# Patient Record
Sex: MALE | Race: WHITE | NOT HISPANIC OR LATINO | Employment: OTHER | URBAN - METROPOLITAN AREA
[De-identification: names, ages, dates, MRNs, and addresses within clinical notes are randomized per-mention and may not be internally consistent; named-entity substitution may affect disease eponyms.]

---

## 2020-01-10 ENCOUNTER — OFFICE VISIT (OUTPATIENT)
Dept: URGENT CARE | Facility: CLINIC | Age: 65
End: 2020-01-10
Payer: MEDICARE

## 2020-01-10 ENCOUNTER — APPOINTMENT (OUTPATIENT)
Dept: RADIOLOGY | Facility: CLINIC | Age: 65
End: 2020-01-10
Attending: FAMILY MEDICINE
Payer: MEDICARE

## 2020-01-10 VITALS
BODY MASS INDEX: 24.59 KG/M2 | HEART RATE: 108 BPM | SYSTOLIC BLOOD PRESSURE: 106 MMHG | OXYGEN SATURATION: 100 % | HEIGHT: 69 IN | TEMPERATURE: 99 F | DIASTOLIC BLOOD PRESSURE: 62 MMHG | WEIGHT: 166 LBS | RESPIRATION RATE: 20 BRPM

## 2020-01-10 DIAGNOSIS — R05.9 COUGH: ICD-10-CM

## 2020-01-10 DIAGNOSIS — B96.89 ACUTE BACTERIAL BRONCHITIS: Primary | ICD-10-CM

## 2020-01-10 DIAGNOSIS — J20.8 ACUTE BACTERIAL BRONCHITIS: Primary | ICD-10-CM

## 2020-01-10 PROBLEM — I44.7 LEFT BUNDLE-BRANCH BLOCK, UNSPECIFIED: Status: ACTIVE | Noted: 2020-01-10

## 2020-01-10 PROCEDURE — 99203 OFFICE O/P NEW LOW 30 MIN: CPT | Performed by: FAMILY MEDICINE

## 2020-01-10 PROCEDURE — 71046 X-RAY EXAM CHEST 2 VIEWS: CPT

## 2020-01-10 RX ORDER — THIAMINE MONONITRATE (VIT B1) 100 MG
100 TABLET ORAL DAILY
COMMUNITY

## 2020-01-10 RX ORDER — MELATONIN
1000 DAILY
COMMUNITY

## 2020-01-10 RX ORDER — BENZONATATE 200 MG/1
200 CAPSULE ORAL 3 TIMES DAILY PRN
Qty: 20 CAPSULE | Refills: 0 | Status: SHIPPED | OUTPATIENT
Start: 2020-01-10

## 2020-01-10 RX ORDER — AMOXICILLIN AND CLAVULANATE POTASSIUM 875; 125 MG/1; MG/1
1 TABLET, FILM COATED ORAL EVERY 12 HOURS SCHEDULED
Qty: 20 TABLET | Refills: 0 | Status: SHIPPED | OUTPATIENT
Start: 2020-01-10 | End: 2020-01-20

## 2020-01-10 RX ORDER — CARVEDILOL 25 MG/1
25 TABLET ORAL DAILY
COMMUNITY

## 2020-01-10 NOTE — PROGRESS NOTES
[facility]        NAME: May Beard is a 75 y o  male  : 1955    MRN: [id number]  DATE: January 10, 2020  TIME: 2:09 PM    Assessment and Plan   Acute bacterial bronchitis [J20 8, B96 89]  1  Acute bacterial bronchitis  XR chest pa & lateral    benzonatate (TESSALON) 200 MG capsule    amoxicillin-clavulanate (AUGMENTIN) 875-125 mg per tablet         Patient Instructions     Patient Instructions   1  Acute Bronchitis  - CXR results pending, but preliminary images appear to be consistent with COPD, but show no obvious acute abnormalities  - Augmentin prescribed, complete as directed   - Tessalon pearls prescribed to help w/ cough   - rest and run a humidifier at home  - fluid intake as recommended per cardiology   - try warm salt water gargles and cough drops as needed   - follow up w/ PCP for re-check on Monday   - if symptoms persist despite treatment, worsen, or any new symptoms present, should be seen in the ER  Follow up with PCP in 3 days  Proceed to  ER if symptoms worsen  Chief Complaint     Chief Complaint   Patient presents with    Cold Like Symptoms     x 10 days         History of Present Illness       74 yo male presents c/o dry cough and chest congestion x 10 days  He has associated nasal congestion, rhinorrhea, PND, and sore throat  No fever/chills  No headache or body aches  No chest pain, SOB, or wheezing  He is a smoker  He denies any hx of asthma or COPD  He did not get the flu shot this year  He has been taking Theraflu and Delsym cough syrup as needed  No GI sx  No skin rashes  He has not been seen for this prior to today  Review of Systems   Review of Systems   Constitutional: Negative  HENT:        As noted in HPI   Eyes: Negative  Respiratory:        As noted in HPI   Cardiovascular: Negative  Gastrointestinal: Negative  Musculoskeletal: Negative  Skin: Negative  Neurological: Negative            Current Medications       Current Outpatient Medications:     Calcium Carb-Cholecalciferol (CALCIUM 1000 + D PO), Take by mouth, Disp: , Rfl:     carvedilol (COREG) 25 mg tablet, Take 25 mg by mouth daily, Disp: , Rfl:     cholecalciferol (VITAMIN D3) 1,000 units tablet, Take 1,000 Units by mouth daily, Disp: , Rfl:     Coenzyme Q10 (COQ-10 PO), Take by mouth, Disp: , Rfl:     sacubitril-valsartan (ENTRESTO) 24-26 MG TABS, Take 1 tablet by mouth 2 (two) times a day, Disp: , Rfl:     thiamine (VITAMIN B1) 100 mg tablet, Take 100 mg by mouth daily, Disp: , Rfl:     amoxicillin-clavulanate (AUGMENTIN) 875-125 mg per tablet, Take 1 tablet by mouth every 12 (twelve) hours for 10 days, Disp: 20 tablet, Rfl: 0    benzonatate (TESSALON) 200 MG capsule, Take 1 capsule (200 mg total) by mouth 3 (three) times a day as needed for cough, Disp: 20 capsule, Rfl: 0    Current Allergies     Allergies as of 01/10/2020    (No Known Allergies)            The following portions of the patient's history were reviewed and updated as appropriate: allergies, current medications, past family history, past medical history, past social history, past surgical history and problem list      Past Medical History:   Diagnosis Date    Heart failure ([address] )        Past Surgical History:   Procedure Laterality Date    FRACTURE SURGERY      knee  left     ROTATOR CUFF REPAIR      right    TONSILLECTOMY         Family History   Problem Relation Age of Onset    Heart failure Mother          Medications have been verified  Objective   /62 (BP Location: Right arm, Patient Position: Sitting, Cuff Size: Standard)   Pulse (!) 108   Temp 99 °F (37 2 °C) (Tympanic)   Resp 20   Ht 5' 9" (1 753 m)   Wt 75 3 kg (166 lb)   SpO2 100%   BMI 24 51 kg/m²        Physical Exam     Physical Exam   Constitutional: He is oriented to person, place, and time  Vital signs are normal  He appears well-developed and well-nourished  He is active and cooperative    Non-toxic appearance  He does not have a sickly appearance  He does not appear ill  No distress  HENT:   Head: Normocephalic and atraumatic  Right Ear: Tympanic membrane, external ear and ear canal normal    Left Ear: Tympanic membrane, external ear and ear canal normal    Nose: Nose normal    Mouth/Throat: Uvula is midline and mucous membranes are normal  Posterior oropharyngeal erythema present  No oropharyngeal exudate, posterior oropharyngeal edema or tonsillar abscesses  No tonsillar exudate  Eyes: Conjunctivae and EOM are normal    Neck: Normal range of motion  Neck supple  Cardiovascular: Normal rate, regular rhythm and normal heart sounds  Pulmonary/Chest: Effort normal  No respiratory distress  Coarse breath sounds and congestion in bilateral upper and lower lobes  Lymphadenopathy:     He has no cervical adenopathy  Neurological: He is alert and oriented to person, place, and time  Skin: He is not diaphoretic  Psychiatric: He has a normal mood and affect  His behavior is normal  Judgment and thought content normal    Nursing note and vitals reviewed

## 2020-01-10 NOTE — PATIENT INSTRUCTIONS
1  Acute Bronchitis  - CXR results pending, but preliminary images appear to be consistent with COPD, but show no obvious acute abnormalities  - Augmentin prescribed, complete as directed   - Tessalon pearls prescribed to help w/ cough   - rest and run a humidifier at home  - fluid intake as recommended per cardiology   - try warm salt water gargles and cough drops as needed   - follow up w/ PCP for re-check on Monday   - if symptoms persist despite treatment, worsen, or any new symptoms present, should be seen in the ER

## 2021-04-01 ENCOUNTER — IMMUNIZATIONS (OUTPATIENT)
Dept: FAMILY MEDICINE CLINIC | Facility: HOSPITAL | Age: 66
End: 2021-04-01

## 2021-04-01 DIAGNOSIS — Z23 ENCOUNTER FOR IMMUNIZATION: Primary | ICD-10-CM

## 2021-04-01 PROCEDURE — 91301 SARS-COV-2 / COVID-19 MRNA VACCINE (MODERNA) 100 MCG: CPT

## 2021-04-01 PROCEDURE — 0011A SARS-COV-2 / COVID-19 MRNA VACCINE (MODERNA) 100 MCG: CPT

## 2021-05-04 ENCOUNTER — IMMUNIZATIONS (OUTPATIENT)
Dept: FAMILY MEDICINE CLINIC | Facility: HOSPITAL | Age: 66
End: 2021-05-04

## 2021-05-04 DIAGNOSIS — Z23 ENCOUNTER FOR IMMUNIZATION: Primary | ICD-10-CM

## 2021-05-04 PROCEDURE — 0012A SARS-COV-2 / COVID-19 MRNA VACCINE (MODERNA) 100 MCG: CPT

## 2021-05-04 PROCEDURE — 91301 SARS-COV-2 / COVID-19 MRNA VACCINE (MODERNA) 100 MCG: CPT

## 2021-12-14 ENCOUNTER — IMMUNIZATIONS (OUTPATIENT)
Dept: FAMILY MEDICINE CLINIC | Facility: HOSPITAL | Age: 66
End: 2021-12-14

## 2021-12-14 DIAGNOSIS — Z23 ENCOUNTER FOR IMMUNIZATION: Primary | ICD-10-CM

## 2021-12-14 PROCEDURE — 91306 COVID-19 MODERNA VACC 0.25 ML BOOSTER: CPT

## 2021-12-14 PROCEDURE — 0064A COVID-19 MODERNA VACC 0.25 ML BOOSTER: CPT

## 2024-02-21 PROBLEM — J20.8 ACUTE BACTERIAL BRONCHITIS: Status: RESOLVED | Noted: 2020-01-10 | Resolved: 2024-02-21

## 2024-02-21 PROBLEM — B96.89 ACUTE BACTERIAL BRONCHITIS: Status: RESOLVED | Noted: 2020-01-10 | Resolved: 2024-02-21

## 2024-03-20 ENCOUNTER — HOSPITAL ENCOUNTER (INPATIENT)
Facility: HOSPITAL | Age: 69
LOS: 6 days | Discharge: HOME | DRG: 392 | End: 2024-03-27
Attending: EMERGENCY MEDICINE | Admitting: INTERNAL MEDICINE
Payer: MEDICARE

## 2024-03-20 ENCOUNTER — APPOINTMENT (EMERGENCY)
Dept: RADIOLOGY | Facility: HOSPITAL | Age: 69
DRG: 392 | End: 2024-03-20
Payer: MEDICARE

## 2024-03-20 DIAGNOSIS — R19.7 DIARRHEA, UNSPECIFIED TYPE: ICD-10-CM

## 2024-03-20 DIAGNOSIS — D64.9 ANEMIA: ICD-10-CM

## 2024-03-20 DIAGNOSIS — R74.8 ELEVATED LIPASE: ICD-10-CM

## 2024-03-20 DIAGNOSIS — Z72.0 NICOTINE ABUSE: ICD-10-CM

## 2024-03-20 DIAGNOSIS — K21.9 GASTROESOPHAGEAL REFLUX DISEASE, UNSPECIFIED WHETHER ESOPHAGITIS PRESENT: ICD-10-CM

## 2024-03-20 DIAGNOSIS — R93.5 ABNORMAL CT OF THE ABDOMEN: ICD-10-CM

## 2024-03-20 DIAGNOSIS — K52.9 COLITIS: ICD-10-CM

## 2024-03-20 DIAGNOSIS — R10.9 ABDOMINAL PAIN: Primary | ICD-10-CM

## 2024-03-20 DIAGNOSIS — R18.8 ASCITES: ICD-10-CM

## 2024-03-20 DIAGNOSIS — E87.6 HYPOKALEMIA: ICD-10-CM

## 2024-03-20 DIAGNOSIS — K64.9 HEMORRHOIDS: ICD-10-CM

## 2024-03-20 LAB
ALBUMIN SERPL BCP-MCNC: 3 G/DL (ref 3.5–5)
ALP SERPL-CCNC: 80 U/L (ref 34–104)
ALT SERPL W P-5'-P-CCNC: 11 U/L (ref 7–52)
ANION GAP SERPL CALCULATED.3IONS-SCNC: 9 MMOL/L (ref 4–13)
AST SERPL W P-5'-P-CCNC: 22 U/L (ref 13–39)
BASE EX.OXY STD BLDV CALC-SCNC: 38.2 % (ref 60–80)
BASE EXCESS BLDV CALC-SCNC: 17.4 MMOL/L
BASOPHILS # BLD AUTO: 0.03 THOUSANDS/ÂΜL (ref 0–0.1)
BASOPHILS NFR BLD AUTO: 0 % (ref 0–1)
BILIRUB SERPL-MCNC: 1.74 MG/DL (ref 0.2–1)
BUN SERPL-MCNC: 6 MG/DL (ref 5–25)
CALCIUM ALBUM COR SERPL-MCNC: 9.1 MG/DL (ref 8.3–10.1)
CALCIUM SERPL-MCNC: 8.3 MG/DL (ref 8.4–10.2)
CHLORIDE SERPL-SCNC: 84 MMOL/L (ref 96–108)
CO2 SERPL-SCNC: 45 MMOL/L (ref 21–32)
CREAT SERPL-MCNC: 0.51 MG/DL (ref 0.6–1.3)
EOSINOPHIL # BLD AUTO: 0.07 THOUSAND/ÂΜL (ref 0–0.61)
EOSINOPHIL NFR BLD AUTO: 1 % (ref 0–6)
ERYTHROCYTE [DISTWIDTH] IN BLOOD BY AUTOMATED COUNT: 18.7 % (ref 11.6–15.1)
ETHANOL SERPL-MCNC: <10 MG/DL
GFR SERPL CREATININE-BSD FRML MDRD: 108 ML/MIN/1.73SQ M
GLUCOSE SERPL-MCNC: 101 MG/DL (ref 65–140)
HCO3 BLDV-SCNC: 42.1 MMOL/L (ref 24–30)
HCT VFR BLD AUTO: 36.3 % (ref 36.5–49.3)
HGB BLD-MCNC: 12.2 G/DL (ref 12–17)
IMM GRANULOCYTES # BLD AUTO: 0.03 THOUSAND/UL (ref 0–0.2)
IMM GRANULOCYTES NFR BLD AUTO: 0 % (ref 0–2)
LACTATE SERPL-SCNC: 1.4 MMOL/L (ref 0.5–2)
LIPASE SERPL-CCNC: 326 U/L (ref 11–82)
LYMPHOCYTES # BLD AUTO: 0.85 THOUSANDS/ÂΜL (ref 0.6–4.47)
LYMPHOCYTES NFR BLD AUTO: 10 % (ref 14–44)
MAGNESIUM SERPL-MCNC: 1.9 MG/DL (ref 1.9–2.7)
MCH RBC QN AUTO: 32.5 PG (ref 26.8–34.3)
MCHC RBC AUTO-ENTMCNC: 33.6 G/DL (ref 31.4–37.4)
MCV RBC AUTO: 97 FL (ref 82–98)
MONOCYTES # BLD AUTO: 0.72 THOUSAND/ÂΜL (ref 0.17–1.22)
MONOCYTES NFR BLD AUTO: 9 % (ref 4–12)
NEUTROPHILS # BLD AUTO: 6.5 THOUSANDS/ÂΜL (ref 1.85–7.62)
NEUTS SEG NFR BLD AUTO: 80 % (ref 43–75)
NRBC BLD AUTO-RTO: 0 /100 WBCS
O2 CT BLDV-SCNC: 7.3 ML/DL
PCO2 BLDV: 48.8 MM HG (ref 42–50)
PH BLDV: 7.55 [PH] (ref 7.3–7.4)
PHOSPHATE SERPL-MCNC: 2.5 MG/DL (ref 2.3–4.1)
PLATELET # BLD AUTO: 200 THOUSANDS/UL (ref 149–390)
PMV BLD AUTO: 9.6 FL (ref 8.9–12.7)
PO2 BLDV: 22.8 MM HG (ref 35–45)
POTASSIUM SERPL-SCNC: 2.1 MMOL/L (ref 3.5–5.3)
PROT SERPL-MCNC: 5.2 G/DL (ref 6.4–8.4)
RBC # BLD AUTO: 3.75 MILLION/UL (ref 3.88–5.62)
SODIUM SERPL-SCNC: 138 MMOL/L (ref 135–147)
WBC # BLD AUTO: 8.2 THOUSAND/UL (ref 4.31–10.16)

## 2024-03-20 PROCEDURE — 82805 BLOOD GASES W/O2 SATURATION: CPT | Performed by: EMERGENCY MEDICINE

## 2024-03-20 PROCEDURE — 96366 THER/PROPH/DIAG IV INF ADDON: CPT

## 2024-03-20 PROCEDURE — 84100 ASSAY OF PHOSPHORUS: CPT | Performed by: EMERGENCY MEDICINE

## 2024-03-20 PROCEDURE — 96375 TX/PRO/DX INJ NEW DRUG ADDON: CPT

## 2024-03-20 PROCEDURE — 83880 ASSAY OF NATRIURETIC PEPTIDE: CPT | Performed by: NURSE PRACTITIONER

## 2024-03-20 PROCEDURE — 83690 ASSAY OF LIPASE: CPT | Performed by: EMERGENCY MEDICINE

## 2024-03-20 PROCEDURE — 83605 ASSAY OF LACTIC ACID: CPT | Performed by: EMERGENCY MEDICINE

## 2024-03-20 PROCEDURE — 80053 COMPREHEN METABOLIC PANEL: CPT | Performed by: EMERGENCY MEDICINE

## 2024-03-20 PROCEDURE — 82077 ASSAY SPEC XCP UR&BREATH IA: CPT | Performed by: EMERGENCY MEDICINE

## 2024-03-20 PROCEDURE — 96365 THER/PROPH/DIAG IV INF INIT: CPT

## 2024-03-20 PROCEDURE — 99284 EMERGENCY DEPT VISIT MOD MDM: CPT

## 2024-03-20 PROCEDURE — 74177 CT ABD & PELVIS W/CONTRAST: CPT

## 2024-03-20 PROCEDURE — 85025 COMPLETE CBC W/AUTO DIFF WBC: CPT | Performed by: EMERGENCY MEDICINE

## 2024-03-20 PROCEDURE — 83735 ASSAY OF MAGNESIUM: CPT | Performed by: EMERGENCY MEDICINE

## 2024-03-20 PROCEDURE — 36415 COLL VENOUS BLD VENIPUNCTURE: CPT | Performed by: EMERGENCY MEDICINE

## 2024-03-20 PROCEDURE — 99285 EMERGENCY DEPT VISIT HI MDM: CPT | Performed by: EMERGENCY MEDICINE

## 2024-03-20 RX ORDER — POTASSIUM CHLORIDE 14.9 MG/ML
20 INJECTION INTRAVENOUS ONCE
Status: COMPLETED | OUTPATIENT
Start: 2024-03-20 | End: 2024-03-21

## 2024-03-20 RX ORDER — ONDANSETRON 2 MG/ML
4 INJECTION INTRAMUSCULAR; INTRAVENOUS ONCE
Status: COMPLETED | OUTPATIENT
Start: 2024-03-20 | End: 2024-03-20

## 2024-03-20 RX ORDER — KETOROLAC TROMETHAMINE 30 MG/ML
15 INJECTION, SOLUTION INTRAMUSCULAR; INTRAVENOUS ONCE
Status: COMPLETED | OUTPATIENT
Start: 2024-03-20 | End: 2024-03-20

## 2024-03-20 RX ORDER — POTASSIUM CHLORIDE 20 MEQ/1
40 TABLET, EXTENDED RELEASE ORAL ONCE
Status: COMPLETED | OUTPATIENT
Start: 2024-03-20 | End: 2024-03-20

## 2024-03-20 RX ADMIN — POTASSIUM CHLORIDE 40 MEQ: 1500 TABLET, EXTENDED RELEASE ORAL at 22:14

## 2024-03-20 RX ADMIN — IOHEXOL 100 ML: 350 INJECTION, SOLUTION INTRAVENOUS at 22:21

## 2024-03-20 RX ADMIN — ONDANSETRON 4 MG: 2 INJECTION INTRAMUSCULAR; INTRAVENOUS at 21:14

## 2024-03-20 RX ADMIN — POTASSIUM CHLORIDE 20 MEQ: 14.9 INJECTION, SOLUTION INTRAVENOUS at 22:15

## 2024-03-20 RX ADMIN — KETOROLAC TROMETHAMINE 15 MG: 30 INJECTION, SOLUTION INTRAMUSCULAR; INTRAVENOUS at 21:14

## 2024-03-21 ENCOUNTER — APPOINTMENT (EMERGENCY)
Dept: RADIOLOGY | Facility: HOSPITAL | Age: 69
DRG: 392 | End: 2024-03-21
Payer: MEDICARE

## 2024-03-21 PROBLEM — R74.8 ELEVATED LIPASE: Status: ACTIVE | Noted: 2024-03-21

## 2024-03-21 PROBLEM — F10.10 ALCOHOL ABUSE: Status: ACTIVE | Noted: 2024-03-21

## 2024-03-21 PROBLEM — M35.3 PMR (POLYMYALGIA RHEUMATICA) (HCC): Status: ACTIVE | Noted: 2024-03-21

## 2024-03-21 PROBLEM — Z72.0 NICOTINE ABUSE: Status: ACTIVE | Noted: 2024-03-21

## 2024-03-21 PROBLEM — E87.6 HYPOKALEMIA: Status: ACTIVE | Noted: 2024-03-21

## 2024-03-21 PROBLEM — I71.20 THORACIC AORTIC ANEURYSM (HCC): Status: ACTIVE | Noted: 2024-03-21

## 2024-03-21 PROBLEM — Z86.79 HISTORY OF CHF (CONGESTIVE HEART FAILURE): Status: ACTIVE | Noted: 2024-03-21

## 2024-03-21 PROBLEM — R10.9 PAIN IN THE ABDOMEN: Status: ACTIVE | Noted: 2024-03-21

## 2024-03-21 PROBLEM — Z87.11 HISTORY OF BLEEDING ULCERS: Status: ACTIVE | Noted: 2024-03-21

## 2024-03-21 PROBLEM — K85.20 ALCOHOL-INDUCED ACUTE PANCREATITIS: Status: ACTIVE | Noted: 2024-03-21

## 2024-03-21 LAB
ALBUMIN SERPL BCP-MCNC: 2.6 G/DL (ref 3.5–5)
ALP SERPL-CCNC: 64 U/L (ref 34–104)
ALT SERPL W P-5'-P-CCNC: 10 U/L (ref 7–52)
ANION GAP SERPL CALCULATED.3IONS-SCNC: 4 MMOL/L (ref 4–13)
ANION GAP SERPL CALCULATED.3IONS-SCNC: 8 MMOL/L (ref 4–13)
AST SERPL W P-5'-P-CCNC: 20 U/L (ref 13–39)
BILIRUB SERPL-MCNC: 1.26 MG/DL (ref 0.2–1)
BNP SERPL-MCNC: 213 PG/ML (ref 0–100)
BUN SERPL-MCNC: 5 MG/DL (ref 5–25)
BUN SERPL-MCNC: 6 MG/DL (ref 5–25)
CALCIUM ALBUM COR SERPL-MCNC: 8.6 MG/DL (ref 8.3–10.1)
CALCIUM SERPL-MCNC: 7.5 MG/DL (ref 8.4–10.2)
CALCIUM SERPL-MCNC: 7.8 MG/DL (ref 8.4–10.2)
CHLORIDE SERPL-SCNC: 90 MMOL/L (ref 96–108)
CHLORIDE SERPL-SCNC: 92 MMOL/L (ref 96–108)
CO2 SERPL-SCNC: 40 MMOL/L (ref 21–32)
CO2 SERPL-SCNC: 40 MMOL/L (ref 21–32)
CREAT SERPL-MCNC: 0.5 MG/DL (ref 0.6–1.3)
CREAT SERPL-MCNC: 0.5 MG/DL (ref 0.6–1.3)
CRP SERPL QL: 107.3 MG/L
ERYTHROCYTE [DISTWIDTH] IN BLOOD BY AUTOMATED COUNT: 19.2 % (ref 11.6–15.1)
GFR SERPL CREATININE-BSD FRML MDRD: 109 ML/MIN/1.73SQ M
GFR SERPL CREATININE-BSD FRML MDRD: 109 ML/MIN/1.73SQ M
GLUCOSE SERPL-MCNC: 144 MG/DL (ref 65–140)
GLUCOSE SERPL-MCNC: 86 MG/DL (ref 65–140)
HCT VFR BLD AUTO: 29.6 % (ref 36.5–49.3)
HGB BLD-MCNC: 10 G/DL (ref 12–17)
INR PPP: 1.18 (ref 0.84–1.19)
LIPASE SERPL-CCNC: 317 U/L (ref 11–82)
MAGNESIUM SERPL-MCNC: 2 MG/DL (ref 1.9–2.7)
MCH RBC QN AUTO: 32.8 PG (ref 26.8–34.3)
MCHC RBC AUTO-ENTMCNC: 33.8 G/DL (ref 31.4–37.4)
MCV RBC AUTO: 97 FL (ref 82–98)
PLATELET # BLD AUTO: 147 THOUSANDS/UL (ref 149–390)
PMV BLD AUTO: 9.5 FL (ref 8.9–12.7)
POTASSIUM SERPL-SCNC: 2.1 MMOL/L (ref 3.5–5.3)
POTASSIUM SERPL-SCNC: 2.1 MMOL/L (ref 3.5–5.3)
PROT SERPL-MCNC: 4.4 G/DL (ref 6.4–8.4)
PROTHROMBIN TIME: 15.2 SECONDS (ref 11.6–14.5)
RBC # BLD AUTO: 3.05 MILLION/UL (ref 3.88–5.62)
SODIUM SERPL-SCNC: 136 MMOL/L (ref 135–147)
SODIUM SERPL-SCNC: 138 MMOL/L (ref 135–147)
WBC # BLD AUTO: 5.28 THOUSAND/UL (ref 4.31–10.16)

## 2024-03-21 PROCEDURE — 80053 COMPREHEN METABOLIC PANEL: CPT | Performed by: NURSE PRACTITIONER

## 2024-03-21 PROCEDURE — 74174 CTA ABD&PLVS W/CONTRAST: CPT

## 2024-03-21 PROCEDURE — C9113 INJ PANTOPRAZOLE SODIUM, VIA: HCPCS | Performed by: NURSE PRACTITIONER

## 2024-03-21 PROCEDURE — 87505 NFCT AGENT DETECTION GI: CPT | Performed by: NURSE PRACTITIONER

## 2024-03-21 PROCEDURE — 99222 1ST HOSP IP/OBS MODERATE 55: CPT | Performed by: SPECIALIST

## 2024-03-21 PROCEDURE — 96366 THER/PROPH/DIAG IV INF ADDON: CPT

## 2024-03-21 PROCEDURE — 36415 COLL VENOUS BLD VENIPUNCTURE: CPT | Performed by: SURGERY

## 2024-03-21 PROCEDURE — 96368 THER/DIAG CONCURRENT INF: CPT

## 2024-03-21 PROCEDURE — 82653 EL-1 FECAL QUANTITATIVE: CPT | Performed by: NURSE PRACTITIONER

## 2024-03-21 PROCEDURE — 87329 GIARDIA AG IA: CPT | Performed by: NURSE PRACTITIONER

## 2024-03-21 PROCEDURE — 80048 BASIC METABOLIC PNL TOTAL CA: CPT | Performed by: FAMILY MEDICINE

## 2024-03-21 PROCEDURE — 99223 1ST HOSP IP/OBS HIGH 75: CPT | Performed by: FAMILY MEDICINE

## 2024-03-21 PROCEDURE — 87493 C DIFF AMPLIFIED PROBE: CPT | Performed by: NURSE PRACTITIONER

## 2024-03-21 PROCEDURE — 83690 ASSAY OF LIPASE: CPT | Performed by: NURSE PRACTITIONER

## 2024-03-21 PROCEDURE — 83993 ASSAY FOR CALPROTECTIN FECAL: CPT | Performed by: NURSE PRACTITIONER

## 2024-03-21 PROCEDURE — 87209 SMEAR COMPLEX STAIN: CPT | Performed by: NURSE PRACTITIONER

## 2024-03-21 PROCEDURE — 93005 ELECTROCARDIOGRAM TRACING: CPT

## 2024-03-21 PROCEDURE — 87177 OVA AND PARASITES SMEARS: CPT | Performed by: NURSE PRACTITIONER

## 2024-03-21 PROCEDURE — 83735 ASSAY OF MAGNESIUM: CPT | Performed by: NURSE PRACTITIONER

## 2024-03-21 PROCEDURE — 85027 COMPLETE CBC AUTOMATED: CPT | Performed by: NURSE PRACTITIONER

## 2024-03-21 PROCEDURE — 97163 PT EVAL HIGH COMPLEX 45 MIN: CPT

## 2024-03-21 PROCEDURE — C9113 INJ PANTOPRAZOLE SODIUM, VIA: HCPCS | Performed by: SURGERY

## 2024-03-21 PROCEDURE — 99222 1ST HOSP IP/OBS MODERATE 55: CPT | Performed by: STUDENT IN AN ORGANIZED HEALTH CARE EDUCATION/TRAINING PROGRAM

## 2024-03-21 PROCEDURE — 86140 C-REACTIVE PROTEIN: CPT | Performed by: NURSE PRACTITIONER

## 2024-03-21 PROCEDURE — 97110 THERAPEUTIC EXERCISES: CPT

## 2024-03-21 PROCEDURE — 96375 TX/PRO/DX INJ NEW DRUG ADDON: CPT

## 2024-03-21 PROCEDURE — 85610 PROTHROMBIN TIME: CPT | Performed by: SURGERY

## 2024-03-21 RX ORDER — CEFTRIAXONE 1 G/50ML
1000 INJECTION, SOLUTION INTRAVENOUS ONCE
Status: COMPLETED | OUTPATIENT
Start: 2024-03-21 | End: 2024-03-21

## 2024-03-21 RX ORDER — CEFTRIAXONE 1 G/50ML
1000 INJECTION, SOLUTION INTRAVENOUS EVERY 24 HOURS
Status: DISCONTINUED | OUTPATIENT
Start: 2024-03-22 | End: 2024-03-26

## 2024-03-21 RX ORDER — POTASSIUM CHLORIDE 20 MEQ/1
40 TABLET, EXTENDED RELEASE ORAL ONCE
Status: COMPLETED | OUTPATIENT
Start: 2024-03-21 | End: 2024-03-21

## 2024-03-21 RX ORDER — POTASSIUM CHLORIDE 14.9 MG/ML
20 INJECTION INTRAVENOUS ONCE
Qty: 100 ML | Refills: 0 | Status: COMPLETED | OUTPATIENT
Start: 2024-03-21 | End: 2024-03-21

## 2024-03-21 RX ORDER — METRONIDAZOLE 500 MG/100ML
500 INJECTION, SOLUTION INTRAVENOUS EVERY 8 HOURS
Status: DISCONTINUED | OUTPATIENT
Start: 2024-03-21 | End: 2024-03-26

## 2024-03-21 RX ORDER — PANTOPRAZOLE SODIUM 40 MG/10ML
40 INJECTION, POWDER, LYOPHILIZED, FOR SOLUTION INTRAVENOUS EVERY 12 HOURS SCHEDULED
Status: DISCONTINUED | OUTPATIENT
Start: 2024-03-21 | End: 2024-03-26

## 2024-03-21 RX ORDER — POTASSIUM CHLORIDE 14.9 MG/ML
20 INJECTION INTRAVENOUS ONCE
Status: COMPLETED | OUTPATIENT
Start: 2024-03-21 | End: 2024-03-22

## 2024-03-21 RX ORDER — SODIUM CHLORIDE, SODIUM LACTATE, POTASSIUM CHLORIDE, CALCIUM CHLORIDE 600; 310; 30; 20 MG/100ML; MG/100ML; MG/100ML; MG/100ML
75 INJECTION, SOLUTION INTRAVENOUS CONTINUOUS
Status: DISCONTINUED | OUTPATIENT
Start: 2024-03-21 | End: 2024-03-21

## 2024-03-21 RX ORDER — METRONIDAZOLE 500 MG/100ML
500 INJECTION, SOLUTION INTRAVENOUS ONCE
Status: COMPLETED | OUTPATIENT
Start: 2024-03-21 | End: 2024-03-21

## 2024-03-21 RX ORDER — ONDANSETRON 2 MG/ML
4 INJECTION INTRAMUSCULAR; INTRAVENOUS EVERY 6 HOURS PRN
Status: DISCONTINUED | OUTPATIENT
Start: 2024-03-21 | End: 2024-03-21

## 2024-03-21 RX ORDER — SODIUM CHLORIDE 9 MG/ML
75 INJECTION, SOLUTION INTRAVENOUS CONTINUOUS
Status: DISPENSED | OUTPATIENT
Start: 2024-03-21 | End: 2024-03-21

## 2024-03-21 RX ORDER — ACETAMINOPHEN 325 MG/1
650 TABLET ORAL EVERY 6 HOURS PRN
Status: DISCONTINUED | OUTPATIENT
Start: 2024-03-21 | End: 2024-03-27 | Stop reason: HOSPADM

## 2024-03-21 RX ORDER — NICOTINE 21 MG/24HR
1 PATCH, TRANSDERMAL 24 HOURS TRANSDERMAL DAILY
Status: DISCONTINUED | OUTPATIENT
Start: 2024-03-21 | End: 2024-03-27 | Stop reason: HOSPADM

## 2024-03-21 RX ORDER — MAGNESIUM SULFATE 1 G/100ML
1 INJECTION INTRAVENOUS ONCE
Status: COMPLETED | OUTPATIENT
Start: 2024-03-21 | End: 2024-03-21

## 2024-03-21 RX ADMIN — B-COMPLEX W/ C & FOLIC ACID TAB 1 TABLET: TAB at 10:42

## 2024-03-21 RX ADMIN — MAGNESIUM SULFATE HEPTAHYDRATE 1 G: 1 INJECTION, SOLUTION INTRAVENOUS at 06:26

## 2024-03-21 RX ADMIN — SODIUM CHLORIDE 75 ML/HR: 0.9 INJECTION, SOLUTION INTRAVENOUS at 05:03

## 2024-03-21 RX ADMIN — POTASSIUM CHLORIDE 40 MEQ: 1500 TABLET, EXTENDED RELEASE ORAL at 18:18

## 2024-03-21 RX ADMIN — PANTOPRAZOLE SODIUM 40 MG: 40 INJECTION, POWDER, FOR SOLUTION INTRAVENOUS at 21:44

## 2024-03-21 RX ADMIN — CEFTRIAXONE 1000 MG: 1 INJECTION, SOLUTION INTRAVENOUS at 02:14

## 2024-03-21 RX ADMIN — POTASSIUM CHLORIDE 20 MEQ: 14.9 INJECTION, SOLUTION INTRAVENOUS at 00:54

## 2024-03-21 RX ADMIN — POTASSIUM CHLORIDE 20 MEQ: 14.9 INJECTION, SOLUTION INTRAVENOUS at 12:33

## 2024-03-21 RX ADMIN — PANTOPRAZOLE SODIUM 40 MG: 40 INJECTION, POWDER, FOR SOLUTION INTRAVENOUS at 10:42

## 2024-03-21 RX ADMIN — IOHEXOL 85 ML: 350 INJECTION, SOLUTION INTRAVENOUS at 02:18

## 2024-03-21 RX ADMIN — POTASSIUM CHLORIDE 40 MEQ: 1500 TABLET, EXTENDED RELEASE ORAL at 22:00

## 2024-03-21 RX ADMIN — PANTOPRAZOLE SODIUM 40 MG: 40 INJECTION, POWDER, FOR SOLUTION INTRAVENOUS at 02:12

## 2024-03-21 RX ADMIN — METRONIDAZOLE 500 MG: 500 INJECTION, SOLUTION INTRAVENOUS at 12:33

## 2024-03-21 RX ADMIN — FOLIC ACID: 5 INJECTION, SOLUTION INTRAMUSCULAR; INTRAVENOUS; SUBCUTANEOUS at 16:18

## 2024-03-21 RX ADMIN — POTASSIUM CHLORIDE 20 MEQ: 14.9 INJECTION, SOLUTION INTRAVENOUS at 22:00

## 2024-03-21 RX ADMIN — POTASSIUM CHLORIDE 20 MEQ: 14.9 INJECTION, SOLUTION INTRAVENOUS at 15:25

## 2024-03-21 RX ADMIN — METRONIDAZOLE 500 MG: 500 INJECTION, SOLUTION INTRAVENOUS at 21:45

## 2024-03-21 RX ADMIN — SODIUM CHLORIDE 1000 ML: 0.9 INJECTION, SOLUTION INTRAVENOUS at 01:41

## 2024-03-21 RX ADMIN — METRONIDAZOLE 500 MG: 500 INJECTION, SOLUTION INTRAVENOUS at 03:00

## 2024-03-21 NOTE — CASE MANAGEMENT
Case Management Assessment & Discharge Planning Note    Patient name Del Ocampo  Location [address] [address]/[address] [address]-* MRN [id number]  : 1955 Date 3/21/2024       Current Admission Date: 3/20/2024  Current Admission Diagnosis:Alcohol-induced acute pancreatitis   Patient Active Problem List    Diagnosis Date Noted    Alcohol-induced acute pancreatitis 2024    Alcohol abuse 2024    Hypokalemia 2024    Elevated lipase 2024    Nicotine abuse 2024    History of CHF (congestive heart failure) 2024    History of bleeding ulcers 2024    Thoracic aortic aneurysm (HCC) 2024    PMR (polymyalgia rheumatica) (HCC) 2024    Left bundle-branch block, unspecified 01/10/2020      LOS (days): 0  Geometric Mean LOS (GMLOS) (days): 3.1  Days to GMLOS:2.7     OBJECTIVE:    Risk of Unplanned Readmission Score: 10.28      Current admission status: Inpatient     Preferred Pharmacy:   [facility]/pharmacy #[id number] - [place], [place] - [address]  [address] [address]  Phone: [phone] Fax: [phone]    Primary Care Provider: Oswaldo Martinez MD    Primary Insurance: MEDICARE  Secondary Insurance: [employer]    ASSESSMENT:  Active Health Care Proxies    There are no active Health Care Proxies on file.  Readmission Root Cause  30 Day Readmission: No    Patient Information  Admitted from:: Home  Mental Status: Alert  During Assessment patient was accompanied by: Spouse  Assessment information provided by:: Spouse  Primary Caregiver: Spouse  Caregiver's Name:: Diann Ocampo (spouse)  Caregiver's Relationship to Patient:: Significant Other  Caregiver's Telephone Number:: [phone]  Support Systems: Self, Spouse/significant other  County of Residence: [facility]  What city do you live in?: [place]  Type of Current Residence: [address] (House does have an in-law suite on 1st floor that is handicap accessible which patient can use if needed)  Upon entering  residence, is there a bedroom on the main floor (no further steps)?: No  A bedroom is located on the following floor levels of residence (select all that apply):: 3rd Floor  Upon entering residence, is there a bathroom on the main floor (no further steps)?: No  Indicate which floors of current residence have a bathroom (select all the apply):: 3rd Floor  Number of steps to 3rd floor from main floor: Two Flights (stair glide to 2nd floor. FF steps to 3rd floor)  Living Arrangements: Lives w/ Spouse/significant other    Activities of Daily Living Prior to Admission  Functional Status: Independent  Completes ADLs independently?: No (Recently has required increased assistance with dressing. Spouse assists with meal prep, household chores, med mgmt, etc.)  Level of ADL dependence: Assistance  Ambulates independently?: Yes  Does patient use assisted devices?: No  Does patient currently own DME?: Yes  What DME does the patient currently own?: Straight Cane, Walker, Stair Chair/Ruffin  Does patient have a history of Outpatient Therapy (PT/OT)?: No  Does the patient have a history of Short-Term Rehab?: No  Does patient currently have HHC?: Yes    Current Home Health Care  Type of Current Home Care Services: Other (Comment) (Nurse Practitioner)  Current Home Health Agency:: Other (please enter name in comment) ([facility])    Patient Information Continued  Income Source: Pension/custodial  Does patient have prescription coverage?: Yes  Does patient receive dialysis treatments?: No  Does patient have a history of substance abuse?: Currently using  Current substance use preference: Alcohol/ETOH (Nicotine)  Historical substance use preference:  (Nicotine)  Is patient currently in treatment for substance abuse?: No. Patient declined treatment information. (Smokes approx 1ppd of cigarettes, drinks approx.1/2 pint ETOH per day)     Means of Transportation  Means of Transport to [facility]:: Family transport    DISCHARGE  DETAILS:    Discharge planning discussed with:: Patient, Spouse  Freedom of Choice: Yes  Comments - Freedom of Choice: SW met bedside with patient/spouse to introduce role, complete assessment, and discuss discharge planning needs. Both decline any outstanding questions, concerns, or needs that SW can assist with at this time. Spouse reports she has support through family members who are employed in the healthcare field. Spouse is also a retired nurses aide. Spouse states that patient is recently active with [facility] for home nurse practitioner visits and has been having ongoing discussions with patient regarding goals of care.  CM contacted family/caregiver?: Yes  Were Treatment Team discharge recommendations reviewed with patient/caregiver?: Yes  Did patient/caregiver verbalize understanding of patient care needs?: Yes  Were patient/caregiver advised of the risks associated with not following Treatment Team discharge recommendations?: Yes    Contacts  Patient Contacts: Diann Ocampo (spouse)  Relationship to Patient:: Family  Contact Method: In Person  Reason/Outcome: Continuity of Care, Emergency Contact, Discharge Planning    Requested Home Health Care         Is the patient interested in HHC at discharge?: No    DME Referral Provided  Referral made for DME?: No    Other Referral/Resources/Interventions Provided:  Interventions: None Indicated     Treatment Team Recommendation: Home  Discharge Destination Plan:: Home  Transport at Discharge : Family

## 2024-03-21 NOTE — CONSULTS
"Physician Requesting Consult: Tyesha Marino DO    Reason for Consult / Principal Problem: Abdominal, ascites    Assessment/Plan:    1.Abdominal pain associated with  nausea, vomiting and diarrhea.  Patient and wife reports longstanding history abdominal pain associated with diarrhea and intermittent episodes of nausea and vomiting which has been going on for many years.  Patient reports that over the last several months diarrhea has increased in severity.  Patient denies any blood in stool, blood from rectal area, but does report intermittent episodes of black stool.  Patient reports using Pepto-Bismol as needed last taken 1 week ago.  CTA abdomen done 3/21 showed indings of severe mid to distal small bowel enteritis and skip segments of severe colitis again seen, similar compared to the prior study. Hemoglobin 12.2 on admission, hemoglobin 10 on 3/21.  Platelets normal on admission platelets 147 today. Differential diagnosis include infectious, ischemic,or inflammatory etiology.    -Stool for C. difficile, Giardia, ova and parasite, and bacteria panel  -Obtain fecal elastase  -Obtain fecal calprotectin   -C-reactive protein 107.3  -Continue supportive care and IV fluid hydration   -Continue electrolyte replacement  -MRI enterography in patient for further evaluation of small bowel for inflammatory bowel disease  -Will need EGD and colonoscopy for further evaluation of nausea, vomiting, abdominal pain and diarrhea.  Will tentatively plan for Monday.    2.  Alcoholic liver disease  3.  Decreased appetite  4.  Protein malnutrition  CT abdomen showed diffuse hepatic steatosis. LFTs within normal limits except total bilirubin 1.74.  LFTs within normal limits except total bilirubin 1.26 and trending downward on 3/21.  Patient reports decreased appetite per wife patient ate half a sandwich last week. Patient reports he does drink a bottle of vodka daily.  Per patient \"it helps my ulcer.\"  MDF 17.8 No need for " prednisolone at current time.  Prognosis good.  MELD 9 on 3/21.    -Obtain ultrasound of abdomen for further evaluation of liver  -Diet as tolerated  -Recommend high-protein supplements such as Ensure with each meal  -CIWA protocol per attending team  -Recommend nutritional consult    5.  Elevated lipase  6.  GERD  Patient noted to have elevated lipase on admission.  Which may be signs of early pancreatitis versus peptic ulcer disease. CTA of abdomen done 3/21 showed pancreas unremarkable.  No abdominal tenderness on examination.  Patient does report acid reflux and heartburn on a daily basis for which he takes Tums at home.    -Continue pantoprazole 40 Mg IV twice daily  -Continue supportive care and IV fluid hydration   -Continue electrolyte replacement    Case discussed with Dr Tobias    HPI: Del Ocampo is a 69 y.o. male  Alcohol-induced acute pancreatitis.  This is a 69-year-old male with a past medical history of alcohol abuse, nicotine abuse, temporal arteritis, CHF, Marinelli's esophagus, colon polyps, peptic ulcer disease, GI bleed who presents with decreased appetite associated with abdominal pain, nausea, vomiting, and diarrhea. Patient and wife reports longstanding history abdominal pain associated with diarrhea and intermittent episodes of nausea and vomiting which has been going on for many years.  Patient reports that over the last several months diarrhea has increased in severity.  Patient denies any blood in stool, blood from rectal area, but does report intermittent episodes of black stool.  Patient reports using Pepto-Bismol as needed last taken 1 week ago. Per wife last episode of vomiting was 3/20 bile, no nonbloody emesis.  Patient also reports acid reflux and heartburn on a daily basis for which he takes Tums at home.  Abdomen exam benign no abdominal tenderness or guarding during consultation.    CTA abdomen chest and pelvis done 3/21 showed findings of severe mid to distal small bowel  enteritis and skip segments of severe colitis again seen, similar compared to the prior study. Findings may be secondary to infectious/inflammatory or ischemic etiology. Clinical correlation recommended.Descending and sigmoid diverticulosis.  Diffuse hepatic steatosis. No evidence of bowel obstruction, pneumatosis intestinalis, or free air. 4.3 cm descending thoracic aortic aneurysm and dilated abdominal aorta measuring up to 2.7 cm in diameter with extensive calcific atherosclerosis. No evidence for aortic dissection. Major abdominal aortic branch vessels are grossly patent with calcific atherosclerosis and multifocal mild luminal narrowing. No occlusion or significant flow-limiting stenosis of the abdominal aorta or proximal branch vessels identified.  Pancreas unremarkable.  Labs on admission: Lipase 326 on admission. Lipase 317 on 3/21.  LFTs within normal limits except total bilirubin 1.74.  LFTs within normal limits except total bilirubin 1.26 and trending downward on 3/21.  Continues with hypokalemia potassium 2.1 on admission and today.  Hemoglobin 12.2 on admission, hemoglobin 10 on 3/21.  Platelets normal on admission platelets 147 today.    Patient does not follow-up regularly with a  medical doctor.  Per wife he has not see a PCP for approximately 8 years until recently when he had a telemedicine visit and was ordered an echocardiogram and lab work. Patient is a smoker. Patient currently drinks vodka 500 mL to 1 L daily for over a year prior to that patient was drinking beer and wine.  No family history of gastric or colon cancer.  Wife reports previous EGD and colonoscopy approximately 8 years ago in [facility]. Report is not available.  Wife does report that they did find Marinelli's esophagus at that time a bleeding ulcer and colon polyps.    Allergies: No Known Allergies    Medications:  Current Facility-Administered Medications:     acetaminophen (TYLENOL) tablet 650 mg, 650 mg, Oral, Q6H PRN, Cuiyin  BREN Thomason    [START ON 3/22/2024] cefTRIAXone (ROCEPHIN) IVPB (premix in dextrose) 1,000 mg 50 mL, 1,000 mg, Intravenous, Q24H, BREN Rodriguez    folic acid 1 mg, thiamine (VITAMIN B1) 100 mg in sodium chloride 0.9 % 100 mL IV piggyback, , Intravenous, Daily, BREN Rodriguez    influenza vaccine, high-dose (FLUZONE HIGH-DOSE) IM injection HONEY 0.7 mL, 0.7 mL, Intramuscular, Prior to discharge, BREN Rodriguez    metroNIDAZOLE (FLAGYL) IVPB (premix) 500 mg 100 mL, 500 mg, Intravenous, Q8H, BREN Rodriguez, Last Rate: 200 mL/hr at 03/21/24 1233, 500 mg at 03/21/24 1233    multivitamin stress formula tablet 1 tablet, 1 tablet, Oral, Daily, BREN Rodriguez, 1 tablet at 03/21/24 1042    nicotine (NICODERM CQ) 21 mg/24 hr TD 24 hr patch 1 patch, 1 patch, Transdermal, Daily, BREN Rodriguez    pantoprazole (PROTONIX) injection 40 mg, 40 mg, Intravenous, Q12H ROBINSON, BREN Rodriguez, 40 mg at 03/21/24 1042    potassium chloride 20 mEq IVPB (premix), 20 mEq, Intravenous, Once, Last Rate: 50 mL/hr at 03/21/24 1233, 20 mEq at 03/21/24 1233 **FOLLOWED BY** potassium chloride 20 mEq IVPB (premix), 20 mEq, Intravenous, Once, Tyesha Revankar, DO    sodium chloride 0.9 % infusion, 75 mL/hr, Intravenous, Continuous, BREN Rodriguez, Last Rate: 75 mL/hr at 03/21/24 0503, 75 mL/hr at 03/21/24 0503    trimethobenzamide (TIGAN) IM injection 200 mg, 200 mg, Intramuscular, Q6H PRN, BREN Rodriguez    Past Medical history:  Past Medical History:   Diagnosis Date    Alcohol abuse     Marinelli's esophagus     Bleeding ulcer     Heart failure (HCC)     PMR (polymyalgia rheumatica) (HCC)     Temporal arteritis (HCC)        Past Surgical History:   Past Surgical History:   Procedure Laterality Date    FRACTURE SURGERY      knee  left     ROTATOR CUFF REPAIR      right    TONSILLECTOMY         Social history:   Social History     Tobacco Use    Smoking status: Every Day     Current packs/day: 1.00     Average packs/day:  1 pack/day for 45.0 years (45.0 ttl pk-yrs)     Types: Cigarettes    Smokeless tobacco: Never   Vaping Use    Vaping status: Never Used   Substance Use Topics    Alcohol use: Yes     Comment: DAILY, 500ml-1L VODKA    Drug use: Not Currently       Family history:   Family History   Problem Relation Age of Onset    Heart failure Mother         Review of Systems: Review of Systems   Constitutional:  Positive for appetite change and fatigue.   Gastrointestinal:  Positive for abdominal pain, diarrhea, nausea and vomiting.   Neurological:  Positive for weakness.   All other systems reviewed and are negative.      Physical Exam: Physical Exam  Vitals and nursing note reviewed.   Constitutional:       General: He is not in acute distress.     Appearance: He is ill-appearing.   HENT:      Head: Normocephalic and atraumatic.   Eyes:      General: No scleral icterus.  Cardiovascular:      Rate and Rhythm: Normal rate and regular rhythm.      Pulses: Normal pulses.      Heart sounds: Normal heart sounds.   Pulmonary:      Effort: Pulmonary effort is normal. No respiratory distress.      Breath sounds: Normal breath sounds. No stridor. No wheezing, rhonchi or rales.   Abdominal:      General: Bowel sounds are normal. There is no distension.      Palpations: Abdomen is soft. There is no mass.      Tenderness: There is no abdominal tenderness. There is no guarding or rebound.      Hernia: No hernia is present.   Musculoskeletal:      Cervical back: Normal range of motion and neck supple.      Right lower leg: No edema.      Left lower leg: No edema.   Skin:     General: Skin is warm and dry.      Capillary Refill: Capillary refill takes less than 2 seconds.      Coloration: Skin is not jaundiced or pale.   Neurological:      Mental Status: He is alert and oriented to person, place, and time.   Psychiatric:         Mood and Affect: Mood normal.           Lab Results:   Recent Results (from the past 24 hour(s))   CBC and  differential    Collection Time: 03/20/24  9:10 PM   Result Value Ref Range    WBC 8.20 4.31 - 10.16 Thousand/uL    RBC 3.75 (L) 3.88 - 5.62 Million/uL    Hemoglobin 12.2 12.0 - 17.0 g/dL    Hematocrit 36.3 (L) 36.5 - 49.3 %    MCV 97 82 - 98 fL    MCH 32.5 26.8 - 34.3 pg    MCHC 33.6 31.4 - 37.4 g/dL    RDW 18.7 (H) 11.6 - 15.1 %    MPV 9.6 8.9 - 12.7 fL    Platelets 200 149 - 390 Thousands/uL    nRBC 0 /100 WBCs    Neutrophils Relative 80 (H) 43 - 75 %    Immature Grans % 0 0 - 2 %    Lymphocytes Relative 10 (L) 14 - 44 %    Monocytes Relative 9 4 - 12 %    Eosinophils Relative 1 0 - 6 %    Basophils Relative 0 0 - 1 %    Neutrophils Absolute 6.50 1.85 - 7.62 Thousands/µL    Absolute Immature Grans 0.03 0.00 - 0.20 Thousand/uL    Absolute Lymphocytes 0.85 0.60 - 4.47 Thousands/µL    Absolute Monocytes 0.72 0.17 - 1.22 Thousand/µL    Eosinophils Absolute 0.07 0.00 - 0.61 Thousand/µL    Basophils Absolute 0.03 0.00 - 0.10 Thousands/µL   Comprehensive metabolic panel    Collection Time: 03/20/24  9:10 PM   Result Value Ref Range    Sodium 138 135 - 147 mmol/L    Potassium 2.1 (LL) 3.5 - 5.3 mmol/L    Chloride 84 (L) 96 - 108 mmol/L    CO2 45 (H) 21 - 32 mmol/L    ANION GAP 9 4 - 13 mmol/L    BUN 6 5 - 25 mg/dL    Creatinine 0.51 (L) 0.60 - 1.30 mg/dL    Glucose 101 65 - 140 mg/dL    Calcium 8.3 (L) 8.4 - 10.2 mg/dL    Corrected Calcium 9.1 8.3 - 10.1 mg/dL    AST 22 13 - 39 U/L    ALT 11 7 - 52 U/L    Alkaline Phosphatase 80 34 - 104 U/L    Total Protein 5.2 (L) 6.4 - 8.4 g/dL    Albumin 3.0 (L) 3.5 - 5.0 g/dL    Total Bilirubin 1.74 (H) 0.20 - 1.00 mg/dL    eGFR 108 ml/min/1.73sq m   Magnesium    Collection Time: 03/20/24  9:10 PM   Result Value Ref Range    Magnesium 1.9 1.9 - 2.7 mg/dL   Phosphorus    Collection Time: 03/20/24  9:10 PM   Result Value Ref Range    Phosphorus 2.5 2.3 - 4.1 mg/dL   Lipase    Collection Time: 03/20/24  9:10 PM   Result Value Ref Range    Lipase 326 (H) 11 - 82 u/L   Lactic acid,  plasma (w/reflex if result > 2.0)    Collection Time: 03/20/24  9:10 PM   Result Value Ref Range    LACTIC ACID 1.4 0.5 - 2.0 mmol/L   Blood gas, venous    Collection Time: 03/20/24  9:10 PM   Result Value Ref Range    pH, Edi 7.554 (H) 7.300 - 7.400    pCO2, Edi 48.8 42.0 - 50.0 mm Hg    pO2, Edi 22.8 (L) 35.0 - 45.0 mm Hg    HCO3, Edi 42.1 (H) 24 - 30 mmol/L    Base Excess, Edi 17.4 mmol/L    O2 Content, Edi 7.3 ml/dL    O2 HGB, VENOUS 38.2 (L) 60.0 - 80.0 %   Ethanol    Collection Time: 03/20/24  9:10 PM   Result Value Ref Range    Ethanol Lvl <10 <10 mg/dL   B-Type Natriuretic Peptide(BNP)    Collection Time: 03/20/24  9:10 PM   Result Value Ref Range     (H) 0 - 100 pg/mL   Protime-INR    Collection Time: 03/21/24  1:51 AM   Result Value Ref Range    Protime 15.2 (H) 11.6 - 14.5 seconds    INR 1.18 0.84 - 1.19   Lipase    Collection Time: 03/21/24 11:11 AM   Result Value Ref Range    Lipase 317 (H) 11 - 82 u/L   Comprehensive metabolic panel    Collection Time: 03/21/24 11:11 AM   Result Value Ref Range    Sodium 138 135 - 147 mmol/L    Potassium 2.1 (LL) 3.5 - 5.3 mmol/L    Chloride 90 (L) 96 - 108 mmol/L    CO2 40 (H) 21 - 32 mmol/L    ANION GAP 8 4 - 13 mmol/L    BUN 5 5 - 25 mg/dL    Creatinine 0.50 (L) 0.60 - 1.30 mg/dL    Glucose 86 65 - 140 mg/dL    Calcium 7.5 (L) 8.4 - 10.2 mg/dL    Corrected Calcium 8.6 8.3 - 10.1 mg/dL    AST 20 13 - 39 U/L    ALT 10 7 - 52 U/L    Alkaline Phosphatase 64 34 - 104 U/L    Total Protein 4.4 (L) 6.4 - 8.4 g/dL    Albumin 2.6 (L) 3.5 - 5.0 g/dL    Total Bilirubin 1.26 (H) 0.20 - 1.00 mg/dL    eGFR 109 ml/min/1.73sq m   Magnesium    Collection Time: 03/21/24 11:11 AM   Result Value Ref Range    Magnesium 2.0 1.9 - 2.7 mg/dL   CBC    Collection Time: 03/21/24 11:11 AM   Result Value Ref Range    WBC 5.28 4.31 - 10.16 Thousand/uL    RBC 3.05 (L) 3.88 - 5.62 Million/uL    Hemoglobin 10.0 (L) 12.0 - 17.0 g/dL    Hematocrit 29.6 (L) 36.5 - 49.3 %    MCV 97 82 - 98 fL     MCH 32.8 26.8 - 34.3 pg    MCHC 33.8 31.4 - 37.4 g/dL    RDW 19.2 (H) 11.6 - 15.1 %    Platelets 147 (L) 149 - 390 Thousands/uL    MPV 9.5 8.9 - 12.7 fL   C-reactive protein    Collection Time: 03/21/24 11:11 AM   Result Value Ref Range    .3 (H) <3.0 mg/L           Imaging Studies: CT abdomen pelvis with contrast    Result Date: 3/21/2024  Narrative: CT ABDOMEN AND PELVIS WITH IV CONTRAST INDICATION: Diffuse abdominal tenderness. COMPARISON: None. TECHNIQUE: CT examination of the abdomen and pelvis was performed. Multiplanar 2D reformatted images were created from the source data. This examination, like all CT scans performed in the [facility], was performed utilizing techniques to minimize radiation dose exposure, including the use of iterative reconstruction and automated exposure control. Radiation dose length product (DLP) for this visit: 480.96 mGy-cm IV Contrast: 100 mL of iohexol (OMNIPAQUE) Enteric Contrast: Not administered. FINDINGS: ABDOMEN LOWER CHEST: Trace pleural effusions with minimal dependent atelectasis in the lung bases. Heart size is normal. No significant pericardial effusion. LIVER/BILIARY TREE: Moderate diffuse hepatic steatosis. No suspicious mass. Normal hepatic contours. No biliary dilation. GALLBLADDER: Distended gallbladder without abnormal wall thickening or calcified gallstones. No pericholecystic inflammatory change. SPLEEN: Unremarkable. PANCREAS: Unremarkable. ADRENAL GLANDS: Unremarkable. KIDNEYS/URETERS: Unremarkable. No hydronephrosis. STOMACH AND BOWEL: Stomach is mildly distended with oral contrast, air, and fluid. No gross intraluminal mass is seen. Abnormal wall thickening and mucosal hyperenhancement involving multiple abdominal/pelvic small bowel loops including the terminal ileum suggesting diffuse severe enteritis. Normal air-filled appendix is seen without inflammatory changes. Severe wall thickening of the sigmoid colon and rectum with  pericolonic mesenteric inflammatory stranding suggesting severe colitis of these segments. Mild wall thickening of the ascending colon and proximal to mid transverse colon also noted suggesting colitis. There is some sparing of the mid to distal transverse colon and descending colon seen. Extensive descending and sigmoid diverticulosis. APPENDIX: Normal. ABDOMINOPELVIC CAVITY: No abdominal/pelvic lymphadenopathy by size criteria. No free air or pneumatosis intestinalis noted. Moderate abdominal/pelvic ascites is seen which could be reactive in etiology. VESSELS: 4.3 cm descending thoracic aortic aneurysm. Abdominal aorta measures up to 2.7 cm in diameter. No evidence for aortic dissection. Extensive calcific atherosclerosis of the thoracoabdominal aorta and proximal branch vessels noted. PELVIS REPRODUCTIVE ORGANS: Unremarkable for patient's age. URINARY BLADDER: Mildly distended and grossly unremarkable. ABDOMINAL WALL/INGUINAL REGIONS: Unremarkable. BONES: No acute fracture or suspicious osseous lesion. Mild multilevel degenerative changes of the thoracolumbar spine worse at the L5-S1 level.     Impression: 1.  Findings of diffuse severe small bowel enteritis and skip segments of severe colitis are seen as described above which may be secondary to infectious/inflammatory process or ischemic etiology. Clinical correlation recommended. Follow-up colonoscopy when clinically appropriate also recommended. 2.  No evidence for bowel obstruction, pneumatosis intestinalis, or free air. Moderate abdominal/pelvic ascites again noted which could be reactive in etiology. 3.  Diffuse hepatic steatosis. 4.  4.3 cm descending thoracic aortic aneurysm and dilated abdominal aorta measuring up to 2.7 cm in diameter with extensive calcific atherosclerosis. No evidence for aortic dissection.  No occlusion or significant flow-limiting stenosis of the abdominal  aorta or proximal branch vessels identified. Workstation performed:  [id number]     CTA abdomen pelvis w wo contrast    Result Date: 3/21/2024  Narrative: CT ANGIOGRAM OF THE ABDOMEN AND PELVIS WITH AND WITHOUT IV CONTRAST INDICATION: Abnormal CT of the abdomen; R/o bowel ischemia. COMPARISON: None. TECHNIQUE: CT angiogram examination of the abdomen and pelvis was performed according to standard protocol. This examination, like all CT scans performed in the [facility], was performed utilizing techniques to minimize radiation dose exposure, including the use of iterative reconstruction and automated exposure control. Contrast as well as noncontrast images were obtained. Rad dose 1784.24 mGy-cm IV Contrast: 85 mL of iohexol (OMNIPAQUE) Enteric Contrast: Not administered. FINDINGS: VASCULAR STRUCTURES: 4.3 cm descending thoracic aortic aneurysm and dilated abdominal aorta measuring up to 2.7 cm. Extensive calcific atherosclerosis of the thoracoabdominal aorta and proximal branch vessels. No evidence for dissection. The celiac artery, superior mesenteric artery, bilateral renal arteries, inferior mesenteric artery, and bilateral common/internal/external iliac arteries as well as the visualized portions of the proximal femoral arteries are grossly patent. Multifocal calcific atherosclerosis with mild luminal narrowing is seen. No occlusion or significant flow-limiting stenosis of the abdominal aorta or proximal branch vessels noted. OTHER FINDINGS ABDOMEN LOWER CHEST: Trace pleural effusions with minimal dependent atelectasis in the lung bases. Heart size is normal. No significant pericardial effusion. LIVER/BILIARY TREE: Moderate diffuse hepatic steatosis. No suspicious mass. Normal hepatic contours. No biliary dilation. GALLBLADDER: Distended gallbladder without abnormal wall thickening or calcified gallstones. No pericholecystic inflammatory change. SPLEEN: Unremarkable. PANCREAS: Unremarkable. ADRENAL GLANDS: Unremarkable. KIDNEYS/URETERS: Unremarkable. No  hydronephrosis. STOMACH AND BOWEL: Stomach is contracted limiting evaluation. Subtle diffuse wall thickening of the stomach could be due to under distention. Abnormal wall thickening and mucosal hyperenhancement involving multiple mid to distal small bowel loops including the terminal ileum suggesting severe enteritis. Normal air-filled appendix is seen without inflammatory changes. Similar-appearing abnormal wall thickening involving the right colon, hepatic flexure and proximal transverse colon, distal descending colon, sigmoid colon, and rectum noted with pericolonic mesenteric inflammatory stranding suggesting severe colitis of the segments. There is somewhat sparing of the mid to distal transverse colon and descending colon noted. Descending and sigmoid diverticulosis again seen. APPENDIX: No findings to suggest appendicitis. ABDOMINOPELVIC CAVITY: No abdominal/pelvic lymphadenopathy by size criteria. No free air or pneumatosis intestinalis noted. Moderate abdominal/pelvic ascites is seen which could be reactive in etiology. PELVIS REPRODUCTIVE ORGANS: Unremarkable for patient's age. URINARY BLADDER: Unremarkable. ABDOMINAL WALL/INGUINAL REGIONS: Unremarkable. BONES: No acute fracture or suspicious osseous lesion. Mild multilevel degenerative changes of the thoracolumbar spine worse at the L5-S1 level.     Impression: 1.  4.3 cm descending thoracic aortic aneurysm and dilated abdominal aorta measuring up to 2.7 cm in diameter with extensive calcific atherosclerosis. No evidence for aortic dissection. Major abdominal aortic branch vessels are grossly patent with calcific atherosclerosis and multifocal mild luminal narrowing. No occlusion or significant flow-limiting stenosis of the abdominal aorta or proximal branch vessels identified. 2.  Findings of severe mid to distal small bowel enteritis and skip segments of severe colitis again seen, similar compared to the prior study. Findings may be secondary to  "infectious/inflammatory or ischemic etiology. Clinical correlation recommended. Descending and sigmoid diverticulosis. 3.  No evidence for bowel obstruction, pneumatosis intestinalis, or free air. Moderate abdominal/pelvic ascites again noted which may be reactive in etiology. 4.  Diffuse hepatic steatosis. Workstation performed: [id number]       Problem List:   Patient Active Problem List   Diagnosis    Left bundle-branch block, unspecified    Alcohol-induced acute pancreatitis    Alcohol abuse    Hypokalemia    Elevated lipase    Nicotine abuse    History of CHF (congestive heart failure)    History of bleeding ulcers    Thoracic aortic aneurysm (HCC)    PMR (polymyalgia rheumatica) (HCC)         BREN Chaves      Please Note: \"This note has been constructed using a voice recognition system.Therefore there may be syntax, spelling, and/or grammatical errors. Please call if you have any questions. \"**   "

## 2024-03-21 NOTE — ED NOTES
Pt put on 2 L of O2 due to pt dipping in 80's while asleep. Pt sating at 97% at this time     Piedad Ferraro RN  03/20/24 1016

## 2024-03-21 NOTE — ASSESSMENT & PLAN NOTE
Potassium 2.1.  Likely due to poor oral intake.  EKG after receiving potassium repletion showed NSR, left BBB which is chronic, QTc 522.  Telemetry  Magnesium 1.9.  Will replete.  Repleted with K-Dur 40 p.o., 40 IV in ED.  Repeat BMP in the morning

## 2024-03-21 NOTE — PHYSICAL THERAPY NOTE
PHYSICAL THERAPY EVALUATION/TREATMENT     03/21/24 1405   PT Last Visit   PT Visit Date 03/21/24   Note Type   Note type Evaluation   Pain Assessment   Pain Assessment Tool Wilson-Baker FACES   Wilson-Baker FACES Pain Rating 2  (Bilateral lower extremity pain from knees to feet with neuropathy and numbness as well)   Restrictions/Precautions   Other Precautions Chair Alarm;Bed Alarm;Fall Risk;Pain   Home Living   Type of Home House   Home Layout Multi-level;Able to live on main level with bedroom/bathroom;Ramped entrance  (stair glide to one level living although patient climibing stairs to next level for bedroom on a daily basis)   Bathroom Equipment Shower chair   Home Equipment Walker;Cane   Additional Comments patient states not using an assistive device prior to admission although with decreased balance and limited mobility in the home only.  As per wife patient has not left the house more than 2 times in the last 2 years   Prior Function   Lives With Spouse   Receives Help From Family   IADLs Family/Friend/Other provides transportation;Family/Friend/Other provides meals;Family/Friend/Other provides medication management   Comments Patient requiring assist for ADLs, IADLs and mobility at times.  Wife states that she stays with the patient at all times when walking on the stairs   General   Additional Pertinent History Chart reviewed, patient admitted with alcohol induced pancreatitis.  Patient now presents as generally weak and deconditioned with resulting gait dysfunction and ataxic type gait at times due to neuropathy and weakness.  Patient with limited mobility prior to admission with EtOH although states that he would like to improve his gait function   Family/Caregiver Present Yes  (Spouse present)   Cognition   Overall Cognitive Status Impaired   Arousal/Participation Cooperative   Orientation Level Oriented to person;Oriented to place;Oriented to situation   Following Commands Follows multistep  commands with increased time or repetition   Comments Patient distracted at times   Subjective   Subjective Patient states having numbness in bilateral legs and feels unbalanced with gait activity   RLE Assessment   RLE Assessment   (Range of motion within functional limits, strength 3+/5)   LLE Assessment   LLE Assessment   (Range of motion within functional limits, strength 3+/5)   Coordination   Movements are Fluid and Coordinated 0   Coordination and Movement Description Decreased coordination with transfers and gait and higher-level balance activity   Bed Mobility   Additional Comments Patient standing at the bathroom sink upon arrival by therapist with wife supervising   Transfers   Sit to Stand 4  Minimal assistance   Additional items Assist x 1   Stand to Sit 4  Minimal assistance   Additional items Assist x 1   Ambulation/Elevation   Gait Assistance 4  Minimal assist   Additional items Assist x 1;Verbal cues;Tactile cues   Assistive Device   (Handhold assist)   Distance 30 feet with change in direction.  Shortened stride length with shuffling type gait patterning and narrow base of support.  Lateral loss of balance at times   Balance   Static Sitting Fair +   Dynamic Sitting Fair -   Static Standing Fair   Dynamic Standing Fair -   Ambulatory Fair -   Activity Tolerance   Activity Tolerance Patient limited by fatigue;Patient limited by pain;Treatment limited secondary to medical complications (Comment)   Nurse Made Aware Yes   Assessment   Prognosis Fair   Problem List Decreased strength;Decreased range of motion;Decreased endurance;Impaired balance;Decreased mobility;Decreased coordination;Impaired judgement;Pain   Assessment Patient seen for Physical Therapy evaluation. Patient admitted with Alcohol-induced acute pancreatitis.  Comorbidities affecting patient's physical performance include: .  Personal factors affecting patient at time of initial evaluation include: lives in multi story house, inability  to ambulate household distances, inability to navigate community distances, inability to navigate level surfaces without external assistance, inability to perform dynamic tasks in community, limited home support, positive fall history, inability to perform physical activity, limited insight into impairments, inability to perform ADLS, and inability to perform IADLS . Prior to admission, patient was requiring assist for functional mobility without assistive device, requiring assist for ADLS, requiring assist for IADLS, living in a multi-level home, ambulating household distance, and home with family assist.  Please find objective findings from Physical Therapy assessment regarding body systems outlined above with impairments and limitations including weakness, decreased ROM, impaired balance, decreased endurance, impaired coordination, gait deviations, pain, decreased activity tolerance, decreased functional mobility tolerance, decreased safety awareness, impaired judgement, and fall risk.  The Barthel Index was used as a functional outcome tool presenting with a score of Barthel Index Score: 55 today indicating marked limitations of functional mobility and ADLS.  Patient's clinical presentation is currently unstable/unpredictable as seen in patient's presentation of vital sign response, changing level of pain, increased fall risk, new onset of impairment of functional mobility, decreased endurance, and new onset of weakness. Pt would benefit from continued Physical Therapy treatment to address deficits as defined above and maximize level of functional mobility. As demonstrated by objective findings, the assigned level of complexity for this evaluation is high.[facility] Raw Score is 17. A Raw score of greater than 16 suggests the patient may benefit from discharge to home. Please also refer to the recommendation of the Physical Therapist for safe discharge planning.    Goals   Patient Goals To have better balance and walk better   STG Expiration Date 03/28/24   Short Term Goal #1 Transfers and gait with roller walker with supervision   Short Term Goal #2 Gait endurance to functional household distances   LTG Expiration Date 04/04/24   Long Term Goal #1 Strength bilateral lower extremities 4 -/5   Long Term Goal #2 Independent transfers and gait with rolling walker for functional community distances   Plan   Treatment/Interventions ADL retraining;Functional transfer training;LE strengthening/ROM;Elevations;Therapeutic exercise;Endurance training;Patient/family training;Equipment eval/education;Bed mobility;Gait training;Compensatory technique education   PT Frequency Other (Comment)  (5 times per week)   Discharge Recommendation   Rehab Resource Intensity Level, PT III (Minimum Resource Intensity)   AM-PAC Basic Mobility Inpatient   Turning in Flat Bed Without Bedrails 3   Lying on Back to Sitting on Edge of Flat Bed Without Bedrails 3   Moving Bed to Chair 3   Standing Up From Chair Using Arms 3   Walk in Room 3   Climb 3-5 Stairs With Railing 2   Basic Mobility Inpatient Raw Score 17   Basic Mobility Standardized Score 39.67   [facility] Highest Level Of Mobility   -HL Goal 5: Stand one or more mins   -HLM Achieved 7: Walk 25 feet or more   Barthel Index   Feeding 10   Bathing 0   Grooming Score 0   Dressing Score 5   Bladder Score 10   Bowels Score 10   Toilet Use Score 5   Transfers (Bed/Chair) Score 10   Mobility (Level Surface) Score 0   Stairs Score 5   Barthel Index Score 55   Additional Treatment Session   Start Time 1350   End Time 1405   Treatment Assessment s: Patient reports numbness in bilateral lower extremities O: Bilateral lower extremity exercise completed as listed below.  Gait training with roller walker with supervision.  50 feet with change in direction and verbal cueing for proper walker usage A: Patient will benefit from continued physical therapy  with progression as tolerated with use of roller walker at all times for safety for fall prevention.  Patient will also benefit from increasing functional mobility with clinical staff as well   Exercises   Hip Flexion Sitting;10 reps;Bilateral  (Alternating)   Hip Abduction Sitting;10 reps;Bilateral  (Alternating)   Knee AROM Long Arc Quad Sitting;10 reps;Bilateral  (Alternating)   Ankle Pumps Sitting;20 reps;Bilateral   Marching Standing;10 reps;Bilateral   Balance training  Sidestepping and backward stepping completed for balance and coordination   Licensure   [place] License Number  Vikki Pitts, PT 4 0 [id number]

## 2024-03-21 NOTE — PLAN OF CARE
Problem: Prexisting or High Potential for Compromised Skin Integrity  Goal: Skin integrity is maintained or improved  Description: INTERVENTIONS:  - Identify patients at risk for skin breakdown  - Assess and monitor skin integrity  - Assess and monitor nutrition and hydration status  - Monitor labs   - Assess for incontinence   - Turn and reposition patient  - Assist with mobility/ambulation  - Relieve pressure over bony prominences  - Avoid friction and shearing  - Provide appropriate hygiene as needed including keeping skin clean and dry  - Evaluate need for skin moisturizer/barrier cream  - Collaborate with interdisciplinary team   - Patient/family teaching  - Consider wound care consult   Outcome: Progressing     Problem: PAIN - ADULT  Goal: Verbalizes/displays adequate comfort level or baseline comfort level  Description: Interventions:  - Encourage patient to monitor pain and request assistance  - Assess pain using appropriate pain scale  - Administer analgesics based on type and severity of pain and evaluate response  - Implement non-pharmacological measures as appropriate and evaluate response  - Consider cultural and social influences on pain and pain management  - Notify physician/advanced practitioner if interventions unsuccessful or patient reports new pain  Outcome: Progressing     Problem: INFECTION - ADULT  Goal: Absence or prevention of progression during hospitalization  Description: INTERVENTIONS:  - Assess and monitor for signs and symptoms of infection  - Monitor lab/diagnostic results  - Monitor all insertion sites, i.e. indwelling lines, tubes, and drains  - Pearson appropriate cooling/warming therapies per order  - Administer medications as ordered  - Instruct and encourage patient and family to use good hand hygiene technique  - Identify and instruct in appropriate isolation precautions for identified infection/condition  Outcome: Progressing  Goal: Absence of fever/infection during  neutropenic period  Description: INTERVENTIONS:  - Monitor WBC    Outcome: Progressing     Problem: SAFETY ADULT  Goal: Patient will remain free of falls  Description: INTERVENTIONS:  - Educate patient/family on patient safety including physical limitations  - Instruct patient to call for assistance with activity   - Consult OT/PT to assist with strengthening/mobility   - Keep Call bell within reach  - Keep bed low and locked with side rails adjusted as appropriate  - Keep care items and personal belongings within reach  - Initiate and maintain comfort rounds  - Make Fall Risk Sign visible to staff  - Apply yellow socks and bracelet for high fall risk patients  - Consider moving patient to room near nurses station  Outcome: Progressing  Goal: Maintain or return to baseline ADL function  Description: INTERVENTIONS:  -  Assess patient's ability to carry out ADLs; assess patient's baseline for ADL function and identify physical deficits which impact ability to perform ADLs (bathing, care of mouth/teeth, toileting, grooming, dressing, etc.)  - Assess/evaluate cause of self-care deficits   - Assess range of motion  - Assess patient's mobility; develop plan if impaired  - Assess patient's need for assistive devices and provide as appropriate  - Encourage maximum independence but intervene and supervise when necessary  - Involve family in performance of ADLs  - Assess for home care needs following discharge   - Consider OT consult to assist with ADL evaluation and planning for discharge  - Provide patient education as appropriate  Outcome: Progressing  Goal: Maintains/Returns to pre admission functional level  Description: INTERVENTIONS:  - Perform AM-PAC 6 Click Basic Mobility/ Daily Activity assessment daily.  - Set and communicate daily mobility goal to care team and patient/family/caregiver.   - Collaborate with rehabilitation services on mobility goals if consulted  - Out of bed for toileting  - Record patient  progress and toleration of activity level   Outcome: Progressing     Problem: DISCHARGE PLANNING  Goal: Discharge to home or other facility with appropriate resources  Description: INTERVENTIONS:  - Identify barriers to discharge w/patient and caregiver  - Arrange for needed discharge resources and transportation as appropriate  - Identify discharge learning needs (meds, wound care, etc.)  - Arrange for interpretive services to assist at discharge as needed  - Refer to Case Management Department for coordinating discharge planning if the patient needs post-hospital services based on physician/advanced practitioner order or complex needs related to functional status, cognitive ability, or social support system  Outcome: Progressing     Problem: Knowledge Deficit  Goal: Patient/family/caregiver demonstrates understanding of disease process, treatment plan, medications, and discharge instructions  Description: Complete learning assessment and assess knowledge base.  Interventions:  - Provide teaching at level of understanding  - Provide teaching via preferred learning methods  Outcome: Progressing     Problem: NEUROSENSORY - ADULT  Goal: Achieves maximal functionality and self care  Description: INTERVENTIONS  - Monitor swallowing and airway patency with patient fatigue and changes in neurological status  - Encourage and assist patient to increase activity and self care.   - Encourage visually impaired, hearing impaired and aphasic patients to use assistive/communication devices  Outcome: Progressing     Problem: CARDIOVASCULAR - ADULT  Goal: Maintains optimal cardiac output and hemodynamic stability  Description: INTERVENTIONS:  - Monitor I/O, vital signs and rhythm  - Monitor for S/S and trends of decreased cardiac output  - Administer and titrate ordered vasoactive medications to optimize hemodynamic stability  - Assess quality of pulses, skin color and temperature  - Assess for signs of decreased coronary artery  perfusion  - Instruct patient to report change in severity of symptoms  Outcome: Progressing  Goal: Absence of cardiac dysrhythmias or at baseline rhythm  Description: INTERVENTIONS:  - Continuous cardiac monitoring, vital signs, obtain 12 lead EKG if ordered  - Administer antiarrhythmic and heart rate control medications as ordered  - Monitor electrolytes and administer replacement therapy as ordered  Outcome: Progressing     Problem: RESPIRATORY - ADULT  Goal: Achieves optimal ventilation and oxygenation  Description: INTERVENTIONS:  - Assess for changes in respiratory status  - Assess for changes in mentation and behavior  - Position to facilitate oxygenation and minimize respiratory effort  - Oxygen administered by appropriate delivery if ordered  - Initiate smoking cessation education as indicated  - Encourage broncho-pulmonary hygiene including cough, deep breathe, Incentive Spirometry  - Assess the need for suctioning and aspirate as needed  - Assess and instruct to report SOB or any respiratory difficulty  - Respiratory Therapy support as indicated  Outcome: Progressing     Problem: GASTROINTESTINAL - ADULT  Goal: Minimal or absence of nausea and/or vomiting  Description: INTERVENTIONS:  - Administer IV fluids if ordered to ensure adequate hydration  - Maintain NPO status until nausea and vomiting are resolved  - Nasogastric tube if ordered  - Administer ordered antiemetic medications as needed  - Provide nonpharmacologic comfort measures as appropriate  - Advance diet as tolerated, if ordered  - Consider nutrition services referral to assist patient with adequate nutrition and appropriate food choices  Outcome: Progressing  Goal: Maintains or returns to baseline bowel function  Description: INTERVENTIONS:  - Assess bowel function  - Encourage oral fluids to ensure adequate hydration  - Administer IV fluids if ordered to ensure adequate hydration  - Administer ordered medications as needed  - Encourage  mobilization and activity  - Consider nutritional services referral to assist patient with adequate nutrition and appropriate food choices  Outcome: Progressing  Goal: Maintains adequate nutritional intake  Description: INTERVENTIONS:  - Monitor percentage of each meal consumed  - Identify factors contributing to decreased intake, treat as appropriate  - Assist with meals as needed  - Monitor I&O, weight, and lab values if indicated  - Obtain nutrition services referral as needed  Outcome: Progressing     Problem: GENITOURINARY - ADULT  Goal: Maintains or returns to baseline urinary function  Description: INTERVENTIONS:  - Assess urinary function  - Encourage oral fluids to ensure adequate hydration if ordered  - Administer IV fluids as ordered to ensure adequate hydration  - Administer ordered medications as needed  - Offer frequent toileting  - Follow urinary retention protocol if ordered  Outcome: Progressing     Problem: METABOLIC, FLUID AND ELECTROLYTES - ADULT  Goal: Electrolytes maintained within normal limits  Description: INTERVENTIONS:  - Monitor labs and assess patient for signs and symptoms of electrolyte imbalances  - Administer electrolyte replacement as ordered  - Monitor response to electrolyte replacements, including repeat lab results as appropriate  - Instruct patient on fluid and nutrition as appropriate  Outcome: Progressing  Goal: Fluid balance maintained  Description: INTERVENTIONS:  - Monitor labs   - Monitor I/O and WT  - Instruct patient on fluid and nutrition as appropriate  - Assess for signs & symptoms of volume excess or deficit  Outcome: Progressing     Problem: MUSCULOSKELETAL - ADULT  Goal: Maintain or return mobility to safest level of function  Description: INTERVENTIONS:  - Assess patient's ability to carry out ADLs; assess patient's baseline for ADL function and identify physical deficits which impact ability to perform ADLs (bathing, care of mouth/teeth, toileting, grooming,  dressing, etc.)  - Assess/evaluate cause of self-care deficits   - Assess range of motion  - Assess patient's mobility  - Assess patient's need for assistive devices and provide as appropriate  - Encourage maximum independence but intervene and supervise when necessary  - Involve family in performance of ADLs  - Assess for home care needs following discharge   - Consider OT consult to assist with ADL evaluation and planning for discharge  - Provide patient education as appropriate  Outcome: Progressing  Goal: Maintain proper alignment of affected body part  Description: INTERVENTIONS:  - Support, maintain and protect limb and body alignment  - Provide patient/ family with appropriate education  Outcome: Progressing

## 2024-03-21 NOTE — H&P (VIEW-ONLY)
"Physician Requesting Consult: Tyesha Marino DO    Reason for Consult / Principal Problem: Abdominal, ascites    Assessment/Plan:    1.Abdominal pain associated with  nausea, vomiting and diarrhea.  Patient and wife reports longstanding history abdominal pain associated with diarrhea and intermittent episodes of nausea and vomiting which has been going on for many years.  Patient reports that over the last several months diarrhea has increased in severity.  Patient denies any blood in stool, blood from rectal area, but does report intermittent episodes of black stool.  Patient reports using Pepto-Bismol as needed last taken 1 week ago.  CTA abdomen done 3/21 showed indings of severe mid to distal small bowel enteritis and skip segments of severe colitis again seen, similar compared to the prior study. Hemoglobin 12.2 on admission, hemoglobin 10 on 3/21.  Platelets normal on admission platelets 147 today. Differential diagnosis include infectious, ischemic,or inflammatory etiology.    -Stool for C. difficile, Giardia, ova and parasite, and bacteria panel  -Obtain fecal elastase  -Obtain fecal calprotectin   -C-reactive protein 107.3  -Continue supportive care and IV fluid hydration   -Continue electrolyte replacement  -MRI enterography in patient for further evaluation of small bowel for inflammatory bowel disease  -Will need EGD and colonoscopy for further evaluation of nausea, vomiting, abdominal pain and diarrhea.  Will tentatively plan for Monday.    2.  Alcoholic liver disease  3.  Decreased appetite  4.  Protein malnutrition  CT abdomen showed diffuse hepatic steatosis. LFTs within normal limits except total bilirubin 1.74.  LFTs within normal limits except total bilirubin 1.26 and trending downward on 3/21.  Patient reports decreased appetite per wife patient ate half a sandwich last week. Patient reports he does drink a bottle of vodka daily.  Per patient \"it helps my ulcer.\"  MDF 17.8 No need for " prednisolone at current time.  Prognosis good.  MELD 9 on 3/21.    -Obtain ultrasound of abdomen for further evaluation of liver  -Diet as tolerated  -Recommend high-protein supplements such as Ensure with each meal  -CIWA protocol per attending team  -Recommend nutritional consult    5.  Elevated lipase  6.  GERD  Patient noted to have elevated lipase on admission.  Which may be signs of early pancreatitis versus peptic ulcer disease. CTA of abdomen done 3/21 showed pancreas unremarkable.  No abdominal tenderness on examination.  Patient does report acid reflux and heartburn on a daily basis for which he takes Tums at home.    -Continue pantoprazole 40 Mg IV twice daily  -Continue supportive care and IV fluid hydration   -Continue electrolyte replacement    Case discussed with Dr Tobias    HPI: Del Ocampo is a 69 y.o. male  Alcohol-induced acute pancreatitis.  This is a 69-year-old male with a past medical history of alcohol abuse, nicotine abuse, temporal arteritis, CHF, Marinelli's esophagus, colon polyps, peptic ulcer disease, GI bleed who presents with decreased appetite associated with abdominal pain, nausea, vomiting, and diarrhea. Patient and wife reports longstanding history abdominal pain associated with diarrhea and intermittent episodes of nausea and vomiting which has been going on for many years.  Patient reports that over the last several months diarrhea has increased in severity.  Patient denies any blood in stool, blood from rectal area, but does report intermittent episodes of black stool.  Patient reports using Pepto-Bismol as needed last taken 1 week ago. Per wife last episode of vomiting was 3/20 bile, no nonbloody emesis.  Patient also reports acid reflux and heartburn on a daily basis for which he takes Tums at home.  Abdomen exam benign no abdominal tenderness or guarding during consultation.    CTA abdomen chest and pelvis done 3/21 showed findings of severe mid to distal small bowel  enteritis and skip segments of severe colitis again seen, similar compared to the prior study. Findings may be secondary to infectious/inflammatory or ischemic etiology. Clinical correlation recommended.Descending and sigmoid diverticulosis.  Diffuse hepatic steatosis. No evidence of bowel obstruction, pneumatosis intestinalis, or free air. 4.3 cm descending thoracic aortic aneurysm and dilated abdominal aorta measuring up to 2.7 cm in diameter with extensive calcific atherosclerosis. No evidence for aortic dissection. Major abdominal aortic branch vessels are grossly patent with calcific atherosclerosis and multifocal mild luminal narrowing. No occlusion or significant flow-limiting stenosis of the abdominal aorta or proximal branch vessels identified.  Pancreas unremarkable.  Labs on admission: Lipase 326 on admission. Lipase 317 on 3/21.  LFTs within normal limits except total bilirubin 1.74.  LFTs within normal limits except total bilirubin 1.26 and trending downward on 3/21.  Continues with hypokalemia potassium 2.1 on admission and today.  Hemoglobin 12.2 on admission, hemoglobin 10 on 3/21.  Platelets normal on admission platelets 147 today.    Patient does not follow-up regularly with a  medical doctor.  Per wife he has not see a PCP for approximately 8 years until recently when he had a telemedicine visit and was ordered an echocardiogram and lab work. Patient is a smoker. Patient currently drinks vodka 500 mL to 1 L daily for over a year prior to that patient was drinking beer and wine.  No family history of gastric or colon cancer.  Wife reports previous EGD and colonoscopy approximately 8 years ago in [facility]. Report is not available.  Wife does report that they did find Marinelli's esophagus at that time a bleeding ulcer and colon polyps.    Allergies: No Known Allergies    Medications:  Current Facility-Administered Medications:     acetaminophen (TYLENOL) tablet 650 mg, 650 mg, Oral, Q6H PRN, Cuiyin  BREN Thomason    [START ON 3/22/2024] cefTRIAXone (ROCEPHIN) IVPB (premix in dextrose) 1,000 mg 50 mL, 1,000 mg, Intravenous, Q24H, BREN Rodriguez    folic acid 1 mg, thiamine (VITAMIN B1) 100 mg in sodium chloride 0.9 % 100 mL IV piggyback, , Intravenous, Daily, BREN Rodriguez    influenza vaccine, high-dose (FLUZONE HIGH-DOSE) IM injection HONEY 0.7 mL, 0.7 mL, Intramuscular, Prior to discharge, BREN Rodriguez    metroNIDAZOLE (FLAGYL) IVPB (premix) 500 mg 100 mL, 500 mg, Intravenous, Q8H, BREN Rodriguez, Last Rate: 200 mL/hr at 03/21/24 1233, 500 mg at 03/21/24 1233    multivitamin stress formula tablet 1 tablet, 1 tablet, Oral, Daily, BREN Rodriguez, 1 tablet at 03/21/24 1042    nicotine (NICODERM CQ) 21 mg/24 hr TD 24 hr patch 1 patch, 1 patch, Transdermal, Daily, BREN Rodriguez    pantoprazole (PROTONIX) injection 40 mg, 40 mg, Intravenous, Q12H ROBINSON, BREN Rodriguez, 40 mg at 03/21/24 1042    potassium chloride 20 mEq IVPB (premix), 20 mEq, Intravenous, Once, Last Rate: 50 mL/hr at 03/21/24 1233, 20 mEq at 03/21/24 1233 **FOLLOWED BY** potassium chloride 20 mEq IVPB (premix), 20 mEq, Intravenous, Once, Tyesha Revankar, DO    sodium chloride 0.9 % infusion, 75 mL/hr, Intravenous, Continuous, BREN Rodriguez, Last Rate: 75 mL/hr at 03/21/24 0503, 75 mL/hr at 03/21/24 0503    trimethobenzamide (TIGAN) IM injection 200 mg, 200 mg, Intramuscular, Q6H PRN, BREN Rodriguez    Past Medical history:  Past Medical History:   Diagnosis Date    Alcohol abuse     Marinelli's esophagus     Bleeding ulcer     Heart failure (HCC)     PMR (polymyalgia rheumatica) (HCC)     Temporal arteritis (HCC)        Past Surgical History:   Past Surgical History:   Procedure Laterality Date    FRACTURE SURGERY      knee  left     ROTATOR CUFF REPAIR      right    TONSILLECTOMY         Social history:   Social History     Tobacco Use    Smoking status: Every Day     Current packs/day: 1.00     Average packs/day:  1 pack/day for 45.0 years (45.0 ttl pk-yrs)     Types: Cigarettes    Smokeless tobacco: Never   Vaping Use    Vaping status: Never Used   Substance Use Topics    Alcohol use: Yes     Comment: DAILY, 500ml-1L VODKA    Drug use: Not Currently       Family history:   Family History   Problem Relation Age of Onset    Heart failure Mother         Review of Systems: Review of Systems   Constitutional:  Positive for appetite change and fatigue.   Gastrointestinal:  Positive for abdominal pain, diarrhea, nausea and vomiting.   Neurological:  Positive for weakness.   All other systems reviewed and are negative.      Physical Exam: Physical Exam  Vitals and nursing note reviewed.   Constitutional:       General: He is not in acute distress.     Appearance: He is ill-appearing.   HENT:      Head: Normocephalic and atraumatic.   Eyes:      General: No scleral icterus.  Cardiovascular:      Rate and Rhythm: Normal rate and regular rhythm.      Pulses: Normal pulses.      Heart sounds: Normal heart sounds.   Pulmonary:      Effort: Pulmonary effort is normal. No respiratory distress.      Breath sounds: Normal breath sounds. No stridor. No wheezing, rhonchi or rales.   Abdominal:      General: Bowel sounds are normal. There is no distension.      Palpations: Abdomen is soft. There is no mass.      Tenderness: There is no abdominal tenderness. There is no guarding or rebound.      Hernia: No hernia is present.   Musculoskeletal:      Cervical back: Normal range of motion and neck supple.      Right lower leg: No edema.      Left lower leg: No edema.   Skin:     General: Skin is warm and dry.      Capillary Refill: Capillary refill takes less than 2 seconds.      Coloration: Skin is not jaundiced or pale.   Neurological:      Mental Status: He is alert and oriented to person, place, and time.   Psychiatric:         Mood and Affect: Mood normal.           Lab Results:   Recent Results (from the past 24 hour(s))   CBC and  differential    Collection Time: 03/20/24  9:10 PM   Result Value Ref Range    WBC 8.20 4.31 - 10.16 Thousand/uL    RBC 3.75 (L) 3.88 - 5.62 Million/uL    Hemoglobin 12.2 12.0 - 17.0 g/dL    Hematocrit 36.3 (L) 36.5 - 49.3 %    MCV 97 82 - 98 fL    MCH 32.5 26.8 - 34.3 pg    MCHC 33.6 31.4 - 37.4 g/dL    RDW 18.7 (H) 11.6 - 15.1 %    MPV 9.6 8.9 - 12.7 fL    Platelets 200 149 - 390 Thousands/uL    nRBC 0 /100 WBCs    Neutrophils Relative 80 (H) 43 - 75 %    Immature Grans % 0 0 - 2 %    Lymphocytes Relative 10 (L) 14 - 44 %    Monocytes Relative 9 4 - 12 %    Eosinophils Relative 1 0 - 6 %    Basophils Relative 0 0 - 1 %    Neutrophils Absolute 6.50 1.85 - 7.62 Thousands/µL    Absolute Immature Grans 0.03 0.00 - 0.20 Thousand/uL    Absolute Lymphocytes 0.85 0.60 - 4.47 Thousands/µL    Absolute Monocytes 0.72 0.17 - 1.22 Thousand/µL    Eosinophils Absolute 0.07 0.00 - 0.61 Thousand/µL    Basophils Absolute 0.03 0.00 - 0.10 Thousands/µL   Comprehensive metabolic panel    Collection Time: 03/20/24  9:10 PM   Result Value Ref Range    Sodium 138 135 - 147 mmol/L    Potassium 2.1 (LL) 3.5 - 5.3 mmol/L    Chloride 84 (L) 96 - 108 mmol/L    CO2 45 (H) 21 - 32 mmol/L    ANION GAP 9 4 - 13 mmol/L    BUN 6 5 - 25 mg/dL    Creatinine 0.51 (L) 0.60 - 1.30 mg/dL    Glucose 101 65 - 140 mg/dL    Calcium 8.3 (L) 8.4 - 10.2 mg/dL    Corrected Calcium 9.1 8.3 - 10.1 mg/dL    AST 22 13 - 39 U/L    ALT 11 7 - 52 U/L    Alkaline Phosphatase 80 34 - 104 U/L    Total Protein 5.2 (L) 6.4 - 8.4 g/dL    Albumin 3.0 (L) 3.5 - 5.0 g/dL    Total Bilirubin 1.74 (H) 0.20 - 1.00 mg/dL    eGFR 108 ml/min/1.73sq m   Magnesium    Collection Time: 03/20/24  9:10 PM   Result Value Ref Range    Magnesium 1.9 1.9 - 2.7 mg/dL   Phosphorus    Collection Time: 03/20/24  9:10 PM   Result Value Ref Range    Phosphorus 2.5 2.3 - 4.1 mg/dL   Lipase    Collection Time: 03/20/24  9:10 PM   Result Value Ref Range    Lipase 326 (H) 11 - 82 u/L   Lactic acid,  plasma (w/reflex if result > 2.0)    Collection Time: 03/20/24  9:10 PM   Result Value Ref Range    LACTIC ACID 1.4 0.5 - 2.0 mmol/L   Blood gas, venous    Collection Time: 03/20/24  9:10 PM   Result Value Ref Range    pH, Edi 7.554 (H) 7.300 - 7.400    pCO2, Edi 48.8 42.0 - 50.0 mm Hg    pO2, Edi 22.8 (L) 35.0 - 45.0 mm Hg    HCO3, Edi 42.1 (H) 24 - 30 mmol/L    Base Excess, Edi 17.4 mmol/L    O2 Content, Edi 7.3 ml/dL    O2 HGB, VENOUS 38.2 (L) 60.0 - 80.0 %   Ethanol    Collection Time: 03/20/24  9:10 PM   Result Value Ref Range    Ethanol Lvl <10 <10 mg/dL   B-Type Natriuretic Peptide(BNP)    Collection Time: 03/20/24  9:10 PM   Result Value Ref Range     (H) 0 - 100 pg/mL   Protime-INR    Collection Time: 03/21/24  1:51 AM   Result Value Ref Range    Protime 15.2 (H) 11.6 - 14.5 seconds    INR 1.18 0.84 - 1.19   Lipase    Collection Time: 03/21/24 11:11 AM   Result Value Ref Range    Lipase 317 (H) 11 - 82 u/L   Comprehensive metabolic panel    Collection Time: 03/21/24 11:11 AM   Result Value Ref Range    Sodium 138 135 - 147 mmol/L    Potassium 2.1 (LL) 3.5 - 5.3 mmol/L    Chloride 90 (L) 96 - 108 mmol/L    CO2 40 (H) 21 - 32 mmol/L    ANION GAP 8 4 - 13 mmol/L    BUN 5 5 - 25 mg/dL    Creatinine 0.50 (L) 0.60 - 1.30 mg/dL    Glucose 86 65 - 140 mg/dL    Calcium 7.5 (L) 8.4 - 10.2 mg/dL    Corrected Calcium 8.6 8.3 - 10.1 mg/dL    AST 20 13 - 39 U/L    ALT 10 7 - 52 U/L    Alkaline Phosphatase 64 34 - 104 U/L    Total Protein 4.4 (L) 6.4 - 8.4 g/dL    Albumin 2.6 (L) 3.5 - 5.0 g/dL    Total Bilirubin 1.26 (H) 0.20 - 1.00 mg/dL    eGFR 109 ml/min/1.73sq m   Magnesium    Collection Time: 03/21/24 11:11 AM   Result Value Ref Range    Magnesium 2.0 1.9 - 2.7 mg/dL   CBC    Collection Time: 03/21/24 11:11 AM   Result Value Ref Range    WBC 5.28 4.31 - 10.16 Thousand/uL    RBC 3.05 (L) 3.88 - 5.62 Million/uL    Hemoglobin 10.0 (L) 12.0 - 17.0 g/dL    Hematocrit 29.6 (L) 36.5 - 49.3 %    MCV 97 82 - 98 fL     MCH 32.8 26.8 - 34.3 pg    MCHC 33.8 31.4 - 37.4 g/dL    RDW 19.2 (H) 11.6 - 15.1 %    Platelets 147 (L) 149 - 390 Thousands/uL    MPV 9.5 8.9 - 12.7 fL   C-reactive protein    Collection Time: 03/21/24 11:11 AM   Result Value Ref Range    .3 (H) <3.0 mg/L           Imaging Studies: CT abdomen pelvis with contrast    Result Date: 3/21/2024  Narrative: CT ABDOMEN AND PELVIS WITH IV CONTRAST INDICATION: Diffuse abdominal tenderness. COMPARISON: None. TECHNIQUE: CT examination of the abdomen and pelvis was performed. Multiplanar 2D reformatted images were created from the source data. This examination, like all CT scans performed in the [facility], was performed utilizing techniques to minimize radiation dose exposure, including the use of iterative reconstruction and automated exposure control. Radiation dose length product (DLP) for this visit: 480.96 mGy-cm IV Contrast: 100 mL of iohexol (OMNIPAQUE) Enteric Contrast: Not administered. FINDINGS: ABDOMEN LOWER CHEST: Trace pleural effusions with minimal dependent atelectasis in the lung bases. Heart size is normal. No significant pericardial effusion. LIVER/BILIARY TREE: Moderate diffuse hepatic steatosis. No suspicious mass. Normal hepatic contours. No biliary dilation. GALLBLADDER: Distended gallbladder without abnormal wall thickening or calcified gallstones. No pericholecystic inflammatory change. SPLEEN: Unremarkable. PANCREAS: Unremarkable. ADRENAL GLANDS: Unremarkable. KIDNEYS/URETERS: Unremarkable. No hydronephrosis. STOMACH AND BOWEL: Stomach is mildly distended with oral contrast, air, and fluid. No gross intraluminal mass is seen. Abnormal wall thickening and mucosal hyperenhancement involving multiple abdominal/pelvic small bowel loops including the terminal ileum suggesting diffuse severe enteritis. Normal air-filled appendix is seen without inflammatory changes. Severe wall thickening of the sigmoid colon and rectum with  pericolonic mesenteric inflammatory stranding suggesting severe colitis of these segments. Mild wall thickening of the ascending colon and proximal to mid transverse colon also noted suggesting colitis. There is some sparing of the mid to distal transverse colon and descending colon seen. Extensive descending and sigmoid diverticulosis. APPENDIX: Normal. ABDOMINOPELVIC CAVITY: No abdominal/pelvic lymphadenopathy by size criteria. No free air or pneumatosis intestinalis noted. Moderate abdominal/pelvic ascites is seen which could be reactive in etiology. VESSELS: 4.3 cm descending thoracic aortic aneurysm. Abdominal aorta measures up to 2.7 cm in diameter. No evidence for aortic dissection. Extensive calcific atherosclerosis of the thoracoabdominal aorta and proximal branch vessels noted. PELVIS REPRODUCTIVE ORGANS: Unremarkable for patient's age. URINARY BLADDER: Mildly distended and grossly unremarkable. ABDOMINAL WALL/INGUINAL REGIONS: Unremarkable. BONES: No acute fracture or suspicious osseous lesion. Mild multilevel degenerative changes of the thoracolumbar spine worse at the L5-S1 level.     Impression: 1.  Findings of diffuse severe small bowel enteritis and skip segments of severe colitis are seen as described above which may be secondary to infectious/inflammatory process or ischemic etiology. Clinical correlation recommended. Follow-up colonoscopy when clinically appropriate also recommended. 2.  No evidence for bowel obstruction, pneumatosis intestinalis, or free air. Moderate abdominal/pelvic ascites again noted which could be reactive in etiology. 3.  Diffuse hepatic steatosis. 4.  4.3 cm descending thoracic aortic aneurysm and dilated abdominal aorta measuring up to 2.7 cm in diameter with extensive calcific atherosclerosis. No evidence for aortic dissection.  No occlusion or significant flow-limiting stenosis of the abdominal  aorta or proximal branch vessels identified. Workstation performed:  [id number]     CTA abdomen pelvis w wo contrast    Result Date: 3/21/2024  Narrative: CT ANGIOGRAM OF THE ABDOMEN AND PELVIS WITH AND WITHOUT IV CONTRAST INDICATION: Abnormal CT of the abdomen; R/o bowel ischemia. COMPARISON: None. TECHNIQUE: CT angiogram examination of the abdomen and pelvis was performed according to standard protocol. This examination, like all CT scans performed in the [facility], was performed utilizing techniques to minimize radiation dose exposure, including the use of iterative reconstruction and automated exposure control. Contrast as well as noncontrast images were obtained. Rad dose 1784.24 mGy-cm IV Contrast: 85 mL of iohexol (OMNIPAQUE) Enteric Contrast: Not administered. FINDINGS: VASCULAR STRUCTURES: 4.3 cm descending thoracic aortic aneurysm and dilated abdominal aorta measuring up to 2.7 cm. Extensive calcific atherosclerosis of the thoracoabdominal aorta and proximal branch vessels. No evidence for dissection. The celiac artery, superior mesenteric artery, bilateral renal arteries, inferior mesenteric artery, and bilateral common/internal/external iliac arteries as well as the visualized portions of the proximal femoral arteries are grossly patent. Multifocal calcific atherosclerosis with mild luminal narrowing is seen. No occlusion or significant flow-limiting stenosis of the abdominal aorta or proximal branch vessels noted. OTHER FINDINGS ABDOMEN LOWER CHEST: Trace pleural effusions with minimal dependent atelectasis in the lung bases. Heart size is normal. No significant pericardial effusion. LIVER/BILIARY TREE: Moderate diffuse hepatic steatosis. No suspicious mass. Normal hepatic contours. No biliary dilation. GALLBLADDER: Distended gallbladder without abnormal wall thickening or calcified gallstones. No pericholecystic inflammatory change. SPLEEN: Unremarkable. PANCREAS: Unremarkable. ADRENAL GLANDS: Unremarkable. KIDNEYS/URETERS: Unremarkable. No  hydronephrosis. STOMACH AND BOWEL: Stomach is contracted limiting evaluation. Subtle diffuse wall thickening of the stomach could be due to under distention. Abnormal wall thickening and mucosal hyperenhancement involving multiple mid to distal small bowel loops including the terminal ileum suggesting severe enteritis. Normal air-filled appendix is seen without inflammatory changes. Similar-appearing abnormal wall thickening involving the right colon, hepatic flexure and proximal transverse colon, distal descending colon, sigmoid colon, and rectum noted with pericolonic mesenteric inflammatory stranding suggesting severe colitis of the segments. There is somewhat sparing of the mid to distal transverse colon and descending colon noted. Descending and sigmoid diverticulosis again seen. APPENDIX: No findings to suggest appendicitis. ABDOMINOPELVIC CAVITY: No abdominal/pelvic lymphadenopathy by size criteria. No free air or pneumatosis intestinalis noted. Moderate abdominal/pelvic ascites is seen which could be reactive in etiology. PELVIS REPRODUCTIVE ORGANS: Unremarkable for patient's age. URINARY BLADDER: Unremarkable. ABDOMINAL WALL/INGUINAL REGIONS: Unremarkable. BONES: No acute fracture or suspicious osseous lesion. Mild multilevel degenerative changes of the thoracolumbar spine worse at the L5-S1 level.     Impression: 1.  4.3 cm descending thoracic aortic aneurysm and dilated abdominal aorta measuring up to 2.7 cm in diameter with extensive calcific atherosclerosis. No evidence for aortic dissection. Major abdominal aortic branch vessels are grossly patent with calcific atherosclerosis and multifocal mild luminal narrowing. No occlusion or significant flow-limiting stenosis of the abdominal aorta or proximal branch vessels identified. 2.  Findings of severe mid to distal small bowel enteritis and skip segments of severe colitis again seen, similar compared to the prior study. Findings may be secondary to  "infectious/inflammatory or ischemic etiology. Clinical correlation recommended. Descending and sigmoid diverticulosis. 3.  No evidence for bowel obstruction, pneumatosis intestinalis, or free air. Moderate abdominal/pelvic ascites again noted which may be reactive in etiology. 4.  Diffuse hepatic steatosis. Workstation performed: [id number]       Problem List:   Patient Active Problem List   Diagnosis    Left bundle-branch block, unspecified    Alcohol-induced acute pancreatitis    Alcohol abuse    Hypokalemia    Elevated lipase    Nicotine abuse    History of CHF (congestive heart failure)    History of bleeding ulcers    Thoracic aortic aneurysm (HCC)    PMR (polymyalgia rheumatica) (HCC)         BREN Chaves      Please Note: \"This note has been constructed using a voice recognition system.Therefore there may be syntax, spelling, and/or grammatical errors. Please call if you have any questions. \"**   "

## 2024-03-21 NOTE — ED NOTES
Potassium paused to give Rocephin. Will restart when ABX is finished     Piedad Ferraro RN  03/21/24 [id number]

## 2024-03-21 NOTE — ED PROVIDER NOTES
"History  Chief Complaint   Patient presents with    Abdominal Pain     Generalized ab pain since last summer. Vomiting today. Daily etoh use for decades. Hasn't been to a doctor in 10 years. Got [facility] labs done last wk.     Pt is a 68yo M who presents for abdominal pain.  Patient reports he has had abdominal pain on and off for months but recently has been worsening.  Patient reports yesterday it was severe.  Patient states it is more tolerable today but still present.  Patient reports it is diffuse.  Patient reports associated nausea and vomiting.  Patient also reports \"dark stools\".  Patient also reports decreased p.o. intake due to decreased appetite.  Patient's wife states that patient has been getting a workup done outpatient.  Patient had outpatient labs done last week that showed a potassium of 2.0.  Patient also had an outpatient ultrasound and the PCP was concerned that he had a \"mass in his stomach\".  Patient states that his wife encouraged him to come in today for further evaluations but patient \"is scared of hospitals\" and therefore did not want to come.  Patient reports he is a daily drinker.  Wife reports that is approximately half a pint per day.  Patient did not have any alcohol today.        Prior to Admission Medications   Prescriptions Last Dose Informant Patient Reported? Taking?   Calcium Carb-Cholecalciferol (CALCIUM 1000 + D PO)   Yes No   Sig: Take by mouth   Coenzyme Q10 (COQ-10 PO)   Yes No   Sig: Take by mouth   benzonatate (TESSALON) 200 MG capsule   No No   Sig: Take 1 capsule (200 mg total) by mouth 3 (three) times a day as needed for cough   carvedilol (COREG) 25 mg tablet  Self Yes No   Sig: Take 25 mg by mouth daily   cholecalciferol (VITAMIN D3) 1,000 units tablet   Yes No   Sig: Take 1,000 Units by mouth daily   sacubitril-valsartan (ENTRESTO) 24-26 MG TABS  Self Yes No   Sig: Take 1 tablet by mouth 2 (two) times a day   thiamine (VITAMIN B1) 100 mg tablet   Yes No   Sig: " Take 100 mg by mouth daily      Facility-Administered Medications: None       Past Medical History:   Diagnosis Date    Heart failure (HCC)        Past Surgical History:   Procedure Laterality Date    FRACTURE SURGERY      knee  left     ROTATOR CUFF REPAIR      right    TONSILLECTOMY         Family History   Problem Relation Age of Onset    Heart failure Mother      I have reviewed and agree with the history as documented.    E-Cigarette/Vaping    E-Cigarette Use Never User      E-Cigarette/Vaping Substances    Nicotine No     THC No     CBD No     Flavoring No     Other No     Unknown No      Social History     Tobacco Use    Smoking status: Every Day     Current packs/day: 1.00     Average packs/day: 1 pack/day for 45.0 years (45.0 ttl pk-yrs)     Types: Cigarettes    Smokeless tobacco: Never   Vaping Use    Vaping status: Never Used   Substance Use Topics    Alcohol use: Yes     Comment: DAILY, 1L VODKA    Drug use: Not Currently       Review of Systems   Constitutional:  Positive for appetite change (decreased).   Gastrointestinal:  Positive for abdominal pain, nausea and vomiting.   All other systems reviewed and are negative.      Physical Exam  Physical Exam  Vitals reviewed.   Constitutional:       General: He is not in acute distress.     Appearance: He is well-developed. He is not diaphoretic.   HENT:      Head: Normocephalic and atraumatic.      Right Ear: External ear normal.      Left Ear: External ear normal.      Nose: Nose normal.      Mouth/Throat:      Pharynx: Oropharynx is clear.   Eyes:      Extraocular Movements: Extraocular movements intact.      Pupils: Pupils are equal, round, and reactive to light.   Cardiovascular:      Rate and Rhythm: Normal rate and regular rhythm.      Heart sounds: Normal heart sounds.   Pulmonary:      Effort: Pulmonary effort is normal. No respiratory distress.      Breath sounds: Normal breath sounds.   Abdominal:      General: Bowel sounds are normal. There is no  distension.      Palpations: Abdomen is soft. There is no mass.      Tenderness: There is abdominal tenderness (diffuse). There is no right CVA tenderness, left CVA tenderness or guarding.   Musculoskeletal:         General: Normal range of motion.      Cervical back: Normal range of motion and neck supple.      Right lower leg: No edema.      Left lower leg: No edema.   Skin:     General: Skin is warm and dry.      Capillary Refill: Capillary refill takes less than 2 seconds.      Coloration: Skin is not pale.      Findings: No erythema or rash.   Neurological:      General: No focal deficit present.      Mental Status: He is alert and oriented to person, place, and time.   Psychiatric:         Speech: Speech normal.         Behavior: Behavior is cooperative.         Vital Signs  ED Triage Vitals [03/20/24 2043]   Temperature Pulse Respirations Blood Pressure SpO2   97.9 °F (36.6 °C) 71 16 123/58 92 %      Temp Source Heart Rate Source Patient Position - Orthostatic VS BP Location FiO2 (%)   Tympanic Monitor Sitting Left arm --      Pain Score       10 - Worst Possible Pain           Vitals:    03/20/24 2043 03/20/24 2234 03/20/24 2345 03/21/24 0015   BP: 123/58 106/56 114/59 127/65   Pulse: 71 84 78 76   Patient Position - Orthostatic VS: Sitting            Visual Acuity      ED Medications  Medications   potassium chloride 20 mEq IVPB (premix) (20 mEq Intravenous New Bag 3/21/24 0054)     Followed by   potassium chloride 20 mEq IVPB (premix) (0 mEq Intravenous Stopped 3/21/24 0053)   sodium chloride 0.9 % bolus 1,000 mL (has no administration in time range)   cefTRIAXone (ROCEPHIN) IVPB (premix in dextrose) 1,000 mg 50 mL (has no administration in time range)   ketorolac (TORADOL) injection 15 mg (15 mg Intravenous Given 3/20/24 2114)   ondansetron (ZOFRAN) injection 4 mg (4 mg Intravenous Given 3/20/24 2114)   potassium chloride (Klor-Con M20) CR tablet 40 mEq (40 mEq Oral Given 3/20/24 2214)   iohexol  (OMNIPAQUE) 350 MG/ML injection (MULTI-DOSE) 100 mL (100 mL Intravenous Given 3/20/24 2221)       Diagnostic Studies  Results Reviewed       Procedure Component Value Units Date/Time    Protime-INR [[id number]]     Lab Status: No result Specimen: Blood     Comprehensive metabolic panel [[id number]]  (Abnormal) Collected: 03/20/24 2110    Lab Status: Final result Specimen: Blood from Arm, Left Updated: 03/20/24 2151     Sodium 138 mmol/L      Potassium 2.1 mmol/L      Chloride 84 mmol/L      CO2 45 mmol/L      ANION GAP 9 mmol/L      BUN 6 mg/dL      Creatinine 0.51 mg/dL      Glucose 101 mg/dL      Calcium 8.3 mg/dL      Corrected Calcium 9.1 mg/dL      AST 22 U/L      ALT 11 U/L      Alkaline Phosphatase 80 U/L      Total Protein 5.2 g/dL      Albumin 3.0 g/dL      Total Bilirubin 1.74 mg/dL      eGFR 108 ml/min/1.73sq m     Narrative:      National Kid[employer] guidelines for Chronic Kidney Disease (CKD):     Stage 1 with normal or high GFR (GFR > 90 mL/min/1.73 square meters)    Stage 2 Mild CKD (GFR = 60-89 mL/min/1.73 square meters)    Stage 3A Moderate CKD (GFR = 45-59 mL/min/1.73 square meters)    Stage 3B Moderate CKD (GFR = 30-44 mL/min/1.73 square meters)    Stage 4 Severe CKD (GFR = 15-29 mL/min/1.73 square meters)    Stage 5 End Stage CKD (GFR <15 mL/min/1.73 square meters)  Note: GFR calculation is accurate only with a steady state creatinine    Magnesium [[id number]]  (Normal) Collected: 03/20/24 2110    Lab Status: Final result Specimen: Blood from Arm, Left Updated: 03/20/24 2149     Magnesium 1.9 mg/dL     Phosphorus [[id number]]  (Normal) Collected: 03/20/24 2110    Lab Status: Final result Specimen: Blood from Arm, Left Updated: 03/20/24 2149     Phosphorus 2.5 mg/dL     Lipase [[id number]]  (Abnormal) Collected: 03/20/24 2110    Lab Status: Final result Specimen: Blood from Arm, Left Updated: 03/20/24 2149     Lipase 326 u/L     Lactic acid, plasma (w/reflex if result > 2.0) [[id number]]   (Normal) Collected: 03/20/24 2110    Lab Status: Final result Specimen: Blood from Arm, Left Updated: 03/20/24 2144     LACTIC ACID 1.4 mmol/L     Narrative:      Result may be elevated if tourniquet was used during collection.    Ethanol [[id number]]  (Normal) Collected: 03/20/24 2110    Lab Status: Final result Specimen: Blood from Arm, Left Updated: 03/20/24 2131     Ethanol Lvl <10 mg/dL     CBC and differential [[id number]]  (Abnormal) Collected: 03/20/24 2110    Lab Status: Final result Specimen: Blood from Arm, Left Updated: 03/20/24 2116     WBC 8.20 Thousand/uL      RBC 3.75 Million/uL      Hemoglobin 12.2 g/dL      Hematocrit 36.3 %      MCV 97 fL      MCH 32.5 pg      MCHC 33.6 g/dL      RDW 18.7 %      MPV 9.6 fL      Platelets 200 Thousands/uL      nRBC 0 /100 WBCs      Neutrophils Relative 80 %      Immature Grans % 0 %      Lymphocytes Relative 10 %      Monocytes Relative 9 %      Eosinophils Relative 1 %      Basophils Relative 0 %      Neutrophils Absolute 6.50 Thousands/µL      Absolute Immature Grans 0.03 Thousand/uL      Absolute Lymphocytes 0.85 Thousands/µL      Absolute Monocytes 0.72 Thousand/µL      Eosinophils Absolute 0.07 Thousand/µL      Basophils Absolute 0.03 Thousands/µL     Blood gas, venous [[id number]]  (Abnormal) Collected: 03/20/24 2110    Lab Status: Final result Specimen: Blood from Arm, Left Updated: 03/20/24 2116     pH, Edi 7.554     pCO2, Edi 48.8 mm Hg      pO2, Edi 22.8 mm Hg      HCO3, Edi 42.1 mmol/L      Base Excess, Edi 17.4 mmol/L      O2 Content, Edi 7.3 ml/dL      O2 HGB, VENOUS 38.2 %                    CT abdomen pelvis with contrast    (Results Pending)   CTA abdomen pelvis w wo contrast    (Results Pending)              Procedures  Procedures         ED Course  ED Course as of 03/21/24 0128   Wed Mar 20, 2024   2121 CBC and differential(!)  Reviewed and without actionable derangement.    2121 pH, Edi(!): 7.554  Alkalosis present.   2131 ETHANOL: <10  Negative.     2155 LIPASE(!): 326  Elevated more than 3x the upper limit of normal.    2155 MAGNESIUM: 1.9  WNL   2155 Phosphorus: 2.5  WNL   2155 LACTIC ACID: 1.4  WNL   2155 Potassium(!!): 2.1  Will replete.    2156 Total Bilirubin(!): 1.74  Elevated, no prior available for comparison.    2219 Pt being transported to CT.    u Mar 21, 2024   0043 CT abdomen pelvis with contrast  Per vRads,  1. Wall thickening and edema in the small bowel, cecum, and ascending colon and similar involvement of the sigmoid colon and rectum. Mural and mucosal hypoenhancement in the sigmoid colon and rectum is concerning for bowel ischemia. Other possible etiologies include viral enterocolitis, angioedema, or vasculitis.   2. Hepatic steatosis.   3. Mild ascites   0056 Hemoccult negative.    0119 Discussed with surgery team via TT who recommended medicine admission with GI and surgical consults as well as prophylactic abx for SBP and CTA. Ordered.    0120 SLIM made aware via TT.                                 SBIRT 22yo+      Flowsheet Row Most Recent Value   Initial Alcohol Screen: US AUDIT-C     1. How often do you have a drink containing alcohol? 6 Filed at: 03/20/2024 2045   2. How many drinks containing alcohol do you have on a typical day you are drinking?  5 Filed at: 03/20/2024 2045   3a. Male UNDER 65: How often do you have five or more drinks on one occasion? 6 Filed at: 03/20/2024 2045   Audit-C Score 17 Filed at: 03/20/2024 2045   Full Alcohol Screen: US AUDIT    4. How often during the last year have you found that you were not able to stop drinking once you had started? 4 Filed at: 03/20/2024 2045   5. How often during past year have you failed to do what was normally expected of you because of drinking?  4 Filed at: 03/20/2024 2045   6. How often in past year have you needed a first drink in the morning to get yourself going after a heavy drinking session?  4 Filed at: 03/20/2024 2045   7. How often in past year have you had  feeling of guilt or remorse after drinking?  4 Filed at: 03/20/2024 2045   8. How often in past year have you been unable to remember what happened night before because you had been drinking?  4 Filed at: 03/20/2024 2045   9. Have you or someone else been injured as a result of your drinking?  4 Filed at: 03/20/2024 2045   10. Has a relative, friend, doctor or other health worker been concerned about your drinking and suggested you cut down?  4 Filed at: 03/20/2024 2045   AUDIT Total Score 45 Filed at: 03/20/2024 2045   BRIGIDA: How many times in the past year have you...    Used an illegal drug or used a prescription medication for non-medical reasons? Never Filed at: 03/20/2024 2045                      Medical Decision Making  Pt is a 70yo M who presents with abdominal pain. Exam pertinent for diffuse abdominal tenderness.    Differential diagnosis to include but not limited to pancreatitis, cholecystitis, bowel ischemia, gastroenteritis, dehydration, electrolyte abnormality.   Will plan for labs and imaging. Will treat symptomatically and reassess. See ED course for results and details.    Plan to admit pt. Awaiting CTA prior to admission.       Amount and/or Complexity of Data Reviewed  Labs: ordered. Decision-making details documented in ED Course.  Radiology: ordered. Decision-making details documented in ED Course.    Risk  Prescription drug management.             Disposition  Final diagnoses:   Abdominal pain   Ascites   Elevated lipase   Abnormal CT of the abdomen     Time reflects when diagnosis was documented in both MDM as applicable and the Disposition within this note       Time User Action Codes Description Comment    3/21/2024  1:19 AM Rosanna Augustin Add [R10.9] Abdominal pain     3/21/2024  1:19 AM Rosanna Augustin Add [R18.8] Ascites     3/21/2024  1:19 AM Rosanna Augustin Add [R74.8] Elevated lipase     3/21/2024  1:19 AM Rosanna Augustin Add [R93.5] Abnormal CT of the abdomen           ED  Disposition       ED Disposition   Admit    Condition   Stable    Date/Time   u Mar 21, 2024 0127    Comment                  Follow-up Information    None         Patient's Medications   Discharge Prescriptions    No medications on file       No discharge procedures on file.    PDMP Review       None            ED Provider  Electronically Signed by             Rosanna Augustin MD  03/21/24 5522

## 2024-03-21 NOTE — ASSESSMENT & PLAN NOTE
Patient presented with abdominal pain, poor appetite, hypokalemia on outpatient lab work.  I ate poorly in past 2 weeks.  Patient reports intermittent abdominal pain for about 2 years, with vomiting in the morning after eating once a day for about 1 year, with coffee-ground color diarrhea and constipation alternating chronically.  Daily alcohol use for decades.  Currently drinks vodka 500 ml to 1 L daily for over 1 year, was drinking beer and wine prior to that.  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 Has not seen a doctor in office for about 8  years. Had a tele health visit recently, ordered Echo and lab work. K was 2.0 on 3/12.   CT abdomen pelvis with IV contrast and CTA abdomen pelvis in ED.  Showed diffuse severe small bowel enteritis and skip segments of severe colitis which could be infectious/inflammatory process or ischemic etiology.  Moderate ascites.  Diffuse hepatic steatosis. No occlusion or significant flow-limiting stenosis of the abdominal aorta or proximal branch vessels identified.   Lactic acid normal  Lipase 326 --> 317  Continue Rocephin Flagyl.  Continue clear liquid diet  Appreciate GI and surgical input  Plan for EGD and colonoscopy on 3/25

## 2024-03-21 NOTE — PLAN OF CARE
Problem: Prexisting or High Potential for Compromised Skin Integrity  Goal: Skin integrity is maintained or improved  Description: INTERVENTIONS:  - Identify patients at risk for skin breakdown  - Assess and monitor skin integrity  - Assess and monitor nutrition and hydration status  - Monitor labs   - Assess for incontinence   - Turn and reposition patient  - Assist with mobility/ambulation  - Relieve pressure over bony prominences  - Avoid friction and shearing  - Provide appropriate hygiene as needed including keeping skin clean and dry  - Evaluate need for skin moisturizer/barrier cream  - Collaborate with interdisciplinary team   - Patient/family teaching  - Consider wound care consult   Outcome: Progressing     Problem: PAIN - ADULT  Goal: Verbalizes/displays adequate comfort level or baseline comfort level  Description: Interventions:  - Encourage patient to monitor pain and request assistance  - Assess pain using appropriate pain scale  - Administer analgesics based on type and severity of pain and evaluate response  - Implement non-pharmacological measures as appropriate and evaluate response  - Consider cultural and social influences on pain and pain management  - Notify physician/advanced practitioner if interventions unsuccessful or patient reports new pain  Outcome: Progressing     Problem: INFECTION - ADULT  Goal: Absence or prevention of progression during hospitalization  Description: INTERVENTIONS:  - Assess and monitor for signs and symptoms of infection  - Monitor lab/diagnostic results  - Monitor all insertion sites, i.e. indwelling lines, tubes, and drains  - Monitor endotracheal if appropriate and nasal secretions for changes in amount and color  - Cranberry appropriate cooling/warming therapies per order  - Administer medications as ordered  - Instruct and encourage patient and family to use good hand hygiene technique  - Identify and instruct in appropriate isolation precautions for  identified infection/condition  Outcome: Progressing     Problem: INFECTION - ADULT  Goal: Absence of fever/infection during neutropenic period  Description: INTERVENTIONS:  - Monitor WBC    Outcome: Progressing     Problem: SAFETY ADULT  Goal: Patient will remain free of falls  Description: INTERVENTIONS:  - Educate patient/family on patient safety including physical limitations  - Instruct patient to call for assistance with activity   - Consult OT/PT to assist with strengthening/mobility   - Keep Call bell within reach  - Keep bed low and locked with side rails adjusted as appropriate  - Keep care items and personal belongings within reach  - Initiate and maintain comfort rounds  - Make Fall Risk Sign visible to staff  - Offer Toileting every 2 Hours, in advance of need  - Initiate/Maintain bed alarm  - Obtain necessary fall risk management equipment:   - Apply yellow socks and bracelet for high fall risk patients  - Consider moving patient to room near nurses station  Outcome: Progressing  Goal: Maintain or return to baseline ADL function  Description: INTERVENTIONS:  -  Assess patient's ability to carry out ADLs; assess patient's baseline for ADL function and identify physical deficits which impact ability to perform ADLs (bathing, care of mouth/teeth, toileting, grooming, dressing, etc.)  - Assess/evaluate cause of self-care deficits   - Assess range of motion  - Assess patient's mobility; develop plan if impaired  - Assess patient's need for assistive devices and provide as appropriate  - Encourage maximum independence but intervene and supervise when necessary  - Involve family in performance of ADLs  - Assess for home care needs following discharge   - Consider OT consult to assist with ADL evaluation and planning for discharge  - Provide patient education as appropriate  Outcome: Progressing  Goal: Maintains/Returns to pre admission functional level  Description: INTERVENTIONS:  - Perform AM-PAC 6 Click  Basic Mobility/ Daily Activity assessment daily.  - Set and communicate daily mobility goal to care team and patient/family/caregiver.   - Collaborate with rehabilitation services on mobility goals if consulted  - Perform Range of Motion 3 times a day.  - Actively turns self.   - Dangle patient 3 times a day  - Stand patient 3 times a day  - Ambulate patient 3 times a day  - Out of bed to chair 3 times a day   - Out of bed for meals 3 times a day  - Out of bed for toileting  - Record patient progress and toleration of activity level   Outcome: Progressing     Problem: DISCHARGE PLANNING  Goal: Discharge to home or other facility with appropriate resources  Description: INTERVENTIONS:  - Identify barriers to discharge w/patient and caregiver  - Arrange for needed discharge resources and transportation as appropriate  - Identify discharge learning needs (meds, wound care, etc.)  - Arrange for interpretive services to assist at discharge as needed  - Refer to Case Management Department for coordinating discharge planning if the patient needs post-hospital services based on physician/advanced practitioner order or complex needs related to functional status, cognitive ability, or social support system  Outcome: Progressing     Problem: Knowledge Deficit  Goal: Patient/family/caregiver demonstrates understanding of disease process, treatment plan, medications, and discharge instructions  Description: Complete learning assessment and assess knowledge base.  Interventions:  - Provide teaching at level of understanding  - Provide teaching via preferred learning methods  Outcome: Progressing     Problem: NEUROSENSORY - ADULT  Goal: Achieves maximal functionality and self care  Description: INTERVENTIONS  - Monitor swallowing and airway patency with patient fatigue and changes in neurological status  - Encourage and assist patient to increase activity and self care.   - Encourage visually impaired, hearing impaired and aphasic  patients to use assistive/communication devices  Outcome: Progressing     Problem: CARDIOVASCULAR - ADULT  Goal: Maintains optimal cardiac output and hemodynamic stability  Description: INTERVENTIONS:  - Monitor I/O, vital signs and rhythm  - Monitor for S/S and trends of decreased cardiac output  - Administer and titrate ordered vasoactive medications to optimize hemodynamic stability  - Assess quality of pulses, skin color and temperature  - Assess for signs of decreased coronary artery perfusion  - Instruct patient to report change in severity of symptoms  Outcome: Progressing  Goal: Absence of cardiac dysrhythmias or at baseline rhythm  Description: INTERVENTIONS:  - Continuous cardiac monitoring, vital signs, obtain 12 lead EKG if ordered  - Administer antiarrhythmic and heart rate control medications as ordered  - Monitor electrolytes and administer replacement therapy as ordered  Outcome: Progressing     Problem: RESPIRATORY - ADULT  Goal: Achieves optimal ventilation and oxygenation  Description: INTERVENTIONS:  - Assess for changes in respiratory status  - Assess for changes in mentation and behavior  - Position to facilitate oxygenation and minimize respiratory effort  - Oxygen administered by appropriate delivery if ordered  - Initiate smoking cessation education as indicated  - Encourage broncho-pulmonary hygiene including cough, deep breathe, Incentive Spirometry  - Assess the need for suctioning and aspirate as needed  - Assess and instruct to report SOB or any respiratory difficulty  - Respiratory Therapy support as indicated  Outcome: Progressing     Problem: GASTROINTESTINAL - ADULT  Goal: Minimal or absence of nausea and/or vomiting  Description: INTERVENTIONS:  - Administer IV fluids if ordered to ensure adequate hydration  - Maintain NPO status until nausea and vomiting are resolved  - Nasogastric tube if ordered  - Administer ordered antiemetic medications as needed  - Provide nonpharmacologic  comfort measures as appropriate  - Advance diet as tolerated, if ordered  - Consider nutrition services referral to assist patient with adequate nutrition and appropriate food choices  Outcome: Progressing  Goal: Maintains or returns to baseline bowel function  Description: INTERVENTIONS:  - Assess bowel function  - Encourage oral fluids to ensure adequate hydration  - Administer IV fluids if ordered to ensure adequate hydration  - Administer ordered medications as needed  - Encourage mobilization and activity  - Consider nutritional services referral to assist patient with adequate nutrition and appropriate food choices  Outcome: Progressing  Goal: Maintains adequate nutritional intake  Description: INTERVENTIONS:  - Monitor percentage of each meal consumed  - Identify factors contributing to decreased intake, treat as appropriate  - Assist with meals as needed  - Monitor I&O, weight, and lab values if indicated  - Obtain nutrition services referral as needed  Outcome: Progressing     Problem: GENITOURINARY - ADULT  Goal: Maintains or returns to baseline urinary function  Description: INTERVENTIONS:  - Assess urinary function  - Encourage oral fluids to ensure adequate hydration if ordered  - Administer IV fluids as ordered to ensure adequate hydration  - Administer ordered medications as needed  - Offer frequent toileting  - Follow urinary retention protocol if ordered  Outcome: Progressing     Problem: METABOLIC, FLUID AND ELECTROLYTES - ADULT  Goal: Electrolytes maintained within normal limits  Description: INTERVENTIONS:  - Monitor labs and assess patient for signs and symptoms of electrolyte imbalances  - Administer electrolyte replacement as ordered  - Monitor response to electrolyte replacements, including repeat lab results as appropriate  - Instruct patient on fluid and nutrition as appropriate  Outcome: Progressing  Goal: Fluid balance maintained  Description: INTERVENTIONS:  - Monitor labs   - Monitor  I/O and WT  - Instruct patient on fluid and nutrition as appropriate  - Assess for signs & symptoms of volume excess or deficit  Outcome: Progressing     Problem: SKIN/TISSUE INTEGRITY - ADULT  Goal: Skin Integrity remains intact(Skin Breakdown Prevention)  Description: Assess:  -Perform Pk assessment every shift.  -Clean and moisturize skin every shift.  -Inspect skin when repositioning, toileting, and assisting with ADLS  -Assess extremities for adequate circulation and sensation     Bed Management:  -Have minimal linens on bed & keep smooth, unwrinkled  -Change linens as needed when moist or perspiring  -Avoid sitting or lying in one position for more than 2 hours while in bed  -Keep HOB at 45 degrees     Toileting:  -Offer bedside commode  -Assess for incontinence every shift.   -Use incontinent care products after each incontinent episode such as protective barrier.     Activity:  -Mobilize patient 3 times a day  -Encourage activity and walks on unit  -Encourage or provide ROM exercises   -Turn and reposition patient every 2 Hours  -Use appropriate equipment to lift or move patient in bed  -Instruct/ Assist with weight shifting every 2 hours. when out of bed in chair  -Consider limitation of chair time 2 hour intervals    Outcome: Progressing  Goal: Incision(s), wounds(s) or drain site(s) healing without S/S of infection  Description: INTERVENTIONS  - Assess and document dressing, incision, wound bed, drain sites and surrounding tissue  - Provide patient and family education  - Perform skin care/dressing changes per MD order.   Outcome: Progressing     Problem: MUSCULOSKELETAL - ADULT  Goal: Maintain or return mobility to safest level of function  Description: INTERVENTIONS:  - Assess patient's ability to carry out ADLs; assess patient's baseline for ADL function and identify physical deficits which impact ability to perform ADLs (bathing, care of mouth/teeth, toileting, grooming, dressing, etc.)  -  Assess/evaluate cause of self-care deficits   - Assess range of motion  - Assess patient's mobility  - Assess patient's need for assistive devices and provide as appropriate  - Encourage maximum independence but intervene and supervise when necessary  - Involve family in performance of ADLs  - Assess for home care needs following discharge   - Consider OT consult to assist with ADL evaluation and planning for discharge  - Provide patient education as appropriate  Outcome: Progressing  Goal: Maintain proper alignment of affected body part  Description: INTERVENTIONS:  - Support, maintain and protect limb and body alignment  - Provide patient/ family with appropriate education  Outcome: Progressing

## 2024-03-21 NOTE — ASSESSMENT & PLAN NOTE
Patient presents with abdominal pain, poor appetite, hypokalemia from home.  Ate poorly in past 2 weeks per wife at bedside.  Patient reports intermittent abdominal pain for about 2 years, with vomiting in the morning after eating once a day for about 1 year, with coffee-ground color diarrhea and constipation alternating chronically.  Daily alcohol use for decades.  Currently drinks vodka 500 ml to 1 L daily for over 1 year, was drinking beer and wine prior to that.  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 Has not seen a doctor in office for about 8  years. Had a tele health visit recently, ordered Echo and lab work. K was 2.0 on 3/12.   Had CT abdomen pelvis with IV contrast and CTA abdomen pelvis in ED.  Showed diffuse severe small bowel enteritis and skip segments of severe colitis which may be secondary to infectious/inflammatory process or ischemic etiology.  Moderate ascites.  Diffuse hepatic steatosis. No occlusion or significant flow-limiting stenosis of the abdominal aorta or proximal branch vessels identified.   Lactic acid normal  Lipase 326  Periumbilical mild tenderness on exam  WBC normal, no bands, patient afebrile  ED provider discussed case with surgery on-call, recommended antibiotic ,will see in consult.  Started Rocephin Flagyl in ED.  Will continue.  N.p.o.  Gentle IV hydration  Check stool culture  Consult GI and surgery

## 2024-03-21 NOTE — ASSESSMENT & PLAN NOTE
Mild lipase elevation suggesting mild pancreatitis.  Due to alcohol abuse.  N.p.o.  IV hydration  Repeat lab in the morning  Alcohol cessation

## 2024-03-21 NOTE — ASSESSMENT & PLAN NOTE
CT showed - 4.3 cm descending thoracic aortic aneurysm and dilated abdominal aorta measuring up to 2.7 cm in diameter with extensive calcific atherosclerosis.   Outpatient follow-up.

## 2024-03-21 NOTE — QUICK NOTE
Surgery   Quick note    Discussed case w ED staff.   68 y/o M w h/o etoh abuse, likely cirrhosis, heart failure EF 40% (outside records 2018), presents w abdominal discomfort and melena. CT imaging demonstrating, ascites, SB, cecal, recto-sigmoid thickening/ hypo enhancement.     Vitals stable. Afebrile.     Wbc: 8  Lactate: 1.4  Albumin 3  T bili: 1.74  Potassium 2.1  Cr: 0.5  pH: 7.5    Plan:  NPO  Medicine admission for CIWA, heart failure, hypokalemia   Telemetry  Start patient on PPI bid for melena  Obtain CTA abdomen/pelvis to rule out embolic etiology although unlikely  Obtain coags for libertad obrien   GI consult for melena, cirrhotic work up  Iv abx ceftriaxone/flagyl to ppx translocation  Surgery available for consult    BROCK Vyas MD  Surgery resident [id number]  3/21/24  1:42 AM

## 2024-03-21 NOTE — ASSESSMENT & PLAN NOTE
As above.  Drinks vodka 500 ml -1L daily.  Last drink was Tuesday.  Diffuse hepatic steatosis with moderate ascites on CT.  [facility] protocol  Alcohol cessation.  Patient is not interested in alcohol rehab.

## 2024-03-21 NOTE — H&P
[facility]  H&P  Name: Del Ocampo 69 y.o. male I MRN: [id number]  Unit/Bed#: [address] [place] [address] Date of Admission: 3/20/2024   Date of Service: 3/21/2024 I Hospital Day: 0      Assessment/Plan   * Pain in the abdomen  Assessment & Plan  Patient presents with abdominal pain, poor appetite, hypokalemia from home.  Ate poorly in past 2 weeks per wife at bedside.  Patient reports intermittent abdominal pain for about 2 years, with vomiting in the morning after eating once a day for about 1 year, with coffee-ground color diarrhea and constipation alternating chronically.  Daily alcohol use for decades.  Currently drinks vodka 500 ml to 1 L daily for over 1 year, was drinking beer and wine prior to that.  Reports history of Marinelli's esophagus, bleeding ulcers and multiple polyps per EGD colonoscopy about 8 years ago in [facility]. Has not seen a doctor in office for about 8  years. Had a tele health visit recently, ordered Echo and lab work. K was 2.0 on 3/12.   Had CT abdomen pelvis with IV contrast and CTA abdomen pelvis in ED.  Showed diffuse severe small bowel enteritis and skip segments of severe colitis which may be secondary to infectious/inflammatory process or ischemic etiology.  Moderate ascites.  Diffuse hepatic steatosis. No occlusion or significant flow-limiting stenosis of the abdominal aorta or proximal branch vessels identified.   Lactic acid normal  Lipase 326  Periumbilical mild tenderness on exam  WBC normal, no bands, patient afebrile  ED provider discussed case with surgery on-call, recommended antibiotic ,will see in consult.  Started Rocephin Flagyl in ED.  Will continue.  N.p.o.  Gentle IV hydration  Check stool culture  Consult GI and surgery    Alcohol abuse  Assessment & Plan  As above.  Drinks vodka 500 ml -1L daily.  Last drink was Tuesday.  Diffuse hepatic steatosis with moderate ascites on CT.  [facility] protocol  Alcohol cessation.  Patient is not interested in alcohol  rehab.      Elevated lipase  Assessment & Plan  Mild lipase elevation suggesting mild pancreatitis.  Due to alcohol abuse.  N.p.o.  IV hydration  Repeat lab in the morning  Alcohol cessation    Hypokalemia  Assessment & Plan  Potassium 2.1.  Likely due to poor oral intake.  EKG after receiving potassium repletion showed NSR, left BBB which is chronic, QTc 522.  Telemetry  Magnesium 1.9.  Will replete.  Repleted with K-Dur 40 p.o., 40 IV in ED.  Repeat BMP in the morning    PMR (polymyalgia rheumatica) (HCC)  Assessment & Plan  Noted history  Not on medication at home    Thoracic aortic aneurysm (HCC)  Assessment & Plan  CT showed - 4.3 cm descending thoracic aortic aneurysm and dilated abdominal aorta measuring up to 2.7 cm in diameter with extensive calcific atherosclerosis.   Outpatient follow-up.    History of bleeding ulcers  Assessment & Plan  IV Protonix twice daily started in ED.  Will continue.    History of CHF (congestive heart failure)  Assessment & Plan  History of combined CHF.  Was on Entresto Coreg in the past.  2D echo in 2018 showed EF 40 to 45%, stage I diastolic dysfunction.  2D echo on 3/10/2024 outpatient (wife showed result) showed normal EF around 60%, normal diastolic function, mild to moderate AR.     Trace pleural effusions on CT  No edema on extremities on exam.  Gentle IV hydration for acute pancreatitis  Daily weight, intake output  Monitor volume status.  Patient desats mildly while sleeping in ED, O2 2 L applied, satting in mid 90s.  Wean O2 as tolerated.    Nicotine abuse  Assessment & Plan  Smoke 1 pack a day.  Nicotine patch, smoking cessation           VTE Prophylaxis:  Reports coffee-ground diarrhea chronically   / sequential compression device   Code Status: DNR/DNI  POLST: POLST form is not discussed and not completed at this time.    Anticipated Length of Stay:  Patient will be admitted on an Inpatient basis with an anticipated length of stay of  > 2 midnights.    Justification for Hospital Stay: Abdominal pain, hypokalemia    Total Time for Visit, including Counseling / Coordination of Care: 1 hour.  Greater than 50% of this total time spent on direct patient counseling and coordination of care.    Chief Complaint:   Abdominal pain, poor appetite, low Potassium    History of Present Illness:    Del Ocampo is a 69 y.o. male with PMH of alcohol abuse, nicotine abuse, PMR, temporal arteritis, CHF, Marinelli's esophagus, bleeding ulcers who presents with abdominal pain, poor appetite, hypokalemia from home.  Ate poorly in past 2 weeks per wife at bedside.  Patient reports intermittent abdominal pain for about 2 years, with vomiting in the morning after eating once a day for about 1 year, with coffee-ground color diarrhea and constipation alternating chronically.  Daily alcohol use for decades.  Currently drinks vodka 500 ml to 1 L daily for over 1 year, was drinking beer and wine prior to that.  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 Has not seen a doctor in office for about 8  years. Had a tele health visit recently, ordered Echo and lab work. K was 2.0 on 3/12.  Wife reports patient does not like hospital, she had to force him to come to the hospital today.  Wife reports patient eats poorly in past 2 weeks.  Had half a sandwich last week and the week prior.  Patient barely gets out of bed due to weakness and becomes SOB with ambulation.  Patient reports abdominal pain is mostly located in periumbilical region, eating or drinking water aggravates the pain, alcohol alleviates the pain.  Patient reports vomiting after eating every morning once a day for about 1 year.  Patient denies chest pain, dizziness, SOB, fever or chills.  Patient reports headache at times.  Wife reports patient was hallucinating once due to not drinking alcohol for few days, otherwise no alcohol withdrawals in the  past.  Patient is not interested in alcohol rehab nor alcohol cessation. Information obtained from patient and wife at bedside.            Review of Systems:    Review of Systems   Unable to perform ROS: Acuity of condition (Spoke to wife at bedside)   Constitutional:  Positive for activity change, appetite change and fatigue.   Respiratory:          SOB with exertion   Gastrointestinal:  Positive for abdominal pain, constipation, diarrhea, nausea and vomiting.   Neurological:  Positive for headaches.   All other systems reviewed and are negative.      Past Medical and Surgical History:     Past Medical History:   Diagnosis Date    Alcohol abuse     Marinelli's esophagus     Bleeding ulcer     Heart failure (HCC)     PMR (polymyalgia rheumatica) (HCC)     Temporal arteritis (HCC)        Past Surgical History:   Procedure Laterality Date    FRACTURE SURGERY      knee  left     ROTATOR CUFF REPAIR      right    TONSILLECTOMY         Meds/Allergies:    Prior to Admission medications    Medication Sig Start Date End Date Taking? Authorizing Provider   benzonatate (TESSALON) 200 MG capsule Take 1 capsule (200 mg total) by mouth 3 (three) times a day as needed for cough  Patient not taking: Reported on 3/21/2024 1/10/20   Delmy Banks MD   Calcium Carb-Cholecalciferol (CALCIUM 1000 + D PO) Take by mouth  Patient not taking: Reported on 3/21/2024    Historical Provider, MD   carvedilol (COREG) 25 mg tablet Take 25 mg by mouth daily  Patient not taking: Reported on 3/21/2024    Historical Provider, MD   cholecalciferol (VITAMIN D3) 1,000 units tablet Take 1,000 Units by mouth daily  Patient not taking: Reported on 3/21/2024    Historical Provider, MD   Coenzyme Q10 (COQ-10 PO) Take by mouth  Patient not taking: Reported on 3/21/2024    Historical Provider, MD   sacubitril-valsartan (ENTRESTO) 24-26 MG TABS Take 1 tablet by mouth 2 (two) times a day  Patient not taking: Reported on 3/21/2024    Historical Provider, MD  "  thiamine (VITAMIN B1) 100 mg tablet Take 100 mg by mouth daily  Patient not taking: Reported on 3/21/2024    Historical Provider, MD     I have reviewed home medications with patient family member.    Allergies: No Known Allergies    Social History:     Marital Status: Unknown   Occupation: Retired  Patient Pre-hospital Living Situation: Wife  Patient Pre-hospital Level of Mobility: Independent  Patient Pre-hospital Diet Restrictions: Regular  Substance Use History:   Social History     Substance and Sexual Activity   Alcohol Use Yes    Comment: DAILY, 500ml-1L VODKA     Social History     Tobacco Use   Smoking Status Every Day    Current packs/day: 1.00    Average packs/day: 1 pack/day for 45.0 years (45.0 ttl pk-yrs)    Types: Cigarettes   Smokeless Tobacco Never     Social History     Substance and Sexual Activity   Drug Use Not Currently       Family History:    non-contributory    Physical Exam:     Vitals:   Blood Pressure: 118/61 (03/21/24 0345)  Pulse: 76 (03/21/24 0345)  Temperature: 97.9 °F (36.6 °C) (03/20/24 2043)  Temp Source: Tympanic (03/20/24 2043)  Respirations: 16 (03/21/24 0345)  Height: 5' 8\" (172.7 cm) (03/21/24 0210)  Weight - Scale: 68.5 kg (151 lb) (03/21/24 0210)  SpO2: 95 % (03/21/24 0345)    Physical Exam  Vitals and nursing note reviewed.   Constitutional:       Appearance: He is well-developed.      Comments: Patient appears underweight   HENT:      Head: Normocephalic and atraumatic.   Neck:      Thyroid: No thyromegaly.      Vascular: No JVD.      Trachea: No tracheal deviation.   Cardiovascular:      Rate and Rhythm: Normal rate and regular rhythm.      Heart sounds: Normal heart sounds.   Pulmonary:      Effort: Pulmonary effort is normal. No respiratory distress.      Breath sounds: No wheezing or rales.      Comments: Diminished breath sounds bilateral, O2 2 L, satting mid 90s  Abdominal:      General: Bowel sounds are normal.      Palpations: Abdomen is soft.      Tenderness: " There is abdominal tenderness. There is no guarding.      Comments: Mild abdominal distention.  Mild periumbilical tenderness, more in mid lower abdomen.    Musculoskeletal:      Cervical back: Neck supple.      Right lower leg: No edema.      Left lower leg: No edema.   Skin:     General: Skin is warm and dry.   Neurological:      General: No focal deficit present.      Mental Status: He is alert and oriented to person, place, and time.   Psychiatric:         Mood and Affect: Mood normal.         Additional Data:     Lab Results: I have personally reviewed pertinent reports.      Results from last 7 days   Lab Units 03/20/24  2110   WBC Thousand/uL 8.20   HEMOGLOBIN g/dL 12.2   HEMATOCRIT % 36.3*   PLATELETS Thousands/uL 200   NEUTROS PCT % 80*   LYMPHS PCT % 10*   MONOS PCT % 9   EOS PCT % 1     Results from last 7 days   Lab Units 03/20/24  2110   POTASSIUM mmol/L 2.1*   CHLORIDE mmol/L 84*   CO2 mmol/L 45*   BUN mg/dL 6   CREATININE mg/dL 0.51*   CALCIUM mg/dL 8.3*   ALK PHOS U/L 80   ALT U/L 11   AST U/L 22     Results from last 7 days   Lab Units 03/21/24  0151   INR  1.18       Imaging: I have personally reviewed pertinent reports.      CT abdomen pelvis with contrast    Result Date: 3/21/2024  Narrative: CT ABDOMEN AND PELVIS WITH IV CONTRAST INDICATION: Diffuse abdominal tenderness. COMPARISON: None. TECHNIQUE: CT examination of the abdomen and pelvis was performed. Multiplanar 2D reformatted images were created from the source data. This examination, like all CT scans performed in the [facility], was performed utilizing techniques to minimize radiation dose exposure, including the use of iterative reconstruction and automated exposure control. Radiation dose length product (DLP) for this visit: 480.96 mGy-cm IV Contrast: 100 mL of iohexol (OMNIPAQUE) Enteric Contrast: Not administered. FINDINGS: ABDOMEN LOWER CHEST: Trace pleural effusions with minimal dependent atelectasis in the lung  bases. Heart size is normal. No significant pericardial effusion. LIVER/BILIARY TREE: Moderate diffuse hepatic steatosis. No suspicious mass. Normal hepatic contours. No biliary dilation. GALLBLADDER: Distended gallbladder without abnormal wall thickening or calcified gallstones. No pericholecystic inflammatory change. SPLEEN: Unremarkable. PANCREAS: Unremarkable. ADRENAL GLANDS: Unremarkable. KIDNEYS/URETERS: Unremarkable. No hydronephrosis. STOMACH AND BOWEL: Stomach is mildly distended with oral contrast, air, and fluid. No gross intraluminal mass is seen. Abnormal wall thickening and mucosal hyperenhancement involving multiple abdominal/pelvic small bowel loops including the terminal ileum suggesting diffuse severe enteritis. Normal air-filled appendix is seen without inflammatory changes. Severe wall thickening of the sigmoid colon and rectum with pericolonic mesenteric inflammatory stranding suggesting severe colitis of these segments. Mild wall thickening of the ascending colon and proximal to mid transverse colon also noted suggesting colitis. There is some sparing of the mid to distal transverse colon and descending colon seen. Extensive descending and sigmoid diverticulosis. APPENDIX: Normal. ABDOMINOPELVIC CAVITY: No abdominal/pelvic lymphadenopathy by size criteria. No free air or pneumatosis intestinalis noted. Moderate abdominal/pelvic ascites is seen which could be reactive in etiology. VESSELS: 4.3 cm descending thoracic aortic aneurysm. Abdominal aorta measures up to 2.7 cm in diameter. No evidence for aortic dissection. Extensive calcific atherosclerosis of the thoracoabdominal aorta and proximal branch vessels noted. PELVIS REPRODUCTIVE ORGANS: Unremarkable for patient's age. URINARY BLADDER: Mildly distended and grossly unremarkable. ABDOMINAL WALL/INGUINAL REGIONS: Unremarkable. BONES: No acute fracture or suspicious osseous lesion. Mild multilevel degenerative changes of the thoracolumbar spine  worse at the L5-S1 level.     Impression: 1.  Findings of diffuse severe small bowel enteritis and skip segments of severe colitis are seen as described above which may be secondary to infectious/inflammatory process or ischemic etiology. Clinical correlation recommended. Follow-up colonoscopy when clinically appropriate also recommended. 2.  No evidence for bowel obstruction, pneumatosis intestinalis, or free air. Moderate abdominal/pelvic ascites again noted which could be reactive in etiology. 3.  Diffuse hepatic steatosis. 4.  4.3 cm descending thoracic aortic aneurysm and dilated abdominal aorta measuring up to 2.7 cm in diameter with extensive calcific atherosclerosis. No evidence for aortic dissection.  No occlusion or significant flow-limiting stenosis of the abdominal  aorta or proximal branch vessels identified. Workstation performed: [id number]     CTA abdomen pelvis w wo contrast    Result Date: 3/21/2024  Narrative: CT ANGIOGRAM OF THE ABDOMEN AND PELVIS WITH AND WITHOUT IV CONTRAST INDICATION: Abnormal CT of the abdomen; R/o bowel ischemia. COMPARISON: None. TECHNIQUE: CT angiogram examination of the abdomen and pelvis was performed according to standard protocol. This examination, like all CT scans performed in the [facility], was performed utilizing techniques to minimize radiation dose exposure, including the use of iterative reconstruction and automated exposure control. Contrast as well as noncontrast images were obtained. Rad dose 1784.24 mGy-cm IV Contrast: 85 mL of iohexol (OMNIPAQUE) Enteric Contrast: Not administered. FINDINGS: VASCULAR STRUCTURES: 4.3 cm descending thoracic aortic aneurysm and dilated abdominal aorta measuring up to 2.7 cm. Extensive calcific atherosclerosis of the thoracoabdominal aorta and proximal branch vessels. No evidence for dissection. The celiac artery, superior mesenteric artery, bilateral renal arteries, inferior mesenteric artery, and bilateral  common/internal/external iliac arteries as well as the visualized portions of the proximal femoral arteries are grossly patent. Multifocal calcific atherosclerosis with mild luminal narrowing is seen. No occlusion or significant flow-limiting stenosis of the abdominal aorta or proximal branch vessels noted. OTHER FINDINGS ABDOMEN LOWER CHEST: Trace pleural effusions with minimal dependent atelectasis in the lung bases. Heart size is normal. No significant pericardial effusion. LIVER/BILIARY TREE: Moderate diffuse hepatic steatosis. No suspicious mass. Normal hepatic contours. No biliary dilation. GALLBLADDER: Distended gallbladder without abnormal wall thickening or calcified gallstones. No pericholecystic inflammatory change. SPLEEN: Unremarkable. PANCREAS: Unremarkable. ADRENAL GLANDS: Unremarkable. KIDNEYS/URETERS: Unremarkable. No hydronephrosis. STOMACH AND BOWEL: Stomach is contracted limiting evaluation. Subtle diffuse wall thickening of the stomach could be due to under distention. Abnormal wall thickening and mucosal hyperenhancement involving multiple mid to distal small bowel loops including the terminal ileum suggesting severe enteritis. Normal air-filled appendix is seen without inflammatory changes. Similar-appearing abnormal wall thickening involving the right colon, hepatic flexure and proximal transverse colon, distal descending colon, sigmoid colon, and rectum noted with pericolonic mesenteric inflammatory stranding suggesting severe colitis of the segments. There is somewhat sparing of the mid to distal transverse colon and descending colon noted. Descending and sigmoid diverticulosis again seen. APPENDIX: No findings to suggest appendicitis. ABDOMINOPELVIC CAVITY: No abdominal/pelvic lymphadenopathy by size criteria. No free air or pneumatosis intestinalis noted. Moderate abdominal/pelvic ascites is seen which could be reactive in etiology. PELVIS REPRODUCTIVE ORGANS: Unremarkable for patient's  age. URINARY BLADDER: Unremarkable. ABDOMINAL WALL/INGUINAL REGIONS: Unremarkable. BONES: No acute fracture or suspicious osseous lesion. Mild multilevel degenerative changes of the thoracolumbar spine worse at the L5-S1 level.     Impression: 1.  4.3 cm descending thoracic aortic aneurysm and dilated abdominal aorta measuring up to 2.7 cm in diameter with extensive calcific atherosclerosis. No evidence for aortic dissection. Major abdominal aortic branch vessels are grossly patent with calcific atherosclerosis and multifocal mild luminal narrowing. No occlusion or significant flow-limiting stenosis of the abdominal aorta or proximal branch vessels identified. 2.  Findings of severe mid to distal small bowel enteritis and skip segments of severe colitis again seen, similar compared to the prior study. Findings may be secondary to infectious/inflammatory or ischemic etiology. Clinical correlation recommended. Descending and sigmoid diverticulosis. 3.  No evidence for bowel obstruction, pneumatosis intestinalis, or free air. Moderate abdominal/pelvic ascites again noted which may be reactive in etiology. 4.  Diffuse hepatic steatosis. Workstation performed: [id number]       EKG, Pathology, and Other Studies Reviewed on Admission:   EKG: as above    All[employer] Records Reviewed: Yes     ** Please Note: Dragon 360 Dictation voice to text software may have been used in the creation of this document. **

## 2024-03-21 NOTE — ED NOTES
Pt ambulated to in room rest room. Pt ambulated to toilet with slight assisstance. Pt in room with wife. This RN told pt to call when finished using rest room       Piedad Ferraro RN  03/20/24 [id number]       Piedad Ferraro RN  03/21/24 [id number]

## 2024-03-21 NOTE — ASSESSMENT & PLAN NOTE
History of combined CHF.  Was on Entresto Coreg in the past.  2D echo in 2018 showed EF 40 to 45%, stage I diastolic dysfunction.  2D echo on 3/10/2024 outpatient (wife showed result) showed normal EF around 60%, normal diastolic function, mild to moderate AR.     Trace pleural effusions on CT  No edema on extremities on exam.  Gentle IV hydration for acute pancreatitis  Daily weight, intake output  Monitor volume status.  Patient desats mildly while sleeping in ED, O2 2 L applied, satting in mid 90s.  Wean O2 as tolerated.

## 2024-03-21 NOTE — CONSULTS
Consultation - General  Surgery   Del Ocampo 69 y.o. male MRN: [id number]  Unit/Bed#: [address] [place] [address] Encounter: [id number]      Assessment/Plan      Assessment:  70 y/o M w etoh abuse, CHF, cirrhotic, presents w abdominal pain. Ct concerning for SB enteritis and sigmoid rectal colitis.     Vss. Afebrile. Soft, nontender, distended.  Profound electrolyte abnormalities hypokalemia.     Plan:  Clear liquids as tolerated  Rocephin flagyl iv abx  Recommend echo  Recommend liver US and GI w/u for cirrhosis and EGD to rule out varicies   Continue ppi  No acute surgical intervention      History of Present Illness   Physician Requesting Consult: Janine Shahid MD  Reason for Consult / Principal Problem: abdominal pain  History, ROS and PFSH unobtainable from any source due to none.  HPI: Del Ocampo is a 69 y.o. year old male etoh abuse, chf, cirrhosis who presents with abdominal pain, and melanotic stools. Drinks 1-2 pints of vodka daily. Has persistent abdominal pain, worse after the last few days. Pain is post prandial in nature. Sharp located to epigastrium, however sometimes diffuse pain. Reports last EGD and c scope about 10 y ago at outside facility. Reports h/o ulcer disease. Denied family h/o IBD. Denied any current chest pain or shortness of breath.     Inpatient consult to Acute Care Surgery  Consult performed by: Bull Vyas MD  Consult ordered by: BREN Rodriguez          Review of Systems   Constitutional:  Negative for chills and fever.   HENT: Negative.     Eyes: Negative.    Respiratory: Negative.     Cardiovascular: Negative.    Gastrointestinal:  Positive for abdominal pain. Negative for nausea and vomiting.   Endocrine: Negative.    Musculoskeletal: Negative.    Skin: Negative.    Allergic/Immunologic: Negative.    Neurological: Negative.    Hematological: Negative.    Psychiatric/Behavioral: Negative.         Historical Information   Past Medical History:   Diagnosis Date    Alcohol  "abuse     Marinelli's esophagus     Bleeding ulcer     Heart failure (HCC)     PMR (polymyalgia rheumatica) (HCC)     Temporal arteritis (HCC)      Past Surgical History:   Procedure Laterality Date    FRACTURE SURGERY      knee  left     ROTATOR CUFF REPAIR      right    TONSILLECTOMY       Social History   Social History     Substance and Sexual Activity   Alcohol Use Yes    Comment: DAILY, 500ml-1L VODKA     Social History     Substance and Sexual Activity   Drug Use Not Currently     E-Cigarette/Vaping    E-Cigarette Use Never User      E-Cigarette/Vaping Substances    Nicotine No     THC No     CBD No     Flavoring No     Other No     Unknown No      Social History     Tobacco Use   Smoking Status Every Day    Current packs/day: 1.00    Average packs/day: 1 pack/day for 45.0 years (45.0 ttl pk-yrs)    Types: Cigarettes   Smokeless Tobacco Never     Family History: non-contributory}    Meds/Allergies   all current active meds have been reviewed  No Known Allergies    Objective   Vitals: Blood pressure 123/65, pulse 75, temperature 97.8 °F (36.6 °C), temperature source Oral, resp. rate 18, height 5' 8\" (1.727 m), weight 68.8 kg (151 lb 9.6 oz), SpO2 94%.,Body mass index is 23.05 kg/m².    Intake/Output Summary (Last 24 hours) at 3/21/2024 0823  Last data filed at 3/21/2024 0600  Gross per 24 hour   Intake 1420 ml   Output --   Net 1420 ml     Invasive Devices       Peripheral Intravenous Line  Duration             Peripheral IV 03/20/24 Left Antecubital <1 day                    Physical Exam  Vitals reviewed.   Constitutional:       General: He is not in acute distress.     Appearance: He is not toxic-appearing.   HENT:      Head: Normocephalic.      Nose: Nose normal.   Eyes:      Pupils: Pupils are equal, round, and reactive to light.   Cardiovascular:      Pulses: Normal pulses.      Heart sounds: Murmur heard.   Pulmonary:      Effort: Pulmonary effort is normal.   Abdominal:      General: There is no " distension.      Palpations: Abdomen is soft.      Tenderness: There is no abdominal tenderness. There is no guarding.   Genitourinary:     Comments: deferred  Musculoskeletal:         General: Normal range of motion.      Cervical back: Normal range of motion.   Skin:     General: Skin is warm.      Capillary Refill: Capillary refill takes 2 to 3 seconds.   Neurological:      General: No focal deficit present.      Mental Status: He is alert and oriented to person, place, and time.   Psychiatric:         Mood and Affect: Mood normal.         Lab Results: I have personally reviewed pertinent reports.    Imaging Studies: I have personally reviewed pertinent reports.    EKG, Pathology, and Other Studies: I have personally reviewed pertinent reports.    VTE Prophylaxis: Sequential compression device (Venodyne)      Code Status: Level 3 - DNAR and DNI  Advance Directive and Living Will:      Power of :    POLST:      Counseling / Coordination of Care  Counseling/Coordination of Care: Total floor / unit time spent today 30 minutes. Greater than 50% of total time was spent with the patient and / or family counseling and / or coordination of care. A description of the counseling / coordination of care: 30

## 2024-03-22 ENCOUNTER — APPOINTMENT (INPATIENT)
Dept: RADIOLOGY | Facility: HOSPITAL | Age: 69
DRG: 392 | End: 2024-03-22
Payer: MEDICARE

## 2024-03-22 PROBLEM — K52.9 COLITIS: Status: ACTIVE | Noted: 2024-03-22

## 2024-03-22 PROBLEM — R74.8 ELEVATED LIPASE: Status: RESOLVED | Noted: 2024-03-21 | Resolved: 2024-03-22

## 2024-03-22 LAB
ALBUMIN SERPL BCP-MCNC: 2.6 G/DL (ref 3.5–5)
ALP SERPL-CCNC: 70 U/L (ref 34–104)
ALT SERPL W P-5'-P-CCNC: 11 U/L (ref 7–52)
ANION GAP SERPL CALCULATED.3IONS-SCNC: 5 MMOL/L (ref 4–13)
ANION GAP SERPL CALCULATED.3IONS-SCNC: 7 MMOL/L (ref 4–13)
AST SERPL W P-5'-P-CCNC: 28 U/L (ref 13–39)
BILIRUB SERPL-MCNC: 1.04 MG/DL (ref 0.2–1)
BUN SERPL-MCNC: 4 MG/DL (ref 5–25)
BUN SERPL-MCNC: 5 MG/DL (ref 5–25)
C COLI+JEJUNI TUF STL QL NAA+PROBE: NEGATIVE
C DIFF TOX GENS STL QL NAA+PROBE: NEGATIVE
CALCIUM ALBUM COR SERPL-MCNC: 8.9 MG/DL (ref 8.3–10.1)
CALCIUM SERPL-MCNC: 7.8 MG/DL (ref 8.4–10.2)
CALCIUM SERPL-MCNC: 8.3 MG/DL (ref 8.4–10.2)
CHLORIDE SERPL-SCNC: 101 MMOL/L (ref 96–108)
CHLORIDE SERPL-SCNC: 98 MMOL/L (ref 96–108)
CO2 SERPL-SCNC: 34 MMOL/L (ref 21–32)
CO2 SERPL-SCNC: 34 MMOL/L (ref 21–32)
CREAT SERPL-MCNC: 0.47 MG/DL (ref 0.6–1.3)
CREAT SERPL-MCNC: 0.58 MG/DL (ref 0.6–1.3)
EC STX1+STX2 GENES STL QL NAA+PROBE: NEGATIVE
ERYTHROCYTE [DISTWIDTH] IN BLOOD BY AUTOMATED COUNT: 19.5 % (ref 11.6–15.1)
ERYTHROCYTE [SEDIMENTATION RATE] IN BLOOD: <1 MM/HOUR (ref 0–19)
GFR SERPL CREATININE-BSD FRML MDRD: 102 ML/MIN/1.73SQ M
GFR SERPL CREATININE-BSD FRML MDRD: 111 ML/MIN/1.73SQ M
GLUCOSE SERPL-MCNC: 113 MG/DL (ref 65–140)
GLUCOSE SERPL-MCNC: 89 MG/DL (ref 65–140)
HCT VFR BLD AUTO: 31.1 % (ref 36.5–49.3)
HGB BLD-MCNC: 10.6 G/DL (ref 12–17)
MAGNESIUM SERPL-MCNC: 1.9 MG/DL (ref 1.9–2.7)
MCH RBC QN AUTO: 33.7 PG (ref 26.8–34.3)
MCHC RBC AUTO-ENTMCNC: 34.1 G/DL (ref 31.4–37.4)
MCV RBC AUTO: 99 FL (ref 82–98)
PLATELET # BLD AUTO: 173 THOUSANDS/UL (ref 149–390)
PMV BLD AUTO: 10 FL (ref 8.9–12.7)
POTASSIUM SERPL-SCNC: 2.6 MMOL/L (ref 3.5–5.3)
POTASSIUM SERPL-SCNC: 2.9 MMOL/L (ref 3.5–5.3)
PROT SERPL-MCNC: 4.5 G/DL (ref 6.4–8.4)
RBC # BLD AUTO: 3.15 MILLION/UL (ref 3.88–5.62)
SALMONELLA SP SPAO STL QL NAA+PROBE: NEGATIVE
SHIGELLA SP+EIEC IPAH STL QL NAA+PROBE: NEGATIVE
SODIUM SERPL-SCNC: 139 MMOL/L (ref 135–147)
SODIUM SERPL-SCNC: 140 MMOL/L (ref 135–147)
WBC # BLD AUTO: 6.06 THOUSAND/UL (ref 4.31–10.16)

## 2024-03-22 PROCEDURE — 99232 SBSQ HOSP IP/OBS MODERATE 35: CPT | Performed by: STUDENT IN AN ORGANIZED HEALTH CARE EDUCATION/TRAINING PROGRAM

## 2024-03-22 PROCEDURE — 76705 ECHO EXAM OF ABDOMEN: CPT

## 2024-03-22 PROCEDURE — 99233 SBSQ HOSP IP/OBS HIGH 50: CPT | Performed by: FAMILY MEDICINE

## 2024-03-22 PROCEDURE — 97167 OT EVAL HIGH COMPLEX 60 MIN: CPT

## 2024-03-22 PROCEDURE — 80053 COMPREHEN METABOLIC PANEL: CPT | Performed by: FAMILY MEDICINE

## 2024-03-22 PROCEDURE — 85027 COMPLETE CBC AUTOMATED: CPT | Performed by: FAMILY MEDICINE

## 2024-03-22 PROCEDURE — 85652 RBC SED RATE AUTOMATED: CPT | Performed by: STUDENT IN AN ORGANIZED HEALTH CARE EDUCATION/TRAINING PROGRAM

## 2024-03-22 PROCEDURE — 97535 SELF CARE MNGMENT TRAINING: CPT

## 2024-03-22 PROCEDURE — 83735 ASSAY OF MAGNESIUM: CPT | Performed by: FAMILY MEDICINE

## 2024-03-22 PROCEDURE — 80048 BASIC METABOLIC PNL TOTAL CA: CPT | Performed by: FAMILY MEDICINE

## 2024-03-22 PROCEDURE — C9113 INJ PANTOPRAZOLE SODIUM, VIA: HCPCS | Performed by: NURSE PRACTITIONER

## 2024-03-22 RX ORDER — MAGNESIUM SULFATE HEPTAHYDRATE 40 MG/ML
2 INJECTION, SOLUTION INTRAVENOUS ONCE
Status: COMPLETED | OUTPATIENT
Start: 2024-03-22 | End: 2024-03-23

## 2024-03-22 RX ORDER — POLYETHYLENE GLYCOL 3350 17 G/17G
238 POWDER, FOR SOLUTION ORAL ONCE
Status: COMPLETED | OUTPATIENT
Start: 2024-03-24 | End: 2024-03-24

## 2024-03-22 RX ORDER — POTASSIUM CHLORIDE 20 MEQ/1
40 TABLET, EXTENDED RELEASE ORAL ONCE
Status: COMPLETED | OUTPATIENT
Start: 2024-03-22 | End: 2024-03-22

## 2024-03-22 RX ORDER — POTASSIUM CHLORIDE 14.9 MG/ML
20 INJECTION INTRAVENOUS ONCE
Qty: 100 ML | Refills: 0 | Status: COMPLETED | OUTPATIENT
Start: 2024-03-22 | End: 2024-03-23

## 2024-03-22 RX ORDER — POTASSIUM CHLORIDE 14.9 MG/ML
20 INJECTION INTRAVENOUS ONCE
Qty: 100 ML | Refills: 0 | Status: COMPLETED | OUTPATIENT
Start: 2024-03-23 | End: 2024-03-23

## 2024-03-22 RX ORDER — BISACODYL 5 MG/1
10 TABLET, DELAYED RELEASE ORAL ONCE
Status: COMPLETED | OUTPATIENT
Start: 2024-03-24 | End: 2024-03-24

## 2024-03-22 RX ORDER — SODIUM CHLORIDE AND POTASSIUM CHLORIDE 150; 900 MG/100ML; MG/100ML
50 INJECTION, SOLUTION INTRAVENOUS CONTINUOUS
Status: DISCONTINUED | OUTPATIENT
Start: 2024-03-22 | End: 2024-03-24

## 2024-03-22 RX ORDER — POTASSIUM CHLORIDE 14.9 MG/ML
20 INJECTION INTRAVENOUS ONCE
Status: COMPLETED | OUTPATIENT
Start: 2024-03-22 | End: 2024-03-22

## 2024-03-22 RX ORDER — POTASSIUM CHLORIDE 20 MEQ/1
40 TABLET, EXTENDED RELEASE ORAL ONCE
Status: DISCONTINUED | OUTPATIENT
Start: 2024-03-22 | End: 2024-03-22

## 2024-03-22 RX ORDER — POTASSIUM CHLORIDE 29.8 MG/ML
40 INJECTION INTRAVENOUS ONCE
Status: DISCONTINUED | OUTPATIENT
Start: 2024-03-22 | End: 2024-03-22

## 2024-03-22 RX ADMIN — POTASSIUM CHLORIDE 20 MEQ: 14.9 INJECTION, SOLUTION INTRAVENOUS at 22:15

## 2024-03-22 RX ADMIN — POTASSIUM CHLORIDE 40 MEQ: 1500 TABLET, EXTENDED RELEASE ORAL at 08:18

## 2024-03-22 RX ADMIN — PANTOPRAZOLE SODIUM 40 MG: 40 INJECTION, POWDER, FOR SOLUTION INTRAVENOUS at 20:09

## 2024-03-22 RX ADMIN — SODIUM CHLORIDE AND POTASSIUM CHLORIDE 50 ML/HR: .9; .15 SOLUTION INTRAVENOUS at 20:09

## 2024-03-22 RX ADMIN — METRONIDAZOLE 500 MG: 500 INJECTION, SOLUTION INTRAVENOUS at 14:43

## 2024-03-22 RX ADMIN — POTASSIUM CHLORIDE 20 MEQ: 14.9 INJECTION, SOLUTION INTRAVENOUS at 10:41

## 2024-03-22 RX ADMIN — B-COMPLEX W/ C & FOLIC ACID TAB 1 TABLET: TAB at 08:17

## 2024-03-22 RX ADMIN — POTASSIUM CHLORIDE 40 MEQ: 1500 TABLET, EXTENDED RELEASE ORAL at 03:32

## 2024-03-22 RX ADMIN — PANTOPRAZOLE SODIUM 40 MG: 40 INJECTION, POWDER, FOR SOLUTION INTRAVENOUS at 08:17

## 2024-03-22 RX ADMIN — FOLIC ACID: 5 INJECTION, SOLUTION INTRAMUSCULAR; INTRAVENOUS; SUBCUTANEOUS at 13:47

## 2024-03-22 RX ADMIN — POTASSIUM CHLORIDE 20 MEQ: 14.9 INJECTION, SOLUTION INTRAVENOUS at 08:13

## 2024-03-22 RX ADMIN — METRONIDAZOLE 500 MG: 500 INJECTION, SOLUTION INTRAVENOUS at 20:09

## 2024-03-22 RX ADMIN — METRONIDAZOLE 500 MG: 500 INJECTION, SOLUTION INTRAVENOUS at 03:33

## 2024-03-22 RX ADMIN — POTASSIUM CHLORIDE 20 MEQ: 14.9 INJECTION, SOLUTION INTRAVENOUS at 01:02

## 2024-03-22 RX ADMIN — NICOTINE 1 PATCH: 21 PATCH, EXTENDED RELEASE TRANSDERMAL at 08:14

## 2024-03-22 RX ADMIN — MAGNESIUM SULFATE HEPTAHYDRATE 2 G: 40 INJECTION, SOLUTION INTRAVENOUS at 15:22

## 2024-03-22 RX ADMIN — CEFTRIAXONE 1000 MG: 1 INJECTION, SOLUTION INTRAVENOUS at 01:02

## 2024-03-22 NOTE — PLAN OF CARE
Problem: Prexisting or High Potential for Compromised Skin Integrity  Goal: Skin integrity is maintained or improved  Description: INTERVENTIONS:  - Identify patients at risk for skin breakdown  - Assess and monitor skin integrity  - Assess and monitor nutrition and hydration status  - Monitor labs   - Assess for incontinence   - Turn and reposition patient  - Assist with mobility/ambulation  - Relieve pressure over bony prominences  - Avoid friction and shearing  - Provide appropriate hygiene as needed including keeping skin clean and dry  - Evaluate need for skin moisturizer/barrier cream  - Collaborate with interdisciplinary team   - Patient/family teaching  - Consider wound care consult   Outcome: Progressing     Problem: PAIN - ADULT  Goal: Verbalizes/displays adequate comfort level or baseline comfort level  Description: Interventions:  - Encourage patient to monitor pain and request assistance  - Assess pain using appropriate pain scale  - Administer analgesics based on type and severity of pain and evaluate response  - Implement non-pharmacological measures as appropriate and evaluate response  - Consider cultural and social influences on pain and pain management  - Notify physician/advanced practitioner if interventions unsuccessful or patient reports new pain  Outcome: Progressing     Problem: INFECTION - ADULT  Goal: Absence or prevention of progression during hospitalization  Description: INTERVENTIONS:  - Assess and monitor for signs and symptoms of infection  - Monitor lab/diagnostic results  - Monitor all insertion sites, i.e. indwelling lines, tubes, and drains  - Monitor endotracheal if appropriate and nasal secretions for changes in amount and color  - [place] appropriate cooling/warming therapies per order  - Administer medications as ordered  - Instruct and encourage patient and family to use good hand hygiene technique  - Identify and instruct in appropriate isolation precautions for  identified infection/condition  Outcome: Progressing  Goal: Absence of fever/infection during neutropenic period  Description: INTERVENTIONS:  - Monitor WBC    Outcome: Progressing     Problem: SAFETY ADULT  Goal: Patient will remain free of falls  Description: INTERVENTIONS:  - Educate patient/family on patient safety including physical limitations  - Instruct patient to call for assistance with activity   - Consult OT/PT to assist with strengthening/mobility   - Keep Call bell within reach  - Keep bed low and locked with side rails adjusted as appropriate  - Keep care items and personal belongings within reach  - Initiate and maintain comfort rounds  - Make Fall Risk Sign visible to staff  - Offer Toileting every 22 Hours, in advance of need  - Initiate/Maintain yes alarm  - Obtain necessary fall risk management equipment: yes  - Apply yellow socks and bracelet for high fall risk patients  - Consider moving patient to room near nurses station  Outcome: Progressing  Goal: Maintain or return to baseline ADL function  Description: INTERVENTIONS:  -  Assess patient's ability to carry out ADLs; assess patient's baseline for ADL function and identify physical deficits which impact ability to perform ADLs (bathing, care of mouth/teeth, toileting, grooming, dressing, etc.)  - Assess/evaluate cause of self-care deficits   - Assess range of motion  - Assess patient's mobility; develop plan if impaired  - Assess patient's need for assistive devices and provide as appropriate  - Encourage maximum independence but intervene and supervise when necessary  - Involve family in performance of ADLs  - Assess for home care needs following discharge   - Consider OT consult to assist with ADL evaluation and planning for discharge  - Provide patient education as appropriate  Outcome: Progressing  Goal: Maintains/Returns to pre admission functional level  Description: INTERVENTIONS:  - Perform AM-PAC 6 Click Basic Mobility/ Daily  Activity assessment daily.  - Set and communicate daily mobility goal to care team and patient/family/caregiver.   - Collaborate with rehabilitation services on mobility goals if consulted  - Perform Range of Motion 3 times a day.  - Reposition patient every 2 hours.  - Dangle patient 3 times a day  - Stand patient 3 times a day  - Ambulate patient 3 times a day  - Out of bed to chair 3 times a day   - Out of bed for meals 3 times a day  - Out of bed for toileting  - Record patient progress and toleration of activity level   Outcome: Progressing     Problem: DISCHARGE PLANNING  Goal: Discharge to home or other facility with appropriate resources  Description: INTERVENTIONS:  - Identify barriers to discharge w/patient and caregiver  - Arrange for needed discharge resources and transportation as appropriate  - Identify discharge learning needs (meds, wound care, etc.)  - Arrange for interpretive services to assist at discharge as needed  - Refer to Case Management Department for coordinating discharge planning if the patient needs post-hospital services based on physician/advanced practitioner order or complex needs related to functional status, cognitive ability, or social support system  Outcome: Progressing     Problem: Knowledge Deficit  Goal: Patient/family/caregiver demonstrates understanding of disease process, treatment plan, medications, and discharge instructions  Description: Complete learning assessment and assess knowledge base.  Interventions:  - Provide teaching at level of understanding  - Provide teaching via preferred learning methods  Outcome: Progressing     Problem: NEUROSENSORY - ADULT  Goal: Achieves maximal functionality and self care  Description: INTERVENTIONS  - Monitor swallowing and airway patency with patient fatigue and changes in neurological status  - Encourage and assist patient to increase activity and self care.   - Encourage visually impaired, hearing impaired and aphasic patients  to use assistive/communication devices  Outcome: Progressing     Problem: CARDIOVASCULAR - ADULT  Goal: Maintains optimal cardiac output and hemodynamic stability  Description: INTERVENTIONS:  - Monitor I/O, vital signs and rhythm  - Monitor for S/S and trends of decreased cardiac output  - Administer and titrate ordered vasoactive medications to optimize hemodynamic stability  - Assess quality of pulses, skin color and temperature  - Assess for signs of decreased coronary artery perfusion  - Instruct patient to report change in severity of symptoms  Outcome: Progressing  Goal: Absence of cardiac dysrhythmias or at baseline rhythm  Description: INTERVENTIONS:  - Continuous cardiac monitoring, vital signs, obtain 12 lead EKG if ordered  - Administer antiarrhythmic and heart rate control medications as ordered  - Monitor electrolytes and administer replacement therapy as ordered  Outcome: Progressing     Problem: RESPIRATORY - ADULT  Goal: Achieves optimal ventilation and oxygenation  Description: INTERVENTIONS:  - Assess for changes in respiratory status  - Assess for changes in mentation and behavior  - Position to facilitate oxygenation and minimize respiratory effort  - Oxygen administered by appropriate delivery if ordered  - Initiate smoking cessation education as indicated  - Encourage broncho-pulmonary hygiene including cough, deep breathe, Incentive Spirometry  - Assess the need for suctioning and aspirate as needed  - Assess and instruct to report SOB or any respiratory difficulty  - Respiratory Therapy support as indicated  Outcome: Progressing     Problem: GASTROINTESTINAL - ADULT  Goal: Minimal or absence of nausea and/or vomiting  Description: INTERVENTIONS:  - Administer IV fluids if ordered to ensure adequate hydration  - Maintain NPO status until nausea and vomiting are resolved  - Nasogastric tube if ordered  - Administer ordered antiemetic medications as needed  - Provide nonpharmacologic comfort  measures as appropriate  - Advance diet as tolerated, if ordered  - Consider nutrition services referral to assist patient with adequate nutrition and appropriate food choices  Outcome: Progressing  Goal: Maintains or returns to baseline bowel function  Description: INTERVENTIONS:  - Assess bowel function  - Encourage oral fluids to ensure adequate hydration  - Administer IV fluids if ordered to ensure adequate hydration  - Administer ordered medications as needed  - Encourage mobilization and activity  - Consider nutritional services referral to assist patient with adequate nutrition and appropriate food choices  Outcome: Progressing  Goal: Maintains adequate nutritional intake  Description: INTERVENTIONS:  - Monitor percentage of each meal consumed  - Identify factors contributing to decreased intake, treat as appropriate  - Assist with meals as needed  - Monitor I&O, weight, and lab values if indicated  - Obtain nutrition services referral as needed  Outcome: Progressing     Problem: GENITOURINARY - ADULT  Goal: Maintains or returns to baseline urinary function  Description: INTERVENTIONS:  - Assess urinary function  - Encourage oral fluids to ensure adequate hydration if ordered  - Administer IV fluids as ordered to ensure adequate hydration  - Administer ordered medications as needed  - Offer frequent toileting  - Follow urinary retention protocol if ordered  Outcome: Progressing     Problem: METABOLIC, FLUID AND ELECTROLYTES - ADULT  Goal: Electrolytes maintained within normal limits  Description: INTERVENTIONS:  - Monitor labs and assess patient for signs and symptoms of electrolyte imbalances  - Administer electrolyte replacement as ordered  - Monitor response to electrolyte replacements, including repeat lab results as appropriate  - Instruct patient on fluid and nutrition as appropriate  Outcome: Progressing  Goal: Fluid balance maintained  Description: INTERVENTIONS:  - Monitor labs   - Monitor I/O and  WT  - Instruct patient on fluid and nutrition as appropriate  - Assess for signs & symptoms of volume excess or deficit  Outcome: Progressing     Problem: SKIN/TISSUE INTEGRITY - ADULT  Goal: Skin Integrity remains intact(Skin Breakdown Prevention)  Description: Assess:  -Perform Pk assessment every shift  -Clean and moisturize skin every n as ordered  -Inspect skin when repositioning, toileting, and assisting with ADLS  -Assess extremities for adequate circulation and sensation     Bed Management:  -Have minimal linens on bed & keep smooth, unwrinkled  -Change linens as needed when moist or perspiring  -Avoid sitting or lying in one position for more than 2 hours while in bed  -Keep HOB at 45degrees     Toileting:  -Offer bedside commode  -Assess for incontinence every 2  -Use incontinent care products after each incontinent episode such as 23    Activity:  -Mobilize patient 3 times a day  -Encourage activity and walks on unit  -Encourage or provide ROM exercises   -Turn and reposition patient every 2 Hours  -Use appropriate equipment to lift or move patient in bed  -Instruct/ Assist with weight shifting every 2 when out of bed in chair  -Consider limitation of chair time 2 hour intervals    Skin Care:  -Avoid use of baby powder, tape, friction and shearing, hot water or constrictive clothing  -Relieve pressure over bony prominences using 2  -Do not massage red bony areas    Next Steps:  -Teach patient strategies to minimize risks such as yes   -Consider consults to  interdisciplinary teams such as yes  Outcome: Progressing  Goal: Incision(s), wounds(s) or drain site(s) healing without S/S of infection  Description: INTERVENTIONS  - Assess and document dressing, incision, wound bed, drain sites and surrounding tissue  - Provide patient and family education  - Perform skin care/dressing changes every yes  Outcome: Progressing     Problem: MUSCULOSKELETAL - ADULT  Goal: Maintain or return mobility to safest level  of function  Description: INTERVENTIONS:  - Assess patient's ability to carry out ADLs; assess patient's baseline for ADL function and identify physical deficits which impact ability to perform ADLs (bathing, care of mouth/teeth, toileting, grooming, dressing, etc.)  - Assess/evaluate cause of self-care deficits   - Assess range of motion  - Assess patient's mobility  - Assess patient's need for assistive devices and provide as appropriate  - Encourage maximum independence but intervene and supervise when necessary  - Involve family in performance of ADLs  - Assess for home care needs following discharge   - Consider OT consult to assist with ADL evaluation and planning for discharge  - Provide patient education as appropriate  Outcome: Progressing  Goal: Maintain proper alignment of affected body part  Description: INTERVENTIONS:  - Support, maintain and protect limb and body alignment  - Provide patient/ family with appropriate education  Outcome: Progressing

## 2024-03-22 NOTE — ASSESSMENT & PLAN NOTE
History of combined CHF.  Was on Entresto, Coreg in the past.  2D echo in 2018 showed EF 40 to 45%, stage I diastolic dysfunction.  2D echo on 3/10/2024 outpatient (wife showed result) showed normal EF around 60%, normal diastolic function, mild to moderate AR.     Trace pleural effusions on CT  Daily weight, intake output  Patient desats mildly while sleeping. On RA in AM   Initial (On Arrival)

## 2024-03-22 NOTE — OCCUPATIONAL THERAPY NOTE
Occupational Therapy Evaluation     Patient Name: Del Ocampo  Today's Date: 3/22/2024  Problem List  Principal Problem:    Alcohol-induced acute pancreatitis  Active Problems:    Alcohol abuse    Hypokalemia    Elevated lipase    Nicotine abuse    History of CHF (congestive heart failure)    History of bleeding ulcers    Thoracic aortic aneurysm (HCC)    PMR (polymyalgia rheumatica) (HCC)    Past Medical History  Past Medical History:   Diagnosis Date    Alcohol abuse     Marinelli's esophagus     Bleeding ulcer     Heart failure (HCC)     PMR (polymyalgia rheumatica) (HCC)     Temporal arteritis (HCC)      Past Surgical History  Past Surgical History:   Procedure Laterality Date    FRACTURE SURGERY      knee  left     ROTATOR CUFF REPAIR      right    TONSILLECTOMY          24 1154   OT Last Visit   OT Visit Date 24   Note Type   Note type Evaluation  (eval 3670-8272 + tx session 2656-2268)   Additional Comments Pt identifed by full name and    Pain Assessment   Pain Assessment Tool 0-10   Pain Score 4   Pain Location/Orientation Location: Abdomen   Pain Onset/Description Descriptor: Discomfort   Effect of Pain on Daily Activities limits comfort   Patient's Stated Pain Goal No pain   Hospital Pain Intervention(s) Repositioned;Ambulation/increased activity   Multiple Pain Sites No   Restrictions/Precautions   Weight Bearing Precautions Per Order No   Other Precautions Contact/isolation;Chair Alarm;Bed Alarm;Multiple lines;Telemetry;Fall Risk;Pain  (IV)   Home Living   Type of Home House   Home Layout Multi-level;Bed/bath upstairs   Bathroom Shower/Tub Tub/shower unit   Bathroom Toilet Standard   Bathroom Equipment Shower chair   Bathroom Accessibility Accessible   Home Equipment Walker;Cane;Stair glide   Additional Comments Pt reports bedroom on 2nd floor and uses stair glide to go up   Prior Function   Level of [place] Independent with ADLs;Independent with functional mobility;Needs  "assistance with IADLS   Lives With Spouse   Receives Help From Family   IADLs Family/Friend/Other provides transportation;Family/Friend/Other provides meals;Family/Friend/Other provides medication management   Falls in the last 6 months 1 to 4  (3 falls)   Vocational Retired   Lifestyle   Autonomy Pt reports I w/ ADLs prior to admission   Reciprocal Relationships Pt reports supportive wife and daughter   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 Patient personal factors supporting engagement include supportive family, independence with ADLS prior, lives with family/friends, and access to DME/AD. Barriers include unable to manage stairs, unable to complete IADLS, increased pain, decreased endurance, and increased fatigue.   Family/Caregiver Present Yes  (wife)   Additional General Comments Pt is a 69 y.o male admitted on 3/20/24 w/ abdominal pain and vomitting. Diagnosed w/ alcohol-induced acute pancreatitis   Subjective   Subjective \"I'm hanging in there\"   ADL   Where Assessed Edge of bed  (vs bathroom)   Eating Assistance 7  Independent   Grooming Assistance 5  Supervision/Setup   UB Bathing Assistance 5  Supervision/Setup   LB Bathing Assistance 5  Supervision/Setup   UB Dressing Assistance 4  Minimal Assistance   UB Dressing Deficit Pull around back;Fasteners  (Pt required min A to pull gown around back and fasten)   LB Dressing Assistance 4  Minimal Assistance   Toileting Assistance  5  Supervision/Setup   Additional Comments Unable to assess all ADLs, anticipate levels of assistance using clinical judgement   Bed Mobility   Supine to Sit 5  Supervision   Additional items Assist x 1;HOB elevated;Increased time required;Verbal cues   Additional Comments Pt seated OOB in recliner chair at end " of session   Transfers   Sit to Stand 4  Minimal assistance   Additional items Assist x 1;Increased time required;Verbal cues   Stand to Sit 4  Minimal assistance   Additional items Assist x 1;Increased time required;Armrests;Verbal cues   Toilet transfer 4  Minimal assistance   Additional items Assist x 1;Increased time required;Verbal cues;Standard toilet   Additional Comments Pt completed sit <> stand x1 w/ cues for hand placement   Functional Mobility   Functional Mobility 4  Minimal assistance   Additional Comments Pt completed functional mobility in hallway approx 50 ft using RW   Additional items Rolling walker   Balance   Static Sitting Fair +   Static Standing Fair   Ambulatory Fair -   Activity Tolerance   Activity Tolerance Patient tolerated treatment well   Nurse Made Aware spoke w/ RN Marilee   RUE Assessment   RUE Assessment WFL  (observed during ADLs. Pt R hand appears to have cyst but did not effect function)   LUE Assessment   LUE Assessment WFL  (observed during ADLs)   Cognition   Overall Cognitive Status   (appeared WFL, will continue to assess)   Arousal/Participation Alert;Cooperative   Attention Within functional limits   Orientation Level Oriented to person;Oriented to place;Oriented to situation   Memory Within functional limits   Following Commands Follows one step commands with increased time or repetition   Comments Pt alert, cooperative, able to hold conversation   Assessment   Limitation Decreased ADL status;Decreased endurance;Decreased self-care trans;Decreased high-level ADLs   Assessment Pt is a 69 y.o male admitted w/ abdominal pain and vomitting on 3/20/24, diagnosed w/ alcohol-induced acute pancreatitis. Pt was sleeping upon therapist arrival, easily aroused, willing to participate. Pt reports living w/ wife in a multi-level house. Pt reports I w/ ADLs prior to admission. Upon eval, pt participated in functional transfers/mobility using RW. Pt demonstrated improved functional  mobility since PT eval on 3/21, able to go approx 50ft in hallway w/o signs of fatigue. Pt wife present during session reports he hasn't been able to walk that far w/o getting fatigued. Pt reported need to void, completing toilet transfer w/ min A in bathroom. Pt currently completing ADLs below baseline level of I due to decreased endurance, activity tolerance and increased generalized weakness. From OT standpoint, recommend level III rehab resource intensity once discharged. OT will follow 3-5x/week to maximize I w/ ADLs.   Goals   Patient Goals to go home   Plan   Treatment Interventions ADL retraining;Functional transfer training;Endurance training;Patient/family training;Equipment evaluation/education;Energy conservation;Activityengagement   Goal Expiration Date 04/05/24   OT Treatment Day 0   OT Frequency 3-5x/wk   Discharge Recommendation   Rehab Resource Intensity Level, OT III (Minimum Resource Intensity)   AM-PAC Daily Activity Inpatient   Lower Body Dressing 3   Bathing 3   Toileting 3   Upper Body Dressing 3   Grooming 3   Eating 4   Daily Activity Raw Score 19   Daily Activity Standardized Score (Calc for Raw Score >=11) 40.22   AM-PAC Applied Cognition Inpatient   Following a Speech/Presentation 3   Understanding Ordinary Conversation 4   Taking Medications 3   Remembering Where Things Are Placed or Put Away 4   Remembering List of 4-5 Errands 3   Taking Care of Complicated Tasks 3   Applied Cognition Raw Score 20   Applied Cognition Standardized Score 41.76   Barthel Index   Feeding 10   Bathing 0   Grooming Score 0   Dressing Score 5   Bladder Score 10   Bowels Score 10   Toilet Use Score 5   Transfers (Bed/Chair) Score 10   Mobility (Level Surface) Score 10   Stairs Score 5   Barthel Index Score 65   Additional Treatment Session   Start Time 1135   End Time 1154   Treatment Assessment Pt seen for skilled OT tx session day # 1 on 3/22/24 following eval. Pt agreeable to participate, pt seated in  bathroom on toilet attempting to void. Pt participated in toileting in bathroom w/ S. Pt required min A to don new brief while seated on toilet. Pt completed grooming in stance at sink to wash/dry hands w/ S. Pt completed functional mobility short distance from bathroom to recliner chair w/ min A using RW. Pt seated OOB in recliner chair at end of tx session, all needs met, call bell in reach. OT continue to recommend level III rehab resource intensity once discharged. Will continue to follow pt 3-5x/week.   Additional Treatment Day 1   End of Consult   Education Provided Yes;Family or social support of family present for education by provider   Patient Position at End of Consult Bedside chair;Bed/Chair alarm activated;All needs within reach   Nurse Communication Nurse aware of consult   End of Consult Comments Pt seated OOB in recliner chair at end of session, call bell in reach, all needs met   Licensure   [place] License Number  Ciera Candi, OTS     Pt will meet the following goals within 14 days in order to return home and be with his wife and dog:    LTG  - Pt will complete LB dressing w/ mod I for + time while seated within 14 days  - Pt will complete toileting independently within 14 days  - Pt will complete functional transfers to/from all surfaces using LRAD as needed w/ mod I for + time within 14 days  - Pt will complete functional mobility household distances using LRAD as needed w/ mod I for + time within 14 days  - Pt will demonstrate improved activity tolerance by engaging in ADL task for at least 10 min w/o signs of fatigue within 14 days      Pt will meet the following goals within 3-5 days in order to return home and be with his wife and dog:    STG  - Pt will complete LB dressing w/ S while seated within 3-5 days  - Pt will complete toileting w/ mod I for + time within 3-5 days  - Pt will complete functional transfers to/from all surfaces using LRAD as needed w/ S within 3-5 days  - Pt will complete  functional mobility household distances using LRAD as needed w/ S within 3-5 days  - Pt will demonstrate improved activity tolerance by engaging in ADL task for at least 5 min w/o signs of fatigue within 3-5 days  - Pt will participate in ongoing cognitive assessments to assist with safe D/C planning and supervision/assistance recommendations within 3-5 days  - Pt will demonsrate proper body mechanics and fall prevention strategies during tx sessions for increased safety awareness during ADLs/IADLs within 3-5 days      The patient's raw score on the -PAC Daily Activity Inpatient Short Form is 19. A raw score of greater than or equal to 19 suggests the patient may benefit from discharge to home. Please refer to the recommendation of the Occupational Therapist for safe discharge planning.     Ciera Christopher, OTS

## 2024-03-22 NOTE — PROGRESS NOTES
Progress Note - [facility]  Del Ocampo 69 y.o. male MRN: [id number]    Unit/Bed#: [address] [place] [address] Encounter: [id number]      Assessment/Plan:  69-year-old male with a past medical history of alcohol abuse, nicotine abuse, temporal arteritis, CHF, Marinelli's esophagus, colon polyps, peptic ulcer disease, GI bleed who presents with decreased appetite associated with abdominal pain, nausea, vomiting, and diarrhea .Hx of alcohol abuse patient drinks 500 mL to 1 L of vodka daily.    1.Abdominal pain associated with  nausea, vomiting and diarrhea.  Patient and wife reports longstanding history abdominal pain associated with diarrhea and intermittent episodes of nausea and vomiting which has been going on for many years.  Patient reports that over the last several months diarrhea has increased in severity.  Patient denies any blood in stool, blood from rectal area, but does report intermittent episodes of black stool.  Patient reports using Pepto-Bismol as needed last taken 1 week ago.  CTA abdomen done 3/21 showed findings of severe mid to distal small bowel enteritis and skip segments of severe colitis again seen, similar compared to the prior study. Hemoglobin 12.2 on admission, hemoglobin 10 on 3/21.  Platelets normal on admission platelets 147 on 3/21. Differential diagnosis include pancreatic insufficiency, infectious, ischemic,or inflammatory bowel etiology.     -Stool for C. difficile, Giardia, ova and parasite, and bacteria panel pending  -Obtain pancreatic elastase pending  -Obtain fecal calprotectin pending  -C-reactive protein 107.3  -Continue supportive care and IV fluid hydration   -Continue electrolyte replacement  -MRI enterography in patient for further evaluation of small bowel for inflammatory bowel disease  -Clear liquid diet  -EGD and colonoscopy Monday. Prep and procedure explained to patient in detail. Further recommendations pending results of EGD and colonoscopy.      2.  Alcoholic liver disease  3.   "Decreased appetite  4.  Protein malnutrition  CT abdomen showed diffuse hepatic steatosis. LFTs within normal limits except total bilirubin 1.74.  LFTs within normal limits except total bilirubin 1.26 and trending downward on 3/21.  Patient reports decreased appetite per wife patient ate half a sandwich last week. Patient reports he does drink a bottle of vodka daily.  Per patient \"it helps my ulcer.\"  MDF 17.8 No need for prednisolone at current time.  Prognosis good.  MELD 9 on 3/21.     -Obtain ultrasound of abdomen for further evaluation of liver  -Clear liquid diet  -Recommend high-protein supplements such as Ensure with each meal when diet advanced.  -CIWA protocol per attending team  -Recommend nutritional consult     5.  Elevated lipase  6.  GERD  Patient noted to have elevated lipase on admission.  Which may be signs of early pancreatitis versus peptic ulcer disease. CTA of abdomen done 3/21 showed pancreas unremarkable.  No abdominal tenderness on examination.  Patient does report acid reflux and heartburn on a daily basis for which he takes Tums at home.     -Continue pantoprazole 40 Mg IV twice daily  -Continue supportive care and IV fluid hydration   -Continue electrolyte replacement       Subjective:   Lying in bed in no acute distress.  Tolerating clear liquids.  No nausea or vomiting.  Denies acid reflux or heartburn.  Denies abdominal pain.  Abdomen exam benign.  Patient reports he continues to have diarrhea.  Per nursing staff patient had 3 episodes of liquid stool this morning.  No bright red blood in stool or black tarry stool.    Objective:     Vitals: Blood pressure 107/59, pulse 74, temperature 98 °F (36.7 °C), resp. rate 18, height 5' 8\" (1.727 m), weight 69.9 kg (154 lb), SpO2 91%.,Body mass index is 23.42 kg/m².      Intake/Output Summary (Last 24 hours) at 3/22/2024 0830  Last data filed at 3/22/2024 0614  Gross per 24 hour   Intake 500 ml   Output --   Net 500 ml       Physical Exam: " Physical Exam  Vitals and nursing note reviewed.   Constitutional:       General: He is not in acute distress.  Cardiovascular:      Rate and Rhythm: Normal rate and regular rhythm.      Pulses: Normal pulses.      Heart sounds: Normal heart sounds.   Pulmonary:      Effort: Pulmonary effort is normal. No respiratory distress.      Breath sounds: Normal breath sounds. No stridor. No wheezing, rhonchi or rales.   Abdominal:      General: Bowel sounds are normal. There is no distension.      Palpations: Abdomen is soft. There is no mass.      Tenderness: There is no abdominal tenderness. There is no guarding or rebound.      Hernia: No hernia is present.   Musculoskeletal:      Right lower leg: No edema.      Left lower leg: No edema.   Skin:     General: Skin is warm and dry.      Capillary Refill: Capillary refill takes less than 2 seconds.      Coloration: Skin is not jaundiced or pale.   Neurological:      Mental Status: He is alert and oriented to person, place, and time.   Psychiatric:         Mood and Affect: Mood normal.         Invasive Devices       Peripheral Intravenous Line  Duration             Peripheral IV 03/20/24 Left Antecubital 1 day                    Current Facility-Administered Medications:     acetaminophen (TYLENOL) tablet 650 mg, 650 mg, Oral, Q6H PRN, BREN Rodriguez    cefTRIAXone (ROCEPHIN) IVPB (premix in dextrose) 1,000 mg 50 mL, 1,000 mg, Intravenous, Q24H, BREN Rodriguez, Last Rate: 100 mL/hr at 03/22/24 0102, 1,000 mg at 03/22/24 0102    folic acid 1 mg, thiamine (VITAMIN B1) 100 mg in sodium chloride 0.9 % 100 mL IV piggyback, , Intravenous, Daily, BREN Rodriguez, New Bag at 03/21/24 1618    influenza vaccine, high-dose (FLUZONE HIGH-DOSE) IM injection HONEY 0.7 mL, 0.7 mL, Intramuscular, Prior to discharge, BREN Rodriguez    metroNIDAZOLE (FLAGYL) IVPB (premix) 500 mg 100 mL, 500 mg, Intravenous, Q8H, BREN Rodriguez, Last Rate: 200 mL/hr at 03/22/24 0333, 500 mg  at 03/22/24 0333    multivitamin stress formula tablet 1 tablet, 1 tablet, Oral, Daily, BREN Rodriguez, 1 tablet at 03/22/24 0817    nicotine (NICODERM CQ) 21 mg/24 hr TD 24 hr patch 1 patch, 1 patch, Transdermal, Daily, BREN Rodriguez, 1 patch at 03/22/24 0814    pantoprazole (PROTONIX) injection 40 mg, 40 mg, Intravenous, Q12H ROBINSON, BRNE Rodriguez, 40 mg at 03/22/24 0817    potassium chloride 20 mEq IVPB (premix), 20 mEq, Intravenous, Once, Last Rate: 50 mL/hr at 03/22/24 0813, 20 mEq at 03/22/24 0813 **FOLLOWED BY** potassium chloride 20 mEq IVPB (premix), 20 mEq, Intravenous, Once, BREN Martinez    trimethobenzamide (TIGAN) IM injection 200 mg, 200 mg, Intramuscular, Q6H PRN, BREN Rodriguez    Lab Results:   Recent Results (from the past 24 hour(s))   Lipase    Collection Time: 03/21/24 11:11 AM   Result Value Ref Range    Lipase 317 (H) 11 - 82 u/L   Comprehensive metabolic panel    Collection Time: 03/21/24 11:11 AM   Result Value Ref Range    Sodium 138 135 - 147 mmol/L    Potassium 2.1 (LL) 3.5 - 5.3 mmol/L    Chloride 90 (L) 96 - 108 mmol/L    CO2 40 (H) 21 - 32 mmol/L    ANION GAP 8 4 - 13 mmol/L    BUN 5 5 - 25 mg/dL    Creatinine 0.50 (L) 0.60 - 1.30 mg/dL    Glucose 86 65 - 140 mg/dL    Calcium 7.5 (L) 8.4 - 10.2 mg/dL    Corrected Calcium 8.6 8.3 - 10.1 mg/dL    AST 20 13 - 39 U/L    ALT 10 7 - 52 U/L    Alkaline Phosphatase 64 34 - 104 U/L    Total Protein 4.4 (L) 6.4 - 8.4 g/dL    Albumin 2.6 (L) 3.5 - 5.0 g/dL    Total Bilirubin 1.26 (H) 0.20 - 1.00 mg/dL    eGFR 109 ml/min/1.73sq m   Magnesium    Collection Time: 03/21/24 11:11 AM   Result Value Ref Range    Magnesium 2.0 1.9 - 2.7 mg/dL   CBC    Collection Time: 03/21/24 11:11 AM   Result Value Ref Range    WBC 5.28 4.31 - 10.16 Thousand/uL    RBC 3.05 (L) 3.88 - 5.62 Million/uL    Hemoglobin 10.0 (L) 12.0 - 17.0 g/dL    Hematocrit 29.6 (L) 36.5 - 49.3 %    MCV 97 82 - 98 fL    MCH 32.8 26.8 - 34.3 pg    MCHC 33.8 31.4 -  37.4 g/dL    RDW 19.2 (H) 11.6 - 15.1 %    Platelets 147 (L) 149 - 390 Thousands/uL    MPV 9.5 8.9 - 12.7 fL   C-reactive protein    Collection Time: 03/21/24 11:11 AM   Result Value Ref Range    .3 (H) <3.0 mg/L   Basic metabolic panel    Collection Time: 03/21/24  8:33 PM   Result Value Ref Range    Sodium 136 135 - 147 mmol/L    Potassium 2.1 (LL) 3.5 - 5.3 mmol/L    Chloride 92 (L) 96 - 108 mmol/L    CO2 40 (H) 21 - 32 mmol/L    ANION GAP 4 4 - 13 mmol/L    BUN 6 5 - 25 mg/dL    Creatinine 0.50 (L) 0.60 - 1.30 mg/dL    Glucose 144 (H) 65 - 140 mg/dL    Calcium 7.8 (L) 8.4 - 10.2 mg/dL    eGFR 109 ml/min/1.73sq m   Comprehensive metabolic panel    Collection Time: 03/22/24  6:25 AM   Result Value Ref Range    Sodium 139 135 - 147 mmol/L    Potassium 2.9 (L) 3.5 - 5.3 mmol/L    Chloride 98 96 - 108 mmol/L    CO2 34 (H) 21 - 32 mmol/L    ANION GAP 7 4 - 13 mmol/L    BUN 5 5 - 25 mg/dL    Creatinine 0.47 (L) 0.60 - 1.30 mg/dL    Glucose 89 65 - 140 mg/dL    Calcium 7.8 (L) 8.4 - 10.2 mg/dL    Corrected Calcium 8.9 8.3 - 10.1 mg/dL    AST 28 13 - 39 U/L    ALT 11 7 - 52 U/L    Alkaline Phosphatase 70 34 - 104 U/L    Total Protein 4.5 (L) 6.4 - 8.4 g/dL    Albumin 2.6 (L) 3.5 - 5.0 g/dL    Total Bilirubin 1.04 (H) 0.20 - 1.00 mg/dL    eGFR 111 ml/min/1.73sq m   CBC    Collection Time: 03/22/24  6:25 AM   Result Value Ref Range    WBC 6.06 4.31 - 10.16 Thousand/uL    RBC 3.15 (L) 3.88 - 5.62 Million/uL    Hemoglobin 10.6 (L) 12.0 - 17.0 g/dL    Hematocrit 31.1 (L) 36.5 - 49.3 %    MCV 99 (H) 82 - 98 fL    MCH 33.7 26.8 - 34.3 pg    MCHC 34.1 31.4 - 37.4 g/dL    RDW 19.5 (H) 11.6 - 15.1 %    Platelets 173 149 - 390 Thousands/uL    MPV 10.0 8.9 - 12.7 fL   Magnesium    Collection Time: 03/22/24  6:25 AM   Result Value Ref Range    Magnesium 1.9 1.9 - 2.7 mg/dL             Imaging Studies: CT abdomen pelvis with contrast    Result Date: 3/21/2024  Narrative: CT ABDOMEN AND PELVIS WITH IV CONTRAST INDICATION:  Diffuse abdominal tenderness. COMPARISON: None. TECHNIQUE: CT examination of the abdomen and pelvis was performed. Multiplanar 2D reformatted images were created from the source data. This examination, like all CT scans performed in the [facility], was performed utilizing techniques to minimize radiation dose exposure, including the use of iterative reconstruction and automated exposure control. Radiation dose length product (DLP) for this visit: 480.96 mGy-cm IV Contrast: 100 mL of iohexol (OMNIPAQUE) Enteric Contrast: Not administered. FINDINGS: ABDOMEN LOWER CHEST: Trace pleural effusions with minimal dependent atelectasis in the lung bases. Heart size is normal. No significant pericardial effusion. LIVER/BILIARY TREE: Moderate diffuse hepatic steatosis. No suspicious mass. Normal hepatic contours. No biliary dilation. GALLBLADDER: Distended gallbladder without abnormal wall thickening or calcified gallstones. No pericholecystic inflammatory change. SPLEEN: Unremarkable. PANCREAS: Unremarkable. ADRENAL GLANDS: Unremarkable. KIDNEYS/URETERS: Unremarkable. No hydronephrosis. STOMACH AND BOWEL: Stomach is mildly distended with oral contrast, air, and fluid. No gross intraluminal mass is seen. Abnormal wall thickening and mucosal hyperenhancement involving multiple abdominal/pelvic small bowel loops including the terminal ileum suggesting diffuse severe enteritis. Normal air-filled appendix is seen without inflammatory changes. Severe wall thickening of the sigmoid colon and rectum with pericolonic mesenteric inflammatory stranding suggesting severe colitis of these segments. Mild wall thickening of the ascending colon and proximal to mid transverse colon also noted suggesting colitis. There is some sparing of the mid to distal transverse colon and descending colon seen. Extensive descending and sigmoid diverticulosis. APPENDIX: Normal. ABDOMINOPELVIC CAVITY: No abdominal/pelvic lymphadenopathy by  size criteria. No free air or pneumatosis intestinalis noted. Moderate abdominal/pelvic ascites is seen which could be reactive in etiology. VESSELS: 4.3 cm descending thoracic aortic aneurysm. Abdominal aorta measures up to 2.7 cm in diameter. No evidence for aortic dissection. Extensive calcific atherosclerosis of the thoracoabdominal aorta and proximal branch vessels noted. PELVIS REPRODUCTIVE ORGANS: Unremarkable for patient's age. URINARY BLADDER: Mildly distended and grossly unremarkable. ABDOMINAL WALL/INGUINAL REGIONS: Unremarkable. BONES: No acute fracture or suspicious osseous lesion. Mild multilevel degenerative changes of the thoracolumbar spine worse at the L5-S1 level.     Impression: 1.  Findings of diffuse severe small bowel enteritis and skip segments of severe colitis are seen as described above which may be secondary to infectious/inflammatory process or ischemic etiology. Clinical correlation recommended. Follow-up colonoscopy when clinically appropriate also recommended. 2.  No evidence for bowel obstruction, pneumatosis intestinalis, or free air. Moderate abdominal/pelvic ascites again noted which could be reactive in etiology. 3.  Diffuse hepatic steatosis. 4.  4.3 cm descending thoracic aortic aneurysm and dilated abdominal aorta measuring up to 2.7 cm in diameter with extensive calcific atherosclerosis. No evidence for aortic dissection.  No occlusion or significant flow-limiting stenosis of the abdominal  aorta or proximal branch vessels identified. Workstation performed: [id number]     CTA abdomen pelvis w wo contrast    Result Date: 3/21/2024  Narrative: CT ANGIOGRAM OF THE ABDOMEN AND PELVIS WITH AND WITHOUT IV CONTRAST INDICATION: Abnormal CT of the abdomen; R/o bowel ischemia. COMPARISON: None. TECHNIQUE: CT angiogram examination of the abdomen and pelvis was performed according to standard protocol. This examination, like all CT scans performed in the [facility], was  performed utilizing techniques to minimize radiation dose exposure, including the use of iterative reconstruction and automated exposure control. Contrast as well as noncontrast images were obtained. Rad dose 1784.24 mGy-cm IV Contrast: 85 mL of iohexol (OMNIPAQUE) Enteric Contrast: Not administered. FINDINGS: VASCULAR STRUCTURES: 4.3 cm descending thoracic aortic aneurysm and dilated abdominal aorta measuring up to 2.7 cm. Extensive calcific atherosclerosis of the thoracoabdominal aorta and proximal branch vessels. No evidence for dissection. The celiac artery, superior mesenteric artery, bilateral renal arteries, inferior mesenteric artery, and bilateral common/internal/external iliac arteries as well as the visualized portions of the proximal femoral arteries are grossly patent. Multifocal calcific atherosclerosis with mild luminal narrowing is seen. No occlusion or significant flow-limiting stenosis of the abdominal aorta or proximal branch vessels noted. OTHER FINDINGS ABDOMEN LOWER CHEST: Trace pleural effusions with minimal dependent atelectasis in the lung bases. Heart size is normal. No significant pericardial effusion. LIVER/BILIARY TREE: Moderate diffuse hepatic steatosis. No suspicious mass. Normal hepatic contours. No biliary dilation. GALLBLADDER: Distended gallbladder without abnormal wall thickening or calcified gallstones. No pericholecystic inflammatory change. SPLEEN: Unremarkable. PANCREAS: Unremarkable. ADRENAL GLANDS: Unremarkable. KIDNEYS/URETERS: Unremarkable. No hydronephrosis. STOMACH AND BOWEL: Stomach is contracted limiting evaluation. Subtle diffuse wall thickening of the stomach could be due to under distention. Abnormal wall thickening and mucosal hyperenhancement involving multiple mid to distal small bowel loops including the terminal ileum suggesting severe enteritis. Normal air-filled appendix is seen without inflammatory changes. Similar-appearing abnormal wall thickening  "involving the right colon, hepatic flexure and proximal transverse colon, distal descending colon, sigmoid colon, and rectum noted with pericolonic mesenteric inflammatory stranding suggesting severe colitis of the segments. There is somewhat sparing of the mid to distal transverse colon and descending colon noted. Descending and sigmoid diverticulosis again seen. APPENDIX: No findings to suggest appendicitis. ABDOMINOPELVIC CAVITY: No abdominal/pelvic lymphadenopathy by size criteria. No free air or pneumatosis intestinalis noted. Moderate abdominal/pelvic ascites is seen which could be reactive in etiology. PELVIS REPRODUCTIVE ORGANS: Unremarkable for patient's age. URINARY BLADDER: Unremarkable. ABDOMINAL WALL/INGUINAL REGIONS: Unremarkable. BONES: No acute fracture or suspicious osseous lesion. Mild multilevel degenerative changes of the thoracolumbar spine worse at the L5-S1 level.     Impression: 1.  4.3 cm descending thoracic aortic aneurysm and dilated abdominal aorta measuring up to 2.7 cm in diameter with extensive calcific atherosclerosis. No evidence for aortic dissection. Major abdominal aortic branch vessels are grossly patent with calcific atherosclerosis and multifocal mild luminal narrowing. No occlusion or significant flow-limiting stenosis of the abdominal aorta or proximal branch vessels identified. 2.  Findings of severe mid to distal small bowel enteritis and skip segments of severe colitis again seen, similar compared to the prior study. Findings may be secondary to infectious/inflammatory or ischemic etiology. Clinical correlation recommended. Descending and sigmoid diverticulosis. 3.  No evidence for bowel obstruction, pneumatosis intestinalis, or free air. Moderate abdominal/pelvic ascites again noted which may be reactive in etiology. 4.  Diffuse hepatic steatosis. Workstation performed: [id number]           BREN Chaves      Please Note: \"This note has been constructed using a " "voice recognition system.Therefore there may be syntax, spelling, and/or grammatical errors. Please call if you have any questions. \"**   "

## 2024-03-22 NOTE — ASSESSMENT & PLAN NOTE
On imaging noted to have findings of diffuse severe small bowel enteritis and skip segments of severe colitis  Patient does report chronic diarrhea which has worsened over the last few weeks  C. difficile, bacterial enteric panel within normal limits  Giardia, O&P pending  Fecal Calprotectin pending  Keep stool chart

## 2024-03-22 NOTE — ASSESSMENT & PLAN NOTE
Patient drinks vodka 500 ml -1L daily.  Last drink was Tuesday.  Diffuse hepatic steatosis with moderate ascites on CT.  Right upper quadrant ultrasound pending  Continue [facility] protocol  Alcohol cessation.  Patient is not interested in alcohol rehab.

## 2024-03-22 NOTE — PROGRESS NOTES
[facility]  Progress Note  Name: Del Ocampo I  MRN: [id number]  Unit/Bed#: 4 [place] 416-01 I Date of Admission: 3/20/2024   Date of Service: 3/22/2024 I Hospital Day: 1    Assessment/Plan   * Abdominal pain  Assessment & Plan  Patient presented with abdominal pain, poor appetite, hypokalemia on outpatient lab work.  I ate poorly in past 2 weeks.  Patient reports intermittent abdominal pain for about 2 years, with vomiting in the morning after eating once a day for about 1 year, with coffee-ground color diarrhea and constipation alternating chronically.  Daily alcohol use for decades.  Currently drinks vodka 500 ml to 1 L daily for over 1 year, was drinking beer and wine prior to that.  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 Has not seen a doctor in office for about 8  years. Had a tele health visit recently, ordered Echo and lab work. K was 2.0 on 3/12.   CT abdomen pelvis with IV contrast and CTA abdomen pelvis in ED.  Showed diffuse severe small bowel enteritis and skip segments of severe colitis which could be infectious/inflammatory process or ischemic etiology.  Moderate ascites.  Diffuse hepatic steatosis. No occlusion or significant flow-limiting stenosis of the abdominal aorta or proximal branch vessels identified.   Lactic acid normal  Lipase 326 --> 317  Continue Rocephin Flagyl.  Continue clear liquid diet  Appreciate GI and surgical input  Plan for EGD and colonoscopy on 3/25    Colitis  Assessment & Plan  On imaging noted to have findings of diffuse severe small bowel enteritis and skip segments of severe colitis  Patient does report chronic diarrhea which has worsened over the last few weeks  C. difficile, bacterial enteric panel within normal limits  Giardia, O&P pending  Fecal Calprotectin pending  Keep stool chart      Hypokalemia  Assessment & Plan  Patient with severe hypokalemia  initial potassium 2.1 --> 2.9 today  Likely due to poor oral intake, profuse diarrhea.  EKG after receiving potassium repletion showed NSR, left BBB which is chronic, QTc 522.  Patient has been getting aggressive IV and p.o. potassium repletion  Telemetry  Magnesium 1.9.  Repleted today  Repeat BMP later today and again in the morning    Alcohol abuse  Assessment & Plan  Patient drinks vodka 500 ml -1L daily.  Last drink was Tuesday.  Diffuse hepatic steatosis with moderate ascites on CT.  Right upper quadrant ultrasound pending  Continue [facility] protocol  Alcohol cessation.  Patient is not interested in alcohol rehab.      PMR (polymyalgia rheumatica) (HCC)  Assessment & Plan  Noted history  Not on medication at home    Thoracic aortic aneurysm (HCC)  Assessment & Plan  CT showed - 4.3 cm descending thoracic aortic aneurysm and dilated abdominal aorta measuring up to 2.7 cm in diameter with extensive calcific atherosclerosis.   Outpatient follow-up.    History of bleeding ulcers  Assessment & Plan  Continue IV Protonix twice daily    History of CHF (congestive heart failure)  Assessment & Plan  History of combined CHF.  Was on Entresto, Coreg in the past.  2D echo in 2018 showed EF 40 to 45%, stage I diastolic dysfunction.  2D echo on 3/10/2024 outpatient (wife showed result) showed normal EF around 60%, normal diastolic function, mild to moderate AR.     Trace pleural effusions on CT  Daily weight, intake output  Patient desats mildly while sleeping. On RA in AM    Nicotine abuse  Assessment & Plan  Smokes 1 pack a day.  Nicotine patch, smoking cessation    Elevated lipase-resolved as of 3/22/2024  Assessment & Plan  Mild lipase elevation likely nonspecific in nature               VTE Pharmacologic Prophylaxis:    coffee ground diarrhea    Mobility:   Basic Mobility Inpatient Raw Score: 17  JH-HLM Goal: 5: Stand one or more mins  JH-HLM Achieved: 6: Walk 10 steps or more  JH-HLM Goal achieved. Continue to  encourage appropriate mobility.    Patient Centered Rounds: I performed bedside rounds with nursing staff today.   Discussions with Specialists or Other Care Team Provider: yes - GI    Education and Discussions with Family / Patient: Updated  (wife) at bedside.    Total Time Spent on Date of Encounter in care of patient: This time was spent on one or more of the following: performing physical exam; counseling and coordination of care; obtaining or reviewing history; documenting in the medical record; reviewing/ordering tests, medications or procedures; communicating with other healthcare professionals and discussing with patient's family/caregivers.    Current Length of Stay: 1 day(s)  Current Patient Status: Inpatient   Certification Statement: The patient will continue to require additional inpatient hospital stay due to abdominal pain, colitis, severe hypokalemia  Discharge Plan: Anticipate discharge in >72 hrs to home.    Code Status: Level 3 - DNAR and DNI    Subjective:     Patient continues to report multiple episodes of diarrhea.  Wants to go home soon    Objective:     Vitals:   Temp (24hrs), Av.3 °F (36.8 °C), Min:97.5 °F (36.4 °C), Max:99.2 °F (37.3 °C)    Temp:  [97.5 °F (36.4 °C)-99.2 °F (37.3 °C)] 97.9 °F (36.6 °C)  HR:  [72-81] 80  Resp:  [18] 18  BP: ()/(53-67) 103/57  SpO2:  [87 %-95 %] 95 %  Body mass index is 23.42 kg/m².     Input and Output Summary (last 24 hours):     Intake/Output Summary (Last 24 hours) at 3/22/2024 1944  Last data filed at 3/22/2024 0627  Gross per 24 hour   Intake 500 ml   Output --   Net 500 ml       Physical Exam:   Physical Exam  Vitals reviewed.   Constitutional:       General: He is not in acute distress.     Appearance: He is ill-appearing (chronically). He is not toxic-appearing.   HENT:      Head: Normocephalic and atraumatic.   Eyes:      General:         Right eye: No discharge.         Left eye: No discharge.      Extraocular Movements:  Extraocular movements intact.   Cardiovascular:      Rate and Rhythm: Normal rate and regular rhythm.   Pulmonary:      Effort: Pulmonary effort is normal. No respiratory distress.      Breath sounds: Normal breath sounds. No wheezing or rales.   Abdominal:      General: Bowel sounds are normal. There is no distension.      Palpations: Abdomen is soft.      Tenderness: There is no abdominal tenderness.   Musculoskeletal:      Right lower leg: No edema.      Left lower leg: No edema.      Comments: No tremors noted of hands bilaterally   Neurological:      Mental Status: He is alert and oriented to person, place, and time.          Additional Data:     Labs:  Results from last 7 days   Lab Units 03/22/24  0625 03/21/24  1111 03/20/24  2110   WBC Thousand/uL 6.06   < > 8.20   HEMOGLOBIN g/dL 10.6*   < > 12.2   HEMATOCRIT % 31.1*   < > 36.3*   PLATELETS Thousands/uL 173   < > 200   NEUTROS PCT %  --   --  80*   LYMPHS PCT %  --   --  10*   MONOS PCT %  --   --  9   EOS PCT %  --   --  1    < > = values in this interval not displayed.     Results from last 7 days   Lab Units 03/22/24  0625   SODIUM mmol/L 139   POTASSIUM mmol/L 2.9*   CHLORIDE mmol/L 98   CO2 mmol/L 34*   BUN mg/dL 5   CREATININE mg/dL 0.47*   ANION GAP mmol/L 7   CALCIUM mg/dL 7.8*   ALBUMIN g/dL 2.6*   TOTAL BILIRUBIN mg/dL 1.04*   ALK PHOS U/L 70   ALT U/L 11   AST U/L 28   GLUCOSE RANDOM mg/dL 89     Results from last 7 days   Lab Units 03/21/24  0151   INR  1.18             Results from last 7 days   Lab Units 03/20/24  2110   LACTIC ACID mmol/L 1.4       Lines/Drains:  Invasive Devices       Peripheral Intravenous Line  Duration             Peripheral IV 03/20/24 Left Antecubital 1 day                        Imaging: Reviewed radiology reports from this admission including: abdominal/pelvic CT    Recent Cultures (last 7 days):   Results from last 7 days   Lab Units 03/21/24  1756   C DIFF TOXIN B BY PCR  Negative       Last 24 Hours Medication  List:   Current Facility-Administered Medications   Medication Dose Route Frequency Provider Last Rate    acetaminophen  650 mg Oral Q6H PRN BREN Rodriguez      [START ON 3/24/2024] bisacodyl  10 mg Oral Once BREN Hill      [START ON 3/24/2024] bisacodyl  10 mg Oral Once Lynda Abdiaziz Meza, BREN      cefTRIAXone  1,000 mg Intravenous Q24H BREN Rodriguez 1,000 mg (03/22/24 0102)    folic acid 1 mg, thiamine (VITAMIN B1) 100 mg in sodium chloride 0.9 % 100 mL IV piggyback   Intravenous Daily BREN Rodriguez      influenza vaccine  0.7 mL Intramuscular Prior to discharge BREN Rodriguez      metroNIDAZOLE  500 mg Intravenous Q8H BREN Rodriguez 500 mg (03/22/24 1443)    multivitamin stress formula  1 tablet Oral Daily BREN Rodriguez      nicotine  1 patch Transdermal Daily BREN Rodriguez      pantoprazole  40 mg Intravenous Q12H ROBINSON BREN Rodriguez      [START ON 3/24/2024] polyethylene glycol  238 g Oral Once BREN Hill      trimethobenzamide  200 mg Intramuscular Q6H PRN BREN Rodriguez          Today, Patient Was Seen By: Tyesha Marino DO    **Please Note: This note may have been constructed using a voice recognition system.**

## 2024-03-22 NOTE — PLAN OF CARE
Problem: Prexisting or High Potential for Compromised Skin Integrity  Goal: Skin integrity is maintained or improved  Description: INTERVENTIONS:  - Identify patients at risk for skin breakdown  - Assess and monitor skin integrity  - Assess and monitor nutrition and hydration status  - Monitor labs   - Assess for incontinence   - Turn and reposition patient  - Assist with mobility/ambulation  - Relieve pressure over bony prominences  - Avoid friction and shearing  - Provide appropriate hygiene as needed including keeping skin clean and dry  - Evaluate need for skin moisturizer/barrier cream  - Collaborate with interdisciplinary team   - Patient/family teaching  - Consider wound care consult   Outcome: Progressing     Problem: PAIN - ADULT  Goal: Verbalizes/displays adequate comfort level or baseline comfort level  Description: Interventions:  - Encourage patient to monitor pain and request assistance  - Assess pain using appropriate pain scale  - Administer analgesics based on type and severity of pain and evaluate response  - Implement non-pharmacological measures as appropriate and evaluate response  - Consider cultural and social influences on pain and pain management  - Notify physician/advanced practitioner if interventions unsuccessful or patient reports new pain  Outcome: Progressing     Problem: GASTROINTESTINAL - ADULT  Goal: Minimal or absence of nausea and/or vomiting  Description: INTERVENTIONS:  - Administer IV fluids if ordered to ensure adequate hydration  - Maintain NPO status until nausea and vomiting are resolved  - Nasogastric tube if ordered  - Administer ordered antiemetic medications as needed  - Provide nonpharmacologic comfort measures as appropriate  - Advance diet as tolerated, if ordered  - Consider nutrition services referral to assist patient with adequate nutrition and appropriate food choices  Outcome: Progressing     Problem: GASTROINTESTINAL - ADULT  Goal: Maintains or returns to  baseline bowel function  Description: INTERVENTIONS:  - Assess bowel function  - Encourage oral fluids to ensure adequate hydration  - Administer IV fluids if ordered to ensure adequate hydration  - Administer ordered medications as needed  - Encourage mobilization and activity  - Consider nutritional services referral to assist patient with adequate nutrition and appropriate food choices  Outcome: Progressing     Problem: GASTROINTESTINAL - ADULT  Goal: Maintains adequate nutritional intake  Description: INTERVENTIONS:  - Monitor percentage of each meal consumed  - Identify factors contributing to decreased intake, treat as appropriate  - Assist with meals as needed  - Monitor I&O, weight, and lab values if indicated  - Obtain nutrition services referral as needed  Outcome: Progressing     Problem: METABOLIC, FLUID AND ELECTROLYTES - ADULT  Goal: Electrolytes maintained within normal limits  Description: INTERVENTIONS:  - Monitor labs and assess patient for signs and symptoms of electrolyte imbalances  - Administer electrolyte replacement as ordered  - Monitor response to electrolyte replacements, including repeat lab results as appropriate  - Instruct patient on fluid and nutrition as appropriate  Outcome: Progressing

## 2024-03-22 NOTE — PLAN OF CARE
Problem: OCCUPATIONAL THERAPY ADULT  Goal: Performs self-care activities at highest level of function for planned discharge setting.  See evaluation for individualized goals.  Description: Treatment Interventions: ADL retraining, Functional transfer training, Endurance training, Patient/family training, Equipment evaluation/education, Energy conservation, Activityengagement          See flowsheet documentation for full assessment, interventions and recommendations.   Note: Limitation: Decreased ADL status, Decreased endurance, Decreased self-care trans, Decreased high-level ADLs     Assessment: Pt is a 69 y.o male admitted w/ abdominal pain and vomitting on 3/20/24, diagnosed w/ alcohol-induced acute pancreatitis. Pt was sleeping upon therapist arrival, easily aroused, willing to participate. Pt reports living w/ wife in a multi-level house. Pt reports I w/ ADLs prior to admission. Upon eval, pt participated in functional transfers/mobility using RW. Pt demonstrated improved functional mobility since PT eval on 3/21, able to go approx 50ft in hallway w/o signs of fatigue. Pt wife present during session reports he hasn't been able to walk that far w/o getting fatigued. Pt reported need to void, completing toilet transfer w/ min A in bathroom. Pt currently completing ADLs below baseline level of I due to decreased endurance, activity tolerance and increased generalized weakness. From OT standpoint, recommend level III rehab resource intensity once discharged. OT will follow 3-5x/week to maximize I w/ ADLs.     Rehab Resource Intensity Level, OT: III (Minimum Resource Intensity)

## 2024-03-22 NOTE — ASSESSMENT & PLAN NOTE
Patient with severe hypokalemia initial potassium 2.1 --> 2.9 today  Likely due to poor oral intake, profuse diarrhea.  EKG after receiving potassium repletion showed NSR, left BBB which is chronic, QTc 522.  Patient has been getting aggressive IV and p.o. potassium repletion  Telemetry  Magnesium 1.9.  Repleted today  Repeat BMP later today and again in the morning

## 2024-03-23 LAB
ALBUMIN SERPL BCP-MCNC: 2.5 G/DL (ref 3.5–5)
ALP SERPL-CCNC: 62 U/L (ref 34–104)
ALT SERPL W P-5'-P-CCNC: 10 U/L (ref 7–52)
ANION GAP SERPL CALCULATED.3IONS-SCNC: 7 MMOL/L (ref 4–13)
ANION GAP SERPL CALCULATED.3IONS-SCNC: 7 MMOL/L (ref 4–13)
AST SERPL W P-5'-P-CCNC: 19 U/L (ref 13–39)
BILIRUB SERPL-MCNC: 0.88 MG/DL (ref 0.2–1)
BUN SERPL-MCNC: 2 MG/DL (ref 5–25)
BUN SERPL-MCNC: 2 MG/DL (ref 5–25)
CALCIUM ALBUM COR SERPL-MCNC: 8.7 MG/DL (ref 8.3–10.1)
CALCIUM SERPL-MCNC: 7.5 MG/DL (ref 8.4–10.2)
CALCIUM SERPL-MCNC: 7.6 MG/DL (ref 8.4–10.2)
CHLORIDE SERPL-SCNC: 103 MMOL/L (ref 96–108)
CHLORIDE SERPL-SCNC: 107 MMOL/L (ref 96–108)
CO2 SERPL-SCNC: 26 MMOL/L (ref 21–32)
CO2 SERPL-SCNC: 29 MMOL/L (ref 21–32)
CREAT SERPL-MCNC: 0.49 MG/DL (ref 0.6–1.3)
CREAT SERPL-MCNC: 0.51 MG/DL (ref 0.6–1.3)
ERYTHROCYTE [DISTWIDTH] IN BLOOD BY AUTOMATED COUNT: 19.9 % (ref 11.6–15.1)
GFR SERPL CREATININE-BSD FRML MDRD: 108 ML/MIN/1.73SQ M
GFR SERPL CREATININE-BSD FRML MDRD: 109 ML/MIN/1.73SQ M
GLUCOSE SERPL-MCNC: 103 MG/DL (ref 65–140)
GLUCOSE SERPL-MCNC: 95 MG/DL (ref 65–140)
HCT VFR BLD AUTO: 30.5 % (ref 36.5–49.3)
HGB BLD-MCNC: 10.4 G/DL (ref 12–17)
MAGNESIUM SERPL-MCNC: 2 MG/DL (ref 1.9–2.7)
MCH RBC QN AUTO: 32.8 PG (ref 26.8–34.3)
MCHC RBC AUTO-ENTMCNC: 34.1 G/DL (ref 31.4–37.4)
MCV RBC AUTO: 96 FL (ref 82–98)
PLATELET # BLD AUTO: 182 THOUSANDS/UL (ref 149–390)
PMV BLD AUTO: 9.6 FL (ref 8.9–12.7)
POTASSIUM SERPL-SCNC: 2.6 MMOL/L (ref 3.5–5.3)
POTASSIUM SERPL-SCNC: 3.1 MMOL/L (ref 3.5–5.3)
PROT SERPL-MCNC: 4.3 G/DL (ref 6.4–8.4)
RBC # BLD AUTO: 3.17 MILLION/UL (ref 3.88–5.62)
SODIUM SERPL-SCNC: 139 MMOL/L (ref 135–147)
SODIUM SERPL-SCNC: 140 MMOL/L (ref 135–147)
WBC # BLD AUTO: 6.25 THOUSAND/UL (ref 4.31–10.16)

## 2024-03-23 PROCEDURE — 83735 ASSAY OF MAGNESIUM: CPT | Performed by: FAMILY MEDICINE

## 2024-03-23 PROCEDURE — 85027 COMPLETE CBC AUTOMATED: CPT | Performed by: FAMILY MEDICINE

## 2024-03-23 PROCEDURE — 80053 COMPREHEN METABOLIC PANEL: CPT | Performed by: FAMILY MEDICINE

## 2024-03-23 PROCEDURE — 99233 SBSQ HOSP IP/OBS HIGH 50: CPT | Performed by: FAMILY MEDICINE

## 2024-03-23 PROCEDURE — 80048 BASIC METABOLIC PNL TOTAL CA: CPT | Performed by: FAMILY MEDICINE

## 2024-03-23 PROCEDURE — C9113 INJ PANTOPRAZOLE SODIUM, VIA: HCPCS | Performed by: NURSE PRACTITIONER

## 2024-03-23 RX ORDER — POTASSIUM CHLORIDE 20 MEQ/1
40 TABLET, EXTENDED RELEASE ORAL ONCE
Status: COMPLETED | OUTPATIENT
Start: 2024-03-23 | End: 2024-03-23

## 2024-03-23 RX ORDER — POTASSIUM CHLORIDE 14.9 MG/ML
20 INJECTION INTRAVENOUS ONCE
Status: COMPLETED | OUTPATIENT
Start: 2024-03-23 | End: 2024-03-23

## 2024-03-23 RX ORDER — CHOLESTYRAMINE LIGHT 4 G/5.7G
4 POWDER, FOR SUSPENSION ORAL 2 TIMES DAILY
Status: DISCONTINUED | OUTPATIENT
Start: 2024-03-23 | End: 2024-03-24

## 2024-03-23 RX ORDER — HYDROCORTISONE 25 MG/G
CREAM TOPICAL 4 TIMES DAILY PRN
Status: DISCONTINUED | OUTPATIENT
Start: 2024-03-23 | End: 2024-03-27 | Stop reason: HOSPADM

## 2024-03-23 RX ADMIN — POTASSIUM CHLORIDE 20 MEQ: 14.9 INJECTION, SOLUTION INTRAVENOUS at 13:03

## 2024-03-23 RX ADMIN — NICOTINE 1 PATCH: 21 PATCH, EXTENDED RELEASE TRANSDERMAL at 08:43

## 2024-03-23 RX ADMIN — PANTOPRAZOLE SODIUM 40 MG: 40 INJECTION, POWDER, FOR SOLUTION INTRAVENOUS at 08:37

## 2024-03-23 RX ADMIN — CEFTRIAXONE 1000 MG: 1 INJECTION, SOLUTION INTRAVENOUS at 00:01

## 2024-03-23 RX ADMIN — HYDROCORTISONE 1 APPLICATION: 25 CREAM TOPICAL at 21:20

## 2024-03-23 RX ADMIN — POTASSIUM CHLORIDE 20 MEQ: 14.9 INJECTION, SOLUTION INTRAVENOUS at 01:13

## 2024-03-23 RX ADMIN — METRONIDAZOLE 500 MG: 500 INJECTION, SOLUTION INTRAVENOUS at 12:06

## 2024-03-23 RX ADMIN — FOLIC ACID: 5 INJECTION, SOLUTION INTRAMUSCULAR; INTRAVENOUS; SUBCUTANEOUS at 10:23

## 2024-03-23 RX ADMIN — METRONIDAZOLE 500 MG: 500 INJECTION, SOLUTION INTRAVENOUS at 20:28

## 2024-03-23 RX ADMIN — POTASSIUM CHLORIDE 20 MEQ: 14.9 INJECTION, SOLUTION INTRAVENOUS at 10:27

## 2024-03-23 RX ADMIN — METRONIDAZOLE 500 MG: 500 INJECTION, SOLUTION INTRAVENOUS at 04:20

## 2024-03-23 RX ADMIN — PANTOPRAZOLE SODIUM 40 MG: 40 INJECTION, POWDER, FOR SOLUTION INTRAVENOUS at 20:27

## 2024-03-23 RX ADMIN — CHOLESTYRAMINE 4 G: 4 POWDER, FOR SUSPENSION ORAL at 17:28

## 2024-03-23 RX ADMIN — CHOLESTYRAMINE 4 G: 4 POWDER, FOR SUSPENSION ORAL at 12:04

## 2024-03-23 RX ADMIN — POTASSIUM CHLORIDE 40 MEQ: 1500 TABLET, EXTENDED RELEASE ORAL at 08:37

## 2024-03-23 RX ADMIN — SODIUM CHLORIDE AND POTASSIUM CHLORIDE 50 ML/HR: .9; .15 SOLUTION INTRAVENOUS at 09:22

## 2024-03-23 RX ADMIN — B-COMPLEX W/ C & FOLIC ACID TAB 1 TABLET: TAB at 08:37

## 2024-03-23 RX ADMIN — CEFTRIAXONE 1000 MG: 1 INJECTION, SOLUTION INTRAVENOUS at 23:09

## 2024-03-23 NOTE — ASSESSMENT & PLAN NOTE
CT showed - 4.3 cm descending thoracic aortic aneurysm and dilated abdominal aorta measuring up to 2.7 cm in diameter with extensive calcific atherosclerosis.   Outpatient follow-up

## 2024-03-23 NOTE — PLAN OF CARE
Problem: Prexisting or High Potential for Compromised Skin Integrity  Goal: Skin integrity is maintained or improved  Description: INTERVENTIONS:  - Identify patients at risk for skin breakdown  - Assess and monitor skin integrity  - Assess and monitor nutrition and hydration status  - Monitor labs   - Assess for incontinence   - Turn and reposition patient  - Assist with mobility/ambulation  - Relieve pressure over bony prominences  - Avoid friction and shearing  - Provide appropriate hygiene as needed including keeping skin clean and dry  - Evaluate need for skin moisturizer/barrier cream  - Collaborate with interdisciplinary team   - Patient/family teaching  - Consider wound care consult   Outcome: Progressing     Problem: PAIN - ADULT  Goal: Verbalizes/displays adequate comfort level or baseline comfort level  Description: Interventions:  - Encourage patient to monitor pain and request assistance  - Assess pain using appropriate pain scale  - Administer analgesics based on type and severity of pain and evaluate response  - Implement non-pharmacological measures as appropriate and evaluate response  - Consider cultural and social influences on pain and pain management  - Notify physician/advanced practitioner if interventions unsuccessful or patient reports new pain  Outcome: Progressing     Problem: INFECTION - ADULT  Goal: Absence or prevention of progression during hospitalization  Description: INTERVENTIONS:  - Assess and monitor for signs and symptoms of infection  - Monitor lab/diagnostic results  - Monitor all insertion sites, i.e. indwelling lines, tubes, and drains  - Monitor endotracheal if appropriate and nasal secretions for changes in amount and color  - [place] appropriate cooling/warming therapies per order  - Administer medications as ordered  - Instruct and encourage patient and family to use good hand hygiene technique  - Identify and instruct in appropriate isolation precautions for  identified infection/condition  Outcome: Progressing     Problem: INFECTION - ADULT  Goal: Absence of fever/infection during neutropenic period  Description: INTERVENTIONS:  - Monitor WBC    Outcome: Progressing     Problem: SAFETY ADULT  Goal: Patient will remain free of falls  Description: INTERVENTIONS:  - Educate patient/family on patient safety including physical limitations  - Instruct patient to call for assistance with activity   - Consult OT/PT to assist with strengthening/mobility   - Keep Call bell within reach  - Keep bed low and locked with side rails adjusted as appropriate  - Keep care items and personal belongings within reach  - Initiate and maintain comfort rounds  - Make Fall Risk Sign visible to staff  - Offer Toileting every 2 Hours, in advance of need  - Initiate/Maintain bed alarm  - Obtain necessary fall risk management equipment:   - Apply yellow socks and bracelet for high fall risk patients  - Consider moving patient to room near nurses station  Outcome: Progressing     Problem: SAFETY ADULT  Goal: Maintain or return to baseline ADL function  Description: INTERVENTIONS:  -  Assess patient's ability to carry out ADLs; assess patient's baseline for ADL function and identify physical deficits which impact ability to perform ADLs (bathing, care of mouth/teeth, toileting, grooming, dressing, etc.)  - Assess/evaluate cause of self-care deficits   - Assess range of motion  - Assess patient's mobility; develop plan if impaired  - Assess patient's need for assistive devices and provide as appropriate  - Encourage maximum independence but intervene and supervise when necessary  - Involve family in performance of ADLs  - Assess for home care needs following discharge   - Consider OT consult to assist with ADL evaluation and planning for discharge  - Provide patient education as appropriate  Outcome: Progressing     Problem: SAFETY ADULT  Goal: Maintains/Returns to pre admission functional  level  Description: INTERVENTIONS:  - Perform AM-PAC 6 Click Basic Mobility/ Daily Activity assessment daily.  - Set and communicate daily mobility goal to care team and patient/family/caregiver.   - Collaborate with rehabilitation services on mobility goals if consulted  - Perform Range of Motion 3 times a day.  - Actively turns self.   - Dangle patient 3 times a day  - Stand patient 3 times a day  - Ambulate patient 3 times a day  - Out of bed to chair 3 times a day   - Out of bed for meals 3 times a day  - Out of bed for toileting  - Record patient progress and toleration of activity level   Outcome: Progressing     Problem: DISCHARGE PLANNING  Goal: Discharge to home or other facility with appropriate resources  Description: INTERVENTIONS:  - Identify barriers to discharge w/patient and caregiver  - Arrange for needed discharge resources and transportation as appropriate  - Identify discharge learning needs (meds, wound care, etc.)  - Arrange for interpretive services to assist at discharge as needed  - Refer to Case Management Department for coordinating discharge planning if the patient needs post-hospital services based on physician/advanced practitioner order or complex needs related to functional status, cognitive ability, or social support system  Outcome: Progressing     Problem: Knowledge Deficit  Goal: Patient/family/caregiver demonstrates understanding of disease process, treatment plan, medications, and discharge instructions  Description: Complete learning assessment and assess knowledge base.  Interventions:  - Provide teaching at level of understanding  - Provide teaching via preferred learning methods  Outcome: Progressing     Problem: NEUROSENSORY - ADULT  Goal: Achieves maximal functionality and self care  Description: INTERVENTIONS  - Monitor swallowing and airway patency with patient fatigue and changes in neurological status  - Encourage and assist patient to increase activity and self care.    - Encourage visually impaired, hearing impaired and aphasic patients to use assistive/communication devices  Outcome: Progressing     Problem: CARDIOVASCULAR - ADULT  Goal: Maintains optimal cardiac output and hemodynamic stability  Description: INTERVENTIONS:  - Monitor I/O, vital signs and rhythm  - Monitor for S/S and trends of decreased cardiac output  - Administer and titrate ordered vasoactive medications to optimize hemodynamic stability  - Assess quality of pulses, skin color and temperature  - Assess for signs of decreased coronary artery perfusion  - Instruct patient to report change in severity of symptoms  Outcome: Progressing     Problem: CARDIOVASCULAR - ADULT  Goal: Absence of cardiac dysrhythmias or at baseline rhythm  Description: INTERVENTIONS:  - Continuous cardiac monitoring, vital signs, obtain 12 lead EKG if ordered  - Administer antiarrhythmic and heart rate control medications as ordered  - Monitor electrolytes and administer replacement therapy as ordered  Outcome: Progressing     Problem: RESPIRATORY - ADULT  Goal: Achieves optimal ventilation and oxygenation  Description: INTERVENTIONS:  - Assess for changes in respiratory status  - Assess for changes in mentation and behavior  - Position to facilitate oxygenation and minimize respiratory effort  - Oxygen administered by appropriate delivery if ordered  - Initiate smoking cessation education as indicated  - Encourage broncho-pulmonary hygiene including cough, deep breathe, Incentive Spirometry  - Assess the need for suctioning and aspirate as needed  - Assess and instruct to report SOB or any respiratory difficulty  - Respiratory Therapy support as indicated  Outcome: Progressing     Problem: GASTROINTESTINAL - ADULT  Goal: Minimal or absence of nausea and/or vomiting  Description: INTERVENTIONS:  - Administer IV fluids if ordered to ensure adequate hydration  - Maintain NPO status until nausea and vomiting are resolved  - Nasogastric  tube if ordered  - Administer ordered antiemetic medications as needed  - Provide nonpharmacologic comfort measures as appropriate  - Advance diet as tolerated, if ordered  - Consider nutrition services referral to assist patient with adequate nutrition and appropriate food choices  Outcome: Progressing     Problem: GASTROINTESTINAL - ADULT  Goal: Maintains or returns to baseline bowel function  Description: INTERVENTIONS:  - Assess bowel function  - Encourage oral fluids to ensure adequate hydration  - Administer IV fluids if ordered to ensure adequate hydration  - Administer ordered medications as needed  - Encourage mobilization and activity  - Consider nutritional services referral to assist patient with adequate nutrition and appropriate food choices  Outcome: Progressing     Problem: GASTROINTESTINAL - ADULT  Goal: Maintains adequate nutritional intake  Description: INTERVENTIONS:  - Monitor percentage of each meal consumed  - Identify factors contributing to decreased intake, treat as appropriate  - Assist with meals as needed  - Monitor I&O, weight, and lab values if indicated  - Obtain nutrition services referral as needed  Outcome: Progressing     Problem: GENITOURINARY - ADULT  Goal: Maintains or returns to baseline urinary function  Description: INTERVENTIONS:  - Assess urinary function  - Encourage oral fluids to ensure adequate hydration if ordered  - Administer IV fluids as ordered to ensure adequate hydration  - Administer ordered medications as needed  - Offer frequent toileting  - Follow urinary retention protocol if ordered  Outcome: Progressing     Problem: METABOLIC, FLUID AND ELECTROLYTES - ADULT  Goal: Electrolytes maintained within normal limits  Description: INTERVENTIONS:  - Monitor labs and assess patient for signs and symptoms of electrolyte imbalances  - Administer electrolyte replacement as ordered  - Monitor response to electrolyte replacements, including repeat lab results as  appropriate  - Instruct patient on fluid and nutrition as appropriate  Outcome: Progressing     Problem: METABOLIC, FLUID AND ELECTROLYTES - ADULT  Goal: Fluid balance maintained  Description: INTERVENTIONS:  - Monitor labs   - Monitor I/O and WT  - Instruct patient on fluid and nutrition as appropriate  - Assess for signs & symptoms of volume excess or deficit  Outcome: Progressing     Problem: SKIN/TISSUE INTEGRITY - ADULT  Goal: Skin Integrity remains intact(Skin Breakdown Prevention)  Description: Assess:  -Perform Pk assessment every shift.  -Clean and moisturize skin every shift.  -Inspect skin when repositioning, toileting, and assisting with ADLS  -Assess extremities for adequate circulation and sensation     Bed Management:  -Have minimal linens on bed & keep smooth, unwrinkled  -Change linens as needed when moist or perspiring  -Avoid sitting or lying in one position for more than 2 hours while in bed  -Keep HOB at 45 degrees     Toileting:  -Offer bedside commode  -Assess for incontinence every shift.  -Use incontinent care products after each incontinent episode such as protective barrier.     Activity:  -Mobilize patient 3 times a day  -Encourage activity and walks on unit  -Encourage or provide ROM exercises   -Turn and reposition patient every 2 Hours  -Use appropriate equipment to lift or move patient in bed  -Instruct/ Assist with weight shifting every 2 hours when out of bed in chair  -Consider limitation of chair time 2 hour intervals    Skin Care:  -Avoid use of baby powder, tape, friction and shearing, hot water or constrictive clothing  -Relieve pressure over bony prominences using pillows.   -Do not massage red bony areas    Next Steps:  -Teach patient strategies to minimize risks such as weight shifting   -Consider consults to  interdisciplinary teams such as wound care team.  Outcome: Progressing       Outcome: Progressing     Problem: MUSCULOSKELETAL - ADULT  Goal: Maintain or return  mobility to safest level of function  Description: INTERVENTIONS:  - Assess patient's ability to carry out ADLs; assess patient's baseline for ADL function and identify physical deficits which impact ability to perform ADLs (bathing, care of mouth/teeth, toileting, grooming, dressing, etc.)  - Assess/evaluate cause of self-care deficits   - Assess range of motion  - Assess patient's mobility  - Assess patient's need for assistive devices and provide as appropriate  - Encourage maximum independence but intervene and supervise when necessary  - Involve family in performance of ADLs  - Assess for home care needs following discharge   - Consider OT consult to assist with ADL evaluation and planning for discharge  - Provide patient education as appropriate  Outcome: Progressing     Problem: MUSCULOSKELETAL - ADULT  Goal: Maintain proper alignment of affected body part  Description: INTERVENTIONS:  - Support, maintain and protect limb and body alignment  - Provide patient/ family with appropriate education  Outcome: Progressing

## 2024-03-23 NOTE — ASSESSMENT & PLAN NOTE
On imaging noted to have findings of diffuse severe small bowel enteritis and skip segments of severe colitis  Patient does report chronic diarrhea which has worsened over the last few weeks  C. difficile, bacterial enteric panel within normal limits  Giardia, O&P pending  Fecal Calprotectin pending  Discussed with GI AP, advanced from clear liquids to full liquids for today.  Questran started given recurrent significant diarrhea  Keep stool chart

## 2024-03-23 NOTE — ASSESSMENT & PLAN NOTE
Patient with severe hypokalemia, initial potassium 2.1 --> 2.6 today  Likely due to poor oral intake, profuse diarrhea.  EKG after receiving potassium repletion showed NSR, left BBB which is chronic, QTc 522.  Continue aggressive IV and p.o. potassium repletion  Telemetry  Magnesium within normal limits  Repeat BMP later today and again in the morning

## 2024-03-23 NOTE — ASSESSMENT & PLAN NOTE
Patient presented with abdominal pain, poor appetite, hypokalemia on outpatient lab work.  Decreased p.o. intake over 2 weeks.  Patient reports intermittent abdominal pain for about 2 years, with vomiting in the morning after eating once a day for about 1 year, with coffee-ground color diarrhea and constipation alternating chronically.  Daily alcohol use for decades.  Currently drinks vodka 500 ml to 1 L daily for over 1 year, was drinking beer and wine prior to that.  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 Has not seen a doctor in office for about 8  years. Had a tele health visit recently, ordered Echo and lab work. K was 2.0 on 3/12.   CT abdomen pelvis with IV contrast and CTA abdomen pelvis in ED.  Showed diffuse severe small bowel enteritis and skip segments of severe colitis which could be infectious/inflammatory process or ischemic etiology.  Moderate ascites.  Diffuse hepatic steatosis. No occlusion or significant flow-limiting stenosis of the abdominal aorta or proximal branch vessels identified.   Lactic acid normal  Lipase 326 --> 317  Appreciate GI and surgical input  Plan for EGD and colonoscopy on 3/25

## 2024-03-23 NOTE — ASSESSMENT & PLAN NOTE
Patient drinks vodka 500 ml -1L daily.  Last drink was Tuesday.  Diffuse hepatic steatosis with moderate ascites on CT.  Right upper quadrant ultrasound with hepatomegaly and hepatic steatosis along with gallbladder sludge and GB distention and small volume ascites  Continue CIWA protocol  Alcohol cessation.  Patient is not interested in alcohol rehab.

## 2024-03-23 NOTE — ASSESSMENT & PLAN NOTE
History of combined CHF.  Was on Entresto, Coreg in the past.  2D echo in 2018 showed EF 40 to 45%, stage I diastolic dysfunction.  2D echo on 3/10/2024 outpatient (wife showed result) showed normal EF around 60%, normal diastolic function, mild to moderate AR.     Trace pleural effusions on CT.  Daily weight, intake output  Patient desats mildly while sleeping at times. On RA in AM

## 2024-03-23 NOTE — PROGRESS NOTES
Formerly [facility], [facility]  Progress Note  Name: Del Ocampo I  MRN: [id number]  Unit/Bed#: 4 [address]  Date of Admission: 3/20/2024   Date of Service: 3/23/2024 I Hospital Day: 2    Assessment/Plan   * Abdominal pain  Assessment & Plan  Patient presented with abdominal pain, poor appetite, hypokalemia on outpatient lab work.  Decreased p.o. intake over 2 weeks.  Patient reports intermittent abdominal pain for about 2 years, with vomiting in the morning after eating once a day for about 1 year, with coffee-ground color diarrhea and constipation alternating chronically.  Daily alcohol use for decades.  Currently drinks vodka 500 ml to 1 L daily for over 1 year, was drinking beer and wine prior to that.  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. Has not seen a doctor in office for about 8  years. Had a tele health visit recently, ordered Echo and lab work. K was 2.0 on 3/12.   CT abdomen pelvis with IV contrast and CTA abdomen pelvis in ED.  Showed diffuse severe small bowel enteritis and skip segments of severe colitis which could be infectious/inflammatory process or ischemic etiology.  Moderate ascites.  Diffuse hepatic steatosis. No occlusion or significant flow-limiting stenosis of the abdominal aorta or proximal branch vessels identified.   Lactic acid normal  Lipase 326 --> 317  Appreciate GI and surgical input  Plan for EGD and colonoscopy on 3/25    Colitis  Assessment & Plan  On imaging noted to have findings of diffuse severe small bowel enteritis and skip segments of severe colitis  Patient does report chronic diarrhea which has worsened over the last few weeks  C. difficile, bacterial enteric panel within normal limits  Giardia, O&P pending  Fecal Calprotectin pending  Discussed with GI AP, advanced from clear liquids to full liquids for today.  Questran started given recurrent significant diarrhea  Keep  stool chart      Hypokalemia  Assessment & Plan  Patient with severe hypokalemia, initial potassium 2.1 --> 2.6 today  Likely due to poor oral intake, profuse diarrhea.  EKG after receiving potassium repletion showed NSR, left BBB which is chronic, QTc 522.  Continue aggressive IV and p.o. potassium repletion  Telemetry  Magnesium within normal limits  Repeat BMP later today and again in the morning    Alcohol abuse  Assessment & Plan  Patient drinks vodka 500 ml -1L daily.  Last drink was Tuesday.  Diffuse hepatic steatosis with moderate ascites on CT.  Right upper quadrant ultrasound with hepatomegaly and hepatic steatosis along with gallbladder sludge and GB distention and small volume ascites  Continue CIWA protocol  Alcohol cessation.  Patient is not interested in alcohol rehab.      PMR (polymyalgia rheumatica) (HCC)  Assessment & Plan  Noted history  Not on medication at home.    Thoracic aortic aneurysm (HCC)  Assessment & Plan  CT showed - 4.3 cm descending thoracic aortic aneurysm and dilated abdominal aorta measuring up to 2.7 cm in diameter with extensive calcific atherosclerosis.   Outpatient follow-up    History of bleeding ulcers  Assessment & Plan  Continue IV Protonix twice daily.    History of CHF (congestive heart failure)  Assessment & Plan  History of combined CHF.  Was on Entresto, Coreg in the past.  2D echo in 2018 showed EF 40 to 45%, stage I diastolic dysfunction.  2D echo on 3/10/2024 outpatient (wife showed result) showed normal EF around 60%, normal diastolic function, mild to moderate AR.     Trace pleural effusions on CT.  Daily weight, intake output  Patient desats mildly while sleeping at times. On RA in AM    Nicotine abuse  Assessment & Plan  Smokes 1 pack a day  Nicotine patch, smoking cessation    Elevated lipase-resolved as of 3/22/2024  Assessment & Plan  Mild lipase elevation likely nonspecific in nature               VTE Pharmacologic Prophylaxis:    none    Mobility:    Basic Mobility Inpatient Raw Score: 17  JH-HLM Goal: 5: Stand one or more mins  JH-HLM Achieved: 1: Laying in bed  JH-HLM Goal NOT achieved. Continue with multidisciplinary rounding and encourage appropriate mobility to improve upon JH-HLM goals.    Patient Centered Rounds: I performed bedside rounds with nursing staff today.   Discussions with Specialists or Other Care Team Provider: yes - GI    Education and Discussions with Family / Patient: Updated  (wife) at bedside.    Total Time Spent on Date of Encounter in care of patient: This time was spent on one or more of the following: performing physical exam; counseling and coordination of care; obtaining or reviewing history; documenting in the medical record; reviewing/ordering tests, medications or procedures; communicating with other healthcare professionals and discussing with patient's family/caregivers.    Current Length of Stay: 2 day(s)  Current Patient Status: Inpatient   Certification Statement: The patient will continue to require additional inpatient hospital stay due to colitis with significant diarrhea requiring EGD/colonoscopy, severe hypokalemia requiring aggressive repletion  Discharge Plan: Anticipate discharge in >72 hrs to home.    Code Status: Level 3 - DNAR and DNI    Subjective:     Patient continues to report multiple episodes of diarrhea.  Hoping to be going home soon    Objective:     Vitals:   Temp (24hrs), Av.9 °F (36.6 °C), Min:97.8 °F (36.6 °C), Max:98.1 °F (36.7 °C)    Temp:  [97.8 °F (36.6 °C)-98.1 °F (36.7 °C)] 97.8 °F (36.6 °C)  HR:  [73-82] 73  Resp:  [17-19] 18  BP: (103-120)/(57-66) 113/66  SpO2:  [93 %-98 %] 98 %  Body mass index is 24.02 kg/m².     Input and Output Summary (last 24 hours):     Intake/Output Summary (Last 24 hours) at 3/23/2024 7624  Last data filed at 3/23/2024 0430  Gross per 24 hour   Intake 510 ml   Output --   Net 510 ml       Physical Exam:   Physical Exam  Vitals reviewed.    Constitutional:       General: He is not in acute distress.     Appearance: He is not toxic-appearing.   HENT:      Head: Normocephalic and atraumatic.      Ears:      Comments: Hard of hearing  Eyes:      General:         Right eye: No discharge.         Left eye: No discharge.   Cardiovascular:      Rate and Rhythm: Normal rate and regular rhythm.   Pulmonary:      Effort: Pulmonary effort is normal. No respiratory distress.      Breath sounds: Normal breath sounds. No wheezing or rales.   Abdominal:      General: Bowel sounds are normal. There is no distension.      Palpations: Abdomen is soft.      Tenderness: There is no abdominal tenderness.   Musculoskeletal:      Right lower leg: No edema.      Left lower leg: No edema.      Comments: No tremors noted   Neurological:      Mental Status: He is alert.          Additional Data:     Labs:  Results from last 7 days   Lab Units 03/23/24  0647 03/21/24  1111 03/20/24  2110   WBC Thousand/uL 6.25   < > 8.20   HEMOGLOBIN g/dL 10.4*   < > 12.2   HEMATOCRIT % 30.5*   < > 36.3*   PLATELETS Thousands/uL 182   < > 200   NEUTROS PCT %  --   --  80*   LYMPHS PCT %  --   --  10*   MONOS PCT %  --   --  9   EOS PCT %  --   --  1    < > = values in this interval not displayed.     Results from last 7 days   Lab Units 03/23/24  0647   SODIUM mmol/L 139   POTASSIUM mmol/L 2.6*   CHLORIDE mmol/L 103   CO2 mmol/L 29   BUN mg/dL 2*   CREATININE mg/dL 0.49*   ANION GAP mmol/L 7   CALCIUM mg/dL 7.5*   ALBUMIN g/dL 2.5*   TOTAL BILIRUBIN mg/dL 0.88   ALK PHOS U/L 62   ALT U/L 10   AST U/L 19   GLUCOSE RANDOM mg/dL 95     Results from last 7 days   Lab Units 03/21/24  0151   INR  1.18             Results from last 7 days   Lab Units 03/20/24  2110   LACTIC ACID mmol/L 1.4       Lines/Drains:  Invasive Devices       Peripheral Intravenous Line  Duration             Peripheral IV 03/23/24 Proximal;Right;Ventral (anterior) Forearm <1 day                      Imaging: Reviewed radiology  reports from this admission including: ultrasound(s)    Recent Cultures (last 7 days):   Results from last 7 days   Lab Units 03/21/24  1756   C DIFF TOXIN B BY PCR  Negative       Last 24 Hours Medication List:   Current Facility-Administered Medications   Medication Dose Route Frequency Provider Last Rate    acetaminophen  650 mg Oral Q6H PRN BREN Rodriguez      [START ON 3/24/2024] bisacodyl  10 mg Oral Once Lynda [place]ell Susana, ALOKNP      [START ON 3/24/2024] bisacodyl  10 mg Oral Once Lynda [place] Susana, CRNP      cefTRIAXone  1,000 mg Intravenous Q24H BREN Rodriguez 1,000 mg (03/23/24 0001)    cholestyramine sugar free  4 g Oral BID Tyesha Marino DO      folic acid 1 mg, thiamine (VITAMIN B1) 100 mg in sodium chloride 0.9 % 100 mL IV piggyback   Intravenous Daily BREN Rodriguez      influenza vaccine  0.7 mL Intramuscular Prior to discharge BREN Rodriguez      metroNIDAZOLE  500 mg Intravenous Q8H ALOK RodriguezNP 500 mg (03/23/24 1206)    multivitamin stress formula  1 tablet Oral Daily BREN Rodriguez      nicotine  1 patch Transdermal Daily BREN Rodriguez      pantoprazole  40 mg Intravenous Q12H ROBINSON BREN Rodriguez      [START ON 3/24/2024] polyethylene glycol  238 g Oral Once Lynda Abdiaziz Meza, ALOKNP      sodium chloride 0.9 % with KCl 20 mEq/L  50 mL/hr Intravenous Continuous Tyehsa Marino DO 50 mL/hr (03/23/24 0922)    trimethobenzamide  200 mg Intramuscular Q6H PRN BREN Rodriguez          Today, Patient Was Seen By: Tyesha Marino DO    **Please Note: This note may have been constructed using a voice recognition system.**

## 2024-03-24 LAB
ALBUMIN SERPL BCP-MCNC: 2.9 G/DL (ref 3.5–5)
ALP SERPL-CCNC: 70 U/L (ref 34–104)
ALT SERPL W P-5'-P-CCNC: 11 U/L (ref 7–52)
ANION GAP SERPL CALCULATED.3IONS-SCNC: 7 MMOL/L (ref 4–13)
ANION GAP SERPL CALCULATED.3IONS-SCNC: 7 MMOL/L (ref 4–13)
AST SERPL W P-5'-P-CCNC: 19 U/L (ref 13–39)
ATRIAL RATE: 75 BPM
BILIRUB SERPL-MCNC: 0.85 MG/DL (ref 0.2–1)
BUN SERPL-MCNC: 2 MG/DL (ref 5–25)
BUN SERPL-MCNC: 2 MG/DL (ref 5–25)
CALCIUM ALBUM COR SERPL-MCNC: 9.3 MG/DL (ref 8.3–10.1)
CALCIUM SERPL-MCNC: 7.8 MG/DL (ref 8.4–10.2)
CALCIUM SERPL-MCNC: 8.4 MG/DL (ref 8.4–10.2)
CHLORIDE SERPL-SCNC: 106 MMOL/L (ref 96–108)
CHLORIDE SERPL-SCNC: 107 MMOL/L (ref 96–108)
CO2 SERPL-SCNC: 22 MMOL/L (ref 21–32)
CO2 SERPL-SCNC: 26 MMOL/L (ref 21–32)
CREAT SERPL-MCNC: 0.55 MG/DL (ref 0.6–1.3)
CREAT SERPL-MCNC: 0.55 MG/DL (ref 0.6–1.3)
ERYTHROCYTE [DISTWIDTH] IN BLOOD BY AUTOMATED COUNT: 20.2 % (ref 11.6–15.1)
GFR SERPL CREATININE-BSD FRML MDRD: 104 ML/MIN/1.73SQ M
GFR SERPL CREATININE-BSD FRML MDRD: 104 ML/MIN/1.73SQ M
GLUCOSE SERPL-MCNC: 137 MG/DL (ref 65–140)
GLUCOSE SERPL-MCNC: 92 MG/DL (ref 65–140)
HCT VFR BLD AUTO: 34.2 % (ref 36.5–49.3)
HGB BLD-MCNC: 11.6 G/DL (ref 12–17)
MCH RBC QN AUTO: 33.3 PG (ref 26.8–34.3)
MCHC RBC AUTO-ENTMCNC: 33.9 G/DL (ref 31.4–37.4)
MCV RBC AUTO: 98 FL (ref 82–98)
P AXIS: 71 DEGREES
PLATELET # BLD AUTO: 234 THOUSANDS/UL (ref 149–390)
PMV BLD AUTO: 9.5 FL (ref 8.9–12.7)
POTASSIUM SERPL-SCNC: 2.9 MMOL/L (ref 3.5–5.3)
POTASSIUM SERPL-SCNC: 3.2 MMOL/L (ref 3.5–5.3)
PR INTERVAL: 200 MS
PROT SERPL-MCNC: 5 G/DL (ref 6.4–8.4)
QRS AXIS: -17 DEGREES
QRSD INTERVAL: 152 MS
QT INTERVAL: 468 MS
QTC INTERVAL: 522 MS
RBC # BLD AUTO: 3.48 MILLION/UL (ref 3.88–5.62)
SODIUM SERPL-SCNC: 135 MMOL/L (ref 135–147)
SODIUM SERPL-SCNC: 140 MMOL/L (ref 135–147)
T WAVE AXIS: 141 DEGREES
VENTRICULAR RATE: 75 BPM
WBC # BLD AUTO: 8.22 THOUSAND/UL (ref 4.31–10.16)

## 2024-03-24 PROCEDURE — 80048 BASIC METABOLIC PNL TOTAL CA: CPT | Performed by: INTERNAL MEDICINE

## 2024-03-24 PROCEDURE — 99233 SBSQ HOSP IP/OBS HIGH 50: CPT | Performed by: FAMILY MEDICINE

## 2024-03-24 PROCEDURE — 85027 COMPLETE CBC AUTOMATED: CPT | Performed by: FAMILY MEDICINE

## 2024-03-24 PROCEDURE — C9113 INJ PANTOPRAZOLE SODIUM, VIA: HCPCS | Performed by: NURSE PRACTITIONER

## 2024-03-24 PROCEDURE — 93010 ELECTROCARDIOGRAM REPORT: CPT | Performed by: INTERNAL MEDICINE

## 2024-03-24 PROCEDURE — 97530 THERAPEUTIC ACTIVITIES: CPT

## 2024-03-24 PROCEDURE — 80053 COMPREHEN METABOLIC PANEL: CPT | Performed by: FAMILY MEDICINE

## 2024-03-24 RX ORDER — POTASSIUM CHLORIDE 14.9 MG/ML
20 INJECTION INTRAVENOUS ONCE
Status: COMPLETED | OUTPATIENT
Start: 2024-03-24 | End: 2024-03-24

## 2024-03-24 RX ORDER — SODIUM CHLORIDE AND POTASSIUM CHLORIDE 150; 900 MG/100ML; MG/100ML
50 INJECTION, SOLUTION INTRAVENOUS CONTINUOUS
Status: DISCONTINUED | OUTPATIENT
Start: 2024-03-25 | End: 2024-03-25

## 2024-03-24 RX ORDER — POTASSIUM CHLORIDE 14.9 MG/ML
20 INJECTION INTRAVENOUS ONCE
Status: COMPLETED | OUTPATIENT
Start: 2024-03-24 | End: 2024-03-25

## 2024-03-24 RX ORDER — POTASSIUM CHLORIDE 20 MEQ/1
40 TABLET, EXTENDED RELEASE ORAL ONCE
Status: COMPLETED | OUTPATIENT
Start: 2024-03-24 | End: 2024-03-24

## 2024-03-24 RX ADMIN — POTASSIUM CHLORIDE 20 MEQ: 14.9 INJECTION, SOLUTION INTRAVENOUS at 11:50

## 2024-03-24 RX ADMIN — SODIUM CHLORIDE AND POTASSIUM CHLORIDE 50 ML/HR: .9; .15 SOLUTION INTRAVENOUS at 04:25

## 2024-03-24 RX ADMIN — FOLIC ACID: 5 INJECTION, SOLUTION INTRAMUSCULAR; INTRAVENOUS; SUBCUTANEOUS at 08:55

## 2024-03-24 RX ADMIN — CHOLESTYRAMINE 4 G: 4 POWDER, FOR SUSPENSION ORAL at 08:47

## 2024-03-24 RX ADMIN — METRONIDAZOLE 500 MG: 500 INJECTION, SOLUTION INTRAVENOUS at 11:50

## 2024-03-24 RX ADMIN — PANTOPRAZOLE SODIUM 40 MG: 40 INJECTION, POWDER, FOR SOLUTION INTRAVENOUS at 08:47

## 2024-03-24 RX ADMIN — POLYETHYLENE GLYCOL 3350 238 G: 17 POWDER, FOR SOLUTION ORAL at 15:01

## 2024-03-24 RX ADMIN — POTASSIUM CHLORIDE 20 MEQ: 14.9 INJECTION, SOLUTION INTRAVENOUS at 23:38

## 2024-03-24 RX ADMIN — BISACODYL 10 MG: 5 TABLET, COATED ORAL at 13:32

## 2024-03-24 RX ADMIN — PANTOPRAZOLE SODIUM 40 MG: 40 INJECTION, POWDER, FOR SOLUTION INTRAVENOUS at 21:20

## 2024-03-24 RX ADMIN — SODIUM CHLORIDE AND POTASSIUM CHLORIDE 50 ML/HR: .9; .15 SOLUTION INTRAVENOUS at 23:42

## 2024-03-24 RX ADMIN — POTASSIUM CHLORIDE 20 MEQ: 14.9 INJECTION, SOLUTION INTRAVENOUS at 09:50

## 2024-03-24 RX ADMIN — METRONIDAZOLE 500 MG: 500 INJECTION, SOLUTION INTRAVENOUS at 19:44

## 2024-03-24 RX ADMIN — B-COMPLEX W/ C & FOLIC ACID TAB 1 TABLET: TAB at 08:47

## 2024-03-24 RX ADMIN — NICOTINE 1 PATCH: 21 PATCH, EXTENDED RELEASE TRANSDERMAL at 08:50

## 2024-03-24 RX ADMIN — POTASSIUM CHLORIDE 20 MEQ: 14.9 INJECTION, SOLUTION INTRAVENOUS at 21:20

## 2024-03-24 RX ADMIN — BISACODYL 10 MG: 5 TABLET, COATED ORAL at 21:21

## 2024-03-24 RX ADMIN — POTASSIUM CHLORIDE 40 MEQ: 1500 TABLET, EXTENDED RELEASE ORAL at 05:42

## 2024-03-24 RX ADMIN — METRONIDAZOLE 500 MG: 500 INJECTION, SOLUTION INTRAVENOUS at 04:25

## 2024-03-24 NOTE — ASSESSMENT & PLAN NOTE
History of combined CHF.  Was on Entresto, Coreg in the past.  2D echo in 2018 showed EF 40 to 45%, stage I diastolic dysfunction.  2D echo on 3/10/2024 outpatient (wife showed result) showed normal EF around 60%, normal diastolic function, mild to moderate AR.     Trace pleural effusions on CT.  Daily weight, intake output  Patient was previously desaturating mildly while sleeping at times.  Currently stable on room air

## 2024-03-24 NOTE — ASSESSMENT & PLAN NOTE
Patient presented with abdominal pain, poor appetite, hypokalemia on outpatient lab work.  Decreased p.o. intake over 2 weeks.  Reports intermittent abdominal pain for about 2 years, with vomiting in the morning after eating once a day for about 1 year, with coffee-ground color diarrhea and constipation alternating chronically.  Daily alcohol use for decades. 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 Has not seen a doctor in office for about 8  years. Had a tele health visit recently, ordered Echo and lab work. K was 2.0 on 3/12.   CT abdomen pelvis with IV contrast and CTA abdomen pelvis in ED.  Showed diffuse severe small bowel enteritis and skip segments of severe colitis which could be infectious/inflammatory process or ischemic etiology.  Moderate ascites.  Diffuse hepatic steatosis. No occlusion or significant flow-limiting stenosis of the abdominal aorta or proximal branch vessels identified.   Lactic acid normal, Lipase 326 --> 317  Appreciate GI and surgical input  Plan for EGD and colonoscopy on 3/25

## 2024-03-24 NOTE — PROGRESS NOTES
[facility]  Progress Note  Name: Del Ocampo I  MRN: [id number]  Unit/Bed#: 4 [address] I Date of Admission: 3/20/2024   Date of Service: 3/24/2024 I Hospital Day: 3    Assessment/Plan   * Abdominal pain  Assessment & Plan  Patient presented with abdominal pain, poor appetite, hypokalemia on outpatient lab work.  Decreased p.o. intake over 2 weeks.  Reports intermittent abdominal pain for about 2 years, with vomiting in the morning after eating once a day for about 1 year, with coffee-ground color diarrhea and constipation alternating chronically.  Daily alcohol use for decades. 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 Has not seen a doctor in office for about 8  years. Had a tele health visit recently, ordered Echo and lab work. K was 2.0 on 3/12.   CT abdomen pelvis with IV contrast and CTA abdomen pelvis in ED.  Showed diffuse severe small bowel enteritis and skip segments of severe colitis which could be infectious/inflammatory process or ischemic etiology.  Moderate ascites.  Diffuse hepatic steatosis. No occlusion or significant flow-limiting stenosis of the abdominal aorta or proximal branch vessels identified.   Lactic acid normal, Lipase 326 --> 317  Appreciate GI and surgical input  Plan for EGD and colonoscopy on 3/25    Colitis  Assessment & Plan  On imaging noted to have findings of diffuse severe small bowel enteritis and skip segments of severe colitis  Patient does report chronic diarrhea which has worsened over the last few weeks  C. difficile, bacterial enteric panel within normal limits  Giardia, O&P pending, Fecal Calprotectin pending  Was on full liquids.  Clear liquids for today for procedure tomorrow   Questran was started given recurrent significant diarrhea but will hold for procedure tomorrow  Keep stool chart      Hypokalemia  Assessment & Plan  Patient with severe  hypokalemia, initial potassium 2.1 --> 2.9 today  Likely due to poor oral intake, profuse diarrhea.  EKG after receiving potassium repletion showed NSR, left BBB which is chronic, QTc 522.  Receiving aggressive IV and p.o. potassium repletion  Telemetry  Magnesium within normal limits  Repeat BMP later today and again in the morning    Alcohol abuse  Assessment & Plan  Patient drinks vodka 500 ml -1L daily for over 1 year, was drinking beer and wine prior to that..  Last drink was Tuesday.  Diffuse hepatic steatosis with moderate ascites on CT.  Right upper quadrant ultrasound with hepatomegaly and hepatic steatosis along with gallbladder sludge and GB distention and small volume ascites  No signs of withdrawal  Alcohol cessation.  Patient is not interested in alcohol rehab.      PMR (polymyalgia rheumatica) (HCC)  Assessment & Plan  Noted history.  Not on medication at home.    Thoracic aortic aneurysm (HCC)  Assessment & Plan  CT showed - 4.3 cm descending thoracic aortic aneurysm and dilated abdominal aorta measuring up to 2.7 cm in diameter with extensive calcific atherosclerosis.   Outpatient follow-up.    History of bleeding ulcers  Assessment & Plan  Continue IV Protonix twice daily    History of CHF (congestive heart failure)  Assessment & Plan  History of combined CHF.  Was on Entresto, Coreg in the past.  2D echo in 2018 showed EF 40 to 45%, stage I diastolic dysfunction.  2D echo on 3/10/2024 outpatient (wife showed result) showed normal EF around 60%, normal diastolic function, mild to moderate AR.     Trace pleural effusions on CT.  Daily weight, intake output  Patient was previously desaturating mildly while sleeping at times.  Currently stable on room air    Nicotine abuse  Assessment & Plan  Smokes 1 pack a day.  Nicotine patch, smoking cessation    Elevated lipase-resolved as of 3/22/2024  Assessment & Plan  Mild lipase elevation likely nonspecific in nature               VTE Pharmacologic  Prophylaxis:    none    Mobility:   Basic Mobility Inpatient Raw Score: 17  JH-HLM Goal: 5: Stand one or more mins  JH-HLM Achieved: 1: Laying in bed  JH-HLM Goal NOT achieved. Continue with multidisciplinary rounding and encourage appropriate mobility to improve upon JH-HLM goals.    Patient Centered Rounds: I performed bedside rounds with nursing staff today.   Discussions with Specialists or Other Care Team Provider: yes     Education and Discussions with Family / Patient: Updated  (wife) at bedside.    Total Time Spent on Date of Encounter in care of patient:  This time was spent on one or more of the following: performing physical exam; counseling and coordination of care; obtaining or reviewing history; documenting in the medical record; reviewing/ordering tests, medications or procedures; communicating with other healthcare professionals and discussing with patient's family/caregivers.    Current Length of Stay: 3 day(s)  Current Patient Status: Inpatient   Certification Statement: The patient will continue to require additional inpatient hospital stay due to abdominal pain, colitis with recurrent diarrhea requiring EGD/colonoscopy tomorrow, severe hypokalemia requiring aggressive repletion and monitoring of lab or  Discharge Plan: Anticipate discharge in 48-72 hrs to home.    Code Status: Level 3 - DNAR and DNI    Subjective:     Patient states his diarrhea did improve somewhat after starting Questran.  Denies any abdominal pain.  Wants to get home as soon as possible    Objective:     Vitals:   Temp (24hrs), Av °F (36.7 °C), Min:97.7 °F (36.5 °C), Max:98.6 °F (37 °C)    Temp:  [97.7 °F (36.5 °C)-98.6 °F (37 °C)] 97.8 °F (36.6 °C)  HR:  [73-88] 84  Resp:  [16-19] 19  BP: (111-128)/(63-70) 120/64  SpO2:  [92 %-98 %] 96 %  Body mass index is 24.54 kg/m².     Input and Output Summary (last 24 hours):     Intake/Output Summary (Last 24 hours) at 3/24/2024 0929  Last data filed at 3/24/2024  0425  Gross per 24 hour   Intake 2053.33 ml   Output --   Net 2053.33 ml       Physical Exam:   Physical Exam  Vitals reviewed.   Constitutional:       General: He is not in acute distress.     Appearance: He is not toxic-appearing.   HENT:      Head: Normocephalic and atraumatic.      Ears:      Comments: Hard of hearing  Eyes:      General:         Right eye: No discharge.         Left eye: No discharge.   Cardiovascular:      Rate and Rhythm: Normal rate and regular rhythm.   Pulmonary:      Effort: Pulmonary effort is normal. No respiratory distress.      Breath sounds: Normal breath sounds. No wheezing or rales.   Abdominal:      General: Bowel sounds are normal. There is no distension.      Palpations: Abdomen is soft.      Tenderness: There is no abdominal tenderness.   Musculoskeletal:      Right lower leg: No edema.      Left lower leg: No edema.   Neurological:      Mental Status: He is alert.          Additional Data:     Labs:  Results from last 7 days   Lab Units 03/24/24  0425 03/21/24  1111 03/20/24  2110   WBC Thousand/uL 8.22   < > 8.20   HEMOGLOBIN g/dL 11.6*   < > 12.2   HEMATOCRIT % 34.2*   < > 36.3*   PLATELETS Thousands/uL 234   < > 200   NEUTROS PCT %  --   --  80*   LYMPHS PCT %  --   --  10*   MONOS PCT %  --   --  9   EOS PCT %  --   --  1    < > = values in this interval not displayed.     Results from last 7 days   Lab Units 03/24/24  0425   SODIUM mmol/L 140   POTASSIUM mmol/L 2.9*   CHLORIDE mmol/L 107   CO2 mmol/L 26   BUN mg/dL 2*   CREATININE mg/dL 0.55*   ANION GAP mmol/L 7   CALCIUM mg/dL 8.4   ALBUMIN g/dL 2.9*   TOTAL BILIRUBIN mg/dL 0.85   ALK PHOS U/L 70   ALT U/L 11   AST U/L 19   GLUCOSE RANDOM mg/dL 92     Results from last 7 days   Lab Units 03/21/24  0151   INR  1.18             Results from last 7 days   Lab Units 03/20/24  2110   LACTIC ACID mmol/L 1.4       Lines/Drains:  Invasive Devices       Peripheral Intravenous Line  Duration             Peripheral IV 03/23/24  Proximal;Right;Ventral (anterior) Forearm <1 day                    Imaging: No pertinent imaging reviewed.    Recent Cultures (last 7 days):   Results from last 7 days   Lab Units 03/21/24  1756   C DIFF TOXIN B BY PCR  Negative       Last 24 Hours Medication List:   Current Facility-Administered Medications   Medication Dose Route Frequency Provider Last Rate    acetaminophen  650 mg Oral Q6H PRN BREN Rodriguez      bisacodyl  10 mg Oral Once Lynda Foard Babio, ALOKNP      bisacodyl  10 mg Oral Once Lynda Foard Babio, CRNP      cefTRIAXone  1,000 mg Intravenous Q24H ALOK RodriguezNP 1,000 mg (03/23/24 2309)    cholestyramine sugar free  4 g Oral BID Tyesha Marino DO      folic acid 1 mg, thiamine (VITAMIN B1) 100 mg in sodium chloride 0.9 % 100 mL IV piggyback   Intravenous Daily BREN Rodriguez      hydrocortisone   Topical 4x Daily PRN Taj Suarez MD      influenza vaccine  0.7 mL Intramuscular Prior to discharge BREN Rodriguez      metroNIDAZOLE  500 mg Intravenous Q8H BREN Rodriguez 500 mg (03/24/24 0425)    multivitamin stress formula  1 tablet Oral Daily BREN Rodriguez      nicotine  1 patch Transdermal Daily BREN Rodriguez      pantoprazole  40 mg Intravenous Q12H [facility] BREN Rodriguez      polyethylene glycol  238 g Oral Once Lynda Foard Susana, ALOKNP      potassium chloride  20 mEq Intravenous Once Tyesha Marino DO      Followed by    potassium chloride  20 mEq Intravenous Once Tyesha Marino DO      sodium chloride 0.9 % with KCl 20 mEq/L  50 mL/hr Intravenous Continuous Tyesha Marino DO 50 mL/hr (03/24/24 0425)    trimethobenzamide  200 mg Intramuscular Q6H PRN BREN Rodriguez          Today, Patient Was Seen By: Tyesha Marino DO    **Please Note: This note may have been constructed using a voice recognition system.**

## 2024-03-24 NOTE — ASSESSMENT & PLAN NOTE
Patient with severe hypokalemia, initial potassium 2.1 --> 2.9 today  Likely due to poor oral intake, profuse diarrhea.  EKG after receiving potassium repletion showed NSR, left BBB which is chronic, QTc 522.  Receiving aggressive IV and p.o. potassium repletion  Telemetry  Magnesium within normal limits  Repeat BMP later today and again in the morning

## 2024-03-24 NOTE — ASSESSMENT & PLAN NOTE
On imaging noted to have findings of diffuse severe small bowel enteritis and skip segments of severe colitis  Patient does report chronic diarrhea which has worsened over the last few weeks  C. difficile, bacterial enteric panel within normal limits  Giardia, O&P pending, Fecal Calprotectin pending  Was on full liquids.  Clear liquids for today for procedure tomorrow   Questran was started given recurrent significant diarrhea but will hold for procedure tomorrow  Keep stool chart

## 2024-03-24 NOTE — PHYSICAL THERAPY NOTE
"   PT TREATMENT     03/24/24 1301   Note Type   Note Type Treatment   Pain Assessment   Pain Assessment Tool 0-10   Restrictions/Precautions   Other Precautions Fall Risk   General   Chart Reviewed Yes   Cognition   Overall Cognitive Status WFL   Subjective   Subjective 'I am feeling better, I just want to go home\"   Bed Mobility   Supine to Sit 5  Supervision   Sit to Supine 5  Supervision   Transfers   Sit to Stand 5  Supervision   Stand to Sit 5  Supervision   Stand pivot 5  Supervision   Ambulation/Elevation   Gait Assistance 5  Supervision   Assistive Device   (with and without rolling walker)   Distance 150   Balance   Static Sitting Good   Static Standing Fair +   Ambulatory Fair   Activity Tolerance   Activity Tolerance Patient tolerated treatment well   Assessment   Prognosis Good   Problem List Decreased strength;Decreased endurance;Impaired balance;Decreased mobility   Assessment Pt demonstrates improving endurance and independence with functional mobillity.    The patient's AM-[facility] Basic Mobility Inpatient Short Form Raw Score is 20. A Raw score of greater than 16 suggests the patient may benefit from discharge to home. Please also refer to the recommendation of the Physical Therapist for safe discharge planning.         Plan   Treatment/Interventions Therapeutic exercise;Endurance training;Elevations;LE strengthening/ROM;Functional transfer training;Gait training;Patient/family training   Progress Progressing toward goals   PT Frequency Other (Comment)  (5x/week)   Discharge Recommendation   Rehab Resource Intensity Level, PT III (Minimum Resource Intensity)   AM-PAC Basic Mobility Inpatient   Turning in Flat Bed Without Bedrails 4   Lying on Back to Sitting on Edge of Flat Bed Without Bedrails 4   Moving Bed to Chair 3   Standing Up From Chair Using Arms 3   Walk in Room 3   Climb 3-5 Stairs With Railing 3   Basic Mobility Inpatient Raw Score 20   Basic Mobility Standardized Score 43.99   Jovan Patel " Highest Level Of Mobility   JH-HLM Goal 6: Walk 10 steps or more   JH-HLM Achieved 8: Walk 250 feet ot more   End of Consult   Patient Position at End of Consult All needs within reach;Supine   Licensure   [place] License Number  Trice Spicer PT  [id number]

## 2024-03-24 NOTE — ASSESSMENT & PLAN NOTE
Patient drinks vodka 500 ml -1L daily for over 1 year, was drinking beer and wine prior to that..  Last drink was Tuesday.  Diffuse hepatic steatosis with moderate ascites on CT.  Right upper quadrant ultrasound with hepatomegaly and hepatic steatosis along with gallbladder sludge and GB distention and small volume ascites  No signs of withdrawal  Alcohol cessation.  Patient is not interested in alcohol rehab.

## 2024-03-25 ENCOUNTER — ANESTHESIA EVENT (INPATIENT)
Dept: PERIOP | Facility: HOSPITAL | Age: 69
DRG: 392 | End: 2024-03-25
Payer: MEDICARE

## 2024-03-25 ENCOUNTER — ANESTHESIA (INPATIENT)
Dept: PERIOP | Facility: HOSPITAL | Age: 69
DRG: 392 | End: 2024-03-25
Payer: MEDICARE

## 2024-03-25 ENCOUNTER — APPOINTMENT (OUTPATIENT)
Dept: PERIOP | Facility: HOSPITAL | Age: 69
DRG: 392 | End: 2024-03-25
Payer: MEDICARE

## 2024-03-25 PROBLEM — K76.9 HEPATIC DISEASE: Status: ACTIVE | Noted: 2024-03-25

## 2024-03-25 PROBLEM — D64.9 ANEMIA: Status: ACTIVE | Noted: 2024-03-25

## 2024-03-25 PROBLEM — IMO0001 SMOKING: Status: ACTIVE | Noted: 2024-03-25

## 2024-03-25 PROBLEM — K76.0 HEPATIC STEATOSIS: Status: ACTIVE | Noted: 2024-03-25

## 2024-03-25 PROBLEM — F17.200 SMOKING: Status: ACTIVE | Noted: 2024-03-25

## 2024-03-25 PROBLEM — R18.8 ASCITES: Status: ACTIVE | Noted: 2024-03-25

## 2024-03-25 LAB
ANION GAP SERPL CALCULATED.3IONS-SCNC: 6 MMOL/L (ref 4–13)
BUN SERPL-MCNC: <2 MG/DL (ref 5–25)
CALCIUM SERPL-MCNC: 7.9 MG/DL (ref 8.4–10.2)
CHLORIDE SERPL-SCNC: 108 MMOL/L (ref 96–108)
CO2 SERPL-SCNC: 22 MMOL/L (ref 21–32)
CREAT SERPL-MCNC: 0.51 MG/DL (ref 0.6–1.3)
GFR SERPL CREATININE-BSD FRML MDRD: 108 ML/MIN/1.73SQ M
GLUCOSE SERPL-MCNC: 90 MG/DL (ref 65–140)
MAGNESIUM SERPL-MCNC: 1.6 MG/DL (ref 1.9–2.7)
PHOSPHATE SERPL-MCNC: 2.9 MG/DL (ref 2.3–4.1)
POTASSIUM SERPL-SCNC: 3.2 MMOL/L (ref 3.5–5.3)
SODIUM SERPL-SCNC: 136 MMOL/L (ref 135–147)

## 2024-03-25 PROCEDURE — 0DBN8ZX EXCISION OF SIGMOID COLON, VIA NATURAL OR ARTIFICIAL OPENING ENDOSCOPIC, DIAGNOSTIC: ICD-10-PCS | Performed by: INTERNAL MEDICINE

## 2024-03-25 PROCEDURE — 99232 SBSQ HOSP IP/OBS MODERATE 35: CPT | Performed by: FAMILY MEDICINE

## 2024-03-25 PROCEDURE — 45385 COLONOSCOPY W/LESION REMOVAL: CPT | Performed by: INTERNAL MEDICINE

## 2024-03-25 PROCEDURE — 0DB78ZX EXCISION OF STOMACH, PYLORUS, VIA NATURAL OR ARTIFICIAL OPENING ENDOSCOPIC, DIAGNOSTIC: ICD-10-PCS | Performed by: INTERNAL MEDICINE

## 2024-03-25 PROCEDURE — 88305 TISSUE EXAM BY PATHOLOGIST: CPT | Performed by: STUDENT IN AN ORGANIZED HEALTH CARE EDUCATION/TRAINING PROGRAM

## 2024-03-25 PROCEDURE — C9113 INJ PANTOPRAZOLE SODIUM, VIA: HCPCS | Performed by: NURSE PRACTITIONER

## 2024-03-25 PROCEDURE — 43239 EGD BIOPSY SINGLE/MULTIPLE: CPT | Performed by: INTERNAL MEDICINE

## 2024-03-25 PROCEDURE — 0DBK8ZX EXCISION OF ASCENDING COLON, VIA NATURAL OR ARTIFICIAL OPENING ENDOSCOPIC, DIAGNOSTIC: ICD-10-PCS | Performed by: INTERNAL MEDICINE

## 2024-03-25 PROCEDURE — 84100 ASSAY OF PHOSPHORUS: CPT | Performed by: FAMILY MEDICINE

## 2024-03-25 PROCEDURE — 45380 COLONOSCOPY AND BIOPSY: CPT | Performed by: INTERNAL MEDICINE

## 2024-03-25 PROCEDURE — C9113 INJ PANTOPRAZOLE SODIUM, VIA: HCPCS | Performed by: INTERNAL MEDICINE

## 2024-03-25 PROCEDURE — 0DBP8ZX EXCISION OF RECTUM, VIA NATURAL OR ARTIFICIAL OPENING ENDOSCOPIC, DIAGNOSTIC: ICD-10-PCS | Performed by: INTERNAL MEDICINE

## 2024-03-25 PROCEDURE — 80048 BASIC METABOLIC PNL TOTAL CA: CPT | Performed by: FAMILY MEDICINE

## 2024-03-25 PROCEDURE — 97116 GAIT TRAINING THERAPY: CPT

## 2024-03-25 PROCEDURE — 83735 ASSAY OF MAGNESIUM: CPT | Performed by: FAMILY MEDICINE

## 2024-03-25 RX ORDER — MAGNESIUM SULFATE HEPTAHYDRATE 40 MG/ML
2 INJECTION, SOLUTION INTRAVENOUS ONCE
Status: COMPLETED | OUTPATIENT
Start: 2024-03-25 | End: 2024-03-25

## 2024-03-25 RX ORDER — POTASSIUM CHLORIDE 20 MEQ/1
40 TABLET, EXTENDED RELEASE ORAL ONCE
Status: COMPLETED | OUTPATIENT
Start: 2024-03-25 | End: 2024-03-25

## 2024-03-25 RX ORDER — LIDOCAINE HYDROCHLORIDE 20 MG/ML
INJECTION, SOLUTION EPIDURAL; INFILTRATION; INTRACAUDAL; PERINEURAL AS NEEDED
Status: DISCONTINUED | OUTPATIENT
Start: 2024-03-25 | End: 2024-03-25

## 2024-03-25 RX ORDER — POTASSIUM CHLORIDE 14.9 MG/ML
20 INJECTION INTRAVENOUS ONCE
Status: COMPLETED | OUTPATIENT
Start: 2024-03-25 | End: 2024-03-25

## 2024-03-25 RX ORDER — PROPOFOL 10 MG/ML
INJECTION, EMULSION INTRAVENOUS AS NEEDED
Status: DISCONTINUED | OUTPATIENT
Start: 2024-03-25 | End: 2024-03-25

## 2024-03-25 RX ORDER — SODIUM CHLORIDE, SODIUM LACTATE, POTASSIUM CHLORIDE, CALCIUM CHLORIDE 600; 310; 30; 20 MG/100ML; MG/100ML; MG/100ML; MG/100ML
INJECTION, SOLUTION INTRAVENOUS CONTINUOUS PRN
Status: DISCONTINUED | OUTPATIENT
Start: 2024-03-25 | End: 2024-03-25

## 2024-03-25 RX ORDER — POTASSIUM CHLORIDE 20 MEQ/1
20 TABLET, EXTENDED RELEASE ORAL ONCE
Status: COMPLETED | OUTPATIENT
Start: 2024-03-25 | End: 2024-03-25

## 2024-03-25 RX ADMIN — POTASSIUM CHLORIDE 20 MEQ: 1500 TABLET, EXTENDED RELEASE ORAL at 18:06

## 2024-03-25 RX ADMIN — SODIUM CHLORIDE AND POTASSIUM CHLORIDE 50 ML/HR: .9; .15 SOLUTION INTRAVENOUS at 03:53

## 2024-03-25 RX ADMIN — PROPOFOL 30 MG: 10 INJECTION, EMULSION INTRAVENOUS at 15:09

## 2024-03-25 RX ADMIN — POTASSIUM CHLORIDE 20 MEQ: 14.9 INJECTION, SOLUTION INTRAVENOUS at 06:22

## 2024-03-25 RX ADMIN — SODIUM CHLORIDE, SODIUM LACTATE, POTASSIUM CHLORIDE, AND CALCIUM CHLORIDE: .6; .31; .03; .02 INJECTION, SOLUTION INTRAVENOUS at 14:49

## 2024-03-25 RX ADMIN — PROPOFOL 100 MG: 10 INJECTION, EMULSION INTRAVENOUS at 15:01

## 2024-03-25 RX ADMIN — POTASSIUM CHLORIDE 20 MEQ: 14.9 INJECTION, SOLUTION INTRAVENOUS at 08:11

## 2024-03-25 RX ADMIN — FOLIC ACID: 5 INJECTION, SOLUTION INTRAMUSCULAR; INTRAVENOUS; SUBCUTANEOUS at 16:14

## 2024-03-25 RX ADMIN — NICOTINE 1 PATCH: 21 PATCH, EXTENDED RELEASE TRANSDERMAL at 10:27

## 2024-03-25 RX ADMIN — METRONIDAZOLE 500 MG: 500 INJECTION, SOLUTION INTRAVENOUS at 03:49

## 2024-03-25 RX ADMIN — METRONIDAZOLE 500 MG: 500 INJECTION, SOLUTION INTRAVENOUS at 16:19

## 2024-03-25 RX ADMIN — CEFTRIAXONE 1000 MG: 1 INJECTION, SOLUTION INTRAVENOUS at 01:51

## 2024-03-25 RX ADMIN — MAGNESIUM SULFATE HEPTAHYDRATE 2 G: 40 INJECTION, SOLUTION INTRAVENOUS at 10:37

## 2024-03-25 RX ADMIN — B-COMPLEX W/ C & FOLIC ACID TAB 1 TABLET: TAB at 08:21

## 2024-03-25 RX ADMIN — METRONIDAZOLE 500 MG: 500 INJECTION, SOLUTION INTRAVENOUS at 20:39

## 2024-03-25 RX ADMIN — PROPOFOL 20 MG: 10 INJECTION, EMULSION INTRAVENOUS at 15:15

## 2024-03-25 RX ADMIN — CEFTRIAXONE 1000 MG: 1 INJECTION, SOLUTION INTRAVENOUS at 23:13

## 2024-03-25 RX ADMIN — PROPOFOL 30 MG: 10 INJECTION, EMULSION INTRAVENOUS at 15:13

## 2024-03-25 RX ADMIN — PANTOPRAZOLE SODIUM 40 MG: 40 INJECTION, POWDER, FOR SOLUTION INTRAVENOUS at 08:25

## 2024-03-25 RX ADMIN — LIDOCAINE HYDROCHLORIDE 80 MG: 20 INJECTION, SOLUTION EPIDURAL; INFILTRATION; INTRACAUDAL; PERINEURAL at 15:01

## 2024-03-25 RX ADMIN — PROPOFOL 50 MG: 10 INJECTION, EMULSION INTRAVENOUS at 15:03

## 2024-03-25 RX ADMIN — POTASSIUM CHLORIDE 40 MEQ: 1500 TABLET, EXTENDED RELEASE ORAL at 06:30

## 2024-03-25 RX ADMIN — PANTOPRAZOLE SODIUM 40 MG: 40 INJECTION, POWDER, FOR SOLUTION INTRAVENOUS at 20:39

## 2024-03-25 NOTE — ASSESSMENT & PLAN NOTE
Patient drinks vodka 500 ml -1L daily for over 1 year, was drinking beer and wine prior to that..  Last drink was Tuesday.  Diffuse hepatic steatosis with moderate ascites on CT.  Right upper quadrant ultrasound with hepatomegaly and hepatic steatosis along with gallbladder sludge and GB distention and small volume ascites  No signs of withdrawal  Complete alcohol cessation discussed.  Patient is not interested in alcohol rehab.

## 2024-03-25 NOTE — PROGRESS NOTES
[facility]  Progress Note  Name: Del Ocampo I  MRN: [id number]  Unit/Bed#: 4 [address]  Date of Admission: 3/20/2024   Date of Service: 3/25/2024 I Hospital Day: 4    Assessment/Plan   * Abdominal pain  Assessment & Plan  Patient presented with abdominal pain, poor appetite, hypokalemia on outpatient lab work.  Decreased p.o. intake over 2 weeks.  Reports intermittent abdominal pain for about 2 years, with vomiting in the morning after eating once a day for about 1 year, with coffee-ground color diarrhea and constipation alternating chronically.  Daily alcohol use for decades. 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 Has not seen a doctor in office for about 8  years. Had a tele health visit recently, ordered Echo and lab work. K was 2.0 on 3/12.   CT abdomen pelvis with IV contrast and CTA abdomen pelvis in ED.  Showed diffuse severe small bowel enteritis and skip segments of severe colitis which could be infectious/inflammatory process or ischemic etiology.  Moderate ascites.  Diffuse hepatic steatosis. No occlusion or significant flow-limiting stenosis of the abdominal aorta or proximal branch vessels identified.   Lactic acid normal, Lipase 326 --> 317  Appreciate GI and surgical input  Plan for EGD and colonoscopy today    Colitis  Assessment & Plan  On imaging noted to have findings of diffuse severe small bowel enteritis and skip segments of severe colitis  Patient does report chronic diarrhea which has worsened over the last few weeks  C. difficile, bacterial enteric panel within normal limits  Giardia, O&P pending, Fecal Calprotectin and pancreatic elastase pending  Was on full liquids.  Currently n.p.o. for procedure  Questran was started given recurrent significant diarrhea but on hold for procedure today  Keep stool chart      Hypokalemia  Assessment & Plan  Patient with severe  hypokalemia, initial potassium 2.1 --> 3.2 today  Likely due to poor oral intake, profuse diarrhea.  EKG after receiving potassium repletion showed NSR, left BBB which is chronic, QTc 522.  Receiving aggressive IV and p.o. potassium repletion during this hospitalization  Discussed with patient/wife that he will most likely need potassium supplementation on discharge at least till diarrhea and p.o. intake improves  Telemetry  Magnesium repleted as well today  Repeat BMP in a.m.    Alcohol abuse  Assessment & Plan  Patient drinks vodka 500 ml -1L daily for over 1 year, was drinking beer and wine prior to that..  Last drink was Tuesday.  Diffuse hepatic steatosis with moderate ascites on CT.  Right upper quadrant ultrasound with hepatomegaly and hepatic steatosis along with gallbladder sludge and GB distention and small volume ascites  No signs of withdrawal  Complete alcohol cessation discussed.  Patient is not interested in alcohol rehab.      PMR (polymyalgia rheumatica) (HCC)  Assessment & Plan  Noted history.  Not on medication at home    Thoracic aortic aneurysm (HCC)  Assessment & Plan  CT showed - 4.3 cm descending thoracic aortic aneurysm and dilated abdominal aorta measuring up to 2.7 cm in diameter with extensive calcific atherosclerosis.   Outpatient follow-up.    History of bleeding ulcers  Assessment & Plan  Continue IV Protonix twice daily.    History of CHF (congestive heart failure)  Assessment & Plan  History of combined CHF.  Was on Entresto, Coreg in the past.  2D echo in 2018 showed EF 40 to 45%, stage I diastolic dysfunction.  2D echo on 3/10/2024 outpatient (wife showed result) showed normal EF around 60%, normal diastolic function, mild to moderate AR.     Trace pleural effusions on CT  Daily weight, intake output  Patient was previously desaturating mildly while sleeping at times.  Currently stable on room air  Will need outpatient follow-up with cardiology    Nicotine abuse  Assessment  & Plan  Smokes 1 pack a day  Nicotine patch, smoking cessation    Elevated lipase-resolved as of 3/22/2024  Assessment & Plan  Mild lipase elevation likely nonspecific in nature               VTE Pharmacologic Prophylaxis:    none    Mobility:   Basic Mobility Inpatient Raw Score: 20  JH-HLM Goal: 6: Walk 10 steps or more  JH-HLM Achieved: 6: Walk 10 steps or more  JH-HLM Goal achieved. Continue to encourage appropriate mobility.    Patient Centered Rounds: I performed bedside rounds with nursing staff today.   Discussions with Specialists or Other Care Team Provider: yes - GI    Education and Discussions with Family / Patient: Updated  (wife) at bedside.    Total Time Spent on Date of Encounter in care of patient: This time was spent on one or more of the following: performing physical exam; counseling and coordination of care; obtaining or reviewing history; documenting in the medical record; reviewing/ordering tests, medications or procedures; communicating with other healthcare professionals and discussing with patient's family/caregivers.    Current Length of Stay: 4 day(s)  Current Patient Status: Inpatient   Certification Statement: The patient will continue to require additional inpatient hospital stay due to colitis, abdominal pain requiring EGD, colonoscopy, severe hypokalemia  Discharge Plan: Anticipate discharge in 48-72 hrs to home.    Code Status: Level 3 - DNAR and DNI    Subjective:     Patient denies any abdominal pain, shortness of breath.  Hoping to go home by tomorrow    Objective:     Vitals:   Temp (24hrs), Av.6 °F (36.4 °C), Min:97.2 °F (36.2 °C), Max:98.4 °F (36.9 °C)    Temp:  [97.2 °F (36.2 °C)-98.4 °F (36.9 °C)] 97.7 °F (36.5 °C)  HR:  [78-89] 79  Resp:  [16-18] 16  BP: (112-132)/(58-72) 115/60  SpO2:  [96 %-97 %] 97 %  Body mass index is 25.09 kg/m².     Input and Output Summary (last 24 hours):   No intake or output data in the 24 hours ending 24 0859    Physical  Exam:   Physical Exam  Vitals reviewed.   Constitutional:       General: He is not in acute distress.     Appearance: He is ill-appearing (chronically). He is not toxic-appearing.   HENT:      Head: Normocephalic and atraumatic.      Ears:      Comments: Hard of hearing  Eyes:      General:         Right eye: No discharge.         Left eye: No discharge.   Cardiovascular:      Rate and Rhythm: Normal rate and regular rhythm.   Pulmonary:      Effort: Pulmonary effort is normal. No respiratory distress.      Breath sounds: Normal breath sounds. No wheezing or rales.   Abdominal:      General: Bowel sounds are normal. There is no distension.      Palpations: Abdomen is soft.      Tenderness: There is no abdominal tenderness.   Neurological:      Mental Status: He is alert and oriented to person, place, and time.          Additional Data:     Labs:  Results from last 7 days   Lab Units 03/24/24  0425 03/21/24  1111 03/20/24  2110   WBC Thousand/uL 8.22   < > 8.20   HEMOGLOBIN g/dL 11.6*   < > 12.2   HEMATOCRIT % 34.2*   < > 36.3*   PLATELETS Thousands/uL 234   < > 200   NEUTROS PCT %  --   --  80*   LYMPHS PCT %  --   --  10*   MONOS PCT %  --   --  9   EOS PCT %  --   --  1    < > = values in this interval not displayed.     Results from last 7 days   Lab Units 03/25/24  0407 03/24/24  1700 03/24/24  0425   SODIUM mmol/L 136   < > 140   POTASSIUM mmol/L 3.2*   < > 2.9*   CHLORIDE mmol/L 108   < > 107   CO2 mmol/L 22   < > 26   BUN mg/dL <2*   < > 2*   CREATININE mg/dL 0.51*   < > 0.55*   ANION GAP mmol/L 6   < > 7   CALCIUM mg/dL 7.9*   < > 8.4   ALBUMIN g/dL  --   --  2.9*   TOTAL BILIRUBIN mg/dL  --   --  0.85   ALK PHOS U/L  --   --  70   ALT U/L  --   --  11   AST U/L  --   --  19   GLUCOSE RANDOM mg/dL 90   < > 92    < > = values in this interval not displayed.     Results from last 7 days   Lab Units 03/21/24  0151   INR  1.18             Results from last 7 days   Lab Units 03/20/24  2110   LACTIC ACID mmol/L  1.4       Lines/Drains:  Invasive Devices       Peripheral Intravenous Line  Duration             Peripheral IV 03/23/24 Proximal;Right;Ventral (anterior) Forearm 1 day                    Imaging: No pertinent imaging reviewed.    Recent Cultures (last 7 days):   Results from last 7 days   Lab Units 03/21/24  1756   C DIFF TOXIN B BY PCR  Negative       Last 24 Hours Medication List:   Current Facility-Administered Medications   Medication Dose Route Frequency Provider Last Rate    acetaminophen  650 mg Oral Q6H PRN BREN Rodriguez      cefTRIAXone  1,000 mg Intravenous Q24H ALOK RodriguezNP 1,000 mg (03/25/24 0151)    folic acid 1 mg, thiamine (VITAMIN B1) 100 mg in sodium chloride 0.9 % 100 mL IV piggyback   Intravenous Daily BREN Rodriguez      hydrocortisone   Topical 4x Daily PRN Taj Suarez MD      influenza vaccine  0.7 mL Intramuscular Prior to discharge BREN Rodriguez      magnesium sulfate  2 g Intravenous Once Tyesha Marino DO      metroNIDAZOLE  500 mg Intravenous Q8H BREN Rodriguez 500 mg (03/25/24 0349)    multivitamin stress formula  1 tablet Oral Daily BREN Rodriguez      nicotine  1 patch Transdermal Daily BREN Rodriguez      pantoprazole  40 mg Intravenous Q12H ROBINSON BREN Rodriguez      potassium chloride  20 mEq Intravenous Once Taj Suarez MD 20 mEq (03/25/24 0811)    sodium chloride 0.9 % with KCl 20 mEq/L  50 mL/hr Intravenous Continuous Tyesha Marino DO 50 mL/hr (03/25/24 0353)    trimethobenzamide  200 mg Intramuscular Q6H PRN BREN Rodriguez          Today, Patient Was Seen By: Tyesha Marino DO    **Please Note: This note may have been constructed using a voice recognition system.**

## 2024-03-25 NOTE — ANESTHESIA POSTPROCEDURE EVALUATION
Post-Op Assessment Note    CV Status:  Stable  Pain Score: 0    Pain management: adequate       Mental Status:  Sleepy   Hydration Status:  Stable   PONV Controlled:  None   Airway Patency:  Patent     Post Op Vitals Reviewed: Yes    No anethesia notable event occurred.    Staff: Anesthesiologist, CRNA               BP   122/70   Temp      Pulse  77   Resp   14   SpO2   98

## 2024-03-25 NOTE — ASSESSMENT & PLAN NOTE
On imaging noted to have findings of diffuse severe small bowel enteritis and skip segments of severe colitis  Patient does report chronic diarrhea which has worsened over the last few weeks  C. difficile, bacterial enteric panel within normal limits  Giardia, O&P pending, Fecal Calprotectin and pancreatic elastase pending  Was on full liquids.  Currently n.p.o. for procedure  Questran was started given recurrent significant diarrhea but on hold for procedure today  Keep stool chart

## 2024-03-25 NOTE — ANESTHESIA PREPROCEDURE EVALUATION
Procedure:  COLONOSCOPY  EGD    Relevant Problems   CARDIO  Last echo in 2018 - EF 45%   (+) Left bundle-branch block, unspecified   (+) Thoracic aortic aneurysm (HCC)      GI/HEPATIC   (+) Hepatic steatosis      HEMATOLOGY   (+) Anemia      PULMONARY   (+) Smoking      Behavioral Health   (+) Alcohol abuse      Rheumatology   (+) PMR (polymyalgia rheumatica) (HCC)      FEN/Gastrointestinal   (+) Colitis      Other   (+) Ascites        Physical Exam    Airway    Mallampati score: II  TM Distance: >3 FB  Neck ROM: full     Dental       Cardiovascular  Rhythm: regular, Rate: normal    Pulmonary   Breath sounds clear to auscultation    Other Findings        Anesthesia Plan  ASA Score- 3     Anesthesia Type- IV sedation with anesthesia with ASA Monitors.         Additional Monitors:     Airway Plan:     Comment: Patient has been actively avoiding medical care for a few years.       Plan Factors-    Chart reviewed.        Patient is a current smoker.  Patient instructed to abstain from smoking on day of procedure. Patient did not smoke on day of surgery.            Induction- intravenous.    Postoperative Plan-     Informed Consent- Anesthetic plan and risks discussed with patient.  I personally reviewed this patient with the CRNA. Discussed and agreed on the Anesthesia Plan with the CRNA..

## 2024-03-25 NOTE — INTERVAL H&P NOTE
H&P reviewed. After examining the patient I find no changes in the patients condition since the H&P had been written.    Vitals:    03/25/24 1344   BP: 131/60   Pulse: 83   Resp: 16   Temp: (!) 97.4 °F (36.3 °C)   SpO2: 97%

## 2024-03-25 NOTE — ASSESSMENT & PLAN NOTE
Patient with severe hypokalemia, initial potassium 2.1 --> 3.2 today  Likely due to poor oral intake, profuse diarrhea.  EKG after receiving potassium repletion showed NSR, left BBB which is chronic, QTc 522.  Receiving aggressive IV and p.o. potassium repletion during this hospitalization  Discussed with patient/wife that he will most likely need potassium supplementation on discharge at least till diarrhea and p.o. intake improves  Telemetry  Magnesium repleted as well today  Repeat BMP in a.m.

## 2024-03-25 NOTE — ASSESSMENT & PLAN NOTE
History of combined CHF.  Was on Entresto, Coreg in the past.  2D echo in 2018 showed EF 40 to 45%, stage I diastolic dysfunction.  2D echo on 3/10/2024 outpatient (wife showed result) showed normal EF around 60%, normal diastolic function, mild to moderate AR.     Trace pleural effusions on CT  Daily weight, intake output  Patient was previously desaturating mildly while sleeping at times.  Currently stable on room air  Will need outpatient follow-up with cardiology

## 2024-03-25 NOTE — OCCUPATIONAL THERAPY NOTE
Occupational Therapy Cancellation Note       03/25/24 1400   Note Type   Note Type Cancelled Session   Cancel Reasons Patient off floor/test  (Chart review completed. Attempted to see pt for OT treatment however pt off floor for EGD/colonscopy. Will follow as time/schedule permits.)   Licensure   [place] License Number  Trice Alvin OTR/L [id number]

## 2024-03-25 NOTE — PHYSICAL THERAPY NOTE
"   PT TREATMENT     03/25/24 1025   PT Last Visit   PT Visit Date 03/25/24   Note Type   Note Type Treatment   Pain Assessment   Pain Assessment Tool 0-10   Pain Score No Pain   Restrictions/Precautions   Other Precautions Fall Risk;Bed Alarm;Chair Alarm  (IV)   General   Chart Reviewed Yes   Family/Caregiver Present Yes  (Patient's spouse)   Cognition   Overall Cognitive Status WFL   Arousal/Participation Cooperative   Attention Within functional limits   Orientation Level Oriented X4   Memory Within functional limits   Following Commands Follows all commands and directions without difficulty   Subjective   Subjective 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   Bed Mobility   Supine to Sit 6  Modified independent   Sit to Supine 6  Modified independent   Transfers   Sit to Stand 5  Supervision   Stand to Sit 5  Supervision   Ambulation/Elevation   Gait pattern   (Mild, generalized multidirectional unsteadiness)   Gait Assistance 5  Supervision   Additional items Assist x 1;Verbal cues;Tactile cues   Assistive Device None   Distance 200 feet with change in direction   Balance   Static Sitting Good   Static Standing Fair   Ambulatory   (Fair to occasional F- but no overt balance loss)   Activity Tolerance   Activity Tolerance Patient limited by fatigue;Patient tolerated treatment well   Nurse Made Aware FELA Chery   Assessment   Problem List Decreased strength;Decreased endurance;Impaired balance;Decreased mobility;Decreased safety awareness   Assessment Patient agreeable to PT session although states \"waiting to go for colonoscopy and EGD today\" patient continues to make steady progress with bed mobility, transfers, functional mobility and overall gait endurance.  Patient is progressing toward PT goals.  While admitted, patient will continue to benefit from skilled physical therapy services to return patient to his prior level of function.  Based on patient's improvement and subjective remarks of ambulatory in the " room multiple times a day will decrease PT frequency from 5 times a week to 3-5 times a week.  Patient can also ambulate ad jade. on the unit with supervision of nursing staff.  When medically stable for discharge, patient remains appropriate for Level III Minimum Resource Intensity.    The patient's AM-[facility] Basic Mobility Inpatient Short Form Raw Score is 19. A Raw score of greater than 16 suggests the patient may benefit from discharge to home. Please also refer to the recommendation of the Physical Therapist for safe discharge planning.   Plan   Treatment/Interventions ADL retraining;Functional transfer training;LE strengthening/ROM;Elevations;Therapeutic exercise;Endurance training;Patient/family training;Equipment eval/education;Bed mobility;Gait training;Compensatory technique education;Spoke to nursing;Family   PT Frequency 3-5x/wk   Discharge Recommendation   Rehab Resource Intensity Level, PT III (Minimum Resource Intensity)   AM-PAC Basic Mobility Inpatient   Turning in Flat Bed Without Bedrails 4   Lying on Back to Sitting on Edge of Flat Bed Without Bedrails 3   Moving Bed to Chair 3   Standing Up From Chair Using Arms 3   Walk in Room 3   Climb 3-5 Stairs With Railing 3   Basic Mobility Inpatient Raw Score 19   Basic Mobility Standardized Score 42.48   [facility] Highest Level Of Mobility   JH-HLM Goal 6: Walk 10 steps or more   JH-HLM Achieved 7: Walk 25 feet or more   Education   Education Provided Mobility training   Patient Demonstrates acceptance/verbal understanding;Explanation/teachback used   End of Consult   Patient Position at End of Consult Seated edge of bed;All needs within reach  (Patient's spouse present)   Licensure   [place] License Number  Natalee Tellez, PT [id number]     Portions of the documentation may have been created using voice recognition software. Occasional wrong word or sound alike substitutions may have occurred due to the inherent limitation of the voice recognition  software. Read the chart carefully and recognize, using context, where substitutions have occurred.

## 2024-03-25 NOTE — PLAN OF CARE
Problem: Prexisting or High Potential for Compromised Skin Integrity  Goal: Skin integrity is maintained or improved  Description: INTERVENTIONS:  - Identify patients at risk for skin breakdown  - Assess and monitor skin integrity  - Assess and monitor nutrition and hydration status  - Monitor labs   - Assess for incontinence   - Turn and reposition patient  - Assist with mobility/ambulation  - Relieve pressure over bony prominences  - Avoid friction and shearing  - Provide appropriate hygiene as needed including keeping skin clean and dry  - Evaluate need for skin moisturizer/barrier cream  - Collaborate with interdisciplinary team   - Patient/family teaching  - Consider wound care consult   Outcome: Progressing     Problem: PAIN - ADULT  Goal: Verbalizes/displays adequate comfort level or baseline comfort level  Description: Interventions:  - Encourage patient to monitor pain and request assistance  - Assess pain using appropriate pain scale  - Administer analgesics based on type and severity of pain and evaluate response  - Implement non-pharmacological measures as appropriate and evaluate response  - Consider cultural and social influences on pain and pain management  - Notify physician/advanced practitioner if interventions unsuccessful or patient reports new pain  Outcome: Progressing     Problem: INFECTION - ADULT  Goal: Absence or prevention of progression during hospitalization  Description: INTERVENTIONS:  - Assess and monitor for signs and symptoms of infection  - Monitor lab/diagnostic results  - Monitor all insertion sites, i.e. indwelling lines, tubes, and drains  - Monitor endotracheal if appropriate and nasal secretions for changes in amount and color  - [place] appropriate cooling/warming therapies per order  - Administer medications as ordered  - Instruct and encourage patient and family to use good hand hygiene technique  - Identify and instruct in appropriate isolation precautions for  identified infection/condition  Outcome: Progressing  Goal: Absence of fever/infection during neutropenic period  Description: INTERVENTIONS:  - Monitor WBC    Outcome: Progressing     Problem: SAFETY ADULT  Goal: Patient will remain free of falls  Description: INTERVENTIONS:  - Educate patient/family on patient safety including physical limitations  - Instruct patient to call for assistance with activity   - Consult OT/PT to assist with strengthening/mobility   - Keep Call bell within reach  - Keep bed low and locked with side rails adjusted as appropriate  - Keep care items and personal belongings within reach  - Initiate and maintain comfort rounds  - Make Fall Risk Sign visible to staff  - Offer Toileting every 2 Hours, in advance of need  - Initiate/Maintain bed alarm  - Obtain necessary fall risk management equipment: bracelet/socks   - Apply yellow socks and bracelet for high fall risk patients  - Consider moving patient to room near nurses station  Outcome: Progressing  Goal: Maintain or return to baseline ADL function  Description: INTERVENTIONS:  -  Assess patient's ability to carry out ADLs; assess patient's baseline for ADL function and identify physical deficits which impact ability to perform ADLs (bathing, care of mouth/teeth, toileting, grooming, dressing, etc.)  - Assess/evaluate cause of self-care deficits   - Assess range of motion  - Assess patient's mobility; develop plan if impaired  - Assess patient's need for assistive devices and provide as appropriate  - Encourage maximum independence but intervene and supervise when necessary  - Involve family in performance of ADLs  - Assess for home care needs following discharge   - Consider OT consult to assist with ADL evaluation and planning for discharge  - Provide patient education as appropriate  Outcome: Progressing  Goal: Maintains/Returns to pre admission functional level  Description: INTERVENTIONS:  - Perform AM-PAC 6 Click Basic Mobility/  Daily Activity assessment daily.  - Set and communicate daily mobility goal to care team and patient/family/caregiver.   - Collaborate with rehabilitation services on mobility goals if consulted  - Perform Range of Motion 12 times a day.  - Reposition patient every 2 hours.  - Dangle patient 3 times a day  - Stand patient 3 times a day  - Ambulate patient 3 times a day  - Out of bed to chair 3 times a day   - Out of bed for meals 3 times a day  - Out of bed for toileting  - Record patient progress and toleration of activity level   Outcome: Progressing     Problem: DISCHARGE PLANNING  Goal: Discharge to home or other facility with appropriate resources  Description: INTERVENTIONS:  - Identify barriers to discharge w/patient and caregiver  - Arrange for needed discharge resources and transportation as appropriate  - Identify discharge learning needs (meds, wound care, etc.)  - Arrange for interpretive services to assist at discharge as needed  - Refer to Case Management Department for coordinating discharge planning if the patient needs post-hospital services based on physician/advanced practitioner order or complex needs related to functional status, cognitive ability, or social support system  Outcome: Progressing     Problem: Knowledge Deficit  Goal: Patient/family/caregiver demonstrates understanding of disease process, treatment plan, medications, and discharge instructions  Description: Complete learning assessment and assess knowledge base.  Interventions:  - Provide teaching at level of understanding  - Provide teaching via preferred learning methods  Outcome: Progressing     Problem: NEUROSENSORY - ADULT  Goal: Achieves maximal functionality and self care  Description: INTERVENTIONS  - Monitor swallowing and airway patency with patient fatigue and changes in neurological status  - Encourage and assist patient to increase activity and self care.   - Encourage visually impaired, hearing impaired and aphasic  patients to use assistive/communication devices  Outcome: Progressing     Problem: CARDIOVASCULAR - ADULT  Goal: Maintains optimal cardiac output and hemodynamic stability  Description: INTERVENTIONS:  - Monitor I/O, vital signs and rhythm  - Monitor for S/S and trends of decreased cardiac output  - Administer and titrate ordered vasoactive medications to optimize hemodynamic stability  - Assess quality of pulses, skin color and temperature  - Assess for signs of decreased coronary artery perfusion  - Instruct patient to report change in severity of symptoms  Outcome: Progressing  Goal: Absence of cardiac dysrhythmias or at baseline rhythm  Description: INTERVENTIONS:  - Continuous cardiac monitoring, vital signs, obtain 12 lead EKG if ordered  - Administer antiarrhythmic and heart rate control medications as ordered  - Monitor electrolytes and administer replacement therapy as ordered  Outcome: Progressing     Problem: RESPIRATORY - ADULT  Goal: Achieves optimal ventilation and oxygenation  Description: INTERVENTIONS:  - Assess for changes in respiratory status  - Assess for changes in mentation and behavior  - Position to facilitate oxygenation and minimize respiratory effort  - Oxygen administered by appropriate delivery if ordered  - Initiate smoking cessation education as indicated  - Encourage broncho-pulmonary hygiene including cough, deep breathe, Incentive Spirometry  - Assess the need for suctioning and aspirate as needed  - Assess and instruct to report SOB or any respiratory difficulty  - Respiratory Therapy support as indicated  Outcome: Progressing     Problem: GASTROINTESTINAL - ADULT  Goal: Minimal or absence of nausea and/or vomiting  Description: INTERVENTIONS:  - Administer IV fluids if ordered to ensure adequate hydration  - Maintain NPO status until nausea and vomiting are resolved  - Nasogastric tube if ordered  - Administer ordered antiemetic medications as needed  - Provide nonpharmacologic  comfort measures as appropriate  - Advance diet as tolerated, if ordered  - Consider nutrition services referral to assist patient with adequate nutrition and appropriate food choices  Outcome: Progressing  Goal: Maintains or returns to baseline bowel function  Description: INTERVENTIONS:  - Assess bowel function  - Encourage oral fluids to ensure adequate hydration  - Administer IV fluids if ordered to ensure adequate hydration  - Administer ordered medications as needed  - Encourage mobilization and activity  - Consider nutritional services referral to assist patient with adequate nutrition and appropriate food choices  Outcome: Progressing  Goal: Maintains adequate nutritional intake  Description: INTERVENTIONS:  - Monitor percentage of each meal consumed  - Identify factors contributing to decreased intake, treat as appropriate  - Assist with meals as needed  - Monitor I&O, weight, and lab values if indicated  - Obtain nutrition services referral as needed  Outcome: Progressing     Problem: GENITOURINARY - ADULT  Goal: Maintains or returns to baseline urinary function  Description: INTERVENTIONS:  - Assess urinary function  - Encourage oral fluids to ensure adequate hydration if ordered  - Administer IV fluids as ordered to ensure adequate hydration  - Administer ordered medications as needed  - Offer frequent toileting  - Follow urinary retention protocol if ordered  Outcome: Progressing     Problem: METABOLIC, FLUID AND ELECTROLYTES - ADULT  Goal: Electrolytes maintained within normal limits  Description: INTERVENTIONS:  - Monitor labs and assess patient for signs and symptoms of electrolyte imbalances  - Administer electrolyte replacement as ordered  - Monitor response to electrolyte replacements, including repeat lab results as appropriate  - Instruct patient on fluid and nutrition as appropriate  Outcome: Progressing  Goal: Fluid balance maintained  Description: INTERVENTIONS:  - Monitor labs   - Monitor  I/O and WT  - Instruct patient on fluid and nutrition as appropriate  - Assess for signs & symptoms of volume excess or deficit  Outcome: Progressing     Problem: MUSCULOSKELETAL - ADULT  Goal: Maintain or return mobility to safest level of function  Description: INTERVENTIONS:  - Assess patient's ability to carry out ADLs; assess patient's baseline for ADL function and identify physical deficits which impact ability to perform ADLs (bathing, care of mouth/teeth, toileting, grooming, dressing, etc.)  - Assess/evaluate cause of self-care deficits   - Assess range of motion  - Assess patient's mobility  - Assess patient's need for assistive devices and provide as appropriate  - Encourage maximum independence but intervene and supervise when necessary  - Involve family in performance of ADLs  - Assess for home care needs following discharge   - Consider OT consult to assist with ADL evaluation and planning for discharge  - Provide patient education as appropriate  Outcome: Progressing  Goal: Maintain proper alignment of affected body part  Description: INTERVENTIONS:  - Support, maintain and protect limb and body alignment  - Provide patient/ family with appropriate education  Outcome: Progressing

## 2024-03-25 NOTE — ASSESSMENT & PLAN NOTE
Patient presented with abdominal pain, poor appetite, hypokalemia on outpatient lab work.  Decreased p.o. intake over 2 weeks.  Reports intermittent abdominal pain for about 2 years, with vomiting in the morning after eating once a day for about 1 year, with coffee-ground color diarrhea and constipation alternating chronically.  Daily alcohol use for decades. Reports history of Marinelli's esophagus, bleeding ulcers and multiple polyps per EGD/colonoscopy about 8 years ago in [facility]. Has not seen a doctor in office for about 8  years. Had a tele health visit recently, ordered Echo and lab work. K was 2.0 on 3/12.   CT abdomen pelvis with IV contrast and CTA abdomen pelvis in ED.  Showed diffuse severe small bowel enteritis and skip segments of severe colitis which could be infectious/inflammatory process or ischemic etiology.  Moderate ascites.  Diffuse hepatic steatosis. No occlusion or significant flow-limiting stenosis of the abdominal aorta or proximal branch vessels identified.   Lactic acid normal, Lipase 326 --> 317  Appreciate GI and surgical input  Plan for EGD and colonoscopy today

## 2024-03-25 NOTE — PLAN OF CARE
"  Problem: PHYSICAL THERAPY ADULT  Goal: Performs mobility at highest level of function for planned discharge setting.  See evaluation for individualized goals.  Description: Treatment/Interventions: ADL retraining, Functional transfer training, LE strengthening/ROM, Elevations, Therapeutic exercise, Endurance training, Patient/family training, Equipment eval/education, Bed mobility, Gait training, Compensatory technique education, Spoke to nursing, Family          See flowsheet documentation for full assessment, interventions and recommendations.  Outcome: Progressing  Note: Prognosis: Good  Problem List: Decreased strength, Decreased endurance, Impaired balance, Decreased mobility, Decreased safety awareness  Assessment: Patient agreeable to PT session although states \"waiting to go for colonoscopy and EGD today\" patient continues to make steady progress with bed mobility, transfers, functional mobility and overall gait endurance.  Patient is progressing toward PT goals.  While admitted, patient will continue to benefit from skilled physical therapy services to return patient to his prior level of function.  Based on patient's improvement and subjective remarks of ambulatory in the room multiple times a day will decrease PT frequency from 5 times a week to 3-5 times a week.  Patient can also ambulate ad jade. on the unit with supervision of nursing staff.  When medically stable for discharge, patient remains appropriate for Level III Minimum Resource Intensity.        Rehab Resource Intensity Level, PT: III (Minimum Resource Intensity)    See flowsheet documentation for full assessment.        "

## 2024-03-26 LAB
ALBUMIN SERPL BCP-MCNC: 2.5 G/DL (ref 3.5–5)
ALP SERPL-CCNC: 53 U/L (ref 34–104)
ALT SERPL W P-5'-P-CCNC: 8 U/L (ref 7–52)
ANION GAP SERPL CALCULATED.3IONS-SCNC: 7 MMOL/L (ref 4–13)
AST SERPL W P-5'-P-CCNC: 11 U/L (ref 13–39)
BILIRUB SERPL-MCNC: 0.66 MG/DL (ref 0.2–1)
BUN SERPL-MCNC: 2 MG/DL (ref 5–25)
CALCIUM ALBUM COR SERPL-MCNC: 8.8 MG/DL (ref 8.3–10.1)
CALCIUM SERPL-MCNC: 7.6 MG/DL (ref 8.4–10.2)
CHLORIDE SERPL-SCNC: 110 MMOL/L (ref 96–108)
CO2 SERPL-SCNC: 22 MMOL/L (ref 21–32)
CREAT SERPL-MCNC: 0.59 MG/DL (ref 0.6–1.3)
ELASTASE PANC STL-MCNT: 119 UG ELAST./G
ERYTHROCYTE [DISTWIDTH] IN BLOOD BY AUTOMATED COUNT: 21.4 % (ref 11.6–15.1)
G LAMBLIA AG STL QL IA: NEGATIVE
GFR SERPL CREATININE-BSD FRML MDRD: 101 ML/MIN/1.73SQ M
GLUCOSE SERPL-MCNC: 90 MG/DL (ref 65–140)
HCT VFR BLD AUTO: 30.7 % (ref 36.5–49.3)
HGB BLD-MCNC: 10.2 G/DL (ref 12–17)
MAGNESIUM SERPL-MCNC: 1.9 MG/DL (ref 1.9–2.7)
MCH RBC QN AUTO: 32.9 PG (ref 26.8–34.3)
MCHC RBC AUTO-ENTMCNC: 33.2 G/DL (ref 31.4–37.4)
MCV RBC AUTO: 99 FL (ref 82–98)
O+P STL CONC: NORMAL
PLATELET # BLD AUTO: 188 THOUSANDS/UL (ref 149–390)
PMV BLD AUTO: 9.6 FL (ref 8.9–12.7)
POTASSIUM SERPL-SCNC: 3.2 MMOL/L (ref 3.5–5.3)
PROT SERPL-MCNC: 4 G/DL (ref 6.4–8.4)
RBC # BLD AUTO: 3.1 MILLION/UL (ref 3.88–5.62)
SODIUM SERPL-SCNC: 139 MMOL/L (ref 135–147)
WBC # BLD AUTO: 7.02 THOUSAND/UL (ref 4.31–10.16)

## 2024-03-26 PROCEDURE — 80053 COMPREHEN METABOLIC PANEL: CPT | Performed by: INTERNAL MEDICINE

## 2024-03-26 PROCEDURE — C9113 INJ PANTOPRAZOLE SODIUM, VIA: HCPCS | Performed by: INTERNAL MEDICINE

## 2024-03-26 PROCEDURE — 99232 SBSQ HOSP IP/OBS MODERATE 35: CPT | Performed by: INTERNAL MEDICINE

## 2024-03-26 PROCEDURE — 85027 COMPLETE CBC AUTOMATED: CPT | Performed by: INTERNAL MEDICINE

## 2024-03-26 PROCEDURE — 83735 ASSAY OF MAGNESIUM: CPT | Performed by: INTERNAL MEDICINE

## 2024-03-26 RX ORDER — CHOLESTYRAMINE LIGHT 4 G/5.7G
4 POWDER, FOR SUSPENSION ORAL 2 TIMES DAILY
Status: DISCONTINUED | OUTPATIENT
Start: 2024-03-26 | End: 2024-03-27 | Stop reason: HOSPADM

## 2024-03-26 RX ORDER — POTASSIUM CHLORIDE 20 MEQ/1
40 TABLET, EXTENDED RELEASE ORAL ONCE
Status: COMPLETED | OUTPATIENT
Start: 2024-03-26 | End: 2024-03-26

## 2024-03-26 RX ORDER — PANTOPRAZOLE SODIUM 40 MG/1
40 TABLET, DELAYED RELEASE ORAL
Status: DISCONTINUED | OUTPATIENT
Start: 2024-03-27 | End: 2024-03-27 | Stop reason: HOSPADM

## 2024-03-26 RX ADMIN — CHOLESTYRAMINE 4 G: 4 POWDER, FOR SUSPENSION ORAL at 17:18

## 2024-03-26 RX ADMIN — CHOLESTYRAMINE 4 G: 4 POWDER, FOR SUSPENSION ORAL at 12:09

## 2024-03-26 RX ADMIN — NICOTINE 1 PATCH: 21 PATCH, EXTENDED RELEASE TRANSDERMAL at 08:47

## 2024-03-26 RX ADMIN — POTASSIUM CHLORIDE 40 MEQ: 1500 TABLET, EXTENDED RELEASE ORAL at 09:34

## 2024-03-26 RX ADMIN — METRONIDAZOLE 500 MG: 500 INJECTION, SOLUTION INTRAVENOUS at 12:09

## 2024-03-26 RX ADMIN — FOLIC ACID: 5 INJECTION, SOLUTION INTRAMUSCULAR; INTRAVENOUS; SUBCUTANEOUS at 08:38

## 2024-03-26 RX ADMIN — B-COMPLEX W/ C & FOLIC ACID TAB 1 TABLET: TAB at 08:30

## 2024-03-26 RX ADMIN — METRONIDAZOLE 500 MG: 500 INJECTION, SOLUTION INTRAVENOUS at 05:23

## 2024-03-26 RX ADMIN — PANTOPRAZOLE SODIUM 40 MG: 40 INJECTION, POWDER, FOR SOLUTION INTRAVENOUS at 09:32

## 2024-03-26 NOTE — ASSESSMENT & PLAN NOTE
Patient with severe hypokalemia, initial potassium 2.1 --> 3.2   Likely due to poor oral intake, profuse diarrhea.  EKG after receiving potassium repletion showed NSR, left BBB which is chronic, QTc 522.  Receiving aggressive IV and p.o. potassium repletion during this hospitalization  Discussed with patient/wife that he will most likely need potassium supplementation on discharge at least till diarrhea and p.o. intake improves  Potassium was repleted again.  Magnesium level is normal.

## 2024-03-26 NOTE — PROGRESS NOTES
Progress Note - Del Ocampo 69 y.o. male MRN: [id number]    Unit/Bed#: [address] [place] [address] Encounter: [id number]        Assessment/Plan:  69-year-old male with a past medical history of alcohol abuse, nicotine abuse, temporal arteritis, CHF, Marinelli's esophagus, colon polyps, peptic ulcer disease, GI bleed who presents with decreased appetite associated with abdominal pain, nausea, vomiting, and diarrhea .Hx of alcohol abuse patient drinks 500 mL to 1 L of vodka daily.     1.Abdominal pain associated with  nausea, vomiting and diarrhea.  Patient with longstanding history of abdominal pain associated with diarrhea and intermittent episodes of nausea and vomiting which has been going on for many years.  CTA abdomen done 3/21 showed findings of severe mid to distal small bowel enteritis and skip segments of severe colitis again seen, similar compared to the prior study     -Stool for C. Difficile and enteric panel negative, Giardia, ova and parasite pending  -Obtain pancreatic elastase pending  -Obtain fecal calprotectin pending  -C-reactive protein 107.3  -Continue supportive care and IV fluid hydration   -Continue electrolyte replacement  -MRI enterography was ordered as inpatient for tomorrow for further evaluation of small bowel for inflammatory bowel disease   EGD/Colon done yesterday- Colonoscopy showed extensive diverticulosis of moderate severity in the ascending colon, transverse colon, descending colon and sigmoid colon.Mild edematous and erythematous mucosa in the sigmoid colon; performed cold forceps biopsy, Distal terminal ileum and rectal mucosa appeared normal.  Some edema patchy erythema of the sigmoid descending area, random biopsies taken from ascending and sigmoid colon.  Edema could be related to hypoalbuminemia / chronic liver disease.  These findings are not suggestive of ulcerative colitis and/or Crohn's disease.  -EGD showed irregular Z-line, small type I hiatal hernia   Mild abnormal mucosa with  "erosion in the antrum; The duodenal bulb, 1st part of the duodenum and 2nd part of the duodenum appeared normal  -Can reorder Questran for loose stools     2.  Alcoholic liver disease  3.  Decreased appetite  4.  Protein malnutrition  CT abdomen showed diffuse hepatic steatosis  -Last MELD was 9. Monitor MELD labs   -Obtained ultrasound of abdomen for further evaluation of liver which showed hepatomegaly. Hepatic steatosis. Gallbladder sludge. Distended gallbladder. Samll volume ascites.  -Recommended high-protein supplements such as Ensure with each meal when diet advanced.  -CIWA protocol per attending team  -Recommend nutritional consult    5.  Elevated lipase  6.  GERD  Patient noted to have elevated lipase on admission.  CTA of abdomen done 3/21 showed pancreas unremarkable.      -Continue pantoprazole daily  -Follow EGD path as above    Subjective:   Patient is lying in bed.  He reports that he still having GI issues but in discussion with him he states that he has been improving because he was having a lot of loose stools but they have slowed down since admission and he also reports that he is not having vomiting with p.o. intake and diet was advanced yesterday to low residue.  Family at bedside.  Reports abdomen does not feel bloated or painful.    Objective:     Vitals: /61   Pulse 70   Temp 97.8 °F (36.6 °C)   Resp 18   Ht 5' 8\" (1.727 m)   Wt 74 kg (163 lb 2.3 oz)   SpO2 97%   BMI 24.81 kg/m²       Physical Exam:  Gen-alert no acute distress  Abd-soft positive bowel sounds nontender nondistended, protuberant       Lab, Imaging and other studies:   Recent Results (from the past 72 hour(s))   Basic metabolic panel    Collection Time: 03/23/24  9:44 PM   Result Value Ref Range    Sodium 140 135 - 147 mmol/L    Potassium 3.1 (L) 3.5 - 5.3 mmol/L    Chloride 107 96 - 108 mmol/L    CO2 26 21 - 32 mmol/L    ANION GAP 7 4 - 13 mmol/L    BUN 2 (L) 5 - 25 mg/dL    Creatinine 0.51 (L) 0.60 - 1.30 mg/dL "    Glucose 103 65 - 140 mg/dL    Calcium 7.6 (L) 8.4 - 10.2 mg/dL    eGFR 108 ml/min/1.73sq m   Comprehensive metabolic panel    Collection Time: 03/24/24  4:25 AM   Result Value Ref Range    Sodium 140 135 - 147 mmol/L    Potassium 2.9 (L) 3.5 - 5.3 mmol/L    Chloride 107 96 - 108 mmol/L    CO2 26 21 - 32 mmol/L    ANION GAP 7 4 - 13 mmol/L    BUN 2 (L) 5 - 25 mg/dL    Creatinine 0.55 (L) 0.60 - 1.30 mg/dL    Glucose 92 65 - 140 mg/dL    Calcium 8.4 8.4 - 10.2 mg/dL    Corrected Calcium 9.3 8.3 - 10.1 mg/dL    AST 19 13 - 39 U/L    ALT 11 7 - 52 U/L    Alkaline Phosphatase 70 34 - 104 U/L    Total Protein 5.0 (L) 6.4 - 8.4 g/dL    Albumin 2.9 (L) 3.5 - 5.0 g/dL    Total Bilirubin 0.85 0.20 - 1.00 mg/dL    eGFR 104 ml/min/1.73sq m   CBC    Collection Time: 03/24/24  4:25 AM   Result Value Ref Range    WBC 8.22 4.31 - 10.16 Thousand/uL    RBC 3.48 (L) 3.88 - 5.62 Million/uL    Hemoglobin 11.6 (L) 12.0 - 17.0 g/dL    Hematocrit 34.2 (L) 36.5 - 49.3 %    MCV 98 82 - 98 fL    MCH 33.3 26.8 - 34.3 pg    MCHC 33.9 31.4 - 37.4 g/dL    RDW 20.2 (H) 11.6 - 15.1 %    Platelets 234 149 - 390 Thousands/uL    MPV 9.5 8.9 - 12.7 fL   Basic metabolic panel    Collection Time: 03/24/24  5:00 PM   Result Value Ref Range    Sodium 135 135 - 147 mmol/L    Potassium 3.2 (L) 3.5 - 5.3 mmol/L    Chloride 106 96 - 108 mmol/L    CO2 22 21 - 32 mmol/L    ANION GAP 7 4 - 13 mmol/L    BUN 2 (L) 5 - 25 mg/dL    Creatinine 0.55 (L) 0.60 - 1.30 mg/dL    Glucose 137 65 - 140 mg/dL    Calcium 7.8 (L) 8.4 - 10.2 mg/dL    eGFR 104 ml/min/1.73sq m   Basic metabolic panel    Collection Time: 03/25/24  4:07 AM   Result Value Ref Range    Sodium 136 135 - 147 mmol/L    Potassium 3.2 (L) 3.5 - 5.3 mmol/L    Chloride 108 96 - 108 mmol/L    CO2 22 21 - 32 mmol/L    ANION GAP 6 4 - 13 mmol/L    BUN <2 (L) 5 - 25 mg/dL    Creatinine 0.51 (L) 0.60 - 1.30 mg/dL    Glucose 90 65 - 140 mg/dL    Calcium 7.9 (L) 8.4 - 10.2 mg/dL    eGFR 108 ml/min/1.73sq m    Magnesium    Collection Time: 03/25/24  4:07 AM   Result Value Ref Range    Magnesium 1.6 (L) 1.9 - 2.7 mg/dL   Phosphorus    Collection Time: 03/25/24  4:07 AM   Result Value Ref Range    Phosphorus 2.9 2.3 - 4.1 mg/dL   CBC    Collection Time: 03/26/24  6:35 AM   Result Value Ref Range    WBC 7.02 4.31 - 10.16 Thousand/uL    RBC 3.10 (L) 3.88 - 5.62 Million/uL    Hemoglobin 10.2 (L) 12.0 - 17.0 g/dL    Hematocrit 30.7 (L) 36.5 - 49.3 %    MCV 99 (H) 82 - 98 fL    MCH 32.9 26.8 - 34.3 pg    MCHC 33.2 31.4 - 37.4 g/dL    RDW 21.4 (H) 11.6 - 15.1 %    Platelets 188 149 - 390 Thousands/uL    MPV 9.6 8.9 - 12.7 fL   Comprehensive metabolic panel    Collection Time: 03/26/24  6:35 AM   Result Value Ref Range    Sodium 139 135 - 147 mmol/L    Potassium 3.2 (L) 3.5 - 5.3 mmol/L    Chloride 110 (H) 96 - 108 mmol/L    CO2 22 21 - 32 mmol/L    ANION GAP 7 4 - 13 mmol/L    BUN 2 (L) 5 - 25 mg/dL    Creatinine 0.59 (L) 0.60 - 1.30 mg/dL    Glucose 90 65 - 140 mg/dL    Calcium 7.6 (L) 8.4 - 10.2 mg/dL    Corrected Calcium 8.8 8.3 - 10.1 mg/dL    AST 11 (L) 13 - 39 U/L    ALT 8 7 - 52 U/L    Alkaline Phosphatase 53 34 - 104 U/L    Total Protein 4.0 (L) 6.4 - 8.4 g/dL    Albumin 2.5 (L) 3.5 - 5.0 g/dL    Total Bilirubin 0.66 0.20 - 1.00 mg/dL    eGFR 101 ml/min/1.73sq m   Magnesium    Collection Time: 03/26/24  6:35 AM   Result Value Ref Range    Magnesium 1.9 1.9 - 2.7 mg/dL

## 2024-03-26 NOTE — PROGRESS NOTES
[facility]  Progress Note  Name: Del Ocampo I  MRN: [id number]  Unit/Bed#: 4 [address] 416-01 I Date of Admission: 3/20/2024   Date of Service: 3/26/2024 I Hospital Day: 5    Assessment/Plan   * Abdominal pain  Assessment & Plan  Patient presented with abdominal pain, poor appetite, hypokalemia on outpatient lab work.  Decreased p.o. intake over 2 weeks.  Reports intermittent abdominal pain for about 2 years, with vomiting in the morning after eating once a day for about 1 year, with coffee-ground color diarrhea and constipation alternating chronically.  Daily alcohol use for decades. 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 Has not seen a doctor in office for about 8  years. Had a tele health visit recently, ordered Echo and lab work. K was 2.0 on 3/12.   CT abdomen pelvis with IV contrast and CTA abdomen pelvis in ED.  Showed diffuse severe small bowel enteritis and skip segments of severe colitis which could be infectious/inflammatory process or ischemic etiology.  Moderate ascites.  Diffuse hepatic steatosis. No occlusion or significant flow-limiting stenosis of the abdominal aorta or proximal branch vessels identified.   Lactic acid normal, Lipase 326 --> 317  Appreciate GI and surgical input  EGD and colonoscopy on 3/26/2024-extensive diverticulosis of moderate severity in the transverse colon, descending colon and sigmoid colon with normal distal terminal ileum.  Mild localized edematous and erythematous mucosa in the sigmoid colon status post biopsy.  Grade 1 hemorrhoid anteriorly.  EGD showed irregular Z-line with small sliding hiatal hernia with mildly patchy abnormal mucosa with erosion in the antrum status postbiopsy  Stool for bacterial panel was negative along with C. difficile toxin a and B.  Stool for O&P, calprotectin level and fecal elastase pending at the current time.  MRI enterography  was ordered which is scheduled for tomorrow  Patient restarted back on Questran 4 g p.o. twice daily for intractable diarrhea.  Patient has been on ceftriaxone and Flagyl for suspected colitis which will be discontinued.    Hypokalemia  Assessment & Plan  Patient with severe hypokalemia, initial potassium 2.1 --> 3.2   Likely due to poor oral intake, profuse diarrhea.  EKG after receiving potassium repletion showed NSR, left BBB which is chronic, QTc 522.  Receiving aggressive IV and p.o. potassium repletion during this hospitalization  Discussed with patient/wife that he will most likely need potassium supplementation on discharge at least till diarrhea and p.o. intake improves  Potassium was repleted again.  Magnesium level is normal.    History of CHF (congestive heart failure)  Assessment & Plan  History of combined CHF.  Was on Entresto, Coreg in the past.  2D echo in 2018 showed EF 40 to 45%, stage I diastolic dysfunction.  2D echo on 3/10/2024 outpatient (wife showed result) showed normal EF around 60%, normal diastolic function, mild to moderate AR.     Trace pleural effusions on CT  Daily weight, intake output  Patient was previously desaturating mildly while sleeping at times.  Currently stable on room air  Will need outpatient follow-up with cardiology    Anemia  Assessment & Plan  Hemoglobin has been stable in the range of 10-12  No evidence of active bleeding    Ascites  Assessment & Plan  CT abdomen pelvis showed moderate abdominal pelvic ascites    Hepatic steatosis  Assessment & Plan  Patient noted to have hepatic steatosis and CT abdomen pelvis  Likely in the setting of alcohol abuse    PMR (polymyalgia rheumatica) (HCC)  Assessment & Plan  Noted history.  Not on medication at home    Thoracic aortic aneurysm (HCC)  Assessment & Plan  CT showed - 4.3 cm descending thoracic aortic aneurysm and dilated abdominal aorta measuring up to 2.7 cm in diameter with extensive calcific atherosclerosis.    Outpatient follow-up.    History of bleeding ulcers  Assessment & Plan  Continue IV Protonix twice daily.    Nicotine abuse  Assessment & Plan  Smokes 1 pack a day  Nicotine patch, smoking cessation    Alcohol abuse  Assessment & Plan  Patient drinks vodka 500 ml -1L daily for over 1 year, was drinking beer and wine prior to that..  Last drink was Tuesday.  Diffuse hepatic steatosis with moderate ascites on CT.  Right upper quadrant ultrasound with hepatomegaly and hepatic steatosis along with gallbladder sludge and GB distention and small volume ascites  No signs of withdrawal  Complete alcohol cessation discussed.  Patient is not interested in alcohol rehab.                 VTE Pharmacologic Prophylaxis:   Low Risk (Score 0-2) - Encourage Ambulation.    Mobility:   Basic Mobility Inpatient Raw Score: 19  JH-HLM Goal: 6: Walk 10 steps or more  JH-HLM Achieved: 7: Walk 25 feet or more  JH-HLM Goal achieved. Continue to encourage appropriate mobility.    Patient Centered Rounds: I performed bedside rounds with nursing staff today.   Discussions with Specialists or Other Care Team Provider: Gastroenterology    Education and Discussions with Family / Patient: Updated  (brother) at bedside.    Total Time Spent on Date of Encounter in care of patient: 35 mins. This time was spent on one or more of the following: performing physical exam; counseling and coordination of care; obtaining or reviewing history; documenting in the medical record; reviewing/ordering tests, medications or procedures; communicating with other healthcare professionals and discussing with patient's family/caregivers.    Current Length of Stay: 5 day(s)  Current Patient Status: Inpatient   Certification Statement: The patient will continue to require additional inpatient hospital stay due to abdominal pain with diarrhea, hypokalemia  Discharge Plan: Anticipate discharge in 24-48 hrs to home.    Code Status: Level 3 - DNAR and  DNI    Subjective:   Patient is still having some diarrhea but not as explosive.  Denies any abdominal pain, nausea or vomiting.  Anxious to go home.    Objective:     Vitals:   Temp (24hrs), Av.7 °F (36.5 °C), Min:97.4 °F (36.3 °C), Max:97.8 °F (36.6 °C)    Temp:  [97.4 °F (36.3 °C)-97.8 °F (36.6 °C)] 97.8 °F (36.6 °C)  HR:  [69-88] 70  Resp:  [16-18] 18  BP: ()/(54-70) 105/61  SpO2:  [95 %-100 %] 97 %  Body mass index is 24.81 kg/m².     Input and Output Summary (last 24 hours):     Intake/Output Summary (Last 24 hours) at 3/26/2024 1452  Last data filed at 3/25/2024 2313  Gross per 24 hour   Intake 710 ml   Output 0 ml   Net 710 ml       Physical Exam:   Physical Exam  Constitutional:       Appearance: Normal appearance.   HENT:      Head: Normocephalic and atraumatic.   Eyes:      Extraocular Movements: Extraocular movements intact.      Pupils: Pupils are equal, round, and reactive to light.   Cardiovascular:      Rate and Rhythm: Normal rate and regular rhythm.      Heart sounds: No murmur heard.     No gallop.   Pulmonary:      Effort: Pulmonary effort is normal.      Breath sounds: Normal breath sounds.   Abdominal:      General: Bowel sounds are normal.      Palpations: Abdomen is soft.      Tenderness: There is no abdominal tenderness.   Musculoskeletal:         General: No swelling or deformity. Normal range of motion.      Cervical back: Normal range of motion and neck supple.   Skin:     General: Skin is warm and dry.   Neurological:      General: No focal deficit present.      Mental Status: He is alert.          Additional Data:     Labs:  Results from last 7 days   Lab Units 24  0635 24  1111 24  2110   WBC Thousand/uL 7.02   < > 8.20   HEMOGLOBIN g/dL 10.2*   < > 12.2   HEMATOCRIT % 30.7*   < > 36.3*   PLATELETS Thousands/uL 188   < > 200   NEUTROS PCT %  --   --  80*   LYMPHS PCT %  --   --  10*   MONOS PCT %  --   --  9   EOS PCT %  --   --  1    < > = values in this  interval not displayed.     Results from last 7 days   Lab Units 03/26/24  0635   SODIUM mmol/L 139   POTASSIUM mmol/L 3.2*   CHLORIDE mmol/L 110*   CO2 mmol/L 22   BUN mg/dL 2*   CREATININE mg/dL 0.59*   ANION GAP mmol/L 7   CALCIUM mg/dL 7.6*   ALBUMIN g/dL 2.5*   TOTAL BILIRUBIN mg/dL 0.66   ALK PHOS U/L 53   ALT U/L 8   AST U/L 11*   GLUCOSE RANDOM mg/dL 90     Results from last 7 days   Lab Units 03/21/24  0151   INR  1.18             Results from last 7 days   Lab Units 03/20/24  2110   LACTIC ACID mmol/L 1.4       Lines/Drains:  Invasive Devices       Peripheral Intravenous Line  Duration             Peripheral IV 03/23/24 Proximal;Right;Ventral (anterior) Forearm 3 days                          Imaging: Reviewed radiology reports from this admission including: abdominal/pelvic CT    Recent Cultures (last 7 days):   Results from last 7 days   Lab Units 03/21/24  1756   C DIFF TOXIN B BY PCR  Negative       Last 24 Hours Medication List:   Current Facility-Administered Medications   Medication Dose Route Frequency Provider Last Rate    acetaminophen  650 mg Oral Q6H PRN Tim Ferguson MD      cholestyramine sugar free  4 g Oral BID Janine Shahid MD      folic acid 1 mg, thiamine (VITAMIN B1) 100 mg in sodium chloride 0.9 % 100 mL IV piggyback   Intravenous Daily Tim Ferguson MD      hydrocortisone   Topical 4x Daily PRN Tim Ferguson MD      influenza vaccine  0.7 mL Intramuscular Prior to discharge Tim Ferguson MD      multivitamin stress formula  1 tablet Oral Daily Tim Ferguson MD      nicotine  1 patch Transdermal Daily Tim Ferguson MD      [START ON 3/27/2024] pantoprazole  40 mg Oral Early Morning Jania Pritchett PA-C      trimethobenzamide  200 mg Intramuscular Q6H PRN Tim Ferguson MD          Today, Patient Was Seen By: Janine Shahid MD    **Please Note: This note may have been constructed using a voice recognition system.**

## 2024-03-26 NOTE — ASSESSMENT & PLAN NOTE
Patient noted to have hepatic steatosis and CT abdomen pelvis  Likely in the setting of alcohol abuse

## 2024-03-26 NOTE — CASE MANAGEMENT
Case Management Discharge Planning Note    Patient name Del Ocampo  Location [address] [address]/4 [place] 416-* MRN [id number]  : 1955 Date 3/26/2024       Current Admission Date: 3/20/2024  Current Admission Diagnosis:Abdominal pain   Patient Active Problem List    Diagnosis Date Noted    Smoking 2024    Hepatic steatosis 2024    Ascites 2024    Anemia 2024    Colitis 2024    Abdominal pain 2024    Alcohol abuse 2024    Hypokalemia 2024    Nicotine abuse 2024    History of CHF (congestive heart failure) 2024    History of bleeding ulcers 2024    Thoracic aortic aneurysm (HCC) 2024    PMR (polymyalgia rheumatica) (HCC) 2024    Left bundle-branch block, unspecified 01/10/2020      LOS (days): 5  Geometric Mean LOS (GMLOS) (days): 3.1  Days to GMLOS:-2.3     OBJECTIVE:  Risk of Unplanned Readmission Score: 15.59      Current admission status: Inpatient   Preferred Pharmacy:   [id number] - [place], [place] - [address]  [address] [place] [address]  Phone: [phone] Fax: [phone]    Primary Care Provider: No primary care provider on file.    Primary Insurance: MEDICARE  Secondary Insurance: [employer]    DISCHARGE DETAILS:    Discharge planning discussed with:: Patient, Spouse  Freedom of Choice: Yes  Comments - Freedom of Choice: SW met bedside with patient/spouse again to continue discussion on discharge planning. Patient declining referral to [facility] services. Choosing to discharge home with continued support by spouse. Spouse verbalized understanding and agreement with patient choice.  CM contacted family/caregiver?: Yes  Were Treatment Team discharge recommendations reviewed with patient/caregiver?: Yes  Did patient/caregiver verbalize understanding of patient care needs?: Yes  Were patient/caregiver advised of the risks associated with not following Treatment Team discharge recommendations?:  Yes    Contacts  Patient Contacts: Diann Ocampo (spouse)  Relationship to Patient:: Family  Contact Method: In Person  Reason/Outcome: Continuity of Care, Emergency Contact, Discharge Planning    Requested Home Health Care         Is the patient interested in HHC at discharge?: No    DME Referral Provided  Referral made for DME?: No    Other Referral/Resources/Interventions Provided:  Interventions: None Indicated     Treatment Team Recommendation: Home with Home Health Care  Discharge Destination Plan:: Home  Transport at Discharge : Family     IMM Given (Date):: 03/26/24  IMM Given to:: Family (IMM#2 reviewed with patient and spouse. Both gave verbal understanding. Spouse signed and was provided original. Copy placed in scan bin.)

## 2024-03-26 NOTE — ASSESSMENT & PLAN NOTE
Patient presented with abdominal pain, poor appetite, hypokalemia on outpatient lab work.  Decreased p.o. intake over 2 weeks.  Reports intermittent abdominal pain for about 2 years, with vomiting in the morning after eating once a day for about 1 year, with coffee-ground color diarrhea and constipation alternating chronically.  Daily alcohol use for decades. 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 Has not seen a doctor in office for about 8  years. Had a tele health visit recently, ordered Echo and lab work. K was 2.0 on 3/12.   CT abdomen pelvis with IV contrast and CTA abdomen pelvis in ED.  Showed diffuse severe small bowel enteritis and skip segments of severe colitis which could be infectious/inflammatory process or ischemic etiology.  Moderate ascites.  Diffuse hepatic steatosis. No occlusion or significant flow-limiting stenosis of the abdominal aorta or proximal branch vessels identified.   Lactic acid normal, Lipase 326 --> 317  Appreciate GI and surgical input  EGD and colonoscopy on 3/26/2024-extensive diverticulosis of moderate severity in the transverse colon, descending colon and sigmoid colon with normal distal terminal ileum.  Mild localized edematous and erythematous mucosa in the sigmoid colon status post biopsy.  Grade 1 hemorrhoid anteriorly.  EGD showed irregular Z-line with small sliding hiatal hernia with mildly patchy abnormal mucosa with erosion in the antrum status postbiopsy  Stool for bacterial panel was negative along with C. difficile toxin a and B.  Stool for O&P, calprotectin level and fecal elastase pending at the current time.  MRI enterography was ordered which is scheduled for tomorrow  Patient restarted back on Questran 4 g p.o. twice daily for intractable diarrhea.  Patient has been on ceftriaxone and Flagyl for suspected colitis which will be discontinued.

## 2024-03-26 NOTE — PLAN OF CARE
Problem: Prexisting or High Potential for Compromised Skin Integrity  Goal: Skin integrity is maintained or improved  Description: INTERVENTIONS:  - Identify patients at risk for skin breakdown  - Assess and monitor skin integrity  - Assess and monitor nutrition and hydration status  - Monitor labs   - Assess for incontinence   - Turn and reposition patient  - Assist with mobility/ambulation  - Relieve pressure over bony prominences  - Avoid friction and shearing  - Provide appropriate hygiene as needed including keeping skin clean and dry  - Evaluate need for skin moisturizer/barrier cream  - Collaborate with interdisciplinary team   - Patient/family teaching  - Consider wound care consult   Outcome: Progressing     Problem: PAIN - ADULT  Goal: Verbalizes/displays adequate comfort level or baseline comfort level  Description: Interventions:  - Encourage patient to monitor pain and request assistance  - Assess pain using appropriate pain scale  - Administer analgesics based on type and severity of pain and evaluate response  - Implement non-pharmacological measures as appropriate and evaluate response  - Consider cultural and social influences on pain and pain management  - Notify physician/advanced practitioner if interventions unsuccessful or patient reports new pain  Outcome: Progressing     Problem: INFECTION - ADULT  Goal: Absence or prevention of progression during hospitalization  Description: INTERVENTIONS:  - Assess and monitor for signs and symptoms of infection  - Monitor lab/diagnostic results  - Monitor all insertion sites, i.e. indwelling lines, tubes, and drains  - Monitor endotracheal if appropriate and nasal secretions for changes in amount and color  - Union appropriate cooling/warming therapies per order  - Administer medications as ordered  - Instruct and encourage patient and family to use good hand hygiene technique  - Identify and instruct in appropriate isolation precautions for  identified infection/condition  Outcome: Progressing     Problem: INFECTION - ADULT  Goal: Absence of fever/infection during neutropenic period  Description: INTERVENTIONS:  - Monitor WBC    Outcome: Progressing     Problem: SAFETY ADULT  Goal: Patient will remain free of falls  Description: INTERVENTIONS:  - Educate patient/family on patient safety including physical limitations  - Instruct patient to call for assistance with activity   - Consult OT/PT to assist with strengthening/mobility   - Keep Call bell within reach  - Keep bed low and locked with side rails adjusted as appropriate  - Keep care items and personal belongings within reach  - Initiate and maintain comfort rounds  - Make Fall Risk Sign visible to staff  - Offer Toileting every 2 Hours, in advance of need  - Initiate/Maintain bed alarm  - Obtain necessary fall risk management equipment:   - Apply yellow socks and bracelet for high fall risk patients  - Consider moving patient to room near nurses station  Outcome: Progressing     Problem: SAFETY ADULT  Goal: Maintain or return to baseline ADL function  Description: INTERVENTIONS:  -  Assess patient's ability to carry out ADLs; assess patient's baseline for ADL function and identify physical deficits which impact ability to perform ADLs (bathing, care of mouth/teeth, toileting, grooming, dressing, etc.)  - Assess/evaluate cause of self-care deficits   - Assess range of motion  - Assess patient's mobility; develop plan if impaired  - Assess patient's need for assistive devices and provide as appropriate  - Encourage maximum independence but intervene and supervise when necessary  - Involve family in performance of ADLs  - Assess for home care needs following discharge   - Consider OT consult to assist with ADL evaluation and planning for discharge  - Provide patient education as appropriate  Outcome: Progressing     Problem: SAFETY ADULT  Goal: Maintains/Returns to pre admission functional  level  Description: INTERVENTIONS:  - Perform AM-PAC 6 Click Basic Mobility/ Daily Activity assessment daily.  - Set and communicate daily mobility goal to care team and patient/family/caregiver.   - Collaborate with rehabilitation services on mobility goals if consulted  - Perform Range of Motion 3 times a day.  - Actively turns self.   - Dangle patient 3 times a day  - Stand patient 3 times a day  - Ambulate patient 3 times a day  - Out of bed to chair 3 times a day   - Out of bed for meals 3 times a day  - Out of bed for toileting  - Record patient progress and toleration of activity level   Outcome: Progressing     Problem: DISCHARGE PLANNING  Goal: Discharge to home or other facility with appropriate resources  Description: INTERVENTIONS:  - Identify barriers to discharge w/patient and caregiver  - Arrange for needed discharge resources and transportation as appropriate  - Identify discharge learning needs (meds, wound care, etc.)  - Arrange for interpretive services to assist at discharge as needed  - Refer to Case Management Department for coordinating discharge planning if the patient needs post-hospital services based on physician/advanced practitioner order or complex needs related to functional status, cognitive ability, or social support system  Outcome: Progressing     Problem: Knowledge Deficit  Goal: Patient/family/caregiver demonstrates understanding of disease process, treatment plan, medications, and discharge instructions  Description: Complete learning assessment and assess knowledge base.  Interventions:  - Provide teaching at level of understanding  - Provide teaching via preferred learning methods  Outcome: Progressing     Problem: NEUROSENSORY - ADULT  Goal: Achieves maximal functionality and self care  Description: INTERVENTIONS  - Monitor swallowing and airway patency with patient fatigue and changes in neurological status  - Encourage and assist patient to increase activity and self care.    - Encourage visually impaired, hearing impaired and aphasic patients to use assistive/communication devices  Outcome: Progressing     Problem: CARDIOVASCULAR - ADULT  Goal: Maintains optimal cardiac output and hemodynamic stability  Description: INTERVENTIONS:  - Monitor I/O, vital signs and rhythm  - Monitor for S/S and trends of decreased cardiac output  - Administer and titrate ordered vasoactive medications to optimize hemodynamic stability  - Assess quality of pulses, skin color and temperature  - Assess for signs of decreased coronary artery perfusion  - Instruct patient to report change in severity of symptoms  Outcome: Progressing     Problem: CARDIOVASCULAR - ADULT  Goal: Absence of cardiac dysrhythmias or at baseline rhythm  Description: INTERVENTIONS:  - Continuous cardiac monitoring, vital signs, obtain 12 lead EKG if ordered  - Administer antiarrhythmic and heart rate control medications as ordered  - Monitor electrolytes and administer replacement therapy as ordered  Outcome: Progressing     Problem: RESPIRATORY - ADULT  Goal: Achieves optimal ventilation and oxygenation  Description: INTERVENTIONS:  - Assess for changes in respiratory status  - Assess for changes in mentation and behavior  - Position to facilitate oxygenation and minimize respiratory effort  - Oxygen administered by appropriate delivery if ordered  - Initiate smoking cessation education as indicated  - Encourage broncho-pulmonary hygiene including cough, deep breathe, Incentive Spirometry  - Assess the need for suctioning and aspirate as needed  - Assess and instruct to report SOB or any respiratory difficulty  - Respiratory Therapy support as indicated  Outcome: Progressing     Problem: GASTROINTESTINAL - ADULT  Goal: Minimal or absence of nausea and/or vomiting  Description: INTERVENTIONS:  - Administer IV fluids if ordered to ensure adequate hydration  - Maintain NPO status until nausea and vomiting are resolved  - Nasogastric  tube if ordered  - Administer ordered antiemetic medications as needed  - Provide nonpharmacologic comfort measures as appropriate  - Advance diet as tolerated, if ordered  - Consider nutrition services referral to assist patient with adequate nutrition and appropriate food choices  Outcome: Progressing     Problem: GASTROINTESTINAL - ADULT  Goal: Maintains or returns to baseline bowel function  Description: INTERVENTIONS:  - Assess bowel function  - Encourage oral fluids to ensure adequate hydration  - Administer IV fluids if ordered to ensure adequate hydration  - Administer ordered medications as needed  - Encourage mobilization and activity  - Consider nutritional services referral to assist patient with adequate nutrition and appropriate food choices  Outcome: Progressing     Problem: GASTROINTESTINAL - ADULT  Goal: Maintains adequate nutritional intake  Description: INTERVENTIONS:  - Monitor percentage of each meal consumed  - Identify factors contributing to decreased intake, treat as appropriate  - Assist with meals as needed  - Monitor I&O, weight, and lab values if indicated  - Obtain nutrition services referral as needed  Outcome: Progressing     Problem: GENITOURINARY - ADULT  Goal: Maintains or returns to baseline urinary function  Description: INTERVENTIONS:  - Assess urinary function  - Encourage oral fluids to ensure adequate hydration if ordered  - Administer IV fluids as ordered to ensure adequate hydration  - Administer ordered medications as needed  - Offer frequent toileting  - Follow urinary retention protocol if ordered  Outcome: Progressing     Problem: METABOLIC, FLUID AND ELECTROLYTES - ADULT  Goal: Electrolytes maintained within normal limits  Description: INTERVENTIONS:  - Monitor labs and assess patient for signs and symptoms of electrolyte imbalances  - Administer electrolyte replacement as ordered  - Monitor response to electrolyte replacements, including repeat lab results as  appropriate  - Instruct patient on fluid and nutrition as appropriate  Outcome: Progressing     Problem: METABOLIC, FLUID AND ELECTROLYTES - ADULT  Goal: Fluid balance maintained  Description: INTERVENTIONS:  - Monitor labs   - Monitor I/O and WT  - Instruct patient on fluid and nutrition as appropriate  - Assess for signs & symptoms of volume excess or deficit  Outcome: Progressing     Problem: SKIN/TISSUE INTEGRITY - ADULT  Goal: Skin Integrity remains intact(Skin Breakdown Prevention)  Description: Assess:  -Perform Pk assessment every shift.   -Clean and moisturize skin every shift.   -Inspect skin when repositioning, toileting, and assisting with ADLS  -Assess extremities for adequate circulation and sensation     Bed Management:  -Have minimal linens on bed & keep smooth, unwrinkled  -Change linens as needed when moist or perspiring  -Avoid sitting or lying in one position for more than 2 hours while in bed  -Keep HOB at 45 degrees     Toileting:  -Offer bedside commode  -Use incontinent care products after each incontinent episode such as protective barrier.    Activity:  -Mobilize patient 3 times a day  -Encourage activity and walks on unit  -Encourage or provide ROM exercises   -Actively turns self.   Outcome: Progressing     Problem: SKIN/TISSUE INTEGRITY - ADULT  Goal: Incision(s), wounds(s) or drain site(s) healing without S/S of infection  Description: INTERVENTIONS  - Assess and document dressing, incision, wound bed, drain sites and surrounding tissue  - Provide patient and family education  - Perform skin care/dressing changes as indicated and PRN  Outcome: Progressing     Problem: MUSCULOSKELETAL - ADULT  Goal: Maintain or return mobility to safest level of function  Description: INTERVENTIONS:  - Assess patient's ability to carry out ADLs; assess patient's baseline for ADL function and identify physical deficits which impact ability to perform ADLs (bathing, care of mouth/teeth, toileting,  grooming, dressing, etc.)  - Assess/evaluate cause of self-care deficits   - Assess range of motion  - Assess patient's mobility  - Assess patient's need for assistive devices and provide as appropriate  - Encourage maximum independence but intervene and supervise when necessary  - Involve family in performance of ADLs  - Assess for home care needs following discharge   - Consider OT consult to assist with ADL evaluation and planning for discharge  - Provide patient education as appropriate  Outcome: Progressing     Problem: MUSCULOSKELETAL - ADULT  Goal: Maintain proper alignment of affected body part  Description: INTERVENTIONS:  - Support, maintain and protect limb and body alignment  - Provide patient/ family with appropriate education  Outcome: Progressing

## 2024-03-27 ENCOUNTER — APPOINTMENT (INPATIENT)
Dept: RADIOLOGY | Facility: HOSPITAL | Age: 69
DRG: 392 | End: 2024-03-27
Payer: MEDICARE

## 2024-03-27 VITALS
DIASTOLIC BLOOD PRESSURE: 61 MMHG | WEIGHT: 163 LBS | BODY MASS INDEX: 24.71 KG/M2 | HEART RATE: 70 BPM | HEIGHT: 68 IN | TEMPERATURE: 97.9 F | SYSTOLIC BLOOD PRESSURE: 108 MMHG | RESPIRATION RATE: 18 BRPM | OXYGEN SATURATION: 97 %

## 2024-03-27 LAB
ALBUMIN SERPL BCP-MCNC: 2.7 G/DL (ref 3.5–5)
ALP SERPL-CCNC: 53 U/L (ref 34–104)
ALT SERPL W P-5'-P-CCNC: 8 U/L (ref 7–52)
ANION GAP SERPL CALCULATED.3IONS-SCNC: 5 MMOL/L (ref 4–13)
AST SERPL W P-5'-P-CCNC: 12 U/L (ref 13–39)
BILIRUB SERPL-MCNC: 0.86 MG/DL (ref 0.2–1)
BUN SERPL-MCNC: 3 MG/DL (ref 5–25)
CALCIUM ALBUM COR SERPL-MCNC: 9 MG/DL (ref 8.3–10.1)
CALCIUM SERPL-MCNC: 8 MG/DL (ref 8.4–10.2)
CALPROTECTIN STL-MCNT: 75 UG/G (ref 0–120)
CHLORIDE SERPL-SCNC: 110 MMOL/L (ref 96–108)
CO2 SERPL-SCNC: 24 MMOL/L (ref 21–32)
CREAT SERPL-MCNC: 0.59 MG/DL (ref 0.6–1.3)
ERYTHROCYTE [DISTWIDTH] IN BLOOD BY AUTOMATED COUNT: 21.7 % (ref 11.6–15.1)
GFR SERPL CREATININE-BSD FRML MDRD: 101 ML/MIN/1.73SQ M
GLUCOSE SERPL-MCNC: 92 MG/DL (ref 65–140)
HCT VFR BLD AUTO: 33 % (ref 36.5–49.3)
HGB BLD-MCNC: 11.1 G/DL (ref 12–17)
INR PPP: 1.41 (ref 0.84–1.19)
MAGNESIUM SERPL-MCNC: 1.8 MG/DL (ref 1.9–2.7)
MCH RBC QN AUTO: 33.4 PG (ref 26.8–34.3)
MCHC RBC AUTO-ENTMCNC: 33.6 G/DL (ref 31.4–37.4)
MCV RBC AUTO: 99 FL (ref 82–98)
PLATELET # BLD AUTO: 216 THOUSANDS/UL (ref 149–390)
PMV BLD AUTO: 10 FL (ref 8.9–12.7)
POTASSIUM SERPL-SCNC: 3.4 MMOL/L (ref 3.5–5.3)
PROT SERPL-MCNC: 4.6 G/DL (ref 6.4–8.4)
PROTHROMBIN TIME: 17.4 SECONDS (ref 11.6–14.5)
RBC # BLD AUTO: 3.32 MILLION/UL (ref 3.88–5.62)
SODIUM SERPL-SCNC: 139 MMOL/L (ref 135–147)
WBC # BLD AUTO: 7.82 THOUSAND/UL (ref 4.31–10.16)

## 2024-03-27 PROCEDURE — 85610 PROTHROMBIN TIME: CPT | Performed by: PHYSICIAN ASSISTANT

## 2024-03-27 PROCEDURE — 72197 MRI PELVIS W/O & W/DYE: CPT

## 2024-03-27 PROCEDURE — 80053 COMPREHEN METABOLIC PANEL: CPT | Performed by: INTERNAL MEDICINE

## 2024-03-27 PROCEDURE — 83735 ASSAY OF MAGNESIUM: CPT | Performed by: INTERNAL MEDICINE

## 2024-03-27 PROCEDURE — 99239 HOSP IP/OBS DSCHRG MGMT >30: CPT | Performed by: INTERNAL MEDICINE

## 2024-03-27 PROCEDURE — A9585 GADOBUTROL INJECTION: HCPCS | Performed by: INTERNAL MEDICINE

## 2024-03-27 PROCEDURE — 74183 MRI ABD W/O CNTR FLWD CNTR: CPT

## 2024-03-27 PROCEDURE — 85027 COMPLETE CBC AUTOMATED: CPT | Performed by: INTERNAL MEDICINE

## 2024-03-27 RX ORDER — LANOLIN ALCOHOL/MO/W.PET/CERES
400 CREAM (GRAM) TOPICAL 2 TIMES DAILY
Status: DISCONTINUED | OUTPATIENT
Start: 2024-03-27 | End: 2024-03-27 | Stop reason: HOSPADM

## 2024-03-27 RX ORDER — POTASSIUM CHLORIDE 20 MEQ/1
40 TABLET, EXTENDED RELEASE ORAL ONCE
Status: COMPLETED | OUTPATIENT
Start: 2024-03-27 | End: 2024-03-27

## 2024-03-27 RX ORDER — GADOBUTROL 604.72 MG/ML
7 INJECTION INTRAVENOUS
Status: COMPLETED | OUTPATIENT
Start: 2024-03-27 | End: 2024-03-27

## 2024-03-27 RX ORDER — POTASSIUM CHLORIDE 20 MEQ/1
20 TABLET, EXTENDED RELEASE ORAL DAILY
Qty: 30 TABLET | Refills: 0 | Status: SHIPPED | OUTPATIENT
Start: 2024-03-27 | End: 2024-04-26

## 2024-03-27 RX ORDER — MAGNESIUM SULFATE HEPTAHYDRATE 40 MG/ML
2 INJECTION, SOLUTION INTRAVENOUS ONCE
Status: DISCONTINUED | OUTPATIENT
Start: 2024-03-27 | End: 2024-03-27 | Stop reason: HOSPADM

## 2024-03-27 RX ORDER — HYDROCORTISONE 25 MG/G
CREAM TOPICAL 4 TIMES DAILY PRN
Qty: 28 G | Refills: 0 | Status: SHIPPED | OUTPATIENT
Start: 2024-03-27

## 2024-03-27 RX ORDER — PANTOPRAZOLE SODIUM 40 MG/1
40 TABLET, DELAYED RELEASE ORAL
Qty: 30 TABLET | Refills: 0 | Status: SHIPPED | OUTPATIENT
Start: 2024-03-28

## 2024-03-27 RX ORDER — NICOTINE 21 MG/24HR
1 PATCH, TRANSDERMAL 24 HOURS TRANSDERMAL DAILY
Qty: 28 PATCH | Refills: 0 | Status: SHIPPED | OUTPATIENT
Start: 2024-03-28

## 2024-03-27 RX ORDER — LANOLIN ALCOHOL/MO/W.PET/CERES
400 CREAM (GRAM) TOPICAL 2 TIMES DAILY
Qty: 60 TABLET | Refills: 0 | Status: SHIPPED | OUTPATIENT
Start: 2024-03-27

## 2024-03-27 RX ORDER — CHOLESTYRAMINE LIGHT 4 G/5.7G
4 POWDER, FOR SUSPENSION ORAL 2 TIMES DAILY
Qty: 60 PACKET | Refills: 0 | Status: SHIPPED | OUTPATIENT
Start: 2024-03-27 | End: 2024-04-26

## 2024-03-27 RX ADMIN — POTASSIUM CHLORIDE 40 MEQ: 1500 TABLET, EXTENDED RELEASE ORAL at 10:22

## 2024-03-27 RX ADMIN — POTASSIUM CHLORIDE 40 MEQ: 1500 TABLET, EXTENDED RELEASE ORAL at 11:48

## 2024-03-27 RX ADMIN — B-COMPLEX W/ C & FOLIC ACID TAB 1 TABLET: TAB at 08:42

## 2024-03-27 RX ADMIN — GADOBUTROL 7 ML: 604.72 INJECTION INTRAVENOUS at 13:17

## 2024-03-27 RX ADMIN — GLUCAGON HYDROCHLORIDE 1 MG: KIT at 13:10

## 2024-03-27 RX ADMIN — CHOLESTYRAMINE 4 G: 4 POWDER, FOR SUSPENSION ORAL at 08:42

## 2024-03-27 RX ADMIN — FOLIC ACID: 5 INJECTION, SOLUTION INTRAMUSCULAR; INTRAVENOUS; SUBCUTANEOUS at 08:27

## 2024-03-27 RX ADMIN — PANTOPRAZOLE SODIUM 40 MG: 40 TABLET, DELAYED RELEASE ORAL at 05:53

## 2024-03-27 RX ADMIN — NICOTINE 1 PATCH: 21 PATCH, EXTENDED RELEASE TRANSDERMAL at 08:43

## 2024-03-27 RX ADMIN — Medication 400 MG: at 11:48

## 2024-03-27 NOTE — DISCHARGE SUMMARY
[facility]  Discharge- Del Ocampo 1955, 69 y.o. male MRN: [id number]  Unit/Bed#: [address] [place] [address] Encounter: [id number]  Primary Care Provider: No primary care provider on file.   Date and time admitted to hospital: 3/20/2024  8:41 PM    * Abdominal pain  Assessment & Plan  Patient presented with abdominal pain, poor appetite, hypokalemia on outpatient lab work.  Decreased p.o. intake over 2 weeks.  Reports intermittent abdominal pain for about 2 years, with vomiting in the morning after eating once a day for about 1 year, with coffee-ground color diarrhea and constipation alternating chronically.  Daily alcohol use for decades. 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 Has not seen a doctor in office for about 8  years. Had a tele health visit recently, ordered Echo and lab work. K was 2.0 on 3/12.   CT abdomen pelvis with IV contrast and CTA abdomen pelvis in ED.  Showed diffuse severe small bowel enteritis and skip segments of severe colitis which could be infectious/inflammatory process or ischemic etiology.  Moderate ascites.  Diffuse hepatic steatosis. No occlusion or significant flow-limiting stenosis of the abdominal aorta or proximal branch vessels identified.   Lactic acid normal, Lipase 326 --> 317  Appreciate GI and surgical input  EGD and colonoscopy on 3/26/2024-extensive diverticulosis of moderate severity in the transverse colon, descending colon and sigmoid colon with normal distal terminal ileum.  Mild localized edematous and erythematous mucosa in the sigmoid colon status post biopsy.  Grade 1 hemorrhoid anteriorly.  EGD showed irregular Z-line with small sliding hiatal hernia with mildly patchy abnormal mucosa with erosion in the antrum status postbiopsy  Stool for bacterial panel was negative along with C. difficile toxin a and B.  Stool for O&P was also negative.  Elastase level  was slightly low.  Calprotectin level is borderline  MRI enterography was performed results of which are pending at the current time.  Patient restarted back on Questran 4 g p.o. twice daily for intractable diarrhea.  Patient has been on ceftriaxone and Flagyl for suspected colitis which was later discontinued.    Hypokalemia  Assessment & Plan  Patient with severe hypokalemia, initial potassium 2.1 --> 3.2 -3.4  Likely due to poor oral intake, profuse diarrhea.  EKG after receiving potassium repletion showed NSR, left BBB which is chronic, QTc 522.  Receiving aggressive IV and p.o. potassium repletion during this hospitalization  Discussed with patient/wife that he will most likely need potassium supplementation on discharge at least till diarrhea and p.o. intake improves  Patient had aggressive potassium supplementation today.  Patient to be discharged on KCl 20 mg p.o. daily and magnesium oxide 4 mg p.o. twice daily through an outpatient follow-up BMP and magnesium level    History of CHF (congestive heart failure)  Assessment & Plan  History of combined CHF.  Was on Entresto, Coreg in the past.  2D echo in 2018 showed EF 40 to 45%, stage I diastolic dysfunction.  2D echo on 3/10/2024 outpatient (wife showed result) showed normal EF around 60%, normal diastolic function, mild to moderate AR.     Trace pleural effusions on CT  Daily weight, intake output  Patient was previously desaturating mildly while sleeping at times.  Currently stable on room air  Will need outpatient follow-up with cardiology    Anemia  Assessment & Plan  Hemoglobin has been stable in the range of 10-12  No evidence of active bleeding    Ascites  Assessment & Plan  CT abdomen pelvis showed moderate abdominal pelvic ascites    Hepatic steatosis  Assessment & Plan  Patient noted to have hepatic steatosis and CT abdomen pelvis  Likely in the setting of alcohol abuse    Colitis  Assessment & Plan  On imaging noted to have findings of  diffuse severe small bowel enteritis and skip segments of severe colitis  Patient does report chronic diarrhea which has worsened over the last few weeks  C. difficile, bacterial enteric panel within normal limits  Stool for Giardia and O&P was negative.  Pancreatic elastase was slightly low which needs to be repeated as outpatient as per GI.  Fecal calprotectin level was 75 which is borderline  Patient was initially on ceftriaxone and Flagyl which were later discontinued  Diarrhea likely related to alcohol use and possible pancreatic insufficiency-discussed alcohol cessation  Outpatient follow-up with GI      PMR (polymyalgia rheumatica) ([facility])  Assessment & Plan  Noted history.  Not on medication at home    Thoracic aortic aneurysm ([facility])  Assessment & Plan  CT showed - 4.3 cm descending thoracic aortic aneurysm and dilated abdominal aorta measuring up to 2.7 cm in diameter with extensive calcific atherosclerosis.   Outpatient follow-up.    History of bleeding ulcers  Assessment & Plan  Patient was on IV Protonix and can continue Protonix 40 mg p.o. daily upon discharge    Nicotine abuse  Assessment & Plan  Smokes 1 pack a day  Nicotine patch ordered for discharge, smoking cessation    Alcohol abuse  Assessment & Plan  Patient drinks vodka 500 ml -1L daily for over 1 year, was drinking beer and wine prior to that..  Last drink was Tuesday.  Diffuse hepatic steatosis with moderate ascites on CT.  Right upper quadrant ultrasound with hepatomegaly and hepatic steatosis along with gallbladder sludge and GB distention and small volume ascites  No signs of withdrawal  Complete alcohol cessation discussed.  Patient is not interested in alcohol rehab.          Medical Problems       Resolved Problems  Date Reviewed: 3/27/2024            Resolved    Elevated lipase 3/22/2024     Resolved by  Tyesha Marino DO        Discharging Physician / Practitioner: Janine Shahid MD  PCP: No primary care provider on  file.  Admission Date:   Admission Orders (From admission, onward)       Ordered        03/21/24 0404  INPATIENT ADMISSION  Once                          Discharge Date: 03/27/24    Consultations During Hospital Stay:  Gastroenterology    Procedures Performed:   EGD showed irregular Z-line with small hiatal hernia with mild patchy abnormal mucosa with erosion in the antrum  Colonoscopy-extensive diverticulosis of moderate severity in the ascending colon, transverse colon and descending colon, sigmoid colon, grade 1 hemorrhoid, mild edematous and erythematous mucosa in the sigmoid colon status post biopsy  Radiology results:    CT abdomen pelvis with 4.3 cm descending thoracic aneurysm and dilated abdominal aorta measuring up to 2.7 cm with extensive calcific atherosclerosis.  Severe mid to distal small bowel enteritis and skip segments of severe colitis.  Diffuse hepatic steatosis  Right upper quadrant ultrasound with hepatomegaly and hepatic steatosis with gallbladder sludge and distended gallbladder with ascites and aortic ectasia    Incidental Findings:   4.3 cm descending thoracic aneurysm and dilated abdominal aorta measuring 2.7 cm in diameter with extensive calcific atherosclerosis    Test Results Pending at Discharge (will require follow up):   MR enterography     Outpatient Tests Requested:  Outpatient follow-up with PCP, GI    Complications: None    Reason for Admission: 7 diarrhea    Hospital Course:   Del Ocampo is a 69 y.o. male patient with past medical history of alcohol abuse, nicotine abuse, PM&R, temporal arteritis, CHF, Marinelli's esophagus, bleeding ulcers who originally presented to the hospital on 3/20/2024 due to abdominal pain with poor appetite and hypokalemia.  Patient also was having profuse diarrhea.  Outpatient labs showed a potassium level of 2.1 and patient was sent into the hospital.  In the ED patient's workup showed possible colitis and patient was started on IV Rocephin and  "Flagyl and was seen by GI.  Stool cultures were ordered which were all negative.  Patient was placed on [facility] protocol for alcohol withdrawal.  Patient had aggressive potassium and magnesium supplementation during hospitalization.  Due to persistent diarrhea patient later underwent EGD and colonoscopy which showed hiatal hernia with patchy abnormal mucosa and erosion in the antrum on EGD and colonoscopy showed diverticulosis.  Postprocedure patient was started on Questran with improved diarrhea.  Antibiotics were discontinued as stool studies were negative.  Patient will be discharged on potassium and magnesium supplementation and will need close follow-up outpatient with PCP and GI.  Discussed nicotine and alcohol cessation in detail with patient and family at bedside.      Please see above list of diagnoses and related plan for additional information.     Condition at Discharge: fair    Discharge Day Visit / Exam:   Subjective: Patient's diarrhea has improved.  Denies any abdominal pain.  Anxious to go home.  Vitals: Blood Pressure: 108/61 (03/27/24 1500)  Pulse: 70 (03/27/24 0800)  Temperature: 97.9 °F (36.6 °C) (03/27/24 0800)  Temp Source: Oral (03/27/24 0800)  Respirations: 18 (03/27/24 0800)  Height: 5' 8\" (172.7 cm) (03/21/24 0600)  Weight - Scale: 73.9 kg (163 lb) (03/27/24 0557)  SpO2: 97 % (03/26/24 2230)  Exam:   Physical Exam  Constitutional:       Appearance: Normal appearance.   HENT:      Head: Normocephalic and atraumatic.   Eyes:      Extraocular Movements: Extraocular movements intact.      Pupils: Pupils are equal, round, and reactive to light.   Cardiovascular:      Rate and Rhythm: Normal rate and regular rhythm.      Heart sounds: No murmur heard.     No gallop.   Pulmonary:      Effort: Pulmonary effort is normal.      Breath sounds: Normal breath sounds.   Abdominal:      General: Bowel sounds are normal.      Palpations: Abdomen is soft.      Tenderness: There is no abdominal tenderness. "   Musculoskeletal:         General: No swelling or deformity. Normal range of motion.      Cervical back: Normal range of motion and neck supple.   Skin:     General: Skin is warm and dry.   Neurological:      General: No focal deficit present.      Mental Status: He is alert.          Discussion with Family: Updated  (wife) at bedside.    Discharge instructions/Information to patient and family:   See after visit summary for information provided to patient and family.      Provisions for Follow-Up Care:  See after visit summary for information related to follow-up care and any pertinent home health orders.      Mobility at time of Discharge:   Basic Mobility Inpatient Raw Score: 19  JH-HLM Goal: 6: Walk 10 steps or more  JH-HLM Achieved: 7: Walk 25 feet or more  HLM Goal achieved. Continue to encourage appropriate mobility.     Disposition:   Home    Planned Readmission: No     Discharge Statement:  I spent 40 minutes discharging the patient. This time was spent on the day of discharge. I had direct contact with the patient on the day of discharge. Greater than 50% of the total time was spent examining patient, answering all patient questions, arranging and discussing plan of care with patient as well as directly providing post-discharge instructions.  Additional time then spent on discharge activities.    Discharge Medications:  See after visit summary for reconciled discharge medications provided to patient and/or family.      **Please Note: This note may have been constructed using a voice recognition system**

## 2024-03-27 NOTE — PROGRESS NOTES
Patient refused blood work, screaming and yelling at staffs. Explained the rationale of obtaining blood work but patient was adamant not  to do it. Attempted twice but to no avail. Hospitalist Trice Kirkland made aware.

## 2024-03-27 NOTE — ASSESSMENT & PLAN NOTE
On imaging noted to have findings of diffuse severe small bowel enteritis and skip segments of severe colitis  Patient does report chronic diarrhea which has worsened over the last few weeks  C. difficile, bacterial enteric panel within normal limits  Stool for Giardia and O&P was negative.  Pancreatic elastase was slightly low which needs to be repeated as outpatient as per GI.  Fecal calprotectin level was 75 which is borderline  Patient was initially on ceftriaxone and Flagyl which were later discontinued  Diarrhea likely related to alcohol use and possible pancreatic insufficiency-discussed alcohol cessation  Outpatient follow-up with GI

## 2024-03-27 NOTE — NURSING NOTE
Patient discharged home with spouse transported home.  AVS and all discharge instructions provided to wife and patient regarding diet, follow up appointment and lab work.   Both of them verbalized understanding.

## 2024-03-27 NOTE — ASSESSMENT & PLAN NOTE
Patient with severe hypokalemia, initial potassium 2.1 --> 3.2 -3.4  Likely due to poor oral intake, profuse diarrhea.  EKG after receiving potassium repletion showed NSR, left BBB which is chronic, QTc 522.  Receiving aggressive IV and p.o. potassium repletion during this hospitalization  Discussed with patient/wife that he will most likely need potassium supplementation on discharge at least till diarrhea and p.o. intake improves  Patient had aggressive potassium supplementation today.  Patient to be discharged on KCl 20 mg p.o. daily and magnesium oxide 4 mg p.o. twice daily through an outpatient follow-up BMP and magnesium level

## 2024-03-27 NOTE — PROGRESS NOTES
Progress Note - Del Ocampo 69 y.o. male MRN: [id number]    Unit/Bed#: [address] [place] [address] Encounter: [id number]        Assessment/Plan:  69-year-old male with a past medical history of alcohol abuse, nicotine abuse, temporal arteritis, CHF, Marinelli's esophagus, colon polyps, peptic ulcer disease, GI bleed who presents with decreased appetite associated with abdominal pain, nausea, vomiting, and diarrhea .Hx of alcohol abuse patient drinks 500 mL to 1 L of vodka daily.     1.Abdominal pain associated with  nausea, vomiting and diarrhea.  Patient with longstanding history of abdominal pain associated with diarrhea and intermittent episodes of nausea and vomiting which has been going on for many years.  CTA abdomen done 3/21 showed findings of severe mid to distal small bowel enteritis and skip segments of severe colitis again seen, similar compared to the prior study     -Stool for C. Difficile and enteric panel negative, Giardia, ova and parasite also negative  -Obtain pancreatic elastase low at 119, stool was watery so recommended to be retested on formed stool if possible, will recheck as outpt  -Obtain fecal calprotectin pending  -C-reactive protein elevated 107.3  -Continue supportive care and IV fluid hydration   -Continue electrolyte replacement  -MRI enterography was ordered as inpatient for tomorrow for further evaluation of small bowel for inflammatory bowel disease   EGD/Colon done on 3/25- Colonoscopy showed extensive diverticulosis of moderate severity in the ascending colon, transverse colon, descending colon and sigmoid colon.Mild edematous and erythematous mucosa in the sigmoid colon; performed cold forceps biopsy, Distal terminal ileum and rectal mucosa appeared normal.  Some edema patchy erythema of the sigmoid descending area, random biopsies taken from ascending and sigmoid colon.  Edema could be related to hypoalbuminemia / chronic liver disease.  These findings are not suggestive of ulcerative  "colitis and/or Crohn's disease.  -EGD showed irregular Z-line, small type I hiatal hernia   Mild abnormal mucosa with erosion in the antrum; The duodenal bulb, 1st part of the duodenum and 2nd part of the duodenum appeared normal  -Questran re-ordered  for loose stools     2.  Alcoholic liver disease  3.  Decreased appetite  4.  Protein malnutrition  CT abdomen showed diffuse hepatic steatosis  -MELD is 11 currently    -Obtained ultrasound of abdomen for further evaluation of liver which showed hepatomegaly. Hepatic steatosis. Gallbladder sludge. Distended gallbladder. Samll volume ascites.  -Recommended high-protein supplements such as Ensure with each meal when diet advanced.  -CIWA protocol per attending team  -Recommended nutritional consult     5.  Elevated lipase  6.  GERD  Patient noted to have elevated lipase on admission.  CTA of abdomen done 3/21 showed pancreas unremarkable.    -Continue pantoprazole daily  -Follow EGD path as above    Subjective:   Patient is lying in bed.  He reports that he was drinking the contrast for the MR enterography and belly does feel somewhat distended.  Wife is at bedside.  We went over multiple results including stool testing as well as EGD colonoscopy findings.    Objective:     Vitals: /63 (BP Location: Right arm)   Pulse 83   Temp 97.6 °F (36.4 °C) (Oral)   Resp 18   Ht 5' 8\" (1.727 m)   Wt 73.9 kg (163 lb)   SpO2 97%   BMI 24.78 kg/m²       Physical Exam:  Gen-alert no acute distress  Abd-soft positive bowel sounds more distended nontender, no rebound rigidity guarding       Lab, Imaging and other studies:   Recent Results (from the past 72 hour(s))   Basic metabolic panel    Collection Time: 03/24/24  5:00 PM   Result Value Ref Range    Sodium 135 135 - 147 mmol/L    Potassium 3.2 (L) 3.5 - 5.3 mmol/L    Chloride 106 96 - 108 mmol/L    CO2 22 21 - 32 mmol/L    ANION GAP 7 4 - 13 mmol/L    BUN 2 (L) 5 - 25 mg/dL    Creatinine 0.55 (L) 0.60 - 1.30 mg/dL    " Glucose 137 65 - 140 mg/dL    Calcium 7.8 (L) 8.4 - 10.2 mg/dL    eGFR 104 ml/min/1.73sq m   Basic metabolic panel    Collection Time: 03/25/24  4:07 AM   Result Value Ref Range    Sodium 136 135 - 147 mmol/L    Potassium 3.2 (L) 3.5 - 5.3 mmol/L    Chloride 108 96 - 108 mmol/L    CO2 22 21 - 32 mmol/L    ANION GAP 6 4 - 13 mmol/L    BUN <2 (L) 5 - 25 mg/dL    Creatinine 0.51 (L) 0.60 - 1.30 mg/dL    Glucose 90 65 - 140 mg/dL    Calcium 7.9 (L) 8.4 - 10.2 mg/dL    eGFR 108 ml/min/1.73sq m   Magnesium    Collection Time: 03/25/24  4:07 AM   Result Value Ref Range    Magnesium 1.6 (L) 1.9 - 2.7 mg/dL   Phosphorus    Collection Time: 03/25/24  4:07 AM   Result Value Ref Range    Phosphorus 2.9 2.3 - 4.1 mg/dL   CBC    Collection Time: 03/26/24  6:35 AM   Result Value Ref Range    WBC 7.02 4.31 - 10.16 Thousand/uL    RBC 3.10 (L) 3.88 - 5.62 Million/uL    Hemoglobin 10.2 (L) 12.0 - 17.0 g/dL    Hematocrit 30.7 (L) 36.5 - 49.3 %    MCV 99 (H) 82 - 98 fL    MCH 32.9 26.8 - 34.3 pg    MCHC 33.2 31.4 - 37.4 g/dL    RDW 21.4 (H) 11.6 - 15.1 %    Platelets 188 149 - 390 Thousands/uL    MPV 9.6 8.9 - 12.7 fL   Comprehensive metabolic panel    Collection Time: 03/26/24  6:35 AM   Result Value Ref Range    Sodium 139 135 - 147 mmol/L    Potassium 3.2 (L) 3.5 - 5.3 mmol/L    Chloride 110 (H) 96 - 108 mmol/L    CO2 22 21 - 32 mmol/L    ANION GAP 7 4 - 13 mmol/L    BUN 2 (L) 5 - 25 mg/dL    Creatinine 0.59 (L) 0.60 - 1.30 mg/dL    Glucose 90 65 - 140 mg/dL    Calcium 7.6 (L) 8.4 - 10.2 mg/dL    Corrected Calcium 8.8 8.3 - 10.1 mg/dL    AST 11 (L) 13 - 39 U/L    ALT 8 7 - 52 U/L    Alkaline Phosphatase 53 34 - 104 U/L    Total Protein 4.0 (L) 6.4 - 8.4 g/dL    Albumin 2.5 (L) 3.5 - 5.0 g/dL    Total Bilirubin 0.66 0.20 - 1.00 mg/dL    eGFR 101 ml/min/1.73sq m   Magnesium    Collection Time: 03/26/24  6:35 AM   Result Value Ref Range    Magnesium 1.9 1.9 - 2.7 mg/dL   CBC    Collection Time: 03/27/24  8:52 AM   Result Value Ref Range     WBC 7.82 4.31 - 10.16 Thousand/uL    RBC 3.32 (L) 3.88 - 5.62 Million/uL    Hemoglobin 11.1 (L) 12.0 - 17.0 g/dL    Hematocrit 33.0 (L) 36.5 - 49.3 %    MCV 99 (H) 82 - 98 fL    MCH 33.4 26.8 - 34.3 pg    MCHC 33.6 31.4 - 37.4 g/dL    RDW 21.7 (H) 11.6 - 15.1 %    Platelets 216 149 - 390 Thousands/uL    MPV 10.0 8.9 - 12.7 fL   Comprehensive metabolic panel    Collection Time: 03/27/24  8:52 AM   Result Value Ref Range    Sodium 139 135 - 147 mmol/L    Potassium 3.4 (L) 3.5 - 5.3 mmol/L    Chloride 110 (H) 96 - 108 mmol/L    CO2 24 21 - 32 mmol/L    ANION GAP 5 4 - 13 mmol/L    BUN 3 (L) 5 - 25 mg/dL    Creatinine 0.59 (L) 0.60 - 1.30 mg/dL    Glucose 92 65 - 140 mg/dL    Calcium 8.0 (L) 8.4 - 10.2 mg/dL    Corrected Calcium 9.0 8.3 - 10.1 mg/dL    AST 12 (L) 13 - 39 U/L    ALT 8 7 - 52 U/L    Alkaline Phosphatase 53 34 - 104 U/L    Total Protein 4.6 (L) 6.4 - 8.4 g/dL    Albumin 2.7 (L) 3.5 - 5.0 g/dL    Total Bilirubin 0.86 0.20 - 1.00 mg/dL    eGFR 101 ml/min/1.73sq m   Protime-INR    Collection Time: 03/27/24  8:52 AM   Result Value Ref Range    Protime 17.4 (H) 11.6 - 14.5 seconds    INR 1.41 (H) 0.84 - 1.19   Magnesium    Collection Time: 03/27/24  8:52 AM   Result Value Ref Range    Magnesium 1.8 (L) 1.9 - 2.7 mg/dL

## 2024-03-27 NOTE — ASSESSMENT & PLAN NOTE
Patient presented with abdominal pain, poor appetite, hypokalemia on outpatient lab work.  Decreased p.o. intake over 2 weeks.  Reports intermittent abdominal pain for about 2 years, with vomiting in the morning after eating once a day for about 1 year, with coffee-ground color diarrhea and constipation alternating chronically.  Daily alcohol use for decades. Reports history of Marinelli's esophagus, bleeding ulcers and multiple polyps per EGD/colonoscopy about 8 years ago in [facility]. Has not seen a doctor in office for about 8  years. Had a tele health visit recently, ordered Echo and lab work. K was 2.0 on 3/12.   CT abdomen pelvis with IV contrast and CTA abdomen pelvis in ED.  Showed diffuse severe small bowel enteritis and skip segments of severe colitis which could be infectious/inflammatory process or ischemic etiology.  Moderate ascites.  Diffuse hepatic steatosis. No occlusion or significant flow-limiting stenosis of the abdominal aorta or proximal branch vessels identified.   Lactic acid normal, Lipase 326 --> 317  Appreciate GI and surgical input  EGD and colonoscopy on 3/26/2024-extensive diverticulosis of moderate severity in the transverse colon, descending colon and sigmoid colon with normal distal terminal ileum.  Mild localized edematous and erythematous mucosa in the sigmoid colon status post biopsy.  Grade 1 hemorrhoid anteriorly.  EGD showed irregular Z-line with small sliding hiatal hernia with mildly patchy abnormal mucosa with erosion in the antrum status postbiopsy  Stool for bacterial panel was negative along with C. difficile toxin a and B.  Stool for O&P was also negative.  Elastase level was slightly low.  Calprotectin level is borderline  MRI enterography was performed results of which are pending at the current time.  Patient restarted back on Questran 4 g p.o. twice daily for intractable diarrhea.  Patient has been on ceftriaxone and Flagyl for suspected colitis which was later  discontinued.

## 2024-03-27 NOTE — PROGRESS NOTES
Patient:  GODFREY STODDARD    MRN:  [id number]    Aidin Request ID:  [id number]    Level of care reserved:  Hospice    [facility]ner [facility]  [facility] [place] [address] [phone]    Clinical needs requested:    Geography searched:  [phone]    Start of Service:    Request sent:  11:12am EDT on 3/27/2024 by Sonia Monahan    Partner reserved:  12:45pm EDT on 3/27/2024 by Sonia Monahan    Choice list shared:  12:45pm EDT on 3/27/2024 by Sonia Monahan

## 2024-03-27 NOTE — PLAN OF CARE
Problem: Prexisting or High Potential for Compromised Skin Integrity  Goal: Skin integrity is maintained or improved  Description: INTERVENTIONS:  - Identify patients at risk for skin breakdown  - Assess and monitor skin integrity  - Assess and monitor nutrition and hydration status  - Monitor labs   - Assess for incontinence   - Turn and reposition patient  - Assist with mobility/ambulation  - Relieve pressure over bony prominences  - Avoid friction and shearing  - Provide appropriate hygiene as needed including keeping skin clean and dry  - Evaluate need for skin moisturizer/barrier cream  - Collaborate with interdisciplinary team   - Patient/family teaching  - Consider wound care consult   Outcome: Progressing     Problem: PAIN - ADULT  Goal: Verbalizes/displays adequate comfort level or baseline comfort level  Description: Interventions:  - Encourage patient to monitor pain and request assistance  - Assess pain using appropriate pain scale  - Administer analgesics based on type and severity of pain and evaluate response  - Implement non-pharmacological measures as appropriate and evaluate response  - Consider cultural and social influences on pain and pain management  - Notify physician/advanced practitioner if interventions unsuccessful or patient reports new pain  Outcome: Progressing     Problem: INFECTION - ADULT  Goal: Absence or prevention of progression during hospitalization  Description: INTERVENTIONS:  - Assess and monitor for signs and symptoms of infection  - Monitor lab/diagnostic results  - Monitor all insertion sites, i.e. indwelling lines, tubes, and drains  - Monitor endotracheal if appropriate and nasal secretions for changes in amount and color  - [place] appropriate cooling/warming therapies per order  - Administer medications as ordered  - Instruct and encourage patient and family to use good hand hygiene technique  - Identify and instruct in appropriate isolation precautions for  identified infection/condition  Outcome: Progressing  Goal: Absence of fever/infection during neutropenic period  Description: INTERVENTIONS:  - Monitor WBC    Outcome: Progressing     Problem: SAFETY ADULT  Goal: Patient will remain free of falls  Description: INTERVENTIONS:  - Educate patient/family on patient safety including physical limitations  - Instruct patient to call for assistance with activity   - Consult OT/PT to assist with strengthening/mobility   - Keep Call bell within reach  - Keep bed low and locked with side rails adjusted as appropriate  - Keep care items and personal belongings within reach  - Initiate and maintain comfort rounds  - Make Fall Risk Sign visible to staff  - Offer Toileting every 2 Hours, in advance of need  - Initiate/Maintain bed alarm  - Obtain necessary fall risk management equipment:   - Apply yellow socks and bracelet for high fall risk patients  - Consider moving patient to room near nurses station  Outcome: Progressing  Goal: Maintain or return to baseline ADL function  Description: INTERVENTIONS:  -  Assess patient's ability to carry out ADLs; assess patient's baseline for ADL function and identify physical deficits which impact ability to perform ADLs (bathing, care of mouth/teeth, toileting, grooming, dressing, etc.)  - Assess/evaluate cause of self-care deficits   - Assess range of motion  - Assess patient's mobility; develop plan if impaired  - Assess patient's need for assistive devices and provide as appropriate  - Encourage maximum independence but intervene and supervise when necessary  - Involve family in performance of ADLs  - Assess for home care needs following discharge   - Consider OT consult to assist with ADL evaluation and planning for discharge  - Provide patient education as appropriate  Outcome: Progressing  Goal: Maintains/Returns to pre admission functional level  Description: INTERVENTIONS:  - Perform AM-PAC 6 Click Basic Mobility/ Daily Activity  assessment daily.  - Set and communicate daily mobility goal to care team and patient/family/caregiver.   - Collaborate with rehabilitation services on mobility goals if consulted  - Perform Range of Motion 3 times a day.  - Actively turns self.   - Dangle patient 3 times a day  - Stand patient 3 times a day  - Ambulate patient 3 times a day  - Out of bed to chair 3 times a day   - Out of bed for meals 3 times a day  - Out of bed for toileting  - Record patient progress and toleration of activity level   Outcome: Progressing     Problem: DISCHARGE PLANNING  Goal: Discharge to home or other facility with appropriate resources  Description: INTERVENTIONS:  - Identify barriers to discharge w/patient and caregiver  - Arrange for needed discharge resources and transportation as appropriate  - Identify discharge learning needs (meds, wound care, etc.)  - Arrange for interpretive services to assist at discharge as needed  - Refer to Case Management Department for coordinating discharge planning if the patient needs post-hospital services based on physician/advanced practitioner order or complex needs related to functional status, cognitive ability, or social support system  Outcome: Progressing     Problem: Knowledge Deficit  Goal: Patient/family/caregiver demonstrates understanding of disease process, treatment plan, medications, and discharge instructions  Description: Complete learning assessment and assess knowledge base.  Interventions:  - Provide teaching at level of understanding  - Provide teaching via preferred learning methods  Outcome: Progressing     Problem: NEUROSENSORY - ADULT  Goal: Achieves maximal functionality and self care  Description: INTERVENTIONS  - Monitor swallowing and airway patency with patient fatigue and changes in neurological status  - Encourage and assist patient to increase activity and self care.   - Encourage visually impaired, hearing impaired and aphasic patients to use  assistive/communication devices  Outcome: Progressing     Problem: CARDIOVASCULAR - ADULT  Goal: Maintains optimal cardiac output and hemodynamic stability  Description: INTERVENTIONS:  - Monitor I/O, vital signs and rhythm  - Monitor for S/S and trends of decreased cardiac output  - Administer and titrate ordered vasoactive medications to optimize hemodynamic stability  - Assess quality of pulses, skin color and temperature  - Assess for signs of decreased coronary artery perfusion  - Instruct patient to report change in severity of symptoms  Outcome: Progressing  Goal: Absence of cardiac dysrhythmias or at baseline rhythm  Description: INTERVENTIONS:  - Continuous cardiac monitoring, vital signs, obtain 12 lead EKG if ordered  - Administer antiarrhythmic and heart rate control medications as ordered  - Monitor electrolytes and administer replacement therapy as ordered  Outcome: Progressing     Problem: RESPIRATORY - ADULT  Goal: Achieves optimal ventilation and oxygenation  Description: INTERVENTIONS:  - Assess for changes in respiratory status  - Assess for changes in mentation and behavior  - Position to facilitate oxygenation and minimize respiratory effort  - Oxygen administered by appropriate delivery if ordered  - Initiate smoking cessation education as indicated  - Encourage broncho-pulmonary hygiene including cough, deep breathe, Incentive Spirometry  - Assess the need for suctioning and aspirate as needed  - Assess and instruct to report SOB or any respiratory difficulty  - Respiratory Therapy support as indicated  Outcome: Progressing     Problem: GASTROINTESTINAL - ADULT  Goal: Minimal or absence of nausea and/or vomiting  Description: INTERVENTIONS:  - Administer IV fluids if ordered to ensure adequate hydration  - Maintain NPO status until nausea and vomiting are resolved  - Nasogastric tube if ordered  - Administer ordered antiemetic medications as needed  - Provide nonpharmacologic comfort measures  as appropriate  - Advance diet as tolerated, if ordered  - Consider nutrition services referral to assist patient with adequate nutrition and appropriate food choices  Outcome: Progressing  Goal: Maintains or returns to baseline bowel function  Description: INTERVENTIONS:  - Assess bowel function  - Encourage oral fluids to ensure adequate hydration  - Administer IV fluids if ordered to ensure adequate hydration  - Administer ordered medications as needed  - Encourage mobilization and activity  - Consider nutritional services referral to assist patient with adequate nutrition and appropriate food choices  Outcome: Progressing  Goal: Maintains adequate nutritional intake  Description: INTERVENTIONS:  - Monitor percentage of each meal consumed  - Identify factors contributing to decreased intake, treat as appropriate  - Assist with meals as needed  - Monitor I&O, weight, and lab values if indicated  - Obtain nutrition services referral as needed  Outcome: Progressing     Problem: GENITOURINARY - ADULT  Goal: Maintains or returns to baseline urinary function  Description: INTERVENTIONS:  - Assess urinary function  - Encourage oral fluids to ensure adequate hydration if ordered  - Administer IV fluids as ordered to ensure adequate hydration  - Administer ordered medications as needed  - Offer frequent toileting  - Follow urinary retention protocol if ordered  Outcome: Progressing     Problem: METABOLIC, FLUID AND ELECTROLYTES - ADULT  Goal: Electrolytes maintained within normal limits  Description: INTERVENTIONS:  - Monitor labs and assess patient for signs and symptoms of electrolyte imbalances  - Administer electrolyte replacement as ordered  - Monitor response to electrolyte replacements, including repeat lab results as appropriate  - Instruct patient on fluid and nutrition as appropriate  Outcome: Progressing  Goal: Fluid balance maintained  Description: INTERVENTIONS:  - Monitor labs   - Monitor I/O and WT  -  Instruct patient on fluid and nutrition as appropriate  - Assess for signs & symptoms of volume excess or deficit  Outcome: Progressing     Problem: SKIN/TISSUE INTEGRITY - ADULT  Goal: Skin Integrity remains intact(Skin Breakdown Prevention)  Description: Assess:  -Perform Pk assessment every shift.  -Clean and moisturize skin every shift.  -Inspect skin when repositioning, toileting, and assisting with ADLS  -Assess extremities for adequate circulation and sensation     Bed Management:  -Have minimal linens on bed & keep smooth, unwrinkled  -Change linens as needed when moist or perspiring  -Avoid sitting or lying in one position for more than 2 hours while in bed  -Keep HOB at 45 degrees     Toileting:  -Offer bedside commode  -Assess for incontinence every shift.  -Use incontinent care products after each incontinent episode such as protective barrier.     Activity:  -Mobilize patient 3 times a day  -Encourage activity and walks on unit  -Encourage or provide ROM exercises   -Turn and reposition patient every 2 Hours  -Use appropriate equipment to lift or move patient in bed  -Instruct/ Assist with weight shifting every 2 hours when out of bed in chair  -Consider limitation of chair time 2 hour intervals    Skin Care:  -Avoid use of baby powder, tape, friction and shearing, hot water or constrictive clothing  -Relieve pressure over bony prominences using pillows.  -Do not massage red bony areas    Next Steps:  -Consider consults to  interdisciplinary teams such as wound care team, nutrition team  Outcome: Progressing  Goal: Incision(s), wounds(s) or drain site(s) healing without S/S of infection  Description: INTERVENTIONS  - Assess and document dressing, incision, wound bed, drain sites and surrounding tissue  - Provide patient and family education  - Perform skin care/dressing changes as indicated and PRN.  Outcome: Progressing     Problem: MUSCULOSKELETAL - ADULT  Goal: Maintain or return mobility to  safest level of function  Description: INTERVENTIONS:  - Assess patient's ability to carry out ADLs; assess patient's baseline for ADL function and identify physical deficits which impact ability to perform ADLs (bathing, care of mouth/teeth, toileting, grooming, dressing, etc.)  - Assess/evaluate cause of self-care deficits   - Assess range of motion  - Assess patient's mobility  - Assess patient's need for assistive devices and provide as appropriate  - Encourage maximum independence but intervene and supervise when necessary  - Involve family in performance of ADLs  - Assess for home care needs following discharge   - Consider OT consult to assist with ADL evaluation and planning for discharge  - Provide patient education as appropriate  Outcome: Progressing  Goal: Maintain proper alignment of affected body part  Description: INTERVENTIONS:  - Support, maintain and protect limb and body alignment  - Provide patient/ family with appropriate education  Outcome: Progressing

## 2024-03-28 PROCEDURE — 88305 TISSUE EXAM BY PATHOLOGIST: CPT | Performed by: STUDENT IN AN ORGANIZED HEALTH CARE EDUCATION/TRAINING PROGRAM

## 2024-03-29 ENCOUNTER — NURSE TRIAGE (OUTPATIENT)
Age: 69
End: 2024-03-29

## 2024-03-29 NOTE — TELEPHONE ENCOUNTER
Pts wife calling back in, she is sorry she missed the call and would like a call back at [phone] instead. She is available all day today

## 2024-03-29 NOTE — TELEPHONE ENCOUNTER
Attempted to call Diann patient's wife.  Left message to return call and provide time frame she is available to speak with GI . As far as hospice consult that was not order by GI.Patient was seen last by Jania LEARY and there is nothing in notes about Hospice.  Thank you

## 2024-03-29 NOTE — TELEPHONE ENCOUNTER
Spoke to wife and patient on phone.  Reviewed results of MRI and endoscopic procedures. Informed them that biopsy results from EGD and colonoscopy are still pending, office will reach out to them when results are available.  At current time patient does not demonstrate any signs of a cirrhotic liver.  Reinforced to patient that he should stop drinking alcohol or it could increase risk for cirrhosis. .Answered all questions. Thank you

## 2024-03-29 NOTE — RESULT ENCOUNTER NOTE
Please call the patient regarding his abnormal result.  MRI suggest inflammation in the colon, if symptoms persist follow-up in the GI office.  Follow up in my office as needed

## 2024-03-29 NOTE — TELEPHONE ENCOUNTER
"Pts wife Diann calling in, pt was recently in the hospital and was seen by GI- pts wife has questions regarding his MRI results and labs.     She reports there was a referral placed for hospice so she is wondering how long pt has and what his prognosis is. She would like to speak with [facility]     Reason for Disposition   Nursing judgment    Answer Assessment - Initial Assessment Questions  1. REASON FOR CALL or QUESTION: \"What is your reason for calling today?\" or \"How can I best help you?\" or \"What question do you have that I can help answer?\"      Results    Protocols used: Information Only Call - No Triage-ADULT-[place]    "

## 2024-04-11 ENCOUNTER — APPOINTMENT (OUTPATIENT)
Dept: LAB | Facility: CLINIC | Age: 69
End: 2024-04-11
Payer: COMMERCIAL

## 2024-04-11 DIAGNOSIS — E87.6 HYPOKALEMIA: ICD-10-CM

## 2024-04-11 LAB
ANION GAP SERPL CALCULATED.3IONS-SCNC: 8 MMOL/L (ref 4–13)
BUN SERPL-MCNC: 4 MG/DL (ref 5–25)
CALCIUM SERPL-MCNC: 9.4 MG/DL (ref 8.4–10.2)
CHLORIDE SERPL-SCNC: 105 MMOL/L (ref 96–108)
CO2 SERPL-SCNC: 28 MMOL/L (ref 21–32)
CREAT SERPL-MCNC: 0.54 MG/DL (ref 0.6–1.3)
GFR SERPL CREATININE-BSD FRML MDRD: 105 ML/MIN/1.73SQ M
GLUCOSE SERPL-MCNC: 91 MG/DL (ref 65–140)
POTASSIUM SERPL-SCNC: 4.8 MMOL/L (ref 3.5–5.3)
SODIUM SERPL-SCNC: 141 MMOL/L (ref 135–147)

## 2024-04-11 PROCEDURE — 80048 BASIC METABOLIC PNL TOTAL CA: CPT

## 2024-04-11 PROCEDURE — 36415 COLL VENOUS BLD VENIPUNCTURE: CPT

## 2024-04-15 ENCOUNTER — OFFICE VISIT (OUTPATIENT)
Age: 69
End: 2024-04-15
Payer: MEDICARE

## 2024-04-15 VITALS
HEART RATE: 87 BPM | TEMPERATURE: 97.8 F | SYSTOLIC BLOOD PRESSURE: 110 MMHG | OXYGEN SATURATION: 97 % | HEIGHT: 68 IN | DIASTOLIC BLOOD PRESSURE: 62 MMHG | BODY MASS INDEX: 21.04 KG/M2 | WEIGHT: 138.8 LBS

## 2024-04-15 DIAGNOSIS — K86.81 EXOCRINE PANCREATIC INSUFFICIENCY: ICD-10-CM

## 2024-04-15 DIAGNOSIS — D64.9 ANEMIA: ICD-10-CM

## 2024-04-15 DIAGNOSIS — R10.84 GENERALIZED ABDOMINAL PAIN: ICD-10-CM

## 2024-04-15 DIAGNOSIS — K52.9 CHRONIC DIARRHEA: Primary | ICD-10-CM

## 2024-04-15 DIAGNOSIS — K50.00 TERMINAL ILEITIS WITHOUT COMPLICATION (HCC): ICD-10-CM

## 2024-04-15 PROCEDURE — 99214 OFFICE O/P EST MOD 30 MIN: CPT | Performed by: STUDENT IN AN ORGANIZED HEALTH CARE EDUCATION/TRAINING PROGRAM

## 2024-04-15 PROCEDURE — G2211 COMPLEX E/M VISIT ADD ON: HCPCS | Performed by: STUDENT IN AN ORGANIZED HEALTH CARE EDUCATION/TRAINING PROGRAM

## 2024-04-15 RX ORDER — PANTOPRAZOLE SODIUM 40 MG/1
40 TABLET, DELAYED RELEASE ORAL
Qty: 30 TABLET | Refills: 2 | Status: SHIPPED | OUTPATIENT
Start: 2024-04-15 | End: 2024-04-23

## 2024-04-15 RX ORDER — LOPERAMIDE HYDROCHLORIDE 2 MG/1
2 TABLET ORAL 4 TIMES DAILY PRN
Qty: 120 TABLET | Refills: 1 | Status: SHIPPED | OUTPATIENT
Start: 2024-04-15

## 2024-04-15 NOTE — PATIENT INSTRUCTIONS
We will start you on pancreatic enzyme replacement for exocrine pancreatic insufficiency (EPI)  Take 2 capsules of pancreatic enzyme 3 times a day with meals  In the meantime, we will start you on Imodium for your diarrhea  You may take this 4 times a day as needed  I also recommend that you start increasing fiber in your diet  You may use over-the-counter fiber supplements such as Metamucil or Benefiber  Goal fiber intake is 25 to 35 g/day

## 2024-04-16 NOTE — PROGRESS NOTES
[facility]  Outpatient Follow-up  Encounter: [id number]    PATIENT INFO     Name: Del Ocampo  YOB: 1955   Age: 69 y.o.   Sex: male   MRN: [id number]    ASSESSMENT & PLAN     Del Ocampo is a 69 y.o. male with history of temporal arteritis, heavy alcohol use, peptic ulcer disease who presents to GI office after recent hospitalization for progressive abdominal pain, nausea/vomiting, and diarrhea.    Problem List Items Addressed This Visit       Abdominal pain    Anemia    Relevant Medications    pantoprazole (PROTONIX) 40 mg tablet    Chronic diarrhea - Primary     Continues to have significant diarrhea with over 10 liquid bowel movements per day.  This may be due to underlying EPI as noted above based on low fecal pancreatic elastase levels.  However, there is also concern for possible IBD.  There is discrepancy in his workup with MR enterography showing evidence of terminal ileitis the colonoscopy with endoscopic evaluation of the TI showed normal appearance.  Biopsies of the TI were not performed but biopsies of the colon did not show any evidence of active IBD.  Fecal calprotectin was within normal limits making IBD less likely.  Infectious workup was negative.  Could certainly have some degree of functional disorder as well.    Begin treatment for EPI  Repeat stool studies  Start Imodium 4 times daily as needed  Recommended addition of dietary fiber to help provide stool bulk  Stop cholestyramine  Consider repeat colonoscopy  They can discuss this further with other provider in [facility] which is where they reside         Relevant Medications    loperamide (IMODIUM A-D) 2 MG tablet    Other Relevant Orders    Calprotectin,Fecal    Exocrine pancreatic insufficiency     Patient meets criteria for EPI based on low pancreatic elastase levels and stool sample.  Uncertain if this is delusional or if there are other factors that may be affecting the  test.  Certainly has risk factors for chronic pancreatitis and EPI given his history of alcohol abuse.  However, given his ongoing diarrhea symptoms, I believe it to be reasonable to begin treatment.    We will start Creon 48,000 units 3 times daily with meals  We will also repeat fecal pancreatic elastase level which is not affected by Creon  Counseled patient on the importance of alcohol cessation         Relevant Medications    pancrelipase, Lip-Prot-Amyl, (CREON) 24,000 units    Other Relevant Orders    Pancreatic elastase, fecal    Terminal ileitis without complication (HCC)     MR enterography was reviewed and does show evidence of terminal ileitis.  However, recent colonoscopy with TI evaluation noted normal endoscopic appearance.  Biopsies were not performed.  Fecal calprotectin was within normal limits.  With his diarrhea, may be reasonable to repeat colonoscopy given concerns for possible IBD given the discrepancy between colonoscopy and previous biopsies and MR enterography findings.    I discussed possible repeat colonoscopy for TI evaluation with biopsies and segmental colon biopsies  They do seem agreeable with this but would like to hold off depending on how his symptoms do with management of EPI  I believe this to be reasonable and would recommend that they follow-up with provider in [place] or [place] where they reside at which time they can consider repeat colonoscopy  Repeat fecal calprotectin         Relevant Orders    Calprotectin,Fecal     Orders Placed This Encounter   Procedures    Calprotectin,Fecal    Pancreatic elastase, fecal       FOLLOW-UP: 8 weeks with provider in [facility]    HISTORY OF PRESENT ILLNESS       Del Ocampo is a 69 y.o. male who presents to GI office for hospital follow-up.  Patient is new to this office.  Patient was seen by my colleagues at [facility] during recent hospitalization where he presented with complaints of worsening abdominal pain,  nausea/vomiting, and worsening diarrhea.  Patient is accompanied by his spouse who helps provide information.    Patient has history of temporal arteritis, alcohol abuse, and peptic ulcer disease.  He initially presented to [facility] with the above complaints.  During his hospitalization, he underwent bidirectional endoscopy which showed an irregular Z-line, small sliding hiatal hernia, and mild gastritis, moderate diverticulosis and mild erythema in the sigmoid colon.  All biopsies were unremarkable.  He had multiple stool studies performed as well including fecal calprotectin which was normal.  However, fecal pancreatic elastase level was noted to be low.  He also underwent MR enterography on 3/27 which showed nonspecific inflammation involving the sigmoid colon, rectum, and several centimeters of the distal ileum.  There was no evidence of stricture, penetrating complication, perianal disease, or other complications.    Patient reports that since his hospitalization, he has had ongoing diarrhea.  He reports having more than 10 liquid bowel movements per day.  Some may be loosely formed but for the most part, his bowel habits are typically with liquid stools.  Denies any hematochezia or melena.  He does continue to have generalized abdominal pain which is mild to moderate in severity.  Has some nausea as well but no vomiting.  Appetite is relatively poor.  He was prescribed cholestyramine but states that he has noticed no change in his diarrhea.  He does have some improvement in his symptoms with pantoprazole but the response is minimal.    Due to limited availability with providers and there region of residency, they presented to the office in [place] for hospital follow-up.  However, this is a long distance travel for them and they would like to continue follow-up with a provider closer to them.     ENDOSCOPIC HISTORY     UPPER ENDOSCOPY: 3/25/2024 with irregular Z-line at 38 cm, small sliding hiatal  hernia, mild gastritis, normal appearance of the duodenum (biopsies negative for H. pylori and celiac sprue)    COLONOSCOPY: 3/25/2024 with moderate diverticula in the ascending, transverse, descending, and sigmoid colon, normal appearance of the distal terminal ileum, mild erythema in the sigmoid colon (performed biopsies which were negative for IBD), internal hemorrhoids, random biopsies throughout the ascending colon were negative for microscopic colitis or IBD    REVIEW OF SYSTEMS     CONSTITUTIONAL: Denies any fever, chills, rigors, and weight loss  HEENT: No earache or tinnitus, denies hearing loss or visual disturbances  CARDIOVASCULAR: No chest pain or palpitations  RESPIRATORY: Denies any cough, hemoptysis, shortness of breath or dyspnea on exertion  GASTROINTESTINAL: As noted in the History of Present Illness  GENITOURINARY: No problems with urination, denies any hematuria or dysuria  NEUROLOGIC: No dizziness or vertigo, denies headaches   MUSCULOSKELETAL: Denies any muscle or joint pain   SKIN: Denies jaundice or itching  PSYCHOSOCIAL: Denies depression or anxiety, denies any recent memory loss     Answers submitted by the patient for this visit:  Abdominal Pain Questionnaire (Submitted on 4/14/2024)  Chief Complaint: Abdominal pain  Chronicity: chronic  Onset: more than 1 year ago  Onset quality: gradual  Frequency: daily  Progression since onset: waxing and waning  Pain location: generalized abdominal region  Pain - numeric: 7/10  Pain quality: sharp  Radiates to: does not radiate  anorexia: Yes  arthralgias: No  belching: No  constipation: Yes  diarrhea: Yes  dysuria: No  fever: Yes  flatus: Yes  frequency: No  headaches: No  hematochezia: No  hematuria: No  melena: No  myalgias: Yes  nausea: Yes  weight loss: Yes  vomiting: Yes  Aggravated by: nothing  Relieved by: bowel movements  Diagnostic workup: CT scan, GI consult, lower endoscopy, ultrasound, upper endoscopy    Historical Information   Past  "Medical History:   Diagnosis Date    Alcohol abuse     Marinelli's esophagus     Bleeding ulcer     Heart failure (HCC)     PMR (polymyalgia rheumatica) (HCC)     Temporal arteritis (HCC)      Past Surgical History:   Procedure Laterality Date    FRACTURE SURGERY      knee  left     ROTATOR CUFF REPAIR      right    TONSILLECTOMY       Social History   Social History     Substance and Sexual Activity   Alcohol Use Yes    Comment: DAILY, 500ml-1L VODKA     Social History     Substance and Sexual Activity   Drug Use Not Currently     Social History     Tobacco Use   Smoking Status Every Day    Current packs/day: 1.00    Average packs/day: 1 pack/day for 45.0 years (45.0 ttl pk-yrs)    Types: Cigarettes   Smokeless Tobacco Never     Family History   Problem Relation Age of Onset    Heart failure Mother        MEDICATIONS & ALLERGIES     Current Outpatient Medications   Medication Instructions    hydrocortisone (ANUSOL-HC) 2.5 % rectal cream Topical, 4 times daily PRN    loperamide (IMODIUM A-D) 2 mg, Oral, 4 times daily PRN    magnesium Oxide (MAG-OX) 400 mg, Oral, 2 times daily    nicotine (NICODERM CQ) 21 mg/24 hr TD 24 hr patch 1 patch, Transdermal, Daily    pancrelipase, Lip-Prot-Amyl, (CREON) 24,000 units 48,000 units of lipase, Oral, 3 times daily with meals    pantoprazole (PROTONIX) 40 mg, Oral, Daily (early morning)    potassium chloride (Klor-Con M20) 20 mEq tablet 20 mEq, Oral, Daily     No Known Allergies    PHYSICAL EXAM      Objective   Blood pressure 110/62, pulse 87, temperature 97.8 °F (36.6 °C), temperature source Temporal, height 5' 8\" (1.727 m), weight 63 kg (138 lb 12.8 oz), SpO2 97%. Body mass index is 21.1 kg/m².    General Appearance:   Alert, cooperative, no distress, malnourished appearing   HEENT:   Normocephalic, atraumatic, anicteric     Neck:   Supple, symmetrical, trachea midline   Lungs:   Equal chest rise, respirations unlabored    Heart:   Regular rate   Abdomen:   Soft, non-tender, " non-distended; normal bowel sounds; no masses, no organomegaly    Rectal:   Deferred    Extremities:   No cyanosis or edema    Neuro:   Moves all 4 extremities    Skin:   No jaundice, rashes, or lesions      LABORATORY RESULTS     No visits with results within 1 Day(s) from this visit.   Latest known visit with results is:   Appointment on 04/11/2024   Component Date Value    Sodium 04/11/2024 141     Potassium 04/11/2024 4.8     Chloride 04/11/2024 105     CO2 04/11/2024 28     ANION GAP 04/11/2024 8     BUN 04/11/2024 4 (L)     Creatinine 04/11/2024 0.54 (L)     Glucose 04/11/2024 91     Calcium 04/11/2024 9.4     eGFR 04/11/2024 105         IMAGING RESULTS     MRI enterography w wo    Result Date: 3/27/2024  Narrative: MRI ABDOMEN AND PELVIS WITH AND WITHOUT CONTRAST (MR ENTEROGRAPHY) INDICATION: 69 years / Male. eval for ibd. COMPARISON: CT scan of the abdomen pelvis dated March 20, 2024 and March 21, 2024. TECHNIQUE: The following pulse sequences of the abdomen and pelvis were obtained: Axial 2D FIESTA with fat saturation, axial and coronal T2, DWI/ADC, pre-contrast coronal T1 with fat saturation, post-contrast dynamic coronal T1 at 20, 70, and 180 seconds  with fat saturation, and axial T1 post-contrast with fat saturation through the abdomen and pelvis. Enteric Contrast: 1500 cc of enteric contrast (Breeza) was administered beginning 45 minute prior to scanning. In addition, 1 mg of glucagon was administered IM prior to contrast administration by the radiology nurse. IV Contrast: 7 mL of Gadobutrol injection (SINGLE-DOSE) Note that dynamic imaging was tailored for evaluation of the bowel with limited evaluation of the remainder of the abdominal and pelvic viscera. ENTEROGRAPHY: - Small bowel distension: Adequate. - Bowel: Diseased bowel segment(s) as follows: - Number of diseased segments: 1 in the small bowel, and 2 in the colon - Segment: 1 *  Length: Contiguous involvement of the distal ileum, including  the terminal ileum over a length of several centimeters *  Location: Distal ileum *  Segmental mural hyperenhancement: *  Wall thickening: Moderate (6-9 mm). *  Intramural edema: Present. *  Stricture: None. *  Restricted diffusion: Marked. *  Ulcerations: Absent. *  Sacculations: Absent. *  Diminished motility:. *  Penetrating complication: None - Mesenteric findings: None. - Extraintestinal findings: None Similar findings involving the sigmoid colon and rectum, although the enhancement pattern is homogeneously hyperenhancing. No definite additional sites of colonic or small bowel involvement. REMAINDER OF THE ABDOMEN AND PELVIS: LOWER THORAX: Unremarkable. LIVER: Signal loss on out of phase images compatible with steatosis. No suspicious mass. BILE DUCTS: No intrahepatic or extrahepatic bile ductal dilation. GALLBLADDER: Nondistended SPLEEN: Normal. PANCREAS: Unremarkable. ADRENAL GLANDS: Normal. KIDNEYS/URETERS: No hydroureteronephrosis. No suspicious renal mass. PERITONEUM/RETROPERITONEUM: Small volume ascites LYMPH NODES: No lymphadenopathy. REPRODUCTIVE STRUCTURES: Age-appropriate. BLADDER: Normal. VESSELS: No aneurysm. ABDOMINOPELVIC WALL: Unremarkable BONES: No suspicious osseous lesion.     Impression: *  Inflammation: Nonspecific inflammation involving the sigmoid colon, rectum, and several centimeters of the distal ileum. Differential diagnosis includes infection as well as ischemia or vasculitis. *  Stricture: None. *  Penetrating complication: None. *  Perianal disease: None. *  Other complications: None. Small volume ascites. Hepatic steatosis. Workstation performed: [id number]     Colonoscopy    Addendum Date: 3/25/2024 Addendum:   [facility] Operating Room [address] [place] [id number] [phone] DATE OF SERVICE: 3/25/24 PHYSICIAN(S): Attending: Tim Ferguson MD Fellow: No Staff Documented INDICATION: Abdominal pain, Diarrhea, unspecified type POST-OP DIAGNOSIS: See the  impression below. HISTORY: Prior colonoscopy: 5 years ago. BOWEL PREPARATION: Miralax/Dulcolax PREPROCEDURE: Informed consent was obtained for the procedure, including sedation. Risks including but not limited to bleeding, infection, perforation, adverse drug reaction and aspiration were explained in detail. Also explained about less than 100% sensitivity with the exam and other alternatives. The patient was placed in the left lateral decubitus position. Procedure: Colonoscopy DETAILS OF PROCEDURE: Patient was taken to the procedure room where a time out was performed to confirm correct patient and correct procedure. The patient underwent monitored anesthesia care, which was administered by an anesthesia professional. The patient's blood pressure, heart rate, level of consciousness, oxygen, respirations, ECG and ETCO2 were monitored throughout the procedure. A digital rectal exam was performed. The scope was introduced through the anus and advanced to the cecum. Retroflexion was performed in the rectum. The quality of bowel preparation was evaluated using the [place] Bowel Preparation Scale with scores of: right colon = 2, transverse colon = 2, left colon = 2. The total BBPS score was 6. Bowel prep was adequate. The patient experienced no blood loss. The procedure was not difficult. The patient tolerated the procedure well. There were no apparent adverse events. ANESTHESIA INFORMATION: ASA: III Anesthesia Type: IV Sedation with Anesthesia MEDICATIONS: No administrations occurring from 1435 to 1519 on 03/25/24 FINDINGS: Few medium, extensive diverticula of moderate severity in the ascending colon, transverse colon, descending colon and sigmoid colon Normal distal terminal ileum. Mild, localized edematous and erythematous mucosa in the sigmoid colon; performed cold forceps biopsy Performed multiple forceps biopsies in the ascending colon. Multiple biopsies taken from the normal-appearing mucosa of ascending colon to  check for microscopic colitis. One anterior internal small (grade 1) hemorrhoid observed during digital rectal exam, perianal exam and retroflexion; no bleeding was identified EVENTS: Procedure Events Event Event Time ENDO CECUM REACHED 3/25/2024  3:11 PM ENDO SCOPE OUT TIME 3/25/2024  3:18 PM SPECIMENS: ID Type Source Tests Collected by Time Destination 1 : antrum Bx/ erythema/ r/o H Pylori Tissue Stomach TISSUE EXAM Tim Ferguson MD 3/25/2024  3:04 PM  2 : Ascending colon Bx/ Hx Diarrhea Tissue Large Intestine, Right/Ascending Colon TISSUE EXAM Tim Ferguson MD 3/25/2024  3:12 PM  3 : Sigmoid Bx/ Hx Diarrhea Tissue Large Intestine, Sigmoid Colon TISSUE EXAM Tim Ferguson MD 3/25/2024  3:14 PM  4 : rectal polyp/ cold snare Tissue Colon TISSUE EXAM Tim Ferguson MD 3/25/2024  3:17 PM  EQUIPMENT: Colonoscope - IMPRESSION: Extensive diverticulosis of moderate severity in the ascending colon, transverse colon, descending colon and sigmoid colon Normal. Mild edematous and erythematous mucosa in the sigmoid colon; performed cold forceps biopsy Performed forceps biopsies in the ascending colon Small (grade 1) hemorrhoid RECOMMENDATION:  No further screening colonoscopies necessary  Age greater than 65   Distal terminal ileum and rectal mucosa appeared normal. Some edema patchy erythema of the sigmoid descending area, random biopsies taken from ascending and sigmoid colon.  Edema could be related to hypoalbuminemia / chronic liver disease.  These findings are not suggestive of ulcerative colitis and/or Crohn's disease.    Tim Ferguson MD     Result Date: 3/25/2024  Narrative: Table formatting from the original result was not included. [facility] Operating Room [address] [place] [id number] [phone] DATE OF SERVICE: 3/25/24 PHYSICIAN(S): Attending: Tim Ferguson MD Fellow: No Staff Documented INDICATION: Abdominal pain, Diarrhea, unspecified type POST-OP DIAGNOSIS: See the impression below.  HISTORY: Prior colonoscopy: 5 years ago. BOWEL PREPARATION: Miralax/Dulcolax PREPROCEDURE: Informed consent was obtained for the procedure, including sedation. Risks including but not limited to bleeding, infection, perforation, adverse drug reaction and aspiration were explained in detail. Also explained about less than 100% sensitivity with the exam and other alternatives. The patient was placed in the left lateral decubitus position. Procedure: Colonoscopy DETAILS OF PROCEDURE: Patient was taken to the procedure room where a time out was performed to confirm correct patient and correct procedure. The patient underwent monitored anesthesia care, which was administered by an anesthesia professional. The patient's blood pressure, heart rate, level of consciousness, oxygen, respirations, ECG and ETCO2 were monitored throughout the procedure. A digital rectal exam was performed. The scope was introduced through the anus and advanced to the cecum. Retroflexion was performed in the rectum. The quality of bowel preparation was evaluated using the [place] Bowel Preparation Scale with scores of: right colon = 2, transverse colon = 2, left colon = 2. The total BBPS score was 6. Bowel prep was adequate. The patient experienced no blood loss. The procedure was not difficult. The patient tolerated the procedure well. There were no apparent adverse events. ANESTHESIA INFORMATION: ASA: III Anesthesia Type: IV Sedation with Anesthesia MEDICATIONS: No administrations occurring from 1435 to 1519 on 03/25/24 FINDINGS: Few medium, extensive diverticula of moderate severity in the ascending colon, transverse colon, descending colon and sigmoid colon Normal distal terminal ileum. Mild, localized edematous and erythematous mucosa in the sigmoid colon; performed cold forceps biopsy Performed multiple forceps biopsies in the ascending colon. Multiple biopsies taken from the normal-appearing mucosa of ascending colon to check for  microscopic colitis. One anterior internal small (grade 1) hemorrhoid observed during digital rectal exam, perianal exam and retroflexion; no bleeding was identified EVENTS: Procedure Events Event Event Time ENDO CECUM REACHED 3/25/2024  3:11 PM ENDO SCOPE OUT TIME 3/25/2024  3:18 PM SPECIMENS: ID Type Source Tests Collected by Time Destination 1 : antrum Bx/ erythema/ r/o H Pylori Tissue Stomach TISSUE EXAM Tim Ferguson MD 3/25/2024  3:04 PM  2 : Ascending colon Bx/ Hx Diarrhea Tissue Large Intestine, Right/Ascending Colon TISSUE EXAM Tim Ferguson MD 3/25/2024  3:12 PM  3 : Sigmoid Bx/ Hx Diarrhea Tissue Large Intestine, Sigmoid Colon TISSUE EXAM Tim Ferguson MD 3/25/2024  3:14 PM  4 : rectal polyp/ cold snare Tissue Colon TISSUE EXAM Tim Ferguson MD 3/25/2024  3:17 PM  EQUIPMENT: Colonoscope -     Impression: Extensive diverticulosis of moderate severity in the ascending colon, transverse colon, descending colon and sigmoid colon Normal. Mild edematous and erythematous mucosa in the sigmoid colon; performed cold forceps biopsy Performed forceps biopsies in the ascending colon Small (grade 1) hemorrhoid RECOMMENDATION:  No further screening colonoscopies necessary  Age greater than 65   Distal terminal ileum and rectal mucosa appeared normal. Some edema patchy erythema of the sigmoid descending area, random biopsies taken from ascending and sigmoid colon.  Edema could be related to chronic liver disease.  These findings are not suggestive of ulcerative colitis and/or Crohn's disease.    Tim Ferguson MD     EGD    Result Date: 3/25/2024  Narrative: Table formatting from the original result was not included. [facility] Operating Room [address] [place] [id number] [phone] DATE OF SERVICE: 3/25/24 PHYSICIAN(S): Attending: Tim Ferguson MD Fellow: No Staff Documented INDICATION: Abdominal pain, Abnormal CT of the abdomen, Diarrhea, unspecified type, Gastroesophageal reflux disease,  unspecified whether esophagitis present POST-OP DIAGNOSIS: See the impression below. PREPROCEDURE: Informed consent was obtained for the procedure, including sedation.  Risks of perforation, hemorrhage, adverse drug reaction and aspiration were discussed. The patient was placed in the left lateral decubitus position. Patient was explained about the risks and benefits of the procedure. Risks including but not limited to bleeding, infection, and perforation were explained in detail. Also explained about less than 100% sensitivity with the exam and other alternatives. PROCEDURE: EGD DETAILS OF PROCEDURE: Patient was taken to the procedure room where a time out was performed to confirm correct patient and correct procedure. The patient underwent monitored anesthesia care, which was administered by an anesthesia professional. The patient's blood pressure, heart rate, level of consciousness, respirations, oxygen, ECG and ETCO2 were monitored throughout the procedure. The scope was introduced through the mouth and advanced to the second part of the duodenum. Retroflexion was performed in the fundus. The patient experienced no blood loss. The procedure was not difficult. The patient tolerated the procedure well. There were no apparent adverse events. ANESTHESIA INFORMATION: ASA: III Anesthesia Type: IV Sedation with Anesthesia MEDICATIONS: No administrations occurring from 1435 to 1519 on 03/25/24 FINDINGS: Irregular Z-line 38 cm from the incisors Small sliding hiatal hernia (type I hiatal hernia) - GE junction 38 cm from the incisors, diaphragmatic impression 39 cm from the incisors:  Hill classification: Grade I Mild, patchy abnormal mucosa with erosion in the antrum; performed cold forceps biopsy The duodenal bulb, 1st part of the duodenum and 2nd part of the duodenum appeared normal. SPECIMENS: ID Type Source Tests Collected by Time Destination 1 : antrum Bx/ erythema/ r/o H Pylori Tissue Stomach TISSUE EXAM Tim  MD Phyllis 3/25/2024  3:04 PM  2 : Ascending colon Bx/ Hx Diarrhea Tissue Large Intestine, Right/Ascending Colon TISSUE EXAM Tim Ferguson MD 3/25/2024  3:12 PM  3 : Sigmoid Bx/ Hx Diarrhea Tissue Large Intestine, Sigmoid Colon TISSUE EXAM Tim Ferguson MD 3/25/2024  3:14 PM  4 : rectal polyp/ cold snare Tissue Colon TISSUE EXAM Tim Ferguson MD 3/25/2024  3:17 PM      Impression: Irregular Z-line Small type I hiatal hernia Mild abnormal mucosa with erosion in the antrum; performed cold forceps biopsy The duodenal bulb, 1st part of the duodenum and 2nd part of the duodenum appeared normal. RECOMMENDATION:  Await pathology results   Tim Ferguson MD     US right upper quadrant    Result Date: 3/23/2024  Narrative: RIGHT UPPER QUADRANT ULTRASOUND INDICATION: abdomen pain, alcohol abuse , alcoholic liver disease. COMPARISON: CT 3/21/2024 TECHNIQUE: Real-time ultrasound of the right upper quadrant was performed with a curvilinear transducer with both volumetric sweeps and still imaging techniques. FINDINGS: PANCREAS: Visualized portions of the pancreas are within normal limits. AORTA AND IVC: Aorta is dilated up to 2.8 cm. Unremarkable IVC. LIVER: Size: Moderately enlarged. The liver measures 19.3 cm in the midclavicular line. Contour: Surface contour is smooth. Parenchyma: There is marked diffuse increased echogenicity with smooth echotexture and significant beam attenuation with loss of periportal echogenicity. Most consistent with severe hepatic steatosis. No liver mass identified. Limited imaging of the main portal vein shows it to be patent and hepatopetal. BILIARY: Distended gallbladder. Mobile gallbladder sludge. No gallstones or gallbladder wall thickening. No pericholecystic fluid. Negative sonographic Lemus sign. No intrahepatic biliary dilatation. CBD measures 6 mm. No choledocholithiasis. KIDNEY: Right kidney measures 11.3 x 5.2 x 5.5 cm. Volume 169.2 mL Kidney within normal limits. ASCITES: Small volume  ascites present     Impression: Hepatomegaly. Hepatic steatosis. Gallbladder sludge. Distended gallbladder. Ascites. Aortic ectasia. Workstation performed: [id number]     CT abdomen pelvis with contrast    Result Date: 3/21/2024  Narrative: CT ABDOMEN AND PELVIS WITH IV CONTRAST INDICATION: Diffuse abdominal tenderness. COMPARISON: None. TECHNIQUE: CT examination of the abdomen and pelvis was performed. Multiplanar 2D reformatted images were created from the source data. This examination, like all CT scans performed in the [facility], was performed utilizing techniques to minimize radiation dose exposure, including the use of iterative reconstruction and automated exposure control. Radiation dose length product (DLP) for this visit: 480.96 mGy-cm IV Contrast: 100 mL of iohexol (OMNIPAQUE) Enteric Contrast: Not administered. FINDINGS: ABDOMEN LOWER CHEST: Trace pleural effusions with minimal dependent atelectasis in the lung bases. Heart size is normal. No significant pericardial effusion. LIVER/BILIARY TREE: Moderate diffuse hepatic steatosis. No suspicious mass. Normal hepatic contours. No biliary dilation. GALLBLADDER: Distended gallbladder without abnormal wall thickening or calcified gallstones. No pericholecystic inflammatory change. SPLEEN: Unremarkable. PANCREAS: Unremarkable. ADRENAL GLANDS: Unremarkable. KIDNEYS/URETERS: Unremarkable. No hydronephrosis. STOMACH AND BOWEL: Stomach is mildly distended with oral contrast, air, and fluid. No gross intraluminal mass is seen. Abnormal wall thickening and mucosal hyperenhancement involving multiple abdominal/pelvic small bowel loops including the terminal ileum suggesting diffuse severe enteritis. Normal air-filled appendix is seen without inflammatory changes. Severe wall thickening of the sigmoid colon and rectum with pericolonic mesenteric inflammatory stranding suggesting severe colitis of these segments. Mild wall thickening of the ascending  colon and proximal to mid transverse colon also noted suggesting colitis. There is some sparing of the mid to distal transverse colon and descending colon seen. Extensive descending and sigmoid diverticulosis. APPENDIX: Normal. ABDOMINOPELVIC CAVITY: No abdominal/pelvic lymphadenopathy by size criteria. No free air or pneumatosis intestinalis noted. Moderate abdominal/pelvic ascites is seen which could be reactive in etiology. VESSELS: 4.3 cm descending thoracic aortic aneurysm. Abdominal aorta measures up to 2.7 cm in diameter. No evidence for aortic dissection. Extensive calcific atherosclerosis of the thoracoabdominal aorta and proximal branch vessels noted. PELVIS REPRODUCTIVE ORGANS: Unremarkable for patient's age. URINARY BLADDER: Mildly distended and grossly unremarkable. ABDOMINAL WALL/INGUINAL REGIONS: Unremarkable. BONES: No acute fracture or suspicious osseous lesion. Mild multilevel degenerative changes of the thoracolumbar spine worse at the L5-S1 level.     Impression: 1.  Findings of diffuse severe small bowel enteritis and skip segments of severe colitis are seen as described above which may be secondary to infectious/inflammatory process or ischemic etiology. Clinical correlation recommended. Follow-up colonoscopy when clinically appropriate also recommended. 2.  No evidence for bowel obstruction, pneumatosis intestinalis, or free air. Moderate abdominal/pelvic ascites again noted which could be reactive in etiology. 3.  Diffuse hepatic steatosis. 4.  4.3 cm descending thoracic aortic aneurysm and dilated abdominal aorta measuring up to 2.7 cm in diameter with extensive calcific atherosclerosis. No evidence for aortic dissection.  No occlusion or significant flow-limiting stenosis of the abdominal  aorta or proximal branch vessels identified. Workstation performed: [id number]     CTA abdomen pelvis w wo contrast    Result Date: 3/21/2024  Narrative: CT ANGIOGRAM OF THE ABDOMEN AND PELVIS WITH AND  WITHOUT IV CONTRAST INDICATION: Abnormal CT of the abdomen; R/o bowel ischemia. COMPARISON: None. TECHNIQUE: CT angiogram examination of the abdomen and pelvis was performed according to standard protocol. This examination, like all CT scans performed in the [facility], was performed utilizing techniques to minimize radiation dose exposure, including the use of iterative reconstruction and automated exposure control. Contrast as well as noncontrast images were obtained. Rad dose 1784.24 mGy-cm IV Contrast: 85 mL of iohexol (OMNIPAQUE) Enteric Contrast: Not administered. FINDINGS: VASCULAR STRUCTURES: 4.3 cm descending thoracic aortic aneurysm and dilated abdominal aorta measuring up to 2.7 cm. Extensive calcific atherosclerosis of the thoracoabdominal aorta and proximal branch vessels. No evidence for dissection. The celiac artery, superior mesenteric artery, bilateral renal arteries, inferior mesenteric artery, and bilateral common/internal/external iliac arteries as well as the visualized portions of the proximal femoral arteries are grossly patent. Multifocal calcific atherosclerosis with mild luminal narrowing is seen. No occlusion or significant flow-limiting stenosis of the abdominal aorta or proximal branch vessels noted. OTHER FINDINGS ABDOMEN LOWER CHEST: Trace pleural effusions with minimal dependent atelectasis in the lung bases. Heart size is normal. No significant pericardial effusion. LIVER/BILIARY TREE: Moderate diffuse hepatic steatosis. No suspicious mass. Normal hepatic contours. No biliary dilation. GALLBLADDER: Distended gallbladder without abnormal wall thickening or calcified gallstones. No pericholecystic inflammatory change. SPLEEN: Unremarkable. PANCREAS: Unremarkable. ADRENAL GLANDS: Unremarkable. KIDNEYS/URETERS: Unremarkable. No hydronephrosis. STOMACH AND BOWEL: Stomach is contracted limiting evaluation. Subtle diffuse wall thickening of the stomach could be due to under  distention. Abnormal wall thickening and mucosal hyperenhancement involving multiple mid to distal small bowel loops including the terminal ileum suggesting severe enteritis. Normal air-filled appendix is seen without inflammatory changes. Similar-appearing abnormal wall thickening involving the right colon, hepatic flexure and proximal transverse colon, distal descending colon, sigmoid colon, and rectum noted with pericolonic mesenteric inflammatory stranding suggesting severe colitis of the segments. There is somewhat sparing of the mid to distal transverse colon and descending colon noted. Descending and sigmoid diverticulosis again seen. APPENDIX: No findings to suggest appendicitis. ABDOMINOPELVIC CAVITY: No abdominal/pelvic lymphadenopathy by size criteria. No free air or pneumatosis intestinalis noted. Moderate abdominal/pelvic ascites is seen which could be reactive in etiology. PELVIS REPRODUCTIVE ORGANS: Unremarkable for patient's age. URINARY BLADDER: Unremarkable. ABDOMINAL WALL/INGUINAL REGIONS: Unremarkable. BONES: No acute fracture or suspicious osseous lesion. Mild multilevel degenerative changes of the thoracolumbar spine worse at the L5-S1 level.     Impression: 1.  4.3 cm descending thoracic aortic aneurysm and dilated abdominal aorta measuring up to 2.7 cm in diameter with extensive calcific atherosclerosis. No evidence for aortic dissection. Major abdominal aortic branch vessels are grossly patent with calcific atherosclerosis and multifocal mild luminal narrowing. No occlusion or significant flow-limiting stenosis of the abdominal aorta or proximal branch vessels identified. 2.  Findings of severe mid to distal small bowel enteritis and skip segments of severe colitis again seen, similar compared to the prior study. Findings may be secondary to infectious/inflammatory or ischemic etiology. Clinical correlation recommended. Descending and sigmoid diverticulosis. 3.  No evidence for bowel  obstruction, pneumatosis intestinalis, or free air. Moderate abdominal/pelvic ascites again noted which may be reactive in etiology. 4.  Diffuse hepatic steatosis. Workstation performed: [id number]     I have personally reviewed any available and pertinent imaging study reports.      Alan Fernandez D.O.  [facility]  Division of Gastroenterology & Hepatology  Available on [employer]  [email or web address]    ** Please Note: This note is constructed using a voice recognition dictation system. **

## 2024-04-16 NOTE — ASSESSMENT & PLAN NOTE
Continues to have significant diarrhea with over 10 liquid bowel movements per day.  This may be due to underlying EPI as noted above based on low fecal pancreatic elastase levels.  However, there is also concern for possible IBD.  There is discrepancy in his workup with MR enterography showing evidence of terminal ileitis the colonoscopy with endoscopic evaluation of the TI showed normal appearance.  Biopsies of the TI were not performed but biopsies of the colon did not show any evidence of active IBD.  Fecal calprotectin was within normal limits making IBD less likely.  Infectious workup was negative.  Could certainly have some degree of functional disorder as well.    Begin treatment for EPI  Repeat stool studies  Start Imodium 4 times daily as needed  Recommended addition of dietary fiber to help provide stool bulk  Stop cholestyramine  Consider repeat colonoscopy  They can discuss this further with other provider in [facility] which is where they reside

## 2024-04-16 NOTE — ASSESSMENT & PLAN NOTE
Patient meets criteria for EPI based on low pancreatic elastase levels and stool sample.  Uncertain if this is delusional or if there are other factors that may be affecting the test.  Certainly has risk factors for chronic pancreatitis and EPI given his history of alcohol abuse.  However, given his ongoing diarrhea symptoms, I believe it to be reasonable to begin treatment.    We will start Creon 48,000 units 3 times daily with meals  We will also repeat fecal pancreatic elastase level which is not affected by Creon  Counseled patient on the importance of alcohol cessation

## 2024-04-16 NOTE — ASSESSMENT & PLAN NOTE
MR enterography was reviewed and does show evidence of terminal ileitis.  However, recent colonoscopy with TI evaluation noted normal endoscopic appearance.  Biopsies were not performed.  Fecal calprotectin was within normal limits.  With his diarrhea, may be reasonable to repeat colonoscopy given concerns for possible IBD given the discrepancy between colonoscopy and previous biopsies and MR enterography findings.    I discussed possible repeat colonoscopy for TI evaluation with biopsies and segmental colon biopsies  They do seem agreeable with this but would like to hold off depending on how his symptoms do with management of EPI  I believe this to be reasonable and would recommend that they follow-up with provider in [place] or [place] where they reside at which time they can consider repeat colonoscopy  Repeat fecal calprotectin

## 2024-04-23 DIAGNOSIS — D64.9 ANEMIA: ICD-10-CM

## 2024-04-23 RX ORDER — PANTOPRAZOLE SODIUM 40 MG/1
TABLET, DELAYED RELEASE ORAL
Qty: 90 TABLET | Refills: 1 | Status: SHIPPED | OUTPATIENT
Start: 2024-04-23

## 2024-04-25 ENCOUNTER — APPOINTMENT (OUTPATIENT)
Dept: LAB | Facility: CLINIC | Age: 69
End: 2024-04-25
Payer: MEDICARE

## 2024-04-25 DIAGNOSIS — K52.9 CHRONIC DIARRHEA: ICD-10-CM

## 2024-04-25 DIAGNOSIS — K86.81 EXOCRINE PANCREATIC INSUFFICIENCY: ICD-10-CM

## 2024-04-25 DIAGNOSIS — K50.00 TERMINAL ILEITIS WITHOUT COMPLICATION (HCC): ICD-10-CM

## 2024-04-25 PROCEDURE — 83993 ASSAY FOR CALPROTECTIN FECAL: CPT

## 2024-04-25 PROCEDURE — 82653 EL-1 FECAL QUANTITATIVE: CPT

## 2024-04-30 LAB — ELASTASE PANC STL-MCNT: 311 UG ELAST./G

## 2024-05-01 LAB — CALPROTECTIN STL-MCNT: 87 UG/G (ref 0–120)

## 2024-05-16 ENCOUNTER — OFFICE VISIT (OUTPATIENT)
Dept: GASTROENTEROLOGY | Facility: CLINIC | Age: 69
End: 2024-05-16
Payer: MEDICARE

## 2024-05-16 VITALS
HEIGHT: 69 IN | SYSTOLIC BLOOD PRESSURE: 148 MMHG | DIASTOLIC BLOOD PRESSURE: 87 MMHG | BODY MASS INDEX: 21.42 KG/M2 | WEIGHT: 144.6 LBS | HEART RATE: 91 BPM

## 2024-05-16 DIAGNOSIS — R19.7 DIARRHEA, UNSPECIFIED TYPE: Primary | ICD-10-CM

## 2024-05-16 DIAGNOSIS — K86.89 PANCREATIC INSUFFICIENCY: ICD-10-CM

## 2024-05-16 DIAGNOSIS — K21.9 GASTROESOPHAGEAL REFLUX DISEASE, UNSPECIFIED WHETHER ESOPHAGITIS PRESENT: ICD-10-CM

## 2024-05-16 PROCEDURE — 99214 OFFICE O/P EST MOD 30 MIN: CPT | Performed by: NURSE PRACTITIONER

## 2024-05-16 PROCEDURE — G2211 COMPLEX E/M VISIT ADD ON: HCPCS | Performed by: NURSE PRACTITIONER

## 2024-05-16 NOTE — PROGRESS NOTES
[facility] - Outpatient Follow-up Note  Del Ocampo 69 y.o. male MRN: [id number]  Encounter: [id number]          ASSESSMENT AND PLAN:      1. Diarrhea, unspecified type  2. Pancreatic insufficiency  Patient has history of diarrhea and pancreatic insufficiency.  Patient was prescribed Creon by Dr. Fernandez but was only taking 2 tablets a day and was taking medication after he ate.  Patient does have history of alcohol abuse which may be contributing to pancreatic insufficiency.  Previous pancreatic enzyme 119 on 3/21/2024. Repeat pancreatic elastase 311.  Patient may have a component of functional diarrhea contributing to his symptoms.  Patient was also on previous magnesium orally which may have been contributing towards symptoms.  Patient reports that his symptoms have improved significantly over the last 1-1/2 weeks.  He is feeling better he is having approximately 3 bowel movements a day.  Bowel movements are not associated aided with abdominal pain or blood.  -Continue Imodium 1 daily if he becomes constipated changed to every other day.  If bowel movements are improving with Creon and fiber recommend stopping Imodium and monitoring symptoms.  -Avoid alcohol  -Avoid oral magnesium supplements this can contribute towards diarrhea.  -High-fiber diet  -Benefiber 2 teaspoons twice a day  -Will decrease Creon to 24,000 units 1 tablet 3 times a day with meals.  Patient instructed to take Creon with first bite of food.    -MR enterography was reviewed and does show evidence of terminal ileitis. However, recent colonoscopy with TI evaluation noted normal endoscopic appearance. Fecal calprotectin within normal limits during hospitalization and repeat fecal calprotectin within normal limits. Since diarrhea is improving no need to repeat colonoscopy at this time.    3. Gastroesophageal reflux disease, unspecified whether esophagitis present  Patient has history of GERD.  Patient reports GERD  symptoms are currently well-controlled on medication.  -Continue antireflux diet and measures  -Continue Pantoprazole 40 mg daily    Follow-up in 6 months    ______________________________________________________________________    SUBJECTIVE: This is a 69-year-old male who presents to office for follow-up. Patient reports that his symptoms have improved significantly over the last 1-1/2 weeks.  He is feeling better he is having approximately 3 bowel movements a day.  Bowel movements are not associated aided with abdominal pain or blood.Patient has history of diarrhea and pancreatic insufficiency.  Patient was prescribed Creon by Dr. Fernandez but was only taking 2 tablets a day and was taking medication after he ate.  Patient does have history of alcohol abuse which may be contributing to pancreatic insufficiency.  Previous pancreatic enzyme 119 on 3/21/2024. Repeat pancreatic elastase 311.  Patient was also on previous magnesium orally which may have been contributing towards symptoms.  Patient denies nausea, vomiting, acid reflux, heartburn, epigastric or abdominal pain.  Patient denies blood in stool, blood from rectal area, or black tarry stool.  Abdomen exam benign, no abdominal tenderness or guarding.    MR enterography was reviewed and does show evidence of terminal ileitis. However, recent colonoscopy with TI evaluation noted normal endoscopic appearance.  Took a radiation and repeat fecal calprotectin within normal limits.  Since diarrhea is improving no need to repeat colonoscopy at this time.    Patient does have history of alcohol abuse.  Patient drinks a 30 pack of beer every 2 days and then switched to wine.  Currently patient reports drinking 2 shots of vodka daily.  Patient denies illicit drug use or marijuana use.  Patient is a current smoker.  No family history of gastric or colon cancer.  History of inflammatory bowel disease.    EGD 3/25 showed irregular Z-line, small type I hiatal hernia. Mild  abnormal mucosa with erosion in the antrum; The duodenal bulb, 1st part of the duodenum and 2nd part of the duodenum appeared normal .biopsy showed no H. pylori.  Negative for intestinal metaplasia, dysplasia, or malignancy.  Colonoscopy done 3/25 showed extensive diverticulosis of moderate severity in the ascending colon, transverse colon, descending colon and sigmoid colon.Mild edematous and erythematous mucosa in the sigmoid colon; performed cold forceps biopsy, Distal terminal ileum and rectal mucosa appeared normal.Some edema patchy erythema of the sigmoid descending area, random biopsies taken from ascending and sigmoid colon.  Biopsy showed negative for chronic, active, and microscopic colitis.  Colonic mucosa within normal limits.  Hyperplastic polyp in rectum.    REVIEW OF SYSTEMS IS OTHERWISE NEGATIVE.      Historical Information   Past Medical History:   Diagnosis Date    Alcohol abuse     Alcoholism (HCC)     Marinelli esophagus     Marinelli's esophagus     Bleeding ulcer     Diverticulitis of colon     Fatty liver     Heart failure (HCC)     PMR (polymyalgia rheumatica) (HCC)     Temporal arteritis (HCC)      Past Surgical History:   Procedure Laterality Date    COLONOSCOPY      FRACTURE SURGERY      knee  left     ROTATOR CUFF REPAIR      right    TONSILLECTOMY       Social History   Social History     Substance and Sexual Activity   Alcohol Use Yes     Social History     Substance and Sexual Activity   Drug Use Not Currently     Social History     Tobacco Use   Smoking Status Every Day    Current packs/day: 1.00    Average packs/day: 1 pack/day for 45.0 years (45.0 ttl pk-yrs)    Types: Cigarettes   Smokeless Tobacco Never     Family History   Problem Relation Age of Onset    Heart failure Mother        Meds/Allergies       Current Outpatient Medications:     loperamide (IMODIUM A-D) 2 MG tablet    pantoprazole (PROTONIX) 40 mg tablet    hydrocortisone (ANUSOL-HC) 2.5 % rectal cream    magnesium Oxide  "(MAG-OX) 400 mg TABS    nicotine (NICODERM CQ) 21 mg/24 hr TD 24 hr patch    pancrelipase, Lip-Prot-Amyl, (CREON) 24,000 units    potassium chloride (Klor-Con M20) 20 mEq tablet    No Known Allergies        Objective     Blood pressure 148/87, pulse 91, height 5' 9\" (1.753 m), weight 65.6 kg (144 lb 9.6 oz). Body mass index is 21.35 kg/m².      PHYSICAL EXAM:      General Appearance:   Alert, cooperative, no distress   HEENT:   Normocephalic, atraumatic, anicteric.     Neck:  Supple, symmetrical, trachea midline   Lungs:   Clear to auscultation bilaterally; no rales, rhonchi or wheezing; respirations unlabored    Heart::   Regular rate and rhythm; no murmur, rub, or gallop.   Abdomen:   Soft, non-tender, non-distended; normal bowel sounds; no masses, no organomegaly    Genitalia:   Deferred    Rectal:   Deferred    Extremities:  No cyanosis, clubbing or edema    Pulses:  2+ and symmetric    Skin:  No jaundice, rashes, or lesions    Lymph nodes:  No palpable cervical lymphadenopathy        Lab Results:   No visits with results within 1 Day(s) from this visit.   Latest known visit with results is:   Appointment on 04/25/2024   Component Date Value    Calprotectin 04/25/2024 87     Pancreatic Elastase-1 04/25/2024 311          Radiology Results:   No results found.  "

## 2024-05-16 NOTE — PATIENT INSTRUCTIONS
Continue with Imodium 1 daily if he becomes constipated change it to every other day.  If bowel movements are improving with Creon and increase fiber tried to stop Imodium and monitor symptoms.  Increase Benefiber to 2 teaspoons 2 times a day  High-fiber diet  Avoid alcohol  Continue Creon take 1 tablet 3 times a day with meals.  Tablet should be taken with the first bite of food.  If he has increase in symptoms on decreased dosage contact office and we can increase him back to the 2 capsules 3 times a day with meals.  Avoid oral magnesium supplements this can contribute towards diarrhea

## 2024-05-24 ENCOUNTER — TELEPHONE (OUTPATIENT)
Age: 69
End: 2024-05-24

## 2024-05-24 NOTE — TELEPHONE ENCOUNTER
Diann called to schedule her  with rheum . I advise diann pt will need a referral . Pt will be seeing his PCP next week .

## 2024-06-04 ENCOUNTER — OFFICE VISIT (OUTPATIENT)
Dept: FAMILY MEDICINE CLINIC | Facility: CLINIC | Age: 69
End: 2024-06-04
Payer: MEDICARE

## 2024-06-04 VITALS
TEMPERATURE: 98.1 F | WEIGHT: 148.8 LBS | SYSTOLIC BLOOD PRESSURE: 150 MMHG | OXYGEN SATURATION: 96 % | HEART RATE: 86 BPM | DIASTOLIC BLOOD PRESSURE: 84 MMHG | RESPIRATION RATE: 16 BRPM | BODY MASS INDEX: 22.04 KG/M2 | HEIGHT: 69 IN

## 2024-06-04 DIAGNOSIS — I10 PRIMARY HYPERTENSION: ICD-10-CM

## 2024-06-04 DIAGNOSIS — J44.9 CHRONIC OBSTRUCTIVE PULMONARY DISEASE, UNSPECIFIED COPD TYPE (HCC): ICD-10-CM

## 2024-06-04 DIAGNOSIS — D53.9 NUTRITIONAL ANEMIA, UNSPECIFIED: ICD-10-CM

## 2024-06-04 DIAGNOSIS — M72.0 DUPUYTREN'S CONTRACTURE OF BOTH HANDS: ICD-10-CM

## 2024-06-04 DIAGNOSIS — F10.10 ALCOHOL ABUSE: ICD-10-CM

## 2024-06-04 DIAGNOSIS — G62.9 NEUROPATHY: Primary | ICD-10-CM

## 2024-06-04 DIAGNOSIS — I42.9 CARDIOMYOPATHY, UNSPECIFIED TYPE (HCC): ICD-10-CM

## 2024-06-04 DIAGNOSIS — D64.9 ANEMIA, UNSPECIFIED TYPE: ICD-10-CM

## 2024-06-04 PROBLEM — R10.9 ABDOMINAL PAIN: Status: RESOLVED | Noted: 2024-03-21 | Resolved: 2024-06-04

## 2024-06-04 PROCEDURE — 99204 OFFICE O/P NEW MOD 45 MIN: CPT | Performed by: FAMILY MEDICINE

## 2024-06-04 RX ORDER — FOLIC ACID 1 MG/1
1000 TABLET ORAL DAILY
Start: 2024-06-04

## 2024-06-04 RX ORDER — GAUZE BANDAGE 2" X 2"
100 BANDAGE TOPICAL DAILY
Start: 2024-06-04

## 2024-06-04 RX ORDER — LISINOPRIL 20 MG/1
20 TABLET ORAL DAILY
Qty: 90 TABLET | Refills: 3 | Status: SHIPPED | OUTPATIENT
Start: 2024-06-04

## 2024-06-04 NOTE — PROGRESS NOTES
Chief Complaint   Patient presents with   • Establish Care     He said the neuropathy in his feet are bad , discuss latest hospital stay that he has with intestinal problems        Patient ID: Del Ocampo is a 69 y.o. male.    HPI  New pt -  seen to establish care  -  has multiple issues -  recently admitted ( 3 m ago) for abd pain, CHF, Anemia, heavy alcohol use ( 1L of vodka a day at that time  -  now down to 1/2 of bottle of sine daily )  -  had neuropathy  -  h/o CHF 10 y ago  -  was lost in cardiology f/u  -  feeling better since last admission  -  gained weight     The following portions of the patient's history were reviewed and updated as appropriate: allergies, current medications, past family history, past medical history, past social history, past surgical history and problem list.    Review of Systems   Constitutional: Negative.    HENT: Negative.     Respiratory:  Positive for shortness of breath (with minimal exertion). Negative for cough, chest tightness and wheezing.    Cardiovascular: Negative.    Gastrointestinal: Negative.    Genitourinary: Negative.    Musculoskeletal:         Hands deformities    Skin: Negative.    Neurological:  Positive for numbness. Negative for dizziness, tremors, weakness and headaches.       Current Outpatient Medications   Medication Sig Dispense Refill   •       •       • loperamide (IMODIUM A-D) 2 MG tablet Take 1 tablet (2 mg total) by mouth 4 (four) times a day as needed for diarrhea 120 tablet 1   • pancrelipase, Lip-Prot-Amyl, (CREON) 24,000 units Take 48,000 units of lipase by mouth 3 (three) times a day with meals 180 capsule 2   • pantoprazole (PROTONIX) 40 mg tablet TAKE 1 TABLET DAILY IN THE EARLY MORNING 90 tablet 1   • Thiamine Mononitrate (VITAMIN B1) 100 mg tablet Take 1 tablet (100 mg total) by mouth daily     • hydrocortisone (ANUSOL-HC) 2.5 % rectal cream Apply topically 4 (four) times a day as needed for hemorrhoids (Patient not taking: Reported on  "5/16/2024) 28 g 0     No current facility-administered medications for this visit.       Objective:    /84 (BP Location: Left arm, Patient Position: Sitting, Cuff Size: Large)   Pulse 86   Temp 98.1 °F (36.7 °C)   Resp 16   Ht 5' 8.5\" (1.74 m)   Wt 67.5 kg (148 lb 12.8 oz)   SpO2 96%   BMI 22.30 kg/m²        Physical Exam  Constitutional:       General: He is not in acute distress.  HENT:      Nose: No congestion or rhinorrhea.   Cardiovascular:      Rate and Rhythm: Normal rate and regular rhythm.      Heart sounds: No murmur heard.     No gallop.   Pulmonary:      Effort: Pulmonary effort is normal. No respiratory distress.      Breath sounds: Decreased air movement present. Wheezing present. No rhonchi or rales.   Abdominal:      Palpations: Abdomen is soft. There is hepatomegaly. There is no shifting dullness.      Tenderness: There is no abdominal tenderness. There is no guarding.   Musculoskeletal:      Right lower leg: No edema.      Left lower leg: No edema.   Skin:     Findings: No rash.   Neurological:      General: No focal deficit present.      Mental Status: He is alert and oriented to person, place, and time.   Psychiatric:         Mood and Affect: Mood normal.           Labs in chart were reviewed.      Assessment/Plan:         Diagnoses and all orders for this visit:    Neuropathy  -     folic acid (FOLVITE) 1 mg tablet; Take 1 tablet (1,000 mcg total) by mouth daily  -     Thiamine Mononitrate (VITAMIN B1) 100 mg tablet; Take 1 tablet (100 mg total) by mouth daily  -     Vitamin B12; Future  -     Folate; Future    Alcohol abuse  -     CBC; Future    Cardiomyopathy, unspecified type (HCC)  -     Ambulatory Referral to Cardiology; Future  -     lisinopril (ZESTRIL) 20 mg tablet; Take 1 tablet (20 mg total) by mouth daily  -     Comprehensive metabolic panel; Future    Primary hypertension  -     lisinopril (ZESTRIL) 20 mg tablet; Take 1 tablet (20 mg total) by mouth daily  -     " Comprehensive metabolic panel; Future  -     Lipid panel; Future  -     TSH, 3rd generation; Future    Anemia, unspecified type  -     CBC; Future    Chronic obstructive pulmonary disease, unspecified COPD type (HCC)    Nutritional anemia, unspecified  -     Vitamin B12; Future  -     Folate; Future    Dupuytren's contracture of both hands  -     Ambulatory Referral to Orthopedic Surgery; Future      Was advised to stop alcohol     Rto in 1 m                  Rosina Morrow MD

## 2024-06-17 ENCOUNTER — HOSPITAL ENCOUNTER (OUTPATIENT)
Dept: RADIOLOGY | Facility: HOSPITAL | Age: 69
Discharge: HOME | End: 2024-06-17
Attending: STUDENT IN AN ORGANIZED HEALTH CARE EDUCATION/TRAINING PROGRAM
Payer: MEDICARE

## 2024-06-17 ENCOUNTER — OFFICE VISIT (OUTPATIENT)
Dept: OBGYN CLINIC | Facility: CLINIC | Age: 69
End: 2024-06-17
Payer: MEDICARE

## 2024-06-17 VITALS — WEIGHT: 147.2 LBS | BODY MASS INDEX: 21.8 KG/M2 | HEIGHT: 69 IN

## 2024-06-17 DIAGNOSIS — M72.0 DUPUYTREN'S CONTRACTURE OF BOTH HANDS: ICD-10-CM

## 2024-06-17 DIAGNOSIS — M72.0 DUPUYTREN'S CONTRACTURE OF BOTH HANDS: Primary | ICD-10-CM

## 2024-06-17 PROCEDURE — 99204 OFFICE O/P NEW MOD 45 MIN: CPT | Performed by: STUDENT IN AN ORGANIZED HEALTH CARE EDUCATION/TRAINING PROGRAM

## 2024-06-17 PROCEDURE — 73130 X-RAY EXAM OF HAND: CPT

## 2024-06-17 NOTE — PROGRESS NOTES
ORTHOPAEDIC HAND, WRIST, AND ELBOW OFFICE  VISIT      ASSESSMENT/PLAN:      Diagnoses and all orders for this visit:    Dupuytren's contracture of both hands  -     Ambulatory Referral to Orthopedic Surgery  -     XR hand 3+ vw right; Future  -     XR hand 3+ vw left; Future  -     Injection Procedure Prior Authorization; Future          69 y.o. male with left ring and small dupuytren's contracture   Treatment options and expected outcomes were discussed.  Discussed non operative verus surgical intervention.  The patient verbalized understanding of exam findings and treatment plan.   The patient was given the opportunity to ask questions.  Questions were answered to the patient's satisfaction.  The patient decided to move forward with Xiaflex.  Discussed the contracture to his left long and right ring finger may not be related to Dupuytren's and may just be a joint contracture. We will continue to monitor these.  He will follow up once Xiaflex is approved.      Follow Up:  Once Xiaflex is approved       To Do Next Visit:  Xiaflex injection      Discussions:  Dupuytren's Disease:  The anatomy and physiology of Dupuytren's disease were discussed with the patient today in the office.  Increased collagen formation (similar to scar tissue formation but without injury) within the interval between the skin volarly and the flexor tendons dorsally can result in pit formation, nodular formation, or eventual cord formation.  These pathologic cords can cause contracture at either the metacarpophalangeal joint, proximal interphalangeal joint, or both.  As the cords progress towards the proximal interphalangeal joint, the neurovascular structures of the finger may become involved within the disease process.  While this is a genetic condition with variable penetrance, repetitive micro trauma may trigger the development of cord formation and thus contracture.  Conservative treatment options including therapy to maintain joint  mobility and tabletop testing were discussed.  Other treatments include Xiaflex (collagenase) injection and surgical excision of abnormal cords.       Antolin Foster MD  Attending, Orthopaedic Surgery  Hand, Wrist, and Elbow Surgery  [facility]    ______________________________________________________________________________________________    CHIEF COMPLAINT:  Chief Complaint   Patient presents with   • Left Hand - Pain   • Right Hand - Pain       SUBJECTIVE:  Patient is a 69 y.o. RHD male who presents today for evaluation and treatment of bilateral hand contractures. The patient notes contracture to his left small and ring fingers. He states this has been ongoing for the past 10 years. He also notes an early contracture to his right ring finger which he states just started about a month ago. The patient notes difficulty with putting on gloves and . He denies any pain. The patient has a family history of Dupuytren's disease. The patient states appx 20 year ago he injured tendons on the left long, ring, and small finger which required pinning.      Occupation: retired      I have personally reviewed all the relevant PMH, PSH, SH, FH, Medications and allergies      PAST MEDICAL HISTORY:  Past Medical History:   Diagnosis Date   • Alcohol abuse    • Alcoholism ([facility])    • Marinelli esophagus    • Marinelli's esophagus    • Bleeding ulcer    • COPD (chronic obstructive pulmonary disease) ([facility])     Hospital visit found   • Coronary artery disease     10 years   • Diverticulitis of colon    • Fatty liver    • GERD (gastroesophageal reflux disease)     Years   • Heart failure ([facility])    • HL (hearing loss)     Years, work related   • Inflammatory bowel disease     Years   • PMR (polymyalgia rheumatica) ([facility])    • Temporal arteritis ([facility])    • Visual impairment     Wears glasses       PAST SURGICAL HISTORY:  Past Surgical History:   Procedure Laterality Date   • COLONOSCOPY     • FRACTURE SURGERY    "   knee  left    • ROTATOR CUFF REPAIR      right   • TONSILLECTOMY         FAMILY HISTORY:  Family History   Problem Relation Age of Onset   • Heart failure Mother    • Alcohol abuse Father        SOCIAL HISTORY:  Social History     Tobacco Use   • Smoking status: Every Day     Current packs/day: 1.00     Average packs/day: 1 pack/day for 45.0 years (45.0 ttl pk-yrs)     Types: Cigarettes   • Smokeless tobacco: Never   Vaping Use   • Vaping status: Never Used   Substance Use Topics   • Alcohol use: Yes     Alcohol/week: 3.0 standard drinks of alcohol     Types: 3 Shots of liquor per week     Comment: Started again   • Drug use: Not Currently       MEDICATIONS:    Current Outpatient Medications:   •  folic acid (FOLVITE) 1 mg tablet, Take 1 tablet (1,000 mcg total) by mouth daily, Disp: , Rfl:   •  lisinopril (ZESTRIL) 20 mg tablet, Take 1 tablet (20 mg total) by mouth daily, Disp: 90 tablet, Rfl: 3  •  loperamide (IMODIUM A-D) 2 MG tablet, Take 1 tablet (2 mg total) by mouth 4 (four) times a day as needed for diarrhea, Disp: 120 tablet, Rfl: 1  •  pancrelipase, Lip-Prot-Amyl, (CREON) 24,000 units, Take 48,000 units of lipase by mouth 3 (three) times a day with meals, Disp: 180 capsule, Rfl: 2  •  pantoprazole (PROTONIX) 40 mg tablet, TAKE 1 TABLET DAILY IN THE EARLY MORNING, Disp: 90 tablet, Rfl: 1  •  Thiamine Mononitrate (VITAMIN B1) 100 mg tablet, Take 1 tablet (100 mg total) by mouth daily, Disp: , Rfl:   •  hydrocortisone (ANUSOL-HC) 2.5 % rectal cream, Apply topically 4 (four) times a day as needed for hemorrhoids (Patient not taking: Reported on 5/16/2024), Disp: 28 g, Rfl: 0    ALLERGIES:  No Known Allergies        REVIEW OF SYSTEMS:  Musculoskeletal:        As noted in HPI.   All other systems reviewed and are negative.    VITALS:  There were no vitals filed for this visit.    LABS:  HgA1c: No results found for: \"HGBA1C\"  BMP:   Lab Results   Component Value Date    CALCIUM 9.4 04/11/2024    K 4.8 " 04/11/2024    CO2 28 04/11/2024     04/11/2024    BUN 4 (L) 04/11/2024    CREATININE 0.54 (L) 04/11/2024       _____________________________________________________  PHYSICAL EXAMINATION:  General: Well developed and well nourished, alert & oriented x 3, appears comfortable  Psychiatric: Normal  HEENT: Normocephalic, Atraumatic Trachea Midline, No torticollis  Pulmonary: No audible wheezing or respiratory distress   Abdomen/GI: Non tender, non distended   Cardiovascular: No pitting edema, 2+ radial pulse   Skin: No masses, erythema, lacerations, fluctation, ulcerations  Neurovascular: Sensation Intact to the Median, Ulnar, Radial Nerve, Motor Intact to the Median, Ulnar, Radial Nerve, and Pulses Intact  Musculoskeletal: Normal, except as noted in detailed exam and in HPI.      MUSCULOSKELETAL EXAMINATION:  Right hand  Ring PIP 30 shy full extension  Index PIP 25 shy full extension     Left hand  Ring PIP 90 shy full extension  Small MCP 75 shy full extension, PIP 45 shy full extension  Long finger PIP 20 shy full extension  Palpable cord to the ring and small fingers  + table top test    ___________________________________________________  STUDIES REVIEWED:  Xrays of the bilateral hands were reviewed and independently interpreted in PACS by Dr. Foster and demonstrate no osseous abnormalities.          PROCEDURES PERFORMED:  Procedures  No Procedures performed today    _____________________________________________________      Scribe Attestation    I,:  Norma Magana MA am acting as a scribe while in the presence of the attending physician.:       I,:  Antolin Foster MD personally performed the services described in this documentation    as scribed in my presence.:

## 2024-06-23 PROBLEM — K50.00 TERMINAL ILEITIS WITHOUT COMPLICATION (HCC): Status: RESOLVED | Noted: 2024-04-15 | Resolved: 2024-06-23

## 2024-06-26 ENCOUNTER — RA CDI HCC (OUTPATIENT)
Dept: OTHER | Facility: HOSPITAL | Age: 69
End: 2024-06-26

## 2024-06-26 ENCOUNTER — TELEPHONE (OUTPATIENT)
Dept: OBGYN CLINIC | Facility: CLINIC | Age: 69
End: 2024-06-26

## 2024-06-26 NOTE — TELEPHONE ENCOUNTER
M for patient to get him scheduled for his Xiaflex injection with Dr. Foster. Requested patient to call back and ask to speak with me to be scheduled.

## 2024-07-02 ENCOUNTER — APPOINTMENT (OUTPATIENT)
Dept: LAB | Facility: HOSPITAL | Age: 69
End: 2024-07-02
Payer: MEDICARE

## 2024-07-02 DIAGNOSIS — G62.9 NEUROPATHY: ICD-10-CM

## 2024-07-02 DIAGNOSIS — F10.10 ALCOHOL ABUSE: ICD-10-CM

## 2024-07-02 DIAGNOSIS — I10 PRIMARY HYPERTENSION: ICD-10-CM

## 2024-07-02 DIAGNOSIS — D64.9 ANEMIA, UNSPECIFIED TYPE: ICD-10-CM

## 2024-07-02 DIAGNOSIS — D53.9 NUTRITIONAL ANEMIA, UNSPECIFIED: ICD-10-CM

## 2024-07-02 DIAGNOSIS — I42.9 CARDIOMYOPATHY, UNSPECIFIED TYPE (HCC): ICD-10-CM

## 2024-07-02 LAB
ALBUMIN SERPL BCG-MCNC: 4.4 G/DL (ref 3.5–5)
ALP SERPL-CCNC: 90 U/L (ref 34–104)
ALT SERPL W P-5'-P-CCNC: 24 U/L (ref 7–52)
ANION GAP SERPL CALCULATED.3IONS-SCNC: 8 MMOL/L (ref 4–13)
AST SERPL W P-5'-P-CCNC: 31 U/L (ref 13–39)
BILIRUB SERPL-MCNC: 0.74 MG/DL (ref 0.2–1)
BUN SERPL-MCNC: 11 MG/DL (ref 5–25)
CALCIUM SERPL-MCNC: 10.2 MG/DL (ref 8.4–10.2)
CHLORIDE SERPL-SCNC: 100 MMOL/L (ref 96–108)
CHOLEST SERPL-MCNC: 206 MG/DL
CO2 SERPL-SCNC: 30 MMOL/L (ref 21–32)
CREAT SERPL-MCNC: 0.71 MG/DL (ref 0.6–1.3)
ERYTHROCYTE [DISTWIDTH] IN BLOOD BY AUTOMATED COUNT: 14 % (ref 11.6–15.1)
FOLATE SERPL-MCNC: 11.2 NG/ML
GFR SERPL CREATININE-BSD FRML MDRD: 94 ML/MIN/1.73SQ M
GLUCOSE P FAST SERPL-MCNC: 93 MG/DL (ref 65–99)
HCT VFR BLD AUTO: 50.6 % (ref 36.5–49.3)
HDLC SERPL-MCNC: 84 MG/DL
HGB BLD-MCNC: 17.2 G/DL (ref 12–17)
LDLC SERPL CALC-MCNC: 103 MG/DL (ref 0–100)
MCH RBC QN AUTO: 36.6 PG (ref 26.8–34.3)
MCHC RBC AUTO-ENTMCNC: 34 G/DL (ref 31.4–37.4)
MCV RBC AUTO: 108 FL (ref 82–98)
NONHDLC SERPL-MCNC: 122 MG/DL
PLATELET # BLD AUTO: 169 THOUSANDS/UL (ref 149–390)
PMV BLD AUTO: 12 FL (ref 8.9–12.7)
POTASSIUM SERPL-SCNC: 4.7 MMOL/L (ref 3.5–5.3)
PROT SERPL-MCNC: 7 G/DL (ref 6.4–8.4)
RBC # BLD AUTO: 4.7 MILLION/UL (ref 3.88–5.62)
SODIUM SERPL-SCNC: 138 MMOL/L (ref 135–147)
TRIGL SERPL-MCNC: 97 MG/DL
TSH SERPL DL<=0.05 MIU/L-ACNC: 2.57 UIU/ML (ref 0.45–4.5)
VIT B12 SERPL-MCNC: 185 PG/ML (ref 180–914)
WBC # BLD AUTO: 9.69 THOUSAND/UL (ref 4.31–10.16)

## 2024-07-02 PROCEDURE — 80061 LIPID PANEL: CPT

## 2024-07-02 PROCEDURE — 80053 COMPREHEN METABOLIC PANEL: CPT

## 2024-07-02 PROCEDURE — 36415 COLL VENOUS BLD VENIPUNCTURE: CPT

## 2024-07-02 PROCEDURE — 82746 ASSAY OF FOLIC ACID SERUM: CPT

## 2024-07-02 PROCEDURE — 84443 ASSAY THYROID STIM HORMONE: CPT

## 2024-07-02 PROCEDURE — 82607 VITAMIN B-12: CPT

## 2024-07-02 PROCEDURE — 85027 COMPLETE CBC AUTOMATED: CPT

## 2024-07-05 ENCOUNTER — OFFICE VISIT (OUTPATIENT)
Dept: FAMILY MEDICINE CLINIC | Facility: CLINIC | Age: 69
End: 2024-07-05
Payer: MEDICARE

## 2024-07-05 VITALS
WEIGHT: 148 LBS | DIASTOLIC BLOOD PRESSURE: 70 MMHG | TEMPERATURE: 98.1 F | BODY MASS INDEX: 21.92 KG/M2 | HEART RATE: 98 BPM | RESPIRATION RATE: 18 BRPM | OXYGEN SATURATION: 96 % | SYSTOLIC BLOOD PRESSURE: 122 MMHG | HEIGHT: 69 IN

## 2024-07-05 DIAGNOSIS — Z00.00 MEDICARE ANNUAL WELLNESS VISIT, INITIAL: ICD-10-CM

## 2024-07-05 DIAGNOSIS — F10.10 ALCOHOL ABUSE: ICD-10-CM

## 2024-07-05 DIAGNOSIS — Z72.0 NICOTINE ABUSE: ICD-10-CM

## 2024-07-05 DIAGNOSIS — G62.9 NEUROPATHY: Primary | ICD-10-CM

## 2024-07-05 PROBLEM — R19.7 DIARRHEA: Status: RESOLVED | Noted: 2024-04-15 | Resolved: 2024-07-05

## 2024-07-05 PROBLEM — D64.9 ANEMIA: Status: RESOLVED | Noted: 2024-03-25 | Resolved: 2024-07-05

## 2024-07-05 PROBLEM — E87.6 HYPOKALEMIA: Status: RESOLVED | Noted: 2024-03-21 | Resolved: 2024-07-05

## 2024-07-05 PROBLEM — K52.9 COLITIS: Status: RESOLVED | Noted: 2024-03-22 | Resolved: 2024-07-05

## 2024-07-05 PROBLEM — I71.23 ANEURYSM OF DESCENDING THORACIC AORTA WITHOUT RUPTURE (HCC): Status: ACTIVE | Noted: 2024-03-21

## 2024-07-05 PROCEDURE — G0438 PPPS, INITIAL VISIT: HCPCS | Performed by: FAMILY MEDICINE

## 2024-07-05 PROCEDURE — 99214 OFFICE O/P EST MOD 30 MIN: CPT | Performed by: FAMILY MEDICINE

## 2024-07-05 NOTE — PROGRESS NOTES
Ambulatory Visit  Name: Del Ocampo      : 1955      MRN: [id number]  Encounter Provider: Rosina Morrow MD  Encounter Date: 2024   Encounter department: [facility]    Assessment & Plan   1. Neuropathy    -  Related to ETOH use -  pt states he is not going to stop drinking   2. Medicare annual wellness visit, initial  3. Alcohol abuse  4. Nicotine abuse     Advised to stop alcohol and smoking      Preventive health issues were discussed with patient, and age appropriate screening tests were ordered as noted in patient's After Visit Summary. Personalized health advice and appropriate referrals for health education or preventive services given if needed, as noted in patient's After Visit Summary.    History of Present Illness     Pt is seeing for f/u neuropathy in both feet -  labs improved  -  did not change drinking habits -  scheduled an will with cardiologist in 3 wks        Patient Care Team:  Rosina Morrow MD as PCP - General (Family Medicine)    Review of Systems   Constitutional: Negative.    HENT: Negative.     Respiratory:  Positive for shortness of breath (with minimal exertion). Negative for cough, chest tightness and wheezing.    Cardiovascular: Negative.    Gastrointestinal: Negative.    Genitourinary: Negative.    Musculoskeletal:         Hands deformities    Skin: Negative.    Neurological:  Positive for numbness. Negative for dizziness, tremors, weakness and headaches.     Medical History Reviewed by provider this encounter:  Tobacco  Allergies  Meds  Problems  Med Hx  Surg Hx  Fam Hx       Annual Wellness Visit Questionnaire   Del is here for his Initial Wellness visit.     Health Risk Assessment:   Patient rates overall health as poor. Patient feels that their physical health rating is slightly worse. Patient is satisfied with their life. Eyesight was rated as slightly worse. Hearing was rated as slightly worse. Patient feels that their emotional  and mental health rating is same. Patients states they are never, rarely angry. Patient states they are often unusually tired/fatigued. Pain experienced in the last 7 days has been some. Patient's pain rating has been 2/10. Patient states that he has experienced weight loss or gain in last 6 months.     Depression Screening:   PHQ-2 Score: 0      Fall Risk Screening:   In the past year, patient has experienced: history of falling in past year    Number of falls: 2 or more  Injured during fall?: Yes    Feels unsteady when standing or walking?: Yes    Worried about falling?: No      Home Safety:  Patient does not have trouble with stairs inside or outside of their home. Patient has working smoke alarms and has working carbon monoxide detector. Home safety hazards include: none.     Nutrition:   Current diet is Regular.     Medications:   Patient is currently taking over-the-counter supplements. OTC medications include: see medication list. Patient is able to manage medications.     Activities of Daily Living (ADLs)/Instrumental Activities of Daily Living (IADLs):   Walk and transfer into and out of bed and chair?: Yes  Dress and groom yourself?: Yes    Bathe or shower yourself?: Yes    Feed yourself? Yes  Do your laundry/housekeeping?: Yes  Manage your money, pay your bills and track your expenses?: Yes  Make your own meals?: Yes    Do your own shopping?: No    Previous Hospitalizations:   Any hospitalizations or ED visits within the last 12 months?: Yes    How many hospitalizations have you had in the last year?: 1-2    Advance Care Planning:   Living will: No    Durable POA for healthcare: No    Advanced directive: No    Advanced directive counseling given: Yes    ACP document given: Yes      Cognitive Screening:   Provider or family/friend/caregiver concerned regarding cognition?: No    PREVENTIVE SCREENINGS      Cardiovascular Screening:    General: Screening Current      Diabetes Screening:     General: Screening  Current      Colorectal Cancer Screening:     General: Screening Current      Prostate Cancer Screening:    General: Screening Not Indicated      Osteoporosis Screening:    General: Screening Not Indicated      Abdominal Aortic Aneurysm (AAA) Screening:    Risk factors include: age between 65-74 yo and tobacco use        General: Screening Current      Hepatitis C Screening:    General: Screening Not Indicated    Screening, Brief Intervention, and Referral to Treatment (SBIRT)    Screening  Typical number of drinks in a day: 4  Typical number of drinks in a week: 28  Interpretation: Risky drinking behavior.    AUDIT-C Screenin) How often did you have a drink containing alcohol in the past year? 4 or more times a week  2) How many drinks did you have on a typical day when you were drinking in the past year? 5 to 6  3) How often did you have 6 or more drinks on one occasion in the past year? less than monthly    AUDIT-C Score: 5  Interpretation: Score 4-12 (male): POSITIVE screen for alcohol misuse    AUDIT Screenin) How often during the last year have you found that you were not able to stop drinking once you had started? 0 - never  5) How often during the last year have you failed to do what was normally expected from you because of drinking? 0 - never  6) How often during the last year have you needed a first drink in the morning to get yourself going after a heavy drinking session? 1 - less than monthly  7) How often during the last year have you had a feeling of guilt or remorse after drinking? 0 - never  8) How often during the last year have you been unable to remember what happened the night before because you had been drinking? 0 - never  9) Have you or someone else been injured as a result of your drinking? 0 - no  10) Has a relative or friend or a doctor or another health worker been concerned about your drinking or suggested you cut down? 4 - yes, during the last year    AUDIT Score:  "10  Interpretation: Harmful or hazardous alcohol consumption    Single Item Drug Screening:  How often have you used an illegal drug (including marijuana) or a prescription medication for non-medical reasons in the past year? never    Single Item Drug Screen Score: 0  Interpretation: Negative screen for possible drug use disorder    Social Determinants of Health     Food Insecurity: No Food Insecurity (7/5/2024)    Hunger Vital Sign    • Worried About Running Out of Food in the Last Year: Never true    • Ran Out of Food in the Last Year: Never true   Transportation Needs: No Transportation Needs (7/5/2024)    PRAPARE - Transportation    • Lack of Transportation (Medical): No    • Lack of Transportation (Non-Medical): No   Housing Stability: Low Risk  (7/5/2024)    Housing Stability Vital Sign    • Unable to Pay for Housing in the Last Year: No    • Number of Times Moved in the Last Year: 0    • Homeless in the Last Year: No   Utilities: Not At Risk (7/5/2024)    [facility] Utilities    • Threatened with loss of utilities: No     No results found.    Objective     /70 (BP Location: Left arm, Patient Position: Sitting, Cuff Size: Standard)   Pulse 98   Temp 98.1 °F (36.7 °C) (Temporal)   Resp 18   Ht 5' 8.5\" (1.74 m)   Wt 67.1 kg (148 lb)   SpO2 96%   BMI 22.18 kg/m²     Physical Exam  Constitutional:       General: He is not in acute distress.     Appearance: He is not ill-appearing.   Cardiovascular:      Rate and Rhythm: Normal rate and regular rhythm.      Heart sounds: No murmur heard.  Pulmonary:      Effort: Pulmonary effort is normal. No respiratory distress.      Breath sounds: Decreased air movement present. No wheezing, rhonchi or rales.   Abdominal:      Palpations: Abdomen is soft.      Tenderness: There is no abdominal tenderness.   Musculoskeletal:      Right lower leg: No edema.      Left lower leg: No edema.   Skin:     Findings: No rash.   Neurological:      General: No focal deficit present. "      Mental Status: He is alert and oriented to person, place, and time.   Psychiatric:         Mood and Affect: Mood normal.         Thought Content: Thought content normal.         Judgment: Judgment normal.       Administrative Statements

## 2024-07-05 NOTE — PATIENT INSTRUCTIONS
Medicare Preventive Visit Patient Instructions  Thank you for completing your Welcome to Medicare Visit or Medicare Annual Wellness Visit today. Your next wellness visit will be due in one year (7/6/2025).  The screening/preventive services that you may require over the next 5-10 years are detailed below. Some tests may not apply to you based off risk factors and/or age. Screening tests ordered at today's visit but not completed yet may show as past due. Also, please note that scanned in results may not display below.  Preventive Screenings:  Service Recommendations Previous Testing/Comments   Colorectal Cancer Screening  Colonoscopy    Fecal Occult Blood Test (FOBT)/Fecal Immunochemical Test (FIT)  Fecal DNA/Cologuard Test  Flexible Sigmoidoscopy Age: 45-75 years old   Colonoscopy: every 10 years (May be performed more frequently if at higher risk)  OR  FOBT/FIT: every 1 year  OR  Cologuard: every 3 years  OR  Sigmoidoscopy: every 5 years  Screening may be recommended earlier than age 45 if at higher risk for colorectal cancer. Also, an individualized decision between you and your healthcare provider will decide whether screening between the ages of 76-85 would be appropriate. Colonoscopy: 03/25/2024  FOBT/FIT: Not on file  Cologuard: Not on file  Sigmoidoscopy: Not on file    Screening Current     Prostate Cancer Screening Individualized decision between patient and health care provider in men between ages of 55-69   Medicare will cover every 12 months beginning on the day after your 50th birthday PSA: No results in last 5 years           Hepatitis C Screening Once for adults born between 1945 and 1965  More frequently in patients at high risk for Hepatitis C Hep C Antibody: Not on file        Diabetes Screening 1-2 times per year if you're at risk for diabetes or have pre-diabetes Fasting glucose: 93 mg/dL (7/2/2024)  A1C: No results in last 5 years (No results in last 5 years)  Screening Current   Cholesterol  Screening Once every 5 years if you don't have a lipid disorder. May order more often based on risk factors. Lipid panel: 07/02/2024  Screening Current      Other Preventive Screenings Covered by Medicare:  Abdominal Aortic Aneurysm (AAA) Screening: covered once if your at risk. You're considered to be at risk if you have a family history of AAA or a male between the age of 65-75 who smoking at least 100 cigarettes in your lifetime.  Lung Cancer Screening: covers low dose CT scan once per year if you meet all of the following conditions: (1) Age 55-77; (2) No signs or symptoms of lung cancer; (3) Current smoker or have quit smoking within the last 15 years; (4) You have a tobacco smoking history of at least 20 pack years (packs per day x number of years you smoked); (5) You get a written order from a healthcare provider.  Glaucoma Screening: covered annually if you're considered high risk: (1) You have diabetes OR (2) Family history of glaucoma OR (3)  aged 50 and older OR (4)  American aged 65 and older  Osteoporosis Screening: covered every 2 years if you meet one of the following conditions: (1) Have a vertebral abnormality; (2) On glucocorticoid therapy for more than 3 months; (3) Have primary hyperparathyroidism; (4) On osteoporosis medications and need to assess response to drug therapy.  HIV Screening: covered annually if you're between the age of 15-65. Also covered annually if you are younger than 15 and older than 65 with risk factors for HIV infection. For pregnant patients, it is covered up to 3 times per pregnancy.    Immunizations:  Immunization Recommendations   Influenza Vaccine Annual influenza vaccination during flu season is recommended for all persons aged >= 6 months who do not have contraindications   Pneumococcal Vaccine   * Pneumococcal conjugate vaccine = PCV13 (Prevnar 13), PCV15 (Vaxneuvance), PCV20 (Prevnar 20)  * Pneumococcal polysaccharide vaccine = PPSV23  (Pneumovax) Adults 19-63 yo with certain risk factors or if 65+ yo  If never received any pneumonia vaccine: recommend Prevnar 20 (PCV20)  Give PCV20 if previously received 1 dose of PCV13 or PPSV23   Hepatitis B Vaccine 3 dose series if at intermediate or high risk (ex: diabetes, end stage renal disease, liver disease)   Respiratory syncytial virus (RSV) Vaccine - COVERED BY MEDICARE PART D  * RSVPreF3 (Arexvy) CDC recommends that adults 60 years of age and older may receive a single dose of RSV vaccine using shared clinical decision-making (SCDM)   Tetanus (Td) Vaccine - COST NOT COVERED BY MEDICARE PART B Following completion of primary series, a booster dose should be given every 10 years to maintain immunity against tetanus. Td may also be given as tetanus wound prophylaxis.   Tdap Vaccine - COST NOT COVERED BY MEDICARE PART B Recommended at least once for all adults. For pregnant patients, recommended with each pregnancy.   Shingles Vaccine (Shingrix) - COST NOT COVERED BY MEDICARE PART B  2 shot series recommended in those 19 years and older who have or will have weakened immune systems or those 50 years and older     Health Maintenance Due:      Topic Date Due   • Hepatitis C Screening  Never done   • Lung Cancer Screening  Never done   • Colorectal Cancer Screening  Discontinued     Immunizations Due:      Topic Date Due   • Hepatitis A Vaccine (1 of 2 - Risk 2-dose series) Never done   • Hepatitis B Vaccine (1 of 3 - Risk 3-dose series) Never done   • COVID-19 Vaccine (4 - 2023-24 season) 09/01/2023   • Influenza Vaccine (1) 09/01/2024     Advance Directives   What are advance directives?  Advance directives are legal documents that state your wishes and plans for medical care. These plans are made ahead of time in case you lose your ability to make decisions for yourself. Advance directives can apply to any medical decision, such as the treatments you want, and if you want to donate organs.   What are the  types of advance directives?  There are many types of advance directives, and each state has rules about how to use them. You may choose a combination of any of the following:  Living will:  This is a written record of the treatment you want. You can also choose which treatments you do not want, which to limit, and which to stop at a certain time. This includes surgery, medicine, IV fluid, and tube feedings.   Durable power of  for healthcare (DPAHC):  This is a written record that states who you want to make healthcare choices for you when you are unable to make them for yourself. This person, called a proxy, is usually a family member or a friend. You may choose more than 1 proxy.  Do not resuscitate (DNR) order:  A DNR order is used in case your heart stops beating or you stop breathing. It is a request not to have certain forms of treatment, such as CPR. A DNR order may be included in other types of advance directives.  Medical directive:  This covers the care that you want if you are in a coma, near death, or unable to make decisions for yourself. You can list the treatments you want for each condition. Treatment may include pain medicine, surgery, blood transfusions, dialysis, IV or tube feedings, and a ventilator (breathing machine).  Values history:  This document has questions about your views, beliefs, and how you feel and think about life. This information can help others choose the care that you would choose.  Why are advance directives important?  An advance directive helps you control your care. Although spoken wishes may be used, it is better to have your wishes written down. Spoken wishes can be misunderstood, or not followed. Treatments may be given even if you do not want them. An advance directive may make it easier for your family to make difficult choices about your care.   Fall Prevention    Fall prevention  includes ways to make your home and other areas safer. It also includes ways  you can move more carefully to prevent a fall. Health conditions that cause changes in your blood pressure, vision, or muscle strength and coordination may increase your risk for falls. Medicines may also increase your risk for falls if they make you dizzy, weak, or sleepy.   Fall prevention tips:   Stand or sit up slowly.    Use assistive devices as directed.    Wear shoes that fit well and have soles that .    Wear a personal alarm.    Stay active.    Manage your medical conditions.    Home Safety Tips:  Add items to prevent falls in the bathroom.    Keep paths clear.    Install bright lights in your home.    Keep items you use often on shelves within reach.    Paint or place reflective tape on the edges of your stairs.    Cigarette Smoking and Your Health   Risks to your health if you smoke:  Nicotine and other chemicals found in tobacco damage every cell in your body. Even if you are a light smoker, you have an increased risk for cancer, heart disease, and lung disease. If you are pregnant or have diabetes, smoking increases your risk for complications.   Benefits to your health if you stop smoking:   You decrease respiratory symptoms such as coughing, wheezing, and shortness of breath.   You reduce your risk for cancers of the lung, mouth, throat, kidney, bladder, pancreas, stomach, and cervix. If you already have cancer, you increase the benefits of chemotherapy. You also reduce your risk for cancer returning or a second cancer from developing.   You reduce your risk for heart disease, blood clots, heart attack, and stroke.   You reduce your risk for lung infections, and diseases such as pneumonia, asthma, chronic bronchitis, and emphysema.  Your circulation improves. More oxygen can be delivered to your body. If you have diabetes, you lower your risk for complications, such as kidney, artery, and eye diseases. You also lower your risk for nerve damage. Nerve damage can lead to amputations, poor vision,  "and blindness.  You improve your body's ability to heal and to fight infections.  For more information and support to stop smoking:   [employer]  Phone: 9- 513 - 501-3518  Web Address: www.[employer] [place]  Alcohol Use and Your Health    Drinking too much can harm your health.  Excessive alcohol use leads to about 88,000 death in the [place] each year, and shortens the life of those who diet by almost 30 years.  Further, excessive drinking cost the economy $249 billion in 2010.  Most excessive drinkers are not alcohol dependent.    Excessive alcohol use has immediate effects that increase the risk of many harmful health conditions.  These are most often the result of binge drinking.  Over time, excessive alcohol use can lead to the development of chronic diseases and other series health problems.    What is considered a \"drink\"?        Excessive alcohol use includes:  Binge Drinking: For women, 4 or more drinks consumed on one occasion. For men, 5 or more drinks consumed on one occasion.  Heavy Drinking: For women, 8 or more drinks per week. For men, 15 or more drinks per week  Any alcohol used by pregnant women  Any alcohol used by those under the age of 21 years    If you choose to drink, do so in moderation:  Do not drink at all if you are under the age of 21, or if you are or may be pregnant, or have health problems that could be made worse by drinking.  For women, up to 1 drink per day  For men, up to 2 drinks a day    No one should begin drinking or drink more frequently based on potential health benefits    Short-Term Health Risks:  Injuries: motor vehicle crashes, falls, drownings, burns  Violence: homicide, suicide, sexual assault, intimate partner violence  Alcohol poisoning  Reproductive health: risky sexual behaviors, unintended prengnacy, sexually transmitted diseases, miscarriage, stillbirth, fetal alcohol syndrome    Long-Term Health Risks:  Chronic diseases: high blood pressure, heart disease, " stroke, liver disease, digestive problems  Cancers: breast, mouth and throat, liver, colon  Learning and memory problems: dementia, poor school performance  Mental health: depression, anxiety, insomnia  Social problems: lost productivity, family problems, unemployment  Alcohol dependence    For support and more information:  Substance Abuse and Mental Health Services Administration  [address]  [place] [address]  Web Address: http://www.[facility].gov    Alcoholics Anonymous        Web Address: http://www.aa.org    https://www.cdc.gov/alcohol/fact-sheets/alcohol-use.htm     © Copyright [employer] 2018 Information is for End User's use only and may not be sold, redistributed or otherwise used for commercial purposes. All illustrations and images included in CareNotes® are the copyrighted property of [employer] Inc[employer] or [employer]

## 2024-07-10 DIAGNOSIS — K52.9 CHRONIC DIARRHEA: ICD-10-CM

## 2024-07-10 RX ORDER — LOPERAMIDE HYDROCHLORIDE 2 MG/1
CAPSULE ORAL
Qty: 360 CAPSULE | Refills: 1 | Status: SHIPPED | OUTPATIENT
Start: 2024-07-10

## 2024-07-12 ENCOUNTER — PROCEDURE VISIT (OUTPATIENT)
Dept: OBGYN CLINIC | Facility: CLINIC | Age: 69
End: 2024-07-12

## 2024-07-12 VITALS — WEIGHT: 148 LBS | BODY MASS INDEX: 21.92 KG/M2 | HEIGHT: 69 IN

## 2024-07-12 DIAGNOSIS — M72.0 DUPUYTREN'S CONTRACTURE OF BOTH HANDS: Primary | ICD-10-CM

## 2024-07-12 NOTE — PROGRESS NOTES
ORTHOPAEDIC HAND, WRIST, AND ELBOW OFFICE  VISIT      ASSESSMENT/PLAN:      Diagnoses and all orders for this visit:    Dupuytren's contracture of both hands  -     Hand/upper extremity injection: L ring finger  -     Hand/upper extremity injection: L small finger  -     collagenase clostridium histolyticum (XIAFLEX) injection 0.58 mg          69 y.o. male with  left ring and small dupuytren's contracture    Risks, benefits and alternatives of Xiaflex injx again discussed.   At this time, pt wishes to proceed with Xiaflex injx.  This was performed and pt tolerated the procedure well.  The hand was wrapped and pt advised to limit use of this during the weekend.      Follow Up:  Monday      To Do Next Visit:  Xiaflex release      Discussions:  The process of receiving Xiaflex injection was discussed with the patient today. This includes 3-4 injections in the area of the affected cord. The hand will then be bandaged for 2-3 consecutive days, at which time the patient will return to the office for manipulation of the affected finger. Risks, benefits and alternatives of Xiaflex injections were discussed with the patient today. Risks include bleeding, infection, pain, swelling, bruising, itching, breaks in the skin, redness/warmth of the skin, allergic reaction, tendon rupture, ligament damage, and nerve/vessel injury. Patient agrees to proceed with the injection. Authorization process will be initiated with patient to follow up once this is achieved.          Antolin Foster MD  Attending, Orthopaedic Surgery  Hand, Wrist, and Elbow Surgery  [facility]    ______________________________________________________________________________________________    CHIEF COMPLAINT:  Chief Complaint   Patient presents with   • Left Hand - Injections       SUBJECTIVE:  Patient is a 69 y.o. RHD male who presents today for follow up of left ring and small dupuytren's contractures. Pt states no changes since his  last appointment. He is still interested in pursuing Xiaflex injxs today.        I have personally reviewed all the relevant PMH, PSH, SH, FH, Medications and allergies      PAST MEDICAL HISTORY:  Past Medical History:   Diagnosis Date   • Alcohol abuse    • Alcoholism (HCC)    • Marinelli esophagus    • Marinelli's esophagus    • Bleeding ulcer    • COPD (chronic obstructive pulmonary disease) ([facility])     Hospital visit found   • Coronary artery disease     10 years   • Diverticulitis of colon    • Fatty liver    • GERD (gastroesophageal reflux disease)     Years   • Heart failure (HCC)    • HL (hearing loss)     Years, work related   • Inflammatory bowel disease     Years   • PMR (polymyalgia rheumatica) ([facility])    • Temporal arteritis ([facility])    • Terminal ileitis without complication ([facility]) 04/15/2024   • Visual impairment     Wears glasses       PAST SURGICAL HISTORY:  Past Surgical History:   Procedure Laterality Date   • COLONOSCOPY     • FRACTURE SURGERY      knee  left    • ROTATOR CUFF REPAIR      right   • TONSILLECTOMY         FAMILY HISTORY:  Family History   Problem Relation Age of Onset   • Heart failure Mother    • Alcohol abuse Father        SOCIAL HISTORY:  Social History     Tobacco Use   • Smoking status: Every Day     Current packs/day: 1.00     Average packs/day: 1 pack/day for 45.0 years (45.0 ttl pk-yrs)     Types: Cigarettes   • Smokeless tobacco: Never   Vaping Use   • Vaping status: Never Used   Substance Use Topics   • Alcohol use: Yes     Alcohol/week: 3.0 standard drinks of alcohol     Types: 3 Shots of liquor per week     Comment: Started again   • Drug use: Not Currently       MEDICATIONS:    Current Outpatient Medications:   •  lisinopril (ZESTRIL) 20 mg tablet, Take 1 tablet (20 mg total) by mouth daily, Disp: 90 tablet, Rfl: 3  •  loperamide (IMODIUM) 2 mg capsule, TAKE 1 CAPSULE 4 TIMES     DAILY AS NEEDED FOR        DIARRHEA, Disp: 360 capsule, Rfl: 1  •  pancrelipase, Lip-Prot-Amyl,  "(CREON) 24,000 units, Take 48,000 units of lipase by mouth 3 (three) times a day with meals, Disp: 180 capsule, Rfl: 2  •  pantoprazole (PROTONIX) 40 mg tablet, TAKE 1 TABLET DAILY IN THE EARLY MORNING, Disp: 90 tablet, Rfl: 1  •  Thiamine Mononitrate (VITAMIN B1) 100 mg tablet, Take 1 tablet (100 mg total) by mouth daily, Disp: , Rfl:   •  folic acid (FOLVITE) 1 mg tablet, Take 1 tablet (1,000 mcg total) by mouth daily (Patient not taking: Reported on 7/5/2024), Disp: , Rfl:   •  hydrocortisone (ANUSOL-HC) 2.5 % rectal cream, Apply topically 4 (four) times a day as needed for hemorrhoids (Patient not taking: Reported on 5/16/2024), Disp: 28 g, Rfl: 0  No current facility-administered medications for this visit.    ALLERGIES:  No Known Allergies        REVIEW OF SYSTEMS:  Musculoskeletal:        As noted in HPI.   All other systems reviewed and are negative.    VITALS:  There were no vitals filed for this visit.    LABS:  HgA1c: No results found for: \"HGBA1C\"  BMP:   Lab Results   Component Value Date    CALCIUM 10.2 07/02/2024    K 4.7 07/02/2024    CO2 30 07/02/2024     07/02/2024    BUN 11 07/02/2024    CREATININE 0.71 07/02/2024       _____________________________________________________  PHYSICAL EXAMINATION:  General: Well developed and well nourished, alert & oriented x 3, appears comfortable  Psychiatric: Normal  HEENT: Normocephalic, Atraumatic Trachea Midline, No torticollis  Pulmonary: No audible wheezing or respiratory distress   Abdomen/GI: Non tender, non distended   Cardiovascular: No pitting edema, 2+ radial pulse   Skin: No Erythema, No Lacerations, No Fluctuation, No Ulcerations  Neurovascular: Sensation Intact to the Median, Ulnar, Radial Nerve, Motor Intact to the Median, Ulnar, Radial Nerve, and Pulses Intact  Musculoskeletal: Normal, except as noted in detailed exam and in HPI.      MUSCULOSKELETAL EXAMINATION:  Left hand  Ring PIP 90 shy full extension  Small MCP 75 shy full extension, " PIP 45 shy full extension  Long finger PIP 20 shy full extension  Palpable cord to the ring and small fingers  + table top test     ___________________________________________________  STUDIES REVIEWED:  No new studies to review         PROCEDURES PERFORMED:  Hand/upper extremity injection: L ring finger  Universal Protocol:  Consent: Verbal consent obtained.  Risks and benefits: risks, benefits and alternatives were discussed  Consent given by: patient  Patient understanding: patient states understanding of the procedure being performed  Patient identity confirmed: verbally with patient  Supporting Documentation  Indications: therapeutic   Procedure Details  Condition:Dupuytren's contracture Dupuytren's Contracture Procedure: Dupuytren's contracture injectionSite: L ring finger   Needle size: 25 G  Approach: volar  Medications administered: 0.58 mg collagenase clostridium histolyticum 0.9 MG  Patient tolerance: patient tolerated the procedure well with no immediate complications  Dressing:  Sterile dressing applied       Hand/upper extremity injection: L small finger  Universal Protocol:  Consent: Verbal consent obtained.  Risks and benefits: risks, benefits and alternatives were discussed  Consent given by: patient  Patient understanding: patient states understanding of the procedure being performed  Patient identity confirmed: verbally with patient  Supporting Documentation  Indications: therapeutic   Procedure Details  Condition:Dupuytren's contracture Dupuytren's Contracture Procedure: Dupuytren's contracture injectionSite: L small finger   Needle size: 25 G  Approach: volar  Patient tolerance: patient tolerated the procedure well with no immediate complications  Dressing:  Sterile dressing applied              _____________________________________________________      Scribe Attestation    I,:  Selena Nichols PA-C am acting as a scribe while in the presence of the attending physician.:       I,:  Antolin  MD Kristin personally performed the services described in this documentation    as scribed in my presence.:

## 2024-07-15 ENCOUNTER — OFFICE VISIT (OUTPATIENT)
Dept: OBGYN CLINIC | Facility: CLINIC | Age: 69
End: 2024-07-15
Payer: MEDICARE

## 2024-07-15 VITALS — WEIGHT: 148 LBS | HEIGHT: 69 IN | BODY MASS INDEX: 21.92 KG/M2

## 2024-07-15 DIAGNOSIS — M72.0 DUPUYTREN'S CONTRACTURE OF BOTH HANDS: Primary | ICD-10-CM

## 2024-07-15 PROCEDURE — 26341 MANIPULAT PALM CORD POST INJ: CPT | Performed by: STUDENT IN AN ORGANIZED HEALTH CARE EDUCATION/TRAINING PROGRAM

## 2024-07-15 NOTE — PROGRESS NOTES
ORTHOPAEDIC HAND, WRIST, AND ELBOW OFFICE  VISIT      ASSESSMENT/PLAN:      Diagnoses and all orders for this visit:    Dupuytren's contracture of both hands  -     Hand/upper extremity injection: L ring finger  -     Hand/upper extremity injection: L small finger  -     Cancel: Ambulatory Referral to PT/OT Hand Therapy; Future  -     Ambulatory Referral to PT/OT Hand Therapy; Future          69 y.o. male with  left ring and small dupuytren's contracture    Pt here today for Xiaflex manipulation  Local anesthetic applied and manipulation performed  Improved extension seen post-manipulation  Nighttime extension splint x 6 weeks  Pt does have a skin tear along the small finger - explained local wound care (OK to wash with soap/water and then recover) and this should heal in a week or two.      Follow Up:  3 weeks       To Do Next Visit:  Re-evaluation of current issue - wound check        Antolin Foster MD  Attending, Orthopaedic Surgery  Hand, Wrist, and Elbow Surgery  [facility]    ______________________________________________________________________________________________    CHIEF COMPLAINT:  Chief Complaint   Patient presents with   • Left Hand - Follow-up       SUBJECTIVE:  Patient is a 69 y.o. RHD male who presents today for follow up of left ring and small Dupuytren's contractures. Xiaflex injections performed to both fingers this past Friday. Pt states overall he is doing well.      I have personally reviewed all the relevant PMH, PSH, SH, FH, Medications and allergies      PAST MEDICAL HISTORY:  Past Medical History:   Diagnosis Date   • Alcohol abuse    • Alcoholism (HCC)    • Marinelli esophagus    • Marinelli's esophagus    • Bleeding ulcer    • COPD (chronic obstructive pulmonary disease) ([facility])     Hospital visit found   • Coronary artery disease     10 years   • Diverticulitis of colon    • Fatty liver    • GERD (gastroesophageal reflux disease)     Years   • Heart failure ([facility])     • HL (hearing loss)     Years, work related   • Inflammatory bowel disease     Years   • PMR (polymyalgia rheumatica) ([facility])    • Temporal arteritis ([facility])    • Terminal ileitis without complication ([facility]) 04/15/2024   • Visual impairment     Wears glasses       PAST SURGICAL HISTORY:  Past Surgical History:   Procedure Laterality Date   • COLONOSCOPY     • FRACTURE SURGERY      knee  left    • ROTATOR CUFF REPAIR      right   • TONSILLECTOMY         FAMILY HISTORY:  Family History   Problem Relation Age of Onset   • Heart failure Mother    • Alcohol abuse Father        SOCIAL HISTORY:  Social History     Tobacco Use   • Smoking status: Every Day     Current packs/day: 1.00     Average packs/day: 1 pack/day for 45.0 years (45.0 ttl pk-yrs)     Types: Cigarettes   • Smokeless tobacco: Never   Vaping Use   • Vaping status: Never Used   Substance Use Topics   • Alcohol use: Yes     Alcohol/week: 3.0 standard drinks of alcohol     Types: 3 Shots of liquor per week     Comment: Started again   • Drug use: Not Currently       MEDICATIONS:    Current Outpatient Medications:   •  lisinopril (ZESTRIL) 20 mg tablet, Take 1 tablet (20 mg total) by mouth daily, Disp: 90 tablet, Rfl: 3  •  loperamide (IMODIUM) 2 mg capsule, TAKE 1 CAPSULE 4 TIMES     DAILY AS NEEDED FOR        DIARRHEA, Disp: 360 capsule, Rfl: 1  •  pancrelipase, Lip-Prot-Amyl, (CREON) 24,000 units, Take 48,000 units of lipase by mouth 3 (three) times a day with meals, Disp: 180 capsule, Rfl: 2  •  pantoprazole (PROTONIX) 40 mg tablet, TAKE 1 TABLET DAILY IN THE EARLY MORNING, Disp: 90 tablet, Rfl: 1  •  Thiamine Mononitrate (VITAMIN B1) 100 mg tablet, Take 1 tablet (100 mg total) by mouth daily, Disp: , Rfl:   •  folic acid (FOLVITE) 1 mg tablet, Take 1 tablet (1,000 mcg total) by mouth daily (Patient not taking: Reported on 7/5/2024), Disp: , Rfl:   •  hydrocortisone (ANUSOL-HC) 2.5 % rectal cream, Apply topically 4 (four) times a day as needed for hemorrhoids  "(Patient not taking: Reported on 5/16/2024), Disp: 28 g, Rfl: 0    ALLERGIES:  No Known Allergies        REVIEW OF SYSTEMS:  Musculoskeletal:        As noted in HPI.   All other systems reviewed and are negative.    VITALS:  There were no vitals filed for this visit.    LABS:  HgA1c: No results found for: \"HGBA1C\"  BMP:   Lab Results   Component Value Date    CALCIUM 10.2 07/02/2024    K 4.7 07/02/2024    CO2 30 07/02/2024     07/02/2024    BUN 11 07/02/2024    CREATININE 0.71 07/02/2024       _____________________________________________________  PHYSICAL EXAMINATION:  General: Well developed and well nourished, alert & oriented x 3, appears comfortable  Psychiatric: Normal  HEENT: Normocephalic, Atraumatic Trachea Midline, No torticollis  Pulmonary: No audible wheezing or respiratory distress   Abdomen/GI: Non tender, non distended   Cardiovascular: No pitting edema, 2+ radial pulse   Skin: No Erythema, No Lacerations, No Fluctuation, No Ulcerations  Neurovascular: Sensation Intact to the Median, Ulnar, Radial Nerve, Motor Intact to the Median, Ulnar, Radial Nerve, and Pulses Intact  Musculoskeletal: Normal, except as noted in detailed exam and in HPI.      MUSCULOSKELETAL EXAMINATION:  Left Hand:  Ring PIP 90 shy full extension  Small MCP 75 shy full extension, PIP 45 shy full extension  Long finger PIP 20 shy full extension (believe to be unrelated to Dupuytren's)  Palpable cord to the ring and small fingers  + table top test           Post-manipulation:  Skin tear along the small finger  Ring PIP 30 shy actively, passively full extension  Ring MCP full extension  Small MCP 15 shy actively, passively full extension  Small PIP 45 shy actively, passively full extension  FDP intact throughout  FDS intact index- ring fingers  ___________________________________________________  STUDIES REVIEWED:  No new studies to review         PROCEDURES PERFORMED:  Hand/upper extremity injection: L ring finger  Universal " Protocol:  Consent: Verbal consent obtained.  Risks and benefits: risks, benefits and alternatives were discussed  Consent given by: patient  Patient understanding: patient states understanding of the procedure being performed  Patient identity confirmed: verbally with patient  Supporting Documentation  Indications: therapeutic   Procedure Details  Condition:Dupuytren's contracture Dupuytren's Contracture Procedure: Dupuytren's contracture manipulationSite: L ring finger   Approach: volar  Patient tolerance: patient tolerated the procedure well with no immediate complications  Dressing:  Sterile dressing applied       Hand/upper extremity injection: L small finger  Universal Protocol:  Consent: Verbal consent obtained.  Risks and benefits: risks, benefits and alternatives were discussed  Consent given by: patient  Patient understanding: patient states understanding of the procedure being performed  Patient identity confirmed: verbally with patient  Supporting Documentation  Indications: therapeutic   Procedure Details  Condition:Dupuytren's contracture Dupuytren's Contracture Procedure: Dupuytren's contracture manipulationSite: L small finger   Approach: volar  Patient tolerance: patient tolerated the procedure well with no immediate complications  Dressing:  Sterile dressing applied              _____________________________________________________      Scribe Attestation    I,:  Selena Nichols PA-C am acting as a scribe while in the presence of the attending physician.:       I,:  Antolin Foster MD personally performed the services described in this documentation    as scribed in my presence.:

## 2024-07-15 NOTE — PATIENT INSTRUCTIONS
OK to wash small finger with soap and water and pat dry.  Place non adherent dressing (yellow xeroform) on wound.  Cover with gauze and use ace wrap to keep bandages in place.  Call asap if any concerns regarding the wound.

## 2024-07-16 ENCOUNTER — OFFICE VISIT (OUTPATIENT)
Dept: OCCUPATIONAL THERAPY | Facility: CLINIC | Age: 69
End: 2024-07-16
Payer: MEDICARE

## 2024-07-16 DIAGNOSIS — M72.0 DUPUYTREN'S CONTRACTURE OF BOTH HANDS: Primary | ICD-10-CM

## 2024-07-16 PROCEDURE — L3913 HFO W/O JOINTS CF: HCPCS

## 2024-07-16 NOTE — PROGRESS NOTES
Orthosis    Diagnosis:   1. Dupuytren's contracture of both hands  Ambulatory Referral to PT/OT Hand Therapy        Indication: Motion Blocking    Location: Left  ring finger and small finger  Supplies: Custom Fit Orthotic  Orthosis type:  night time extension splint  Wearing Schedule: Night only  Describe Position: extension    Precautions: Soft tissue repair and Universal (skin contact/breakdown)    Patient or Caregiver expresses understanding of wearing Schedule and Precautions? Yes  Patient or Caregiver able to don/doff orthotic independently?Yes    Written orders provided to patient? No  Orders Obtained: Written  Orders Obtained from: Dr. Foster    Return for evaluation and treatment Yes patient to return Friday 7/19/24.

## 2024-07-19 ENCOUNTER — EVALUATION (OUTPATIENT)
Dept: OCCUPATIONAL THERAPY | Facility: CLINIC | Age: 69
End: 2024-07-19
Payer: MEDICARE

## 2024-07-19 DIAGNOSIS — M72.0 DUPUYTREN'S CONTRACTURE OF BOTH HANDS: Primary | ICD-10-CM

## 2024-07-19 PROCEDURE — 97166 OT EVAL MOD COMPLEX 45 MIN: CPT

## 2024-07-19 NOTE — PROGRESS NOTES
OT Re-Evaluation     Today's date: 2024  Patient name: Del Ocampo  : 1955  MRN: [id number]  Referring provider: Selena Nichols PA-C  Dx:   Encounter Diagnosis     ICD-10-CM    1. Dupuytren's contracture of both hands  M72.0                      Assessment  Impairments: abnormal or restricted ROM, impaired physical strength, lacks appropriate home exercise program and pain with function    Assessment details: Patient is a 69 y.o. RHD male who presents for OT IE and treatment for Left hand dupytrens release using xiaflex 7/15/24. Patient reports having ongoing progressive symptoms for last 2 years. Patient referred by Selena Nichols PA-C to initiate treatment including hand therapy.    Understanding of Dx/Px/POC: good     Prognosis: good    Goals  Short term goals 2-4 weeks  Establish HEP to enhance performance with ADLs.    Improve active range of motion of LUE by 20  to assist with UE dressing.     Achieve appropriate wound closure in 10 - 14 days post op as evidenced by lack of infection.      Long Term goals by discharge  Establish final home exercise program to enhance maximal functional level with ADLs.    Achieve functional active range of motion of LUE for full return to household chores.       Achieve functional strength of LUE for full return to high level ADLs.          Plan  Patient would benefit from: OT eval, skilled occupational therapy, orthotics and custom splinting  Planned modality interventions: thermotherapy: hydrocollator packs, cryotherapy, TENS, unattended electrical stimulation, ultrasound and electrical stimulation/Russian stimulation    Planned therapy interventions: manual therapy, joint mobilization, strengthening, stretching, therapeutic activities, therapeutic exercise, home exercise program, graded exercise, graded activity, functional ROM exercises, flexibility, orthotic fitting/training, IASTM and kinesiology taping    Frequency: 1x week  Duration in weeks: 10  Plan  of Care beginning date: 2024  Plan of Care expiration date: 2024  Treatment plan discussed with: patient      Subjective Evaluation    History of Present Illness  Date of onset: 7/15/2024  Mechanism of injury: Patient is a 69 y.o. RHD male who presents for OT IE and treatment for Left hand dupytrens release using xiaflex 7/15/24. Patient reports having ongoing progressive symptoms for last 2 years. Patient referred by Selena Nichols PA-C to initiate treatment including hand therapy.    Pain  Current pain ratin  At best pain ratin  At worst pain ratin      Objective     Active Range of Motion     Left Digits    Flexion   Ring     MCP: 78    PIP: 90    DIP: 35  Little     MCP: 85    PIP: 80    DIP: 31  Extension   Ring     MCP: -10    PIP: -35    DIP: 0  Little     MCP: -55    PIP: -8    DIP: 0             Auth Tracker  Auth Status Total   Visits  Expiration date Co-Insurance   pending                                  Visit Tracker  Date                                                                                         PMHx:   has a past medical history of Alcohol abuse, Alcoholism ([facility]), Marinelli esophagus, Marinelli's esophagus, Bleeding ulcer, COPD (chronic obstructive pulmonary disease) ([facility]), Coronary artery disease, Diverticulitis of colon, Fatty liver, GERD (gastroesophageal reflux disease), Heart failure ([facility]), HL (hearing loss), Inflammatory bowel disease, PMR (polymyalgia rheumatica) ([facility]), Temporal arteritis ([facility]), Terminal ileitis without complication ([facility]) (04/15/2024), and Visual impairment.    Precautions:        Manuals HEP 2024                       Ther Ex     Education on HEP and dx  x5min   AROM tendon glides x x10   AROM table top extension x x10   AROM digit add/abduction x x10   Towel scrunch   X10                        Ther Act                     Modalities     MHP  5 min           Assessment:   Patient tolerated session well. Patient demonstrates ROM deficits  upon assessment today. Patient session focused on patient education on anatomy and physiology concerning current dx, techniques for decreasing deficits through HEP, and appropriate use of modalities. Patient educated on HEP to include ROM TE  with verbal instructions and handouts for patient reference. Patient educated on treatment plan at this time. Patient benefiting from skilled hand therapy OT to reduce deficits to improve independence with daily activities.      Plan:   Focus on ROM to improve ability to complete daily activites with ease.  POC 7/19/24 - 9/27/24

## 2024-07-23 ENCOUNTER — TELEPHONE (OUTPATIENT)
Age: 69
End: 2024-07-23

## 2024-07-23 NOTE — TELEPHONE ENCOUNTER
Beatriz from [facility]is asking if most recent ov note, labs and EKG can be faxed to them at [phone] att: Beatriz. Thank you.

## 2024-08-07 ENCOUNTER — APPOINTMENT (OUTPATIENT)
Dept: OCCUPATIONAL THERAPY | Facility: CLINIC | Age: 69
End: 2024-08-07
Payer: MEDICARE

## 2024-08-12 ENCOUNTER — OFFICE VISIT (OUTPATIENT)
Dept: OBGYN CLINIC | Facility: CLINIC | Age: 69
End: 2024-08-12
Payer: MEDICARE

## 2024-08-12 VITALS — BODY MASS INDEX: 21.92 KG/M2 | HEIGHT: 69 IN | WEIGHT: 148 LBS

## 2024-08-12 DIAGNOSIS — M72.0 DUPUYTREN'S CONTRACTURE OF BOTH HANDS: Primary | ICD-10-CM

## 2024-08-12 PROCEDURE — 99214 OFFICE O/P EST MOD 30 MIN: CPT | Performed by: STUDENT IN AN ORGANIZED HEALTH CARE EDUCATION/TRAINING PROGRAM

## 2024-08-12 RX ORDER — CARVEDILOL 6.25 MG/1
6.25 TABLET ORAL 2 TIMES DAILY
COMMUNITY
Start: 2024-07-25 | End: 2025-07-25

## 2024-08-12 NOTE — PROGRESS NOTES
ORTHOPAEDIC HAND, WRIST, AND ELBOW OFFICE  VISIT      ASSESSMENT/PLAN:      Diagnoses and all orders for this visit:    Dupuytren's contracture of both hands    Other orders  -     carvedilol (COREG) 6.25 mg tablet; Take 6.25 mg by mouth 2 (two) times a day          69 y.o. male with left ring and small Dupuytren's disease     The patient underwent Xiaflex and manipulation with improvement. On exam, the patient has a palpable cord to the small finger which is causing an MCP contracture. Discussed we can consider performing Xiaflex again and 2 refills were ordered originally. The patient was agreeable to this. Discussed we will contact the patient once the medication is available.     Follow Up:        To Do Next Visit:  Xiaflex injection        Antolin Foster MD  Attending, Orthopaedic Surgery  Hand, Wrist, and Elbow Surgery  [facility]    ______________________________________________________________________________________________    CHIEF COMPLAINT:  Chief Complaint   Patient presents with   • Left Hand - Follow-up       SUBJECTIVE:  Patient is a 69 y.o. RHD male who presents today for follow up of left ring and small dupuytren's contracture. The patient underwent Xiaflex manipulation at his last visit on 7/15/24. He patient states he is doing well. He states the skin tear has healed.        I have personally reviewed all the relevant PMH, PSH, SH, FH, Medications and allergies      PAST MEDICAL HISTORY:  Past Medical History:   Diagnosis Date   • Alcohol abuse    • Alcoholism ([facility])    • Marinelli esophagus    • Marinelli's esophagus    • Bleeding ulcer    • COPD (chronic obstructive pulmonary disease) ([facility])     Hospital visit found   • Coronary artery disease     10 years   • Diverticulitis of colon    • Fatty liver    • GERD (gastroesophageal reflux disease)     Years   • Heart failure ([facility])    • HL (hearing loss)     Years, work related   • Inflammatory bowel disease     Years   • PMR  (polymyalgia rheumatica) (HCC)    • Temporal arteritis (HCC)    • Terminal ileitis without complication (HCC) 04/15/2024   • Visual impairment     Wears glasses       PAST SURGICAL HISTORY:  Past Surgical History:   Procedure Laterality Date   • COLONOSCOPY     • FRACTURE SURGERY      knee  left    • ROTATOR CUFF REPAIR      right   • TONSILLECTOMY         FAMILY HISTORY:  Family History   Problem Relation Age of Onset   • Heart failure Mother    • Alcohol abuse Father        SOCIAL HISTORY:  Social History     Tobacco Use   • Smoking status: Every Day     Current packs/day: 1.00     Average packs/day: 1 pack/day for 45.0 years (45.0 ttl pk-yrs)     Types: Cigarettes   • Smokeless tobacco: Never   Vaping Use   • Vaping status: Never Used   Substance Use Topics   • Alcohol use: Yes     Alcohol/week: 3.0 standard drinks of alcohol     Types: 3 Shots of liquor per week     Comment: Started again   • Drug use: Not Currently       MEDICATIONS:    Current Outpatient Medications:   •  carvedilol (COREG) 6.25 mg tablet, Take 6.25 mg by mouth 2 (two) times a day, Disp: , Rfl:   •  lisinopril (ZESTRIL) 20 mg tablet, Take 1 tablet (20 mg total) by mouth daily, Disp: 90 tablet, Rfl: 3  •  loperamide (IMODIUM) 2 mg capsule, TAKE 1 CAPSULE 4 TIMES     DAILY AS NEEDED FOR        DIARRHEA, Disp: 360 capsule, Rfl: 1  •  pantoprazole (PROTONIX) 40 mg tablet, TAKE 1 TABLET DAILY IN THE EARLY MORNING, Disp: 90 tablet, Rfl: 1  •  Thiamine Mononitrate (VITAMIN B1) 100 mg tablet, Take 1 tablet (100 mg total) by mouth daily, Disp: , Rfl:   •  folic acid (FOLVITE) 1 mg tablet, Take 1 tablet (1,000 mcg total) by mouth daily (Patient not taking: Reported on 7/5/2024), Disp: , Rfl:   •  hydrocortisone (ANUSOL-HC) 2.5 % rectal cream, Apply topically 4 (four) times a day as needed for hemorrhoids (Patient not taking: Reported on 5/16/2024), Disp: 28 g, Rfl: 0  •  pancrelipase, Lip-Prot-Amyl, (CREON) 24,000 units, Take 48,000 units of lipase by  "mouth 3 (three) times a day with meals, Disp: 180 capsule, Rfl: 2    ALLERGIES:  No Known Allergies        REVIEW OF SYSTEMS:  Musculoskeletal:        As noted in HPI.   All other systems reviewed and are negative.    VITALS:  There were no vitals filed for this visit.    LABS:  HgA1c: No results found for: \"HGBA1C\"  BMP:   Lab Results   Component Value Date    CALCIUM 10.2 07/02/2024    K 4.7 07/02/2024    CO2 30 07/02/2024     07/02/2024    BUN 11 07/02/2024    CREATININE 0.71 07/02/2024       _____________________________________________________  PHYSICAL EXAMINATION:  General: Well developed and well nourished, alert & oriented x 3, appears comfortable  Psychiatric: Normal  HEENT: Normocephalic, Atraumatic Trachea Midline, No torticollis  Pulmonary: No audible wheezing or respiratory distress   Abdomen/GI: Non tender, non distended   Cardiovascular: No pitting edema, 2+ radial pulse   Skin: No masses, erythema, lacerations, fluctation, ulcerations  Neurovascular: Sensation Intact to the Median, Ulnar, Radial Nerve, Motor Intact to the Median, Ulnar, Radial Nerve, and Pulses Intact  Musculoskeletal: Normal, except as noted in detailed exam and in HPI.      MUSCULOSKELETAL EXAMINATION:  Left hand  Ring PIP 30 shy full extension  Small PIP 25 shy full extension  Ring MCP good extension  Small MCP 55 shy full extension   ___________________________________________________  STUDIES REVIEWED:  Prior left hand x-rays reviewed  OT note reviewed  CBC 7/2/24 reviewed      PROCEDURES PERFORMED:  Procedures  No Procedures performed today    _____________________________________________________      Scribe Attestation    I,:  Norma Magana MA am acting as a scribe while in the presence of the attending physician.:       I,:  Antolin Foster MD personally performed the services described in this documentation    as scribed in my presence.:           "

## 2024-08-16 ENCOUNTER — PROCEDURE VISIT (OUTPATIENT)
Dept: OBGYN CLINIC | Facility: CLINIC | Age: 69
End: 2024-08-16
Payer: MEDICARE

## 2024-08-16 VITALS — HEIGHT: 69 IN | BODY MASS INDEX: 21.92 KG/M2 | WEIGHT: 148 LBS

## 2024-08-16 DIAGNOSIS — M72.0 DUPUYTREN'S CONTRACTURE OF BOTH HANDS: Primary | ICD-10-CM

## 2024-08-16 PROCEDURE — 20527 NJX NZM PALMAR FASCIAL CORD: CPT | Performed by: STUDENT IN AN ORGANIZED HEALTH CARE EDUCATION/TRAINING PROGRAM

## 2024-08-19 ENCOUNTER — OFFICE VISIT (OUTPATIENT)
Dept: OBGYN CLINIC | Facility: CLINIC | Age: 69
End: 2024-08-19
Payer: MEDICARE

## 2024-08-19 VITALS — HEIGHT: 69 IN | OXYGEN SATURATION: 100 % | HEART RATE: 64 BPM | WEIGHT: 148 LBS | BODY MASS INDEX: 21.92 KG/M2

## 2024-08-19 DIAGNOSIS — M72.0 DUPUYTREN'S CONTRACTURE OF LEFT HAND: ICD-10-CM

## 2024-08-19 PROCEDURE — 26341 MANIPULAT PALM CORD POST INJ: CPT | Performed by: STUDENT IN AN ORGANIZED HEALTH CARE EDUCATION/TRAINING PROGRAM

## 2024-08-19 RX ORDER — LIDOCAINE HYDROCHLORIDE 10 MG/ML
10 INJECTION, SOLUTION INFILTRATION; PERINEURAL
Status: COMPLETED | OUTPATIENT
Start: 2024-08-19 | End: 2024-08-19

## 2024-08-19 RX ADMIN — LIDOCAINE HYDROCHLORIDE 10 ML: 10 INJECTION, SOLUTION INFILTRATION; PERINEURAL at 12:30

## 2024-08-19 NOTE — PROGRESS NOTES
ORTHOPAEDIC HAND, WRIST, AND ELBOW OFFICE  VISIT      ASSESSMENT/PLAN:      Diagnoses and all orders for this visit:    Dupuytren's contracture of left hand  -     Hand/upper extremity injection: L small finger  -     lidocaine (XYLOCAINE) 1 % injection 10 mL          69 y.o. male with left small Finger Dupuyren's disease  Pt s/p Xiaflex injx this past Friday.   At this time, local was applied to the area and manipulation performed.   Pt's ROM improved s/p manipulation.  Pt to go to therapy to get extension splint adjusted tomorrow.      Follow Up:  6 weeks       To Do Next Visit:  Re-evaluation of current issue          Antolin Foster MD  Attending, Orthopaedic Surgery  Hand, Wrist, and Elbow Surgery  [facility]    ______________________________________________________________________________________________    CHIEF COMPLAINT:  Chief Complaint   Patient presents with   • Left Hand - Follow-up       SUBJECTIVE:  Patient is a 69 y.o. RHD male who presents today for follow up of left hand Dupuytren's contracture. Pt has Xiaflex and manipulation performed on 7/12 with subsequent repeat Xiaflex injx to the small finger this past Friday. Pt states no changes, just that he did have what felt like itching this time around with the injxs.        I have personally reviewed all the relevant PMH, PSH, SH, FH, Medications and allergies      PAST MEDICAL HISTORY:  Past Medical History:   Diagnosis Date   • Alcohol abuse    • Alcoholism ([facility])    • Marinelli esophagus    • Marinelli's esophagus    • Bleeding ulcer    • COPD (chronic obstructive pulmonary disease) ([facility])     Hospital visit found   • Coronary artery disease     10 years   • Diverticulitis of colon    • Fatty liver    • GERD (gastroesophageal reflux disease)     Years   • Heart failure ([facility])    • HL (hearing loss)     Years, work related   • Inflammatory bowel disease     Years   • PMR (polymyalgia rheumatica) ([facility])    • Temporal arteritis ([facility])     • Terminal ileitis without complication (HCC) 04/15/2024   • Visual impairment     Wears glasses       PAST SURGICAL HISTORY:  Past Surgical History:   Procedure Laterality Date   • COLONOSCOPY     • FRACTURE SURGERY      knee  left    • ROTATOR CUFF REPAIR      right   • TONSILLECTOMY         FAMILY HISTORY:  Family History   Problem Relation Age of Onset   • Heart failure Mother    • Alcohol abuse Father        SOCIAL HISTORY:  Social History     Tobacco Use   • Smoking status: Every Day     Current packs/day: 1.00     Average packs/day: 1 pack/day for 45.0 years (45.0 ttl pk-yrs)     Types: Cigarettes   • Smokeless tobacco: Never   Vaping Use   • Vaping status: Never Used   Substance Use Topics   • Alcohol use: Yes     Alcohol/week: 3.0 standard drinks of alcohol     Types: 3 Shots of liquor per week     Comment: Started again   • Drug use: Not Currently       MEDICATIONS:    Current Outpatient Medications:   •  carvedilol (COREG) 6.25 mg tablet, Take 6.25 mg by mouth 2 (two) times a day, Disp: , Rfl:   •  lisinopril (ZESTRIL) 20 mg tablet, Take 1 tablet (20 mg total) by mouth daily, Disp: 90 tablet, Rfl: 3  •  loperamide (IMODIUM) 2 mg capsule, TAKE 1 CAPSULE 4 TIMES     DAILY AS NEEDED FOR        DIARRHEA, Disp: 360 capsule, Rfl: 1  •  pantoprazole (PROTONIX) 40 mg tablet, TAKE 1 TABLET DAILY IN THE EARLY MORNING, Disp: 90 tablet, Rfl: 1  •  Thiamine Mononitrate (VITAMIN B1) 100 mg tablet, Take 1 tablet (100 mg total) by mouth daily, Disp: , Rfl:   •  folic acid (FOLVITE) 1 mg tablet, Take 1 tablet (1,000 mcg total) by mouth daily (Patient not taking: Reported on 7/5/2024), Disp: , Rfl:   •  hydrocortisone (ANUSOL-HC) 2.5 % rectal cream, Apply topically 4 (four) times a day as needed for hemorrhoids (Patient not taking: Reported on 5/16/2024), Disp: 28 g, Rfl: 0  •  pancrelipase, Lip-Prot-Amyl, (CREON) 24,000 units, Take 48,000 units of lipase by mouth 3 (three) times a day with meals, Disp: 180 capsule,  "Rfl: 2  No current facility-administered medications for this visit.    ALLERGIES:  No Known Allergies        REVIEW OF SYSTEMS:  Musculoskeletal:        As noted in HPI.   All other systems reviewed and are negative.    VITALS:  Vitals:    08/19/24 1243   Pulse: 64   SpO2: 100%       LABS:  HgA1c: No results found for: \"HGBA1C\"  BMP:   Lab Results   Component Value Date    CALCIUM 10.2 07/02/2024    K 4.7 07/02/2024    CO2 30 07/02/2024     07/02/2024    BUN 11 07/02/2024    CREATININE 0.71 07/02/2024       _____________________________________________________  PHYSICAL EXAMINATION:  General: Well developed and well nourished, alert & oriented x 3, appears comfortable  Psychiatric: Normal  HEENT: Normocephalic, Atraumatic Trachea Midline, No torticollis  Pulmonary: No audible wheezing or respiratory distress   Abdomen/GI: Non tender, non distended   Cardiovascular: No pitting edema, 2+ radial pulse   Skin: No Erythema, No Lacerations, No Fluctuation, No Ulcerations  Neurovascular: Sensation Intact to the Median, Ulnar, Radial Nerve, Motor Intact to the Median, Ulnar, Radial Nerve, and Pulses Intact  Musculoskeletal: Normal, except as noted in detailed exam and in HPI.      MUSCULOSKELETAL EXAMINATION:  Left hand:      No erythema, ecchymosis or edema   Ring finger PIP 30 degrees shy of full extension   Ring finger MCP good extension   Small finger PIP 25 degrees shy of full extension   Small MCP 55 degrees shy of full extension     POST MANIPULATION:  Ring Finger PIP 30 shy full extension actively.  Ring finger MCP good extension actively.  Small finger PIP 15 shy full extension actively.  Small finger MCP 15 shy full extension actively. Passively this can be straightened fully.  FDS/FDP intact index-small fingers.    ___________________________________________________  STUDIES REVIEWED:  No new studies to review         PROCEDURES PERFORMED:  Hand/upper extremity injection: L small finger  Universal " Protocol:  Consent: Verbal consent obtained.  Risks and benefits: risks, benefits and alternatives were discussed  Consent given by: patient  Patient understanding: patient states understanding of the procedure being performed  Patient identity confirmed: verbally with patient  Supporting Documentation  Indications: therapeutic   Procedure Details  Condition:Dupuytren's contracture Dupuytren's Contracture Procedure: Dupuytren's contracture manipulationSite: L small finger   Needle size: 25 G  Approach: volar  Medications administered: 10 mL lidocaine 1 %  Patient tolerance: patient tolerated the procedure well with no immediate complications  Dressing:  Sterile dressing applied              _____________________________________________________      Scribe Attestation    I,:  Selena Nichols PA-C am acting as a scribe while in the presence of the attending physician.:       I,:  Antolin Foster MD personally performed the services described in this documentation    as scribed in my presence.:

## 2024-08-20 ENCOUNTER — OFFICE VISIT (OUTPATIENT)
Dept: OCCUPATIONAL THERAPY | Facility: CLINIC | Age: 69
End: 2024-08-20
Payer: MEDICARE

## 2024-08-20 DIAGNOSIS — M72.0 DUPUYTREN'S CONTRACTURE: Primary | ICD-10-CM

## 2024-08-20 PROCEDURE — L3913 HFO W/O JOINTS CF: HCPCS

## 2024-08-20 NOTE — PROGRESS NOTES
Orthosis    Diagnosis:   1. Dupuytren's contracture          Indication: Motion Blocking    Location: Left  ring finger and small finger  Supplies: Custom Fit Orthotic  Orthosis type:  night time extension splint  Wearing Schedule: Night only  Describe Position: extension    Precautions: Universal (skin contact/breakdown)    Patient or Caregiver expresses understanding of wearing Schedule and Precautions? Yes  Patient or Caregiver able to don/doff orthotic independently?Yes    Written orders provided to patient? No  Orders Obtained: Written  Orders Obtained from: Selena Nichols PA-C    Return for evaluation and treatment No

## 2024-09-17 ENCOUNTER — AMB VIDEO VISIT (OUTPATIENT)
Dept: OTHER | Facility: HOSPITAL | Age: 69
End: 2024-09-17

## 2024-09-18 NOTE — CARE ANYWHERE EVISITS
Visit Summary for GODFREY STODDARD - Gender: Male - Date of Birth: 1955  Date: [id number] - Duration: 8 minutes  Patient: GODFREY STODDARD  Provider: Jeanmarie Joiner    Patient Contact Information  Address  [address] [place] [address]  [phone]    Visit Topics  temporal arteritis returned,   [Added By: Self - 2024-09-17]    Triage Questions   What is your current physical address in the event of a medical emergency? Answer []  Are you allergic to any medications? Answer []  Are you now or could you be pregnant? Answer []  Do you have any immune system compromise or chronic lung   disease? Answer []  Do you have any vulnerable family members in the home (infant, pregnant, cancer, elderly)? Answer []     Conversation Transcripts  [0A][0A] [Notification] You are connected with Jeanmarie Joiner, Emergency Medicine.[0A][Notification] GODFREY STODDARD is located in [place].[0A][Notification] GODFREY STODDARD has shared health history...[0A][Notification] Jeanmarie Joiner has added a   diagnosis/procedure code.[0A][Notification] Jeanmarie Joiner has added a prescription.[0A]    Diagnosis  Giant cell arteritis with polymyalgia rheumatica    Procedures  Value: [id number] Code: CPT-4 OL DIG E/M SVC 11-20 MIN    Medications Prescribed    prednisone      Frequency :   Patient Instructions : Take 4 tablets for four days, then 3 tablets for three days, then 2 tablets for three days, then 1 tablet for three days  Refills : 0  Instructions to the Pharmacist : Substitutions allowed      Provider Notes  [0A][0A] Mode of Communication: Telemedicine Visit  HPI:  This is a pleasant 69-year-old male with concern for recurrence of temporal arteritis. The report he has had this diagnosis in the past, and the symptoms are just the same. He and his wife report   some mild symptoms of the past few days that have significantly worsened today. He is complaining of severe pain located to his right temple. The pain is tender to touch. He has had no  fever or chills. He does have a history of COPD and congestive heart   failure. He also has a history of polymyalgia rheumatica with giant cell arteritis.[0A]PMH:           Reviewed-Polymyalgia rheumatica with giant cell arteritis, Temporal arteritis, congestive heart failure, COPDPSH:  Meds:       Reviewed-Carvedilol,   cholestyramine,Allergies:     Reviewed-no reported drug allergies Exam: Gen:           Alert, normal mental status and interaction, no distress. HEENT:      Vision, speech, and hearing grossly intact. Neuro:       Pain and internist to palpation over   right temple and scalp.Neck:         Supple.Resp:         Respirations are clear and unlabored. Skin:           No rashes or lesions appreciated. Assessment:  Temporal arteritis[0A]Plan: MDM:        The patient and his wife are requesting initiation of   steroid taper while they get into see their doctor. Based on potential severity of complications, this will be given. 1.               Prednisone 10 mg Take 4 tablets for four days, then 3 tablets for three days, then 2 tablets for three days, then 1   tablet for three days.2.               Discussed precautions. Follow up with your doctor as discussed. Follow up:1.               If there are any questions or problems with the prescription, call support anytime for assistance. 2.               Please   re-connect for another online visit or see an in-person provider should your symptoms worsen or persist. 3.               Taking a probiotic (either in pill form or by eating yogurt that contains probiotics) while using antibiotics can help prevent some   of the troublesome side effects that antibiotics can sometimes cause.4.               Please print a copy of this note and send it to your regular doctor, or take it to your next visit so it may be included in your medical record.  The patient voiced   understanding and agreed with the plan.  Please see your PCP on an annual basis. This document was  dictated using voice recognition software. Despite editing and review by me, there may be minor grammatical and/or minor typographical errors in this   transcription due to the limitations inherent with voice recognition computer dictation. This should not adversely affect the overall integrity of the document.  By submitting this visit report, I attest to performing the history and exam described and   advising the patient on the treatment plan included.  Verified by Jeanmarie Joiner DO, MS, FACEP, FACOEP on 9/16/24.   [0A]    Electronically signed by: Jeanmarie Joiner(NPI [id number])

## 2024-09-20 ENCOUNTER — TELEPHONE (OUTPATIENT)
Age: 69
End: 2024-09-20

## 2024-09-20 NOTE — TELEPHONE ENCOUNTER
Pts wife Diann called to say the pt is experiencing left temporal pain (temporal arteritis) with some vision issues. He did a AMB video visit on 9/17/24. Pt is still taking the prednisone prescribed. She is concerned that as he tapers down on the dose, his pain will return as it has in the past. She made the first available appt for 9/25/24 with Dr Gardner but would like a provider to review his issue as soon as possible.

## 2024-09-23 NOTE — TELEPHONE ENCOUNTER
Spoke w wife/pt last Friday at time of message and advised to cont w steroids and to go to ED for further eval if sxs worsened prior to appt.  Pt unknown to me-is followed by another provider in practice. Pt/pt's wife state that he was diagnosed many yrs ago with the condition though never had biopsy done for confirmation.   Will address further at appt.

## 2024-09-25 ENCOUNTER — OFFICE VISIT (OUTPATIENT)
Dept: FAMILY MEDICINE CLINIC | Facility: CLINIC | Age: 69
End: 2024-09-25
Payer: MEDICARE

## 2024-09-25 VITALS
TEMPERATURE: 97.2 F | WEIGHT: 149.2 LBS | BODY MASS INDEX: 22.1 KG/M2 | SYSTOLIC BLOOD PRESSURE: 120 MMHG | HEIGHT: 69 IN | RESPIRATION RATE: 18 BRPM | DIASTOLIC BLOOD PRESSURE: 60 MMHG | OXYGEN SATURATION: 97 % | HEART RATE: 65 BPM

## 2024-09-25 DIAGNOSIS — Z87.39 HISTORY OF TEMPORAL ARTERITIS: ICD-10-CM

## 2024-09-25 DIAGNOSIS — M35.3 PMR (POLYMYALGIA RHEUMATICA) (HCC): Primary | ICD-10-CM

## 2024-09-25 PROCEDURE — G2211 COMPLEX E/M VISIT ADD ON: HCPCS | Performed by: FAMILY MEDICINE

## 2024-09-25 PROCEDURE — 99213 OFFICE O/P EST LOW 20 MIN: CPT | Performed by: FAMILY MEDICINE

## 2024-09-25 RX ORDER — PREDNISONE 10 MG/1
10 TABLET ORAL
COMMUNITY
Start: 2024-09-17

## 2024-09-25 RX ORDER — CARVEDILOL PHOSPHATE 20 MG/1
20 CAPSULE, EXTENDED RELEASE ORAL DAILY
COMMUNITY
Start: 2024-09-10

## 2024-10-03 NOTE — PROGRESS NOTES
"Assessment/Plan:    1. PMR (polymyalgia rheumatica) ([facility])  2. History of temporal arteritis       Objective:      Patient ID: Del Ocampo is a 69 y.o. male.    Chief Complaint   Patient presents with    temporal artitis    Rash       HPI  The following portions of the patient's history were reviewed and updated as appropriate: allergies, current medications, past family history, past medical history, past social history, past surgical history and problem list.    Review of Systems      Current Outpatient Medications   Medication Sig Dispense Refill    carvedilol (COREG CR) 20 MG 24 hr capsule Take 20 mg by mouth daily      folic acid (FOLVITE) 1 mg tablet Take 1 tablet (1,000 mcg total) by mouth daily      hydrocortisone (ANUSOL-HC) 2.5 % rectal cream Apply topically 4 (four) times a day as needed for hemorrhoids 28 g 0    loperamide (IMODIUM) 2 mg capsule TAKE 1 CAPSULE 4 TIMES     DAILY AS NEEDED FOR        DIARRHEA 360 capsule 1    pantoprazole (PROTONIX) 40 mg tablet TAKE 1 TABLET DAILY IN THE EARLY MORNING 90 tablet 1    predniSONE 10 mg tablet Take 10 mg by mouth As directed      sacubitril-valsartan (ENTRESTO) 24-26 MG TABS Take 1 tablet by mouth 2 (two) times a day      Thiamine Mononitrate (VITAMIN B1) 100 mg tablet Take 1 tablet (100 mg total) by mouth daily       No current facility-administered medications for this visit.       Objective:    /60 (BP Location: Left arm, Patient Position: Sitting, Cuff Size: Standard)   Pulse 65   Temp (!) 97.2 °F (36.2 °C) (Temporal)   Resp 18   Ht 5' 8.5\" (1.74 m)   Wt 67.7 kg (149 lb 3.2 oz)   SpO2 97%   BMI 22.36 kg/m²        Physical Exam           Janice Gardner MD  "

## 2024-11-22 ENCOUNTER — HOSPITAL ENCOUNTER (INPATIENT)
Facility: HOSPITAL | Age: 69
LOS: 1 days | Discharge: HOME | DRG: 292 | End: 2024-11-23
Attending: EMERGENCY MEDICINE | Admitting: FAMILY MEDICINE
Payer: MEDICARE

## 2024-11-22 ENCOUNTER — APPOINTMENT (INPATIENT)
Dept: RADIOLOGY | Facility: HOSPITAL | Age: 69
DRG: 292 | End: 2024-11-22
Payer: MEDICARE

## 2024-11-22 ENCOUNTER — APPOINTMENT (EMERGENCY)
Dept: RADIOLOGY | Facility: HOSPITAL | Age: 69
DRG: 292 | End: 2024-11-22
Payer: MEDICARE

## 2024-11-22 DIAGNOSIS — I50.43 ACUTE ON CHRONIC COMBINED SYSTOLIC AND DIASTOLIC CONGESTIVE HEART FAILURE (HCC): ICD-10-CM

## 2024-11-22 DIAGNOSIS — R06.02 SHORTNESS OF BREATH: ICD-10-CM

## 2024-11-22 DIAGNOSIS — I50.9 CHF EXACERBATION (HCC): Primary | ICD-10-CM

## 2024-11-22 PROBLEM — R79.89 ELEVATED TROPONIN: Status: ACTIVE | Noted: 2024-11-22

## 2024-11-22 PROBLEM — R06.89 ACUTE RESPIRATORY INSUFFICIENCY: Status: ACTIVE | Noted: 2024-11-22

## 2024-11-22 PROBLEM — R17 SERUM TOTAL BILIRUBIN ELEVATED: Status: ACTIVE | Noted: 2024-11-22

## 2024-11-22 PROBLEM — R21 RASH: Status: ACTIVE | Noted: 2024-11-22

## 2024-11-22 LAB
2HR DELTA HS TROPONIN: 5 NG/L
4HR DELTA HS TROPONIN: -3 NG/L
ALBUMIN SERPL BCG-MCNC: 3.3 G/DL (ref 3.5–5)
ALP SERPL-CCNC: 122 U/L (ref 34–104)
ALT SERPL W P-5'-P-CCNC: 20 U/L (ref 7–52)
ANION GAP SERPL CALCULATED.3IONS-SCNC: 12 MMOL/L (ref 4–13)
ANION GAP SERPL CALCULATED.3IONS-SCNC: 17 MMOL/L (ref 4–13)
AST SERPL W P-5'-P-CCNC: 33 U/L (ref 13–39)
BASOPHILS # BLD AUTO: 0.06 THOUSANDS/ÂΜL (ref 0–0.1)
BASOPHILS NFR BLD AUTO: 1 % (ref 0–1)
BILIRUB SERPL-MCNC: 1.68 MG/DL (ref 0.2–1)
BNP SERPL-MCNC: 1935 PG/ML (ref 0–100)
BUN SERPL-MCNC: 5 MG/DL (ref 5–25)
BUN SERPL-MCNC: 5 MG/DL (ref 5–25)
CALCIUM ALBUM COR SERPL-MCNC: 9.1 MG/DL (ref 8.3–10.1)
CALCIUM SERPL-MCNC: 8.1 MG/DL (ref 8.4–10.2)
CALCIUM SERPL-MCNC: 8.5 MG/DL (ref 8.4–10.2)
CARDIAC TROPONIN I PNL SERPL HS: 53 NG/L (ref ?–50)
CARDIAC TROPONIN I PNL SERPL HS: 56 NG/L (ref ?–50)
CARDIAC TROPONIN I PNL SERPL HS: 61 NG/L (ref ?–50)
CHLORIDE SERPL-SCNC: 101 MMOL/L (ref 96–108)
CHLORIDE SERPL-SCNC: 105 MMOL/L (ref 96–108)
CO2 SERPL-SCNC: 23 MMOL/L (ref 21–32)
CO2 SERPL-SCNC: 28 MMOL/L (ref 21–32)
CREAT SERPL-MCNC: 0.61 MG/DL (ref 0.6–1.3)
CREAT SERPL-MCNC: 0.66 MG/DL (ref 0.6–1.3)
D DIMER PPP FEU-MCNC: 1.56 UG/ML FEU
EOSINOPHIL # BLD AUTO: 0 THOUSAND/ÂΜL (ref 0–0.61)
EOSINOPHIL NFR BLD AUTO: 0 % (ref 0–6)
ERYTHROCYTE [DISTWIDTH] IN BLOOD BY AUTOMATED COUNT: 13.3 % (ref 11.6–15.1)
FLUAV RNA RESP QL NAA+PROBE: NEGATIVE
FLUBV RNA RESP QL NAA+PROBE: NEGATIVE
GFR SERPL CREATININE-BSD FRML MDRD: 100 ML/MIN/1.73SQ M
GFR SERPL CREATININE-BSD FRML MDRD: 97 ML/MIN/1.73SQ M
GLUCOSE SERPL-MCNC: 102 MG/DL (ref 65–140)
GLUCOSE SERPL-MCNC: 136 MG/DL (ref 65–140)
HCT VFR BLD AUTO: 39.5 % (ref 36.5–49.3)
HGB BLD-MCNC: 12.9 G/DL (ref 12–17)
IMM GRANULOCYTES # BLD AUTO: 0.04 THOUSAND/UL (ref 0–0.2)
IMM GRANULOCYTES NFR BLD AUTO: 0 % (ref 0–2)
LYMPHOCYTES # BLD AUTO: 1.1 THOUSANDS/ÂΜL (ref 0.6–4.47)
LYMPHOCYTES NFR BLD AUTO: 11 % (ref 14–44)
MAGNESIUM SERPL-MCNC: 1.5 MG/DL (ref 1.9–2.7)
MCH RBC QN AUTO: 36 PG (ref 26.8–34.3)
MCHC RBC AUTO-ENTMCNC: 32.7 G/DL (ref 31.4–37.4)
MCV RBC AUTO: 110 FL (ref 82–98)
MONOCYTES # BLD AUTO: 0.94 THOUSAND/ÂΜL (ref 0.17–1.22)
MONOCYTES NFR BLD AUTO: 9 % (ref 4–12)
NEUTROPHILS # BLD AUTO: 8.29 THOUSANDS/ÂΜL (ref 1.85–7.62)
NEUTS SEG NFR BLD AUTO: 79 % (ref 43–75)
NRBC BLD AUTO-RTO: 0 /100 WBCS
PLATELET # BLD AUTO: 215 THOUSANDS/UL (ref 149–390)
PMV BLD AUTO: 10.6 FL (ref 8.9–12.7)
POTASSIUM SERPL-SCNC: 3.6 MMOL/L (ref 3.5–5.3)
POTASSIUM SERPL-SCNC: 3.7 MMOL/L (ref 3.5–5.3)
PROT SERPL-MCNC: 5.7 G/DL (ref 6.4–8.4)
RBC # BLD AUTO: 3.58 MILLION/UL (ref 3.88–5.62)
RSV RNA RESP QL NAA+PROBE: NEGATIVE
SARS-COV-2 RNA RESP QL NAA+PROBE: NEGATIVE
SODIUM SERPL-SCNC: 141 MMOL/L (ref 135–147)
SODIUM SERPL-SCNC: 145 MMOL/L (ref 135–147)
WBC # BLD AUTO: 10.43 THOUSAND/UL (ref 4.31–10.16)

## 2024-11-22 PROCEDURE — 99222 1ST HOSP IP/OBS MODERATE 55: CPT | Performed by: FAMILY MEDICINE

## 2024-11-22 PROCEDURE — 96374 THER/PROPH/DIAG INJ IV PUSH: CPT

## 2024-11-22 PROCEDURE — 83880 ASSAY OF NATRIURETIC PEPTIDE: CPT | Performed by: EMERGENCY MEDICINE

## 2024-11-22 PROCEDURE — 85025 COMPLETE CBC W/AUTO DIFF WBC: CPT | Performed by: EMERGENCY MEDICINE

## 2024-11-22 PROCEDURE — 99222 1ST HOSP IP/OBS MODERATE 55: CPT | Performed by: INTERNAL MEDICINE

## 2024-11-22 PROCEDURE — 99285 EMERGENCY DEPT VISIT HI MDM: CPT | Performed by: EMERGENCY MEDICINE

## 2024-11-22 PROCEDURE — 97167 OT EVAL HIGH COMPLEX 60 MIN: CPT

## 2024-11-22 PROCEDURE — 80048 BASIC METABOLIC PNL TOTAL CA: CPT | Performed by: NURSE PRACTITIONER

## 2024-11-22 PROCEDURE — 99285 EMERGENCY DEPT VISIT HI MDM: CPT

## 2024-11-22 PROCEDURE — 36415 COLL VENOUS BLD VENIPUNCTURE: CPT | Performed by: EMERGENCY MEDICINE

## 2024-11-22 PROCEDURE — 80053 COMPREHEN METABOLIC PANEL: CPT | Performed by: EMERGENCY MEDICINE

## 2024-11-22 PROCEDURE — 84484 ASSAY OF TROPONIN QUANT: CPT | Performed by: EMERGENCY MEDICINE

## 2024-11-22 PROCEDURE — 83735 ASSAY OF MAGNESIUM: CPT | Performed by: NURSE PRACTITIONER

## 2024-11-22 PROCEDURE — 71045 X-RAY EXAM CHEST 1 VIEW: CPT

## 2024-11-22 PROCEDURE — 0241U HB NFCT DS VIR RESP RNA 4 TRGT: CPT | Performed by: NURSE PRACTITIONER

## 2024-11-22 PROCEDURE — 85379 FIBRIN DEGRADATION QUANT: CPT | Performed by: NURSE PRACTITIONER

## 2024-11-22 PROCEDURE — 84484 ASSAY OF TROPONIN QUANT: CPT | Performed by: NURSE PRACTITIONER

## 2024-11-22 PROCEDURE — 93970 EXTREMITY STUDY: CPT

## 2024-11-22 PROCEDURE — 97163 PT EVAL HIGH COMPLEX 45 MIN: CPT

## 2024-11-22 PROCEDURE — 71275 CT ANGIOGRAPHY CHEST: CPT

## 2024-11-22 PROCEDURE — 97110 THERAPEUTIC EXERCISES: CPT

## 2024-11-22 PROCEDURE — 93970 EXTREMITY STUDY: CPT | Performed by: SURGERY

## 2024-11-22 RX ORDER — FAMOTIDINE 20 MG/1
20 TABLET, FILM COATED ORAL
Status: DISCONTINUED | OUTPATIENT
Start: 2024-11-22 | End: 2024-11-23 | Stop reason: HOSPADM

## 2024-11-22 RX ORDER — MAGNESIUM SULFATE HEPTAHYDRATE 40 MG/ML
4 INJECTION, SOLUTION INTRAVENOUS ONCE
Status: COMPLETED | OUTPATIENT
Start: 2024-11-22 | End: 2024-11-22

## 2024-11-22 RX ORDER — METHYLPREDNISOLONE SODIUM SUCCINATE 40 MG/ML
40 INJECTION, POWDER, LYOPHILIZED, FOR SOLUTION INTRAMUSCULAR; INTRAVENOUS DAILY
Status: DISCONTINUED | OUTPATIENT
Start: 2024-11-22 | End: 2024-11-22

## 2024-11-22 RX ORDER — POTASSIUM CHLORIDE 1500 MG/1
40 TABLET, EXTENDED RELEASE ORAL ONCE
Status: COMPLETED | OUTPATIENT
Start: 2024-11-22 | End: 2024-11-22

## 2024-11-22 RX ORDER — LANOLIN ALCOHOL/MO/W.PET/CERES
100 CREAM (GRAM) TOPICAL DAILY
Status: DISCONTINUED | OUTPATIENT
Start: 2024-11-22 | End: 2024-11-23 | Stop reason: HOSPADM

## 2024-11-22 RX ORDER — ONDANSETRON 2 MG/ML
4 INJECTION INTRAMUSCULAR; INTRAVENOUS EVERY 6 HOURS PRN
Status: DISCONTINUED | OUTPATIENT
Start: 2024-11-22 | End: 2024-11-22

## 2024-11-22 RX ORDER — FUROSEMIDE 10 MG/ML
40 INJECTION INTRAMUSCULAR; INTRAVENOUS
Status: DISCONTINUED | OUTPATIENT
Start: 2024-11-22 | End: 2024-11-23

## 2024-11-22 RX ORDER — ACETAMINOPHEN 325 MG/1
650 TABLET ORAL EVERY 6 HOURS PRN
Status: DISCONTINUED | OUTPATIENT
Start: 2024-11-22 | End: 2024-11-23 | Stop reason: HOSPADM

## 2024-11-22 RX ORDER — MAGNESIUM SULFATE 1 G/100ML
1 INJECTION INTRAVENOUS ONCE
Status: COMPLETED | OUTPATIENT
Start: 2024-11-22 | End: 2024-11-22

## 2024-11-22 RX ORDER — METHYLPREDNISOLONE SODIUM SUCCINATE 40 MG/ML
40 INJECTION, POWDER, LYOPHILIZED, FOR SOLUTION INTRAMUSCULAR; INTRAVENOUS DAILY
Status: DISCONTINUED | OUTPATIENT
Start: 2024-11-22 | End: 2024-11-23

## 2024-11-22 RX ORDER — METHYLPREDNISOLONE SODIUM SUCCINATE 40 MG/ML
40 INJECTION, POWDER, LYOPHILIZED, FOR SOLUTION INTRAMUSCULAR; INTRAVENOUS DAILY
Status: DISCONTINUED | OUTPATIENT
Start: 2024-11-23 | End: 2024-11-22

## 2024-11-22 RX ORDER — PANTOPRAZOLE SODIUM 40 MG/1
40 TABLET, DELAYED RELEASE ORAL
Status: DISCONTINUED | OUTPATIENT
Start: 2024-11-22 | End: 2024-11-23 | Stop reason: HOSPADM

## 2024-11-22 RX ORDER — LEVALBUTEROL INHALATION SOLUTION 0.63 MG/3ML
0.63 SOLUTION RESPIRATORY (INHALATION) EVERY 6 HOURS PRN
Status: DISCONTINUED | OUTPATIENT
Start: 2024-11-22 | End: 2024-11-23 | Stop reason: HOSPADM

## 2024-11-22 RX ORDER — CARVEDILOL PHOSPHATE 20 MG/1
20 CAPSULE, EXTENDED RELEASE ORAL DAILY
Status: DISCONTINUED | OUTPATIENT
Start: 2024-11-22 | End: 2024-11-22

## 2024-11-22 RX ORDER — FUROSEMIDE 10 MG/ML
40 INJECTION INTRAMUSCULAR; INTRAVENOUS ONCE
Status: COMPLETED | OUTPATIENT
Start: 2024-11-22 | End: 2024-11-22

## 2024-11-22 RX ORDER — ENOXAPARIN SODIUM 100 MG/ML
40 INJECTION SUBCUTANEOUS DAILY
Status: DISCONTINUED | OUTPATIENT
Start: 2024-11-22 | End: 2024-11-23 | Stop reason: HOSPADM

## 2024-11-22 RX ORDER — LOPERAMIDE HYDROCHLORIDE 2 MG/1
2 CAPSULE ORAL 4 TIMES DAILY PRN
Status: DISCONTINUED | OUTPATIENT
Start: 2024-11-22 | End: 2024-11-23 | Stop reason: HOSPADM

## 2024-11-22 RX ORDER — NICOTINE 21 MG/24HR
1 PATCH, TRANSDERMAL 24 HOURS TRANSDERMAL DAILY
Status: DISCONTINUED | OUTPATIENT
Start: 2024-11-22 | End: 2024-11-23 | Stop reason: HOSPADM

## 2024-11-22 RX ORDER — CARVEDILOL 6.25 MG/1
6.25 TABLET ORAL 2 TIMES DAILY WITH MEALS
Status: DISCONTINUED | OUTPATIENT
Start: 2024-11-22 | End: 2024-11-23 | Stop reason: HOSPADM

## 2024-11-22 RX ORDER — FUROSEMIDE 10 MG/ML
40 INJECTION INTRAMUSCULAR; INTRAVENOUS DAILY
Status: DISCONTINUED | OUTPATIENT
Start: 2024-11-22 | End: 2024-11-22

## 2024-11-22 RX ADMIN — FUROSEMIDE 40 MG: 10 INJECTION, SOLUTION INTRAMUSCULAR; INTRAVENOUS at 03:31

## 2024-11-22 RX ADMIN — MAGNESIUM SULFATE HEPTAHYDRATE 1 G: 1 INJECTION, SOLUTION INTRAVENOUS at 05:51

## 2024-11-22 RX ADMIN — POTASSIUM CHLORIDE 40 MEQ: 1500 TABLET, EXTENDED RELEASE ORAL at 03:31

## 2024-11-22 RX ADMIN — METHYLPREDNISOLONE SODIUM SUCCINATE 40 MG: 40 INJECTION, POWDER, FOR SOLUTION INTRAMUSCULAR; INTRAVENOUS at 08:36

## 2024-11-22 RX ADMIN — IOHEXOL 85 ML: 350 INJECTION, SOLUTION INTRAVENOUS at 06:33

## 2024-11-22 RX ADMIN — SACUBITRIL AND VALSARTAN 1 TABLET: 24; 26 TABLET, FILM COATED ORAL at 17:57

## 2024-11-22 RX ADMIN — POTASSIUM CHLORIDE 40 MEQ: 1500 TABLET, EXTENDED RELEASE ORAL at 08:37

## 2024-11-22 RX ADMIN — ENOXAPARIN SODIUM 40 MG: 40 INJECTION SUBCUTANEOUS at 08:35

## 2024-11-22 RX ADMIN — CYANOCOBALAMIN TAB 500 MCG 1000 MCG: 500 TAB at 08:38

## 2024-11-22 RX ADMIN — EMPAGLIFLOZIN 10 MG: 10 TABLET, FILM COATED ORAL at 12:17

## 2024-11-22 RX ADMIN — MAGNESIUM SULFATE HEPTAHYDRATE 4 G: 40 INJECTION, SOLUTION INTRAVENOUS at 08:34

## 2024-11-22 RX ADMIN — CARVEDILOL 6.25 MG: 6.25 TABLET, FILM COATED ORAL at 17:42

## 2024-11-22 RX ADMIN — THIAMINE HCL TAB 100 MG 100 MG: 100 TAB at 08:38

## 2024-11-22 RX ADMIN — FUROSEMIDE 40 MG: 10 INJECTION, SOLUTION INTRAMUSCULAR; INTRAVENOUS at 08:36

## 2024-11-22 RX ADMIN — FUROSEMIDE 40 MG: 10 INJECTION, SOLUTION INTRAMUSCULAR; INTRAVENOUS at 17:42

## 2024-11-22 RX ADMIN — NICOTINE 1 PATCH: 21 PATCH, EXTENDED RELEASE TRANSDERMAL at 08:35

## 2024-11-22 RX ADMIN — FAMOTIDINE 20 MG: 20 TABLET, FILM COATED ORAL at 22:34

## 2024-11-22 RX ADMIN — SACUBITRIL AND VALSARTAN 1 TABLET: 24; 26 TABLET, FILM COATED ORAL at 08:42

## 2024-11-22 RX ADMIN — CARVEDILOL 6.25 MG: 6.25 TABLET, FILM COATED ORAL at 08:37

## 2024-11-22 RX ADMIN — PANTOPRAZOLE SODIUM 40 MG: 40 TABLET, DELAYED RELEASE ORAL at 05:57

## 2024-11-22 NOTE — ASSESSMENT & PLAN NOTE
Presumed COPD with possible exacerbation.  Patient was given Solu-Medrol and multiple DuoNebs by EMS.   Mild scattered expiratory wheezing on auscultation  Will continue Solu-Medrol 40 mg IV daily.  Xopenex as needed

## 2024-11-22 NOTE — ASSESSMENT & PLAN NOTE
Initial troponin 56.  Patient denies chest pain.  Denies history of CABG or PCI with stents.  EKG showed sinus tach, TWI in lead I V6, II, V2.   Most likely type II MI  Trend troponin  Telemetry

## 2024-11-22 NOTE — ASSESSMENT & PLAN NOTE
Total bilirubin 1.68.  AST ALT normal.  Alk phos mildly elevated.  Patient denies abdominal pain.  Does report chronic nausea vomiting about twice a week for about a year.  Reports history of diarrhea constipation alternating.  Reports vomited once today and had diarrhea about 8 times today.  Could be secondary to hepatic congestion from CHF exacerbation  IV Lasix  Repeat lab in the morning

## 2024-11-22 NOTE — ASSESSMENT & PLAN NOTE
11/13/2024 nuclear stress test with fixed apical defect, no reversible defects noted  Troponins on admission 56-61  No chest pain  ECG with known left bundle branch block  Most likely represents type II MI in the setting of heart failure, respiratory distress

## 2024-11-22 NOTE — ASSESSMENT & PLAN NOTE
Wt Readings from Last 3 Encounters:   09/25/24 67.7 kg (149 lb 3.2 oz)   08/19/24 67.1 kg (148 lb)   08/16/24 67.1 kg (148 lb)     Most recent 2D echo as above  On Entresto Coreg at home.  Given IV Lasix 40 mg in ED.  Will continue Lasix 40 mg IV daily.  Telemetry  Continue  Entresto Coreg  Cardiac diet with fluid restriction 1800 cc per day  Daily weight, intake output  Consult cardiology

## 2024-11-22 NOTE — ASSESSMENT & PLAN NOTE
Patient presents with SOB started yesterday morning when waking up, progressively worsening prompted ED visit.  Patient denies fever chills.  Reports cough started in ED.  Reports wheezing at home.  Reports was told to have COPD but on no medications for COPD at home.  Reports history of CHF but was never on diuretic, initial EF was around 20%.  Patient denies history of SOB in the past.  Reports did not take medications for 2 days.  SpO2 88% on room air in ED.  O2 2 L applied.  Satting mid 90s currently.  Chart reviewed.  History of combined CHF.  On Entresto Coreg.  Follows cardiologist in [place].  2D echo in July 2024 showed normal LV cavity size with mildly reduced systolic function around 45 to 50%, aortic sclerosis and trace aortic regurgitation, mildly dilated ascending aorta.  Nuclear stress test on 11/13/2024 showed no reversible defects.   Patient reports 50 pack - years smoking history.   Chest x-ray possible vascular congestion, pending final read  BNP 1935.  BNP was 213 in 3/2024.  Mild bilateral pedal edema on exam  Most likely multifactorial secondary to CHF and COPD exacerbation.  Management as below.  Check D-dimer  Check COVID flu RSV  Continue supplemental oxygen to maintain sats above 90%

## 2024-11-22 NOTE — ASSESSMENT & PLAN NOTE
Reports rash on scalp and neck for about 1 year.  Was recently diagnosed with psoriasis by dermatologist in [facility].  Was prescribed a cream twice a day.  Had used it for 2 weeks and supposed to have a follow-up yesterday but did not go due to SOB.  Noticed rash on forehead and neck behind ears on exam.  Will hold off on cream while inpatient.  Follow-up dermatology outpatient.

## 2024-11-22 NOTE — PLAN OF CARE
Problem: OCCUPATIONAL THERAPY ADULT  Goal: Performs self-care activities at highest level of function for planned discharge setting.  See evaluation for individualized goals.  Description: Treatment Interventions: ADL retraining, Functional transfer training, Endurance training, Equipment evaluation/education, Patient/family training, Compensatory technique education, Continued evaluation, Energy conservation, Activityengagement          See flowsheet documentation for full assessment, interventions and recommendations.   Note: Limitation: Decreased ADL status, Decreased Safe judgement during ADL, Decreased endurance, Decreased high-level ADLs     Assessment: Pt is a 70yo male [facility] [place] [facility]24 w/ shortness of breath. Diagnosed w/ acute respiratory insufficiency, acute on chronic combined systolic and diastolic CHF. Significant PMH impacting his occupational performance includes alcoholism, CAD, COPD, heart failure, hearing loss, L knee fracture s/p operative fixation, R rotator cuff repair. Personal and environmental factors supporting performance includes independent at baseline w/ out use of AD or O2, supportive wife, access to AD/ DME; barriers include multi level home, fall history, insight into deficits, alcohol use / abuse. Pt reports living w/ his wife in a 2SH at baseline w/ full fight to access bedroom, bathroom. Pt reports I w/ ADLs w/ out use of AD on room air. Upon eval, pt alert and generally oriented on room air in bathroom upon arrival. O2 sats 93% upon therapist arrival. Pt required S grooming in stance at sink, mod I UBD, min A LBD, mod I toileting, mod I sit <> stand, mod I toilet transfer and S functional mobility household distances w/ out use of AD. Pt engaged in 30 second sit to stand w/ score of 12. O2 sats 90-93% on room air. Pt is completing ADLs below baseline level of I w/ decreased activity tolerance, decreased endurance, decreased standing tolerance / balance. Pt would  benefit from OT in acute care to max I w/ ADLs, achieve highest level of function. Recommend ongoing eval of functional cognition, health literacy to assist in DC Planning. No post acute OT rehab needs when medically stable. Will continue to follow 2-3X week in acute care.     Rehab Resource Intensity Level, OT: No post-acute rehabilitation needs

## 2024-11-22 NOTE — PLAN OF CARE
Problem: RESPIRATORY - ADULT  Goal: Achieves optimal ventilation and oxygenation  Description: INTERVENTIONS:  - Assess for changes in respiratory status  - Assess for changes in mentation and behavior  - Position to facilitate oxygenation and minimize respiratory effort  - Oxygen administered by appropriate delivery if ordered  - Initiate smoking cessation education as indicated  - Encourage broncho-pulmonary hygiene including cough, deep breathe, Incentive Spirometry  - Assess the need for suctioning and aspirate as needed  - Assess and instruct to report SOB or any respiratory difficulty  - Respiratory Therapy support as indicated  Outcome: Progressing     Problem: PAIN - ADULT  Goal: Verbalizes/displays adequate comfort level or baseline comfort level  Description: Interventions:  - Encourage patient to monitor pain and request assistance  - Assess pain using appropriate pain scale  - Administer analgesics based on type and severity of pain and evaluate response  - Implement non-pharmacological measures as appropriate and evaluate response  - Consider cultural and social influences on pain and pain management  - Notify physician/advanced practitioner if interventions unsuccessful or patient reports new pain  Outcome: Progressing     Problem: INFECTION - ADULT  Goal: Absence or prevention of progression during hospitalization  Description: INTERVENTIONS:  - Assess and monitor for signs and symptoms of infection  - Monitor lab/diagnostic results  - Monitor all insertion sites, i.e. indwelling lines, tubes, and drains  - Monitor endotracheal if appropriate and nasal secretions for changes in amount and color  - [place] appropriate cooling/warming therapies per order  - Administer medications as ordered  - Instruct and encourage patient and family to use good hand hygiene technique  - Identify and instruct in appropriate isolation precautions for identified infection/condition  Outcome: Progressing  Goal:  Absence of fever/infection during neutropenic period  Description: INTERVENTIONS:  - Monitor WBC    Outcome: Progressing     Problem: SAFETY ADULT  Goal: Patient will remain free of falls  Description: INTERVENTIONS:  - Educate patient/family on patient safety including physical limitations  - Instruct patient to call for assistance with activity   - Consult OT/PT to assist with strengthening/mobility   - Keep Call bell within reach  - Keep bed low and locked with side rails adjusted as appropriate  - Keep care items and personal belongings within reach  - Initiate and maintain comfort rounds  - Make Fall Risk Sign visible to staff  - Offer Toileting every 2 Hours, in advance of need  - Initiate/Maintain bed alarm  - Apply yellow socks and bracelet for high fall risk patients  - Consider moving patient to room near nurses station  Outcome: Progressing  Goal: Maintain or return to baseline ADL function  Description: INTERVENTIONS:  -  Assess patient's ability to carry out ADLs; assess patient's baseline for ADL function and identify physical deficits which impact ability to perform ADLs (bathing, care of mouth/teeth, toileting, grooming, dressing, etc.)  - Assess/evaluate cause of self-care deficits   - Assess range of motion  - Assess patient's mobility; develop plan if impaired  - Assess patient's need for assistive devices and provide as appropriate  - Encourage maximum independence but intervene and supervise when necessary  - Involve family in performance of ADLs  - Assess for home care needs following discharge   - Consider OT consult to assist with ADL evaluation and planning for discharge  - Provide patient education as appropriate  Outcome: Progressing  Goal: Maintains/Returns to pre admission functional level  Description: INTERVENTIONS:  - Perform AM-PAC 6 Click Basic Mobility/ Daily Activity assessment daily.  - Set and communicate daily mobility goal to care team and patient/family/caregiver.   -  Collaborate with rehabilitation services on mobility goals if consulted  - Perform Range of Motion 3 times a day.    Problem: Knowledge Deficit  Goal: Patient/family/caregiver demonstrates understanding of disease process, treatment plan, medications, and discharge instructions  Description: Complete learning assessment and assess knowledge base.  Interventions:  - Provide teaching at level of understanding  - Provide teaching via preferred learning methods  Outcome: Progressing   - Record patient progress and toleration of activity level   Outcome: Progressing     Problem: DISCHARGE PLANNING  Goal: Discharge to home or other facility with appropriate resources  Description: INTERVENTIONS:  - Identify barriers to discharge w/patient and caregiver  - Arrange for needed discharge resources and transportation as appropriate  - Identify discharge learning needs (meds, wound care, etc.)  - Arrange for interpretive services to assist at discharge as needed  - Refer to Case Management Department for coordinating discharge planning if the patient needs post-hospital services based on physician/advanced practitioner order or complex needs related to functional status, cognitive ability, or social support system  Outcome: Progressing     Problem: CARDIOVASCULAR - ADULT  Goal: Maintains optimal cardiac output and hemodynamic stability  Description: INTERVENTIONS:  - Monitor I/O, vital signs and rhythm  - Monitor for S/S and trends of decreased cardiac output  - Administer and titrate ordered vasoactive medications to optimize hemodynamic stability  - Assess quality of pulses, skin color and temperature  - Assess for signs of decreased coronary artery perfusion  - Instruct patient to report change in severity of symptoms  Outcome: Progressing  Goal: Absence of cardiac dysrhythmias or at baseline rhythm  Description: INTERVENTIONS:  - Continuous cardiac monitoring, vital signs, obtain 12 lead EKG if ordered  - Administer  antiarrhythmic and heart rate control medications as ordered  - Monitor electrolytes and administer replacement therapy as ordered  Outcome: Progressing

## 2024-11-22 NOTE — ASSESSMENT & PLAN NOTE
Has cut down on alcohol use per wife.  Last drink was a week ago per patient.  Reinforced alcohol cessation

## 2024-11-22 NOTE — OCCUPATIONAL THERAPY NOTE
Occupational Therapy Evaluation     Patient Name: Del Ocampo  Today's Date: 11/22/2024  Problem List  Principal Problem:    Acute respiratory insufficiency  Active Problems:    Alcohol abuse    Nicotine abuse    History of bleeding ulcers    PMR (polymyalgia rheumatica) (HCC)    Chronic obstructive pulmonary disease, unspecified COPD type (HCC)    Rash    Acute on chronic combined systolic and diastolic congestive heart failure (HCC)    Serum total bilirubin elevated    Elevated troponin    Past Medical History  Past Medical History:   Diagnosis Date    Alcoholism (HCC)     Marinelli esophagus     Marinelli's esophagus     Bleeding ulcer     COPD (chronic obstructive pulmonary disease) ([facility])     Hospital visit found    Coronary artery disease     10 years    Diverticulitis of colon     Fatty liver     GERD (gastroesophageal reflux disease)     Years    Heart failure (HCC)     HL (hearing loss)     Years, work related    Inflammatory bowel disease     Years    PMR (polymyalgia rheumatica) ([facility])     Temporal arteritis ([facility])     Terminal ileitis without complication ([facility]) 04/15/2024    Visual impairment     Wears glasses     Past Surgical History  Past Surgical History:   Procedure Laterality Date    COLONOSCOPY      FRACTURE SURGERY      knee  left     ROTATOR CUFF REPAIR      right    TONSILLECTOMY          11/22/24 1436   OT Last Visit   OT Visit Date 11/22/24  (Friday)   Note Type   Note type Evaluation  (Eval [id number])   Additional Comments Identified pt by full name and birthdate   Pain Assessment   Pain Assessment Tool 0-10   Pain Score No Pain   Restrictions/Precautions   Weight Bearing Precautions Per Order No   Other Precautions Impulsive;Telemetry;Fall Risk;Fluid restriction  ([place], 1800ML fluid restriction)   Home Living   Type of Home House;Other (Comment)  (2 SH w/ stair glide from garage in basement to main level; full flight of stairs to access bedroom, primary bathroom)   Home Layout Two  "level;Bed/bath upstairs   Bathroom Shower/Tub Walk-in shower   Bathroom Toilet Standard   Bathroom Equipment Shower chair   Bathroom Accessibility Accessible   Home Equipment Walker;Cane  (not using AD)   Additional Comments Pt reports living w/ wife and their dog in 2 SH w/ flight of stairs to access bedroom, bathroom   Prior Function   Level of [place] Independent with ADLs;Independent with functional mobility;Needs assistance with IADLS  (wife has been walking the dog)   Lives With Spouse   Receives Help From Family   IADLs Family/Friend/Other provides transportation;Family/Friend/Other provides medication management;Family/Friend/Other provides meals   Falls in the last 6 months 1 to 4   Vocational Retired   Comments Pt reports I w/ ADLs at baseline w/ out use of AD. Needs assistance w/ IADLs   Lifestyle   Autonomy Pt reports I w/ ADLs w/ out use of AD   Reciprocal Relationships Pt reports living w/ his spouse. 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 O2 sats 90-93% on room air upon arrival. Per RN trial on room air   Subjective   Subjective \"It is numb and tingling\" (stated when asked about sensation in feet / LE)   ADL   Where Assessed Edge of bed  (vs bathroom vs OOB in bedside recliner chair post eval)   Eating Assistance 7  Independent   Grooming Assistance 5  Supervision/Setup   Grooming Deficit Supervision/safety;Verbal cueing  (cues for IV pole mgmt, body position at sink)   UB Bathing Assistance Unable to assess   LB Bathing Assistance Unable to assess   UB Dressing Assistance 6  Modified independent   UB Dressing Deficit Setup;Increased time to " complete  (due to multiple lines)   LB Dressing Assistance 4  Minimal Assistance   LB Dressing Deficit Setup;Fasteners;Don/doff R shoe;Don/doff L shoe;Increased time to complete;Supervision/safety   Toileting Assistance  6  Modified independent  (pt in bathroom upon therapist arrival on room air, O2 93%)   Toileting Deficit Setup;Increased time to complete   Bed Mobility   Supine to Sit Unable to assess   Sit to Supine Unable to assess   Additional Comments Pt in bathroom upon therapist arrival and seated OOB in bedside recliner chair post eval w/ needs met, call bell in reach   Transfers   Sit to Stand 6  Modified independent   Additional items Increased time required;Verbal cues;Assist x 1  (educated pt on hand placement to push up from target surface)   Stand to Sit 6  Modified independent   Additional items Assist x 1;Armrests;Verbal cues   Toilet transfer 6  Modified independent   Additional items Assist x 1;Standard toilet  (pt seated on toilet in bathroom upon therapist arrival)   Additional Comments (S)  Pt engaged in 30 second sit to stand w/ score of 12. Scores< 12 for men 70-74 indicates + fall risk   Functional Mobility   Functional Mobility 5  Supervision   Additional Comments Pt engaged in functional mobility from bathroom with slipper socks w/ S. Don shoes and engaged in functional mobility household distances w/ S. Therapist observed improved balance, stability and pace w/ shoes on.   Additional items   (no AD or LOB)   Balance   Static Sitting Good   Dynamic Sitting Fair +   Static Standing Fair   Ambulatory Fair   Activity Tolerance   Activity Tolerance Patient limited by fatigue   Nurse Made Aware spoke w/ RNIsaak   RUAISSATOU Strength   RUE Overall Strength Within Functional Limits - able to perform ADL tasks with strength  (observed during ADLs)   LUE Strength   LUE Overall Strength Within Functional Limits - able to perform ADL tasks with strength  (observed during ADLs)   Hand Function   Gross  Motor Coordination Functional   Fine Motor Coordination Functional   Cognition   Overall Cognitive Status WFL  (questionable insight into deficits, alert, generally oriented. Recommend ongoing eval of functional cognition, health literacy to assist in DC planning)   Arousal/Participation Alert;Cooperative   Attention Attends with cues to redirect   Orientation Level Oriented X4   Memory Decreased recall of recent events;Decreased short term memory  (fall history)   Following Commands Follows multistep commands without difficulty   Comments Alert, generally oriented and able to follow directions during ADLs. Pt demonstrated questionab insight into deficits. Recommend ongoing eval of health literacy, functional cognition to assist in DC Planning   Assessment   Limitation Decreased ADL status;Decreased Safe judgement during ADL;Decreased endurance;Decreased high-level ADLs   Assessment Pt is a 70yo male admitted to [facility] on 11/22/24 w/ shortness of breath. Diagnosed w/ acute respiratory insufficiency, acute on chronic combined systolic and diastolic CHF. Significant PMH impacting his occupational performance includes alcoholism, CAD, COPD, heart failure, hearing loss, L knee fracture s/p operative fixation, R rotator cuff repair. Personal and environmental factors supporting performance includes independent at baseline w/ out use of AD or O2, supportive wife, access to AD/ DME; barriers include multi level home, fall history, insight into deficits, alcohol use / abuse. Pt reports living w/ his wife in a 2SH at baseline w/ full fight to access bedroom, bathroom. Pt reports I w/ ADLs w/ out use of AD on room air. Upon eval, pt alert and generally oriented on room air in bathroom upon arrival. O2 sats 93% upon therapist arrival. Pt required S grooming in stance at sink, mod I UBD, min A LBD, mod I toileting, mod I sit <> stand, mod I toilet transfer and S functional mobility household distances w/ out use of AD. Pt engaged  in 30 second sit to stand w/ score of 12. O2 sats 90-93% on room air. Pt is completing ADLs below baseline level of I w/ decreased activity tolerance, decreased endurance, decreased standing tolerance / balance. Pt would benefit from OT in acute care to max I w/ ADLs, achieve highest level of function. Recommend ongoing eval of functional cognition, health literacy to assist in DC Planning. No post acute OT rehab needs when medically stable. Will continue to follow 2-3X week in acute care.   Goals   Patient Goals Pt stated that he wants to return home   LTG Time Frame 7-10   Long Term Goal #1 see below   Plan   Treatment Interventions ADL retraining;Functional transfer training;Endurance training;Equipment evaluation/education;Patient/family training;Compensatory technique education;Continued evaluation;Energy conservation;Activityengagement   Goal Expiration Date 12/02/24   OT Treatment Day 0  (Friday 11/22/24)   OT Frequency 2-3x/wk   Discharge Recommendation   Rehab Resource Intensity Level, OT No post-acute rehabilitation needs   AM-PAC Daily Activity Inpatient   Lower Body Dressing 3   Bathing 3   Toileting 4   Upper Body Dressing 4   Grooming 4   Eating 4   Daily Activity Raw Score 22   Daily Activity Standardized Score (Calc for Raw Score >=11) 47.1   AM-PAC Applied Cognition Inpatient   Following a Speech/Presentation 3   Understanding Ordinary Conversation 4   Taking Medications 3   Remembering Where Things Are Placed or Put Away 4   Remembering List of 4-5 Errands 3   Taking Care of Complicated Tasks 2   Applied Cognition Raw Score 19   Applied Cognition Standardized Score 39.77   Barthel Index   Feeding 10   Bathing 0   Grooming Score 5   Dressing Score 5   Bladder Score 10   Bowels Score 10   Toilet Use Score 10   Transfers (Bed/Chair) Score 15   Mobility (Level Surface) Score 0   Stairs Score 5   Barthel Index Score 70   End of Consult   Education Provided Yes;Family or social support of family present  for education by provider   Patient Position at End of Consult Bedside chair;All needs within reach   Nurse Communication Nurse aware of consult   Licensure   [place] License Number  Lizeth Durant, OTR/L [id number]         The patient's raw score on the AM-PAC Daily Activity Inpatient Short Form is 22. A raw score of greater than or equal to 19 suggests the patient may benefit from discharge to home. Please refer to the recommendation of the Occupational Therapist for safe discharge planning.      Pt goals to be met by 12/2/24 to max I w/ ADLs and improve engagement to return home w/ wife includes:    -Pt will demonstrate good attention and participation in ongoing eval of functional cognition, health literacy to assist in DC planning    -Pt will consistently follow multi step directions w/ good recall to max I w/ ADLs, IADLs to return home w/ improved activity engagement.     -Pt will complete UBD/LBD w/ mod I for + time    -Pt will consistently engage in functional mobility using LRAD household distances independently    -Pt will complete functional transfers to bed, chair, and toilet independently using LRAD as needed    -Pt will complete functional shower transfer w/ mod I for + time using LRAD, DME as needed to max I w/ bathing    -Pt will demonstrate good attention and understanding EC tech to max I w/ ADLs, assist in DC planning      Lizeth Durant, OTR/L  [id number]  [id number]

## 2024-11-22 NOTE — CASE MANAGEMENT
Case Management Discharge Planning Note    Patient name Del Ocampo  Location [address] [address]/[address] [place] [address]-* MRN [id number]  : 1955 Date 2024       Current Admission Date: 2024  Current Admission Diagnosis:Acute respiratory insufficiency   Patient Active Problem List    Diagnosis Date Noted Date Diagnosed    Acute respiratory insufficiency 2024     Rash 2024     Acute on chronic combined systolic and diastolic congestive heart failure (HCC) 2024     Serum total bilirubin elevated 2024     Elevated troponin 2024     Temporal arteritis (HCC)      Neuropathy 2024     Chronic obstructive pulmonary disease, unspecified COPD type (HCC) 2024     Gastroesophageal reflux disease 2024     Pancreatic insufficiency 04/15/2024     Hepatic steatosis 2024     Ascites 2024     Alcohol abuse 2024     Nicotine abuse 2024     History of CHF (congestive heart failure) 2024     History of bleeding ulcers 2024     Aneurysm of descending thoracic aorta without rupture (HCC) 2024     PMR (polymyalgia rheumatica) (HCC) 2024     Left bundle-branch block, unspecified 01/10/2020       LOS (days): 0  Geometric Mean LOS (GMLOS) (days): 2.4  Days to GMLOS:1.9     OBJECTIVE:  Risk of Unplanned Readmission Score: 14.56         Current admission status: Inpatient   Preferred Pharmacy:   [facility]/pharmacy #[id number] - [place], [place] - [address]  [address] [address]  Phone: [phone] Fax: [phone]    [facility] [place] - [address]  [address]  [place] [address]  Phone: [phone] Fax: [phone]    Primary Care Provider: Rosina Morrow MD    Primary Insurance: MEDICARE  Secondary Insurance: CIGNA    DISCHARGE DETAILS:    Other Referral/Resources/Interventions Provided:  Interventions: Prescription Price Check  Referral Comments: Call made  to [facility] pharmacy. Confirmed copay for [facility] is $10. Cardiology notified.

## 2024-11-22 NOTE — H&P
H&P - Hospitalist   Name: Del Ocampo 69 y.o. male I MRN: [id number]  Unit/Bed#: ED 10 I Date of Admission: 11/22/2024   Date of Service: 11/22/2024 I Hospital Day: 0     Assessment & Plan  Acute respiratory insufficiency  Patient presents with SOB started yesterday morning when waking up, progressively worsening prompted ED visit.  Patient denies fever chills.  Reports cough started in ED.  Reports wheezing at home.  Reports was told to have COPD but on no medications for COPD at home.  Reports history of CHF but was never on diuretic, initial EF was around 20%.  Patient denies history of SOB in the past.  Reports did not take medications for 2 days.  SpO2 88% on room air in ED.  O2 2 L applied.  Satting mid 90s currently.  Chart reviewed.  History of combined CHF.  On Entresto Coreg.  Follows cardiologist in [place].  2D echo in July 2024 showed normal LV cavity size with mildly reduced systolic function around 45 to 50%, aortic sclerosis and trace aortic regurgitation, mildly dilated ascending aorta.  Nuclear stress test on 11/13/2024 showed no reversible defects.   Patient reports 50 pack - years smoking history.   Chest x-ray possible vascular congestion, pending final read  BNP 1935.  BNP was 213 in 3/2024.  Mild bilateral pedal edema on exam  Most likely multifactorial secondary to CHF and COPD exacerbation.  Management as below.  Check D-dimer  Check COVID flu RSV  Continue supplemental oxygen to maintain sats above 90%      Acute on chronic combined systolic and diastolic congestive heart failure (HCC)  Wt Readings from Last 3 Encounters:   09/25/24 67.7 kg (149 lb 3.2 oz)   08/19/24 67.1 kg (148 lb)   08/16/24 67.1 kg (148 lb)     Most recent 2D echo as above  On Entresto Coreg at home.  Given IV Lasix 40 mg in ED.  Will continue Lasix 40 mg IV daily.  Telemetry  Continue  Entresto Coreg  Cardiac diet with fluid restriction 1800 cc per day  Daily weight, intake output  Consult cardiology      Chronic  obstructive pulmonary disease, unspecified COPD type ([facility])  Presumed COPD with possible exacerbation.  Patient was given Solu-Medrol and multiple DuoNebs by EMS.   Mild scattered expiratory wheezing on auscultation  Will continue Solu-Medrol 40 mg IV daily.  Xopenex as needed  Elevated troponin  Initial troponin 56.  Patient denies chest pain.  Denies history of CABG or PCI with stents.  EKG showed sinus tach, TWI in lead I V6, II, V2.   Most likely type II MI  Trend troponin  Telemetry    Alcohol abuse  Has cut down on alcohol use per wife.  Last drink was a week ago per patient.  Reinforced alcohol cessation  Serum total bilirubin elevated  Total bilirubin 1.68.  AST ALT normal.  Alk phos mildly elevated.  Patient denies abdominal pain.  Does report chronic nausea vomiting about twice a week for about a year.  Reports history of diarrhea constipation alternating.  Reports vomited once today and had diarrhea about 8 times today.  Could be secondary to hepatic congestion from CHF exacerbation  IV Lasix  Repeat lab in the morning  Nicotine abuse  Nicotine patch, smoking cessation  History of bleeding ulcers  Continue PPI daily  Added Pepcid at bedtime as patient is on IV Solu-Medrol  PMR (polymyalgia rheumatica) ([facility])  Weaned off prednisone per patient.  Denies symptoms.    Rash  Reports rash on scalp and neck for about 1 year.  Was recently diagnosed with psoriasis by dermatologist in [facility].  Was prescribed a cream twice a day.  Had used it for 2 weeks and supposed to have a follow-up yesterday but did not go due to SOB.  Noticed rash on forehead and neck behind ears on exam.  Will hold off on cream while inpatient.  Follow-up dermatology outpatient.      VTE Pharmacologic Prophylaxis: VTE Score: 4 Moderate Risk (Score 3-4) - Pharmacological DVT Prophylaxis Ordered: enoxaparin (Lovenox).  Code Status: Full code  Discussion with family: Updated  (wife) at bedside.    Anticipated Length of  Stay: Patient will be admitted on an inpatient basis with an anticipated length of stay of greater than 2 midnights secondary to acute resp insufficiency.    History of Present Illness   Chief Complaint: NIGEL Ocampo is a 69 y.o. male with a PMH of CHF, possible COPD, GERD, Marinelli's esophagus, alcohol abuse, nicotine abuse, temporal arteritis who presents with SOB started yesterday morning when waking up, progressively worsening prompted ED visit.  Patient denies fever chills.  Reports productive cough with phlegm started in ED.  Reports wheezing at home.  Reports was told to have COPD but on no medications for COPD at home.  Reports history of CHF but was never on diuretic, initial EF was around 20%.  Patient denies history of SOB in the past despite CHF diagnosis.  Patient reports he did not take medications for 2 days and he normally skips medication about once a week.  Patient denies chest pain, headache, dizziness.  Patient reports having stomach problem for a while.  Will have nausea and vomiting about twice a week.  Also gets diarrhea and constipation alternating.  Reports appetite has been poor for a while due to GI symptoms.  Reports did not eat anything yesterday and only drank Pedialyte.  Vomited after drinking Pedialyte. Reports having watery diarrhea 8 times yesterday.  Takes Imodium as needed.  Patient denies abdominal pain.  No other complaints.      Review of Systems   Constitutional:  Positive for appetite change.   HENT:  Positive for rhinorrhea.    Respiratory:  Positive for cough, shortness of breath and wheezing.    Gastrointestinal:  Positive for constipation, diarrhea, nausea and vomiting.   Skin:  Positive for rash.   All other systems reviewed and are negative.      Historical Information   Past Medical History:   Diagnosis Date    Alcoholism ([facility])     Marinelli esophagus     Marinelli's esophagus     Bleeding ulcer     COPD (chronic obstructive pulmonary disease) ([facility])     [facility]  visit found    Coronary artery disease     10 years    Diverticulitis of colon     Fatty liver     GERD (gastroesophageal reflux disease)     Years    Heart failure (HCC)     HL (hearing loss)     Years, work related    Inflammatory bowel disease     Years    PMR (polymyalgia rheumatica) (HCC)     Temporal arteritis (HCC)     Terminal ileitis without complication (HCC) 04/15/2024    Visual impairment     Wears glasses     Past Surgical History:   Procedure Laterality Date    COLONOSCOPY      FRACTURE SURGERY      knee  left     ROTATOR CUFF REPAIR      right    TONSILLECTOMY       Social History     Tobacco Use    Smoking status: Every Day     Current packs/day: 1.00     Average packs/day: 1 pack/day for 50.0 years (50.0 ttl pk-yrs)     Types: Cigarettes    Smokeless tobacco: Never   Vaping Use    Vaping status: Never Used   Substance and Sexual Activity    Alcohol use: Yes     Alcohol/week: 3.0 standard drinks of alcohol     Types: 3 Shots of liquor per week     Comment: Started again    Drug use: Not Currently    Sexual activity: Not on file     E-Cigarette/Vaping    E-Cigarette Use Never User      E-Cigarette/Vaping Substances    Nicotine No     THC No     CBD No     Flavoring No     Other No     Unknown No      Family history non-contributory  Social History:  Marital Status: /Civil Union   Occupation: Retired  Patient Pre-hospital Living Situation: Home  Patient Pre-hospital Level of Mobility: walks  Patient Pre-hospital Diet Restrictions: Cardiac diet    Meds/Allergies   I have reviewed home medications with patient personally.  Prior to Admission medications    Medication Sig Start Date End Date Taking? Authorizing Provider   cyanocobalamin (VITAMIN B-12) 100 MCG tablet Take 1,000 mcg by mouth daily   Yes Historical Provider, MD   carvedilol (COREG CR) 20 MG 24 hr capsule Take 20 mg by mouth daily 9/10/24   Historical Provider, MD   folic acid (FOLVITE) 1 mg tablet Take 1 tablet (1,000 mcg total) by  mouth daily  Patient not taking: Reported on 11/22/2024 6/4/24   Rosina Morrow MD   loperamide (IMODIUM) 2 mg capsule TAKE 1 CAPSULE 4 TIMES     DAILY AS NEEDED FOR        DIARRHEA 7/10/24   Alan Fernandez DO   pantoprazole (PROTONIX) 40 mg tablet TAKE 1 TABLET DAILY IN THE EARLY MORNING 4/23/24   Alan Fernandez DO   predniSONE 10 mg tablet Take 10 mg by mouth As directed  Patient not taking: Reported on 11/22/2024 9/17/24   Historical Provider, MD   sacubitril-valsartan (ENTRESTO) 24-26 MG TABS Take 1 tablet by mouth 2 (two) times a day 9/19/24 9/19/25  Historical Provider, MD   Thiamine Mononitrate (VITAMIN B1) 100 mg tablet Take 1 tablet (100 mg total) by mouth daily 6/4/24   Rosina Morrow MD   hydrocortisone (ANUSOL-HC) 2.5 % rectal cream Apply topically 4 (four) times a day as needed for hemorrhoids  Patient not taking: Reported on 11/22/2024 3/27/24 11/22/24  Janine Shahid MD     No Known Allergies    Objective :  Temp:  [98.6 °F (37 °C)] 98.6 °F (37 °C)  HR:  [] 98  BP: (131-160)/(61-96) 140/66  Resp:  [19-33] 19  SpO2:  [88 %-99 %] 96 %  O2 Device: Nasal cannula  Nasal Cannula O2 Flow Rate (L/min):  [2 L/min-4 L/min] 2 L/min    Physical Exam  Vitals and nursing note reviewed.   Constitutional:       Appearance: He is well-developed.   HENT:      Head: Normocephalic and atraumatic.   Neck:      Thyroid: No thyromegaly.      Vascular: No JVD.      Trachea: No tracheal deviation.   Cardiovascular:      Rate and Rhythm: Regular rhythm. Tachycardia present.      Heart sounds: Normal heart sounds.   Pulmonary:      Effort: No respiratory distress.      Breath sounds: Wheezing present. No rales.      Comments: Mild tachypneic.  Mild scattered expiratory wheezing on auscultation.  On O2 2L, sat mid 90s  Abdominal:      General: Bowel sounds are normal. There is no distension.      Palpations: Abdomen is soft.      Tenderness: There is no abdominal tenderness. There is no guarding.   Musculoskeletal:    "      General: Normal range of motion.      Cervical back: Neck supple.      Comments: Mild bilateral pedal edema, left is worse.   Skin:     General: Skin is warm and dry.   Neurological:      General: No focal deficit present.      Mental Status: He is alert and oriented to person, place, and time.   Psychiatric:         Mood and Affect: Mood normal.         Judgment: Judgment normal.          Lines/Drains:            Lab Results: I have reviewed the following results:  Results from last 7 days   Lab Units 11/22/24  0242   WBC Thousand/uL 10.43*   HEMOGLOBIN g/dL 12.9   HEMATOCRIT % 39.5   PLATELETS Thousands/uL 215   SEGS PCT % 79*   LYMPHO PCT % 11*   MONO PCT % 9   EOS PCT % 0     Results from last 7 days   Lab Units 11/22/24  0242   SODIUM mmol/L 145   POTASSIUM mmol/L 3.6   CHLORIDE mmol/L 105   CO2 mmol/L 28   BUN mg/dL 5   CREATININE mg/dL 0.61   ANION GAP mmol/L 12   CALCIUM mg/dL 8.5   ALBUMIN g/dL 3.3*   TOTAL BILIRUBIN mg/dL 1.68*   ALK PHOS U/L 122*   ALT U/L 20   AST U/L 33   GLUCOSE RANDOM mg/dL 102             No results found for: \"HGBA1C\"        Imaging Results Review: I personally reviewed the following image studies/reports in PACS and discussed pertinent findings with Radiology: chest xray. My interpretation of the radiology images/reports is: As above.  Other Study Results Review: EKG was reviewed.     Administrative Statements       ** Please Note: This note has been constructed using a voice recognition system. **    "

## 2024-11-22 NOTE — ASSESSMENT & PLAN NOTE
Not on oxygen at home, currently requiring 2 L nasal cannula  Likely secondary to volume overload, pleural effusions

## 2024-11-22 NOTE — PLAN OF CARE
INTERVENTIONS:  - Assess for changes in respiratory status  - Assess for changes in mentation and behavior  - Position to facilitate oxygenation and minimize respiratory effort  - Oxygen administered by appropriate delivery if ordered  - Initiate smoking cessation education as indicated  - Encourage broncho-pulmonary hygiene including cough, deep breathe, Incentive Spirometry  - Assess the need for suctioning and aspirate as needed  - Assess and instruct to report SOB or any respiratory difficulty  - Respiratory Therapy support as indicated

## 2024-11-22 NOTE — ED PROVIDER NOTES
Time reflects when diagnosis was documented in both MDM as applicable and the Disposition within this note       Time User Action Codes Description Comment    11/22/2024  3:26 AM Alexandro Ashford Add [I50.9] CHF exacerbation (HCC)     11/22/2024  3:27 AM Alexandro Ashford Add [R06.02] Shortness of breath     11/22/2024  4:16 AM Mara Thomason Add [I50.43] Acute on chronic combined systolic and diastolic congestive heart failure (HCC)           ED Disposition       ED Disposition   Admit    Condition   Stable    Date/Time   Fri Nov 22, 2024  3:27 AM    Comment   Case was discussed with AUBREY Terry and the patient's admission status was agreed to be Admission Status: inpatient status to the service of Dr. Marino .               Assessment & Plan       Medical Decision Making  69-year-old male presenting to ED today for shortness of breath.  Differential at this time includes CHF exacerbation versus newly diagnosed COPD ED with exacerbation versus ACS versus pneumonia versus pneumothorax.  Will evaluate this time with a CBC, CMP, troponin, twelve-lead EKG, chest x-ray, BMP.    BNP elevated.  Suggestive of CHF exacerbation.  Discussed case with internal medicine patient admitted to their service.  IV Lasix ordered and potassium ordered as well.    Amount and/or Complexity of Data Reviewed  Labs: ordered. Decision-making details documented in ED Course.  Radiology: ordered.    Risk  Prescription drug management.  Decision regarding hospitalization.        ED Course as of 11/22/24 0506   Fri Nov 22, 2024   0320 BNP(!): 1,935  Patient SOB likely due to HF exacerbation given this elevated level.   0321 Patient per chart review has an EF of 45 to 50% per his cardiology notes from [facility].   0332 SpO2(!): 88 %  Patient placed on 2 L with appropriate response to 96%.       Medications   carvedilol (COREG CR) 24 hr capsule 20 mg (has no administration in time range)   cyanocobalamin (VITAMIN B-12) tablet 1,000 mcg  (has no administration in time range)   loperamide (IMODIUM) capsule 2 mg (has no administration in time range)   pantoprazole (PROTONIX) EC tablet 40 mg (has no administration in time range)   sacubitril-valsartan (ENTRESTO) 24-26 MG per tablet 1 tablet (has no administration in time range)   thiamine tablet 100 mg (has no administration in time range)   ondansetron (ZOFRAN) injection 4 mg (has no administration in time range)   nicotine (NICODERM CQ) 21 mg/24 hr TD 24 hr patch 1 patch (has no administration in time range)   furosemide (LASIX) injection 40 mg (has no administration in time range)   enoxaparin (LOVENOX) subcutaneous injection 40 mg (has no administration in time range)   acetaminophen (TYLENOL) tablet 650 mg (has no administration in time range)   methylPREDNISolone sodium succinate (Solu-MEDROL) injection 40 mg (has no administration in time range)   famotidine (PEPCID) tablet 20 mg (has no administration in time range)   levalbuterol (XOPENEX) inhalation solution 0.63 mg (has no administration in time range)   furosemide (LASIX) injection 40 mg (40 mg Intravenous Given 11/22/24 0331)   potassium chloride (Klor-Con M20) CR tablet 40 mEq (40 mEq Oral Given 11/22/24 0331)       ED Risk Strat Scores                           SBIRT 22yo+      Flowsheet Row Most Recent Value   BRIGIDA: How many times in the past year have you...    Used an illegal drug or used a prescription medication for non-medical reasons? Never Filed at: 11/22/2024 0228                            History of Present Illness       Chief Complaint   Patient presents with    Shortness of Breath     Pt arrived EMS for SOB hx oc COPD. EMS admin 3 round of Duo-Neb treatment, 125 mg Solu-Medrol & zofran.       Past Medical History:   Diagnosis Date    Alcoholism (HCC)     Marinelli esophagus     Marinelli's esophagus     Bleeding ulcer     COPD (chronic obstructive pulmonary disease) (HCC)     Hospital visit found    Coronary artery disease      10 years    Diverticulitis of colon     Fatty liver     GERD (gastroesophageal reflux disease)     Years    Heart failure (HCC)     HL (hearing loss)     Years, work related    Inflammatory bowel disease     Years    PMR (polymyalgia rheumatica) (HCC)     Temporal arteritis (HCC)     Terminal ileitis without complication (HCC) 04/15/2024    Visual impairment     Wears glasses      Past Surgical History:   Procedure Laterality Date    COLONOSCOPY      FRACTURE SURGERY      knee  left     ROTATOR CUFF REPAIR      right    TONSILLECTOMY        Family History   Problem Relation Age of Onset    Heart failure Mother     Alcohol abuse Father       Social History     Tobacco Use    Smoking status: Every Day     Current packs/day: 1.00     Average packs/day: 1 pack/day for 50.0 years (50.0 ttl pk-yrs)     Types: Cigarettes    Smokeless tobacco: Never   Vaping Use    Vaping status: Never Used   Substance Use Topics    Alcohol use: Yes     Alcohol/week: 3.0 standard drinks of alcohol     Types: 3 Shots of liquor per week     Comment: Started again    Drug use: Not Currently      E-Cigarette/Vaping    E-Cigarette Use Never User       E-Cigarette/Vaping Substances    Nicotine No     THC No     CBD No     Flavoring No     Other No     Unknown No       I have reviewed and agree with the history as documented.     69-year-old male past medical history significant for heart failure presenting to ED today for shortness of breath.  Patient states that he was feeling short of breath since yesterday.  Got worse through the night.  Then called EMS.  EMS gave him 3 DuoNebs as well as 125 mg of Solu-Medrol.  Says that he feels improved on the 20-minute EMS ride over.  Currently denying any chest pain.  Has a history of COPD listed in the chart however is never had any formal testing for.  He does have a history of CHF and he said that he has taken multiple medications but he does miss certain doses.  No fevers or chills.  No nausea or  vomiting.  He did have some vomiting initially due to the shortness of breath but has improved after administration of medications with EMS.        Review of Systems        Objective       ED Triage Vitals [11/22/24 0230]   Temperature Pulse Blood Pressure Respirations SpO2 Patient Position - Orthostatic VS   98.6 °F (37 °C) (!) 113 140/75 21 99 % Lying      Temp src Heart Rate Source BP Location FiO2 (%) Pain Score    -- Monitor Right arm -- --      Vitals      Date and Time Temp Pulse SpO2 Resp BP Pain Score FACES Pain Rating User   11/22/24 0430 -- 98 96 % 19 140/66 -- --    11/22/24 0400 -- 108 94 % 33 160/96 -- --    11/22/24 0330 -- 105 96 % 25 131/61 -- --    11/22/24 0315 -- 109 88 % 19 -- -- --    11/22/24 0230 98.6 °F (37 °C) 113 99 % 21 140/75 -- --             Physical Exam    Results Reviewed       Procedure Component Value Units Date/Time    Magnesium [[id number]]  (Abnormal) Collected: 11/22/24 0242    Lab Status: Final result Specimen: Blood from Arm, Right Updated: 11/22/24 0455     Magnesium 1.5 mg/dL     HS Troponin I 2hr [[id number]] Collected: 11/22/24 0442    Lab Status: In process Specimen: Blood from Arm, Right Updated: 11/22/24 0450    D-dimer, quantitative [[id number]] Collected: 11/22/24 0442    Lab Status: In process Specimen: Blood from Arm, Right Updated: 11/22/24 0450    COVID/FLU/RSV [[id number]] Collected: 11/22/24 0442    Lab Status: In process Specimen: Nares from Nose Updated: 11/22/24 0450    HS Troponin I 4hr [[id number]]     Lab Status: No result Specimen: Blood     Comprehensive metabolic panel [[id number]]  (Abnormal) Collected: 11/22/24 0242    Lab Status: Final result Specimen: Blood from Arm, Right Updated: 11/22/24 0324     Sodium 145 mmol/L      Potassium 3.6 mmol/L      Chloride 105 mmol/L      CO2 28 mmol/L      ANION GAP 12 mmol/L      BUN 5 mg/dL      Creatinine 0.61 mg/dL      Glucose 102 mg/dL      Calcium 8.5 mg/dL      Corrected Calcium 9.1 mg/dL      AST 33  U/L      ALT 20 U/L      Alkaline Phosphatase 122 U/L      Total Protein 5.7 g/dL      Albumin 3.3 g/dL      Total Bilirubin 1.68 mg/dL      eGFR 100 ml/min/1.73sq m     Narrative:      National Kidney Disease Foundation guidelines for Chronic Kidney Disease (CKD):     Stage 1 with normal or high GFR (GFR > 90 mL/min/1.73 square meters)    Stage 2 Mild CKD (GFR = 60-89 mL/min/1.73 square meters)    Stage 3A Moderate CKD (GFR = 45-59 mL/min/1.73 square meters)    Stage 3B Moderate CKD (GFR = 30-44 mL/min/1.73 square meters)    Stage 4 Severe CKD (GFR = 15-29 mL/min/1.73 square meters)    Stage 5 End Stage CKD (GFR <15 mL/min/1.73 square meters)  Note: GFR calculation is accurate only with a steady state creatinine    B-Type Natriuretic Peptide(BNP) [[id number]]  (Abnormal) Collected: 11/22/24 0242    Lab Status: Final result Specimen: Blood from Arm, Right Updated: 11/22/24 0316     BNP 1,935 pg/mL     HS Troponin 0hr (reflex protocol) [[id number]]  (Abnormal) Collected: 11/22/24 0242    Lab Status: Final result Specimen: Blood from Arm, Right Updated: 11/22/24 0314     hs TnI 0hr 56 ng/L     CBC and differential [[id number]]  (Abnormal) Collected: 11/22/24 0242    Lab Status: Final result Specimen: Blood from Arm, Right Updated: 11/22/24 0253     WBC 10.43 Thousand/uL      RBC 3.58 Million/uL      Hemoglobin 12.9 g/dL      Hematocrit 39.5 %       fL      MCH 36.0 pg      MCHC 32.7 g/dL      RDW 13.3 %      MPV 10.6 fL      Platelets 215 Thousands/uL      nRBC 0 /100 WBCs      Segmented % 79 %      Immature Grans % 0 %      Lymphocytes % 11 %      Monocytes % 9 %      Eosinophils Relative 0 %      Basophils Relative 1 %      Absolute Neutrophils 8.29 Thousands/µL      Absolute Immature Grans 0.04 Thousand/uL      Absolute Lymphocytes 1.10 Thousands/µL      Absolute Monocytes 0.94 Thousand/µL      Eosinophils Absolute 0.00 Thousand/µL      Basophils Absolute 0.06 Thousands/µL             X-ray chest 1 view  portable    (Results Pending)       Procedures    ED Medication and Procedure Management   Prior to Admission Medications   Prescriptions Last Dose Informant Patient Reported? Taking?   Thiamine Mononitrate (VITAMIN B1) 100 mg tablet 11/19/2024  No No   Sig: Take 1 tablet (100 mg total) by mouth daily   carvedilol (COREG CR) 20 MG 24 hr capsule 11/19/2024  Yes No   Sig: Take 20 mg by mouth daily   cyanocobalamin (VITAMIN B-12) 100 MCG tablet 11/19/2024  Yes Yes   Sig: Take 1,000 mcg by mouth daily   folic acid (FOLVITE) 1 mg tablet Not Taking  No No   Sig: Take 1 tablet (1,000 mcg total) by mouth daily   Patient not taking: Reported on 11/22/2024   loperamide (IMODIUM) 2 mg capsule   No No   Sig: TAKE 1 CAPSULE 4 TIMES     DAILY AS NEEDED FOR        DIARRHEA   pantoprazole (PROTONIX) 40 mg tablet 11/19/2024 Self No No   Sig: TAKE 1 TABLET DAILY IN THE EARLY MORNING   predniSONE 10 mg tablet Not Taking  Yes No   Sig: Take 10 mg by mouth As directed   Patient not taking: Reported on 11/22/2024   sacubitril-valsartan (ENTRESTO) 24-26 MG TABS 11/19/2024  Yes No   Sig: Take 1 tablet by mouth 2 (two) times a day      Facility-Administered Medications: None     Current Discharge Medication List        CONTINUE these medications which have NOT CHANGED    Details   cyanocobalamin (VITAMIN B-12) 100 MCG tablet Take 1,000 mcg by mouth daily      carvedilol (COREG CR) 20 MG 24 hr capsule Take 20 mg by mouth daily      folic acid (FOLVITE) 1 mg tablet Take 1 tablet (1,000 mcg total) by mouth daily    Associated Diagnoses: Neuropathy      loperamide (IMODIUM) 2 mg capsule TAKE 1 CAPSULE 4 TIMES     DAILY AS NEEDED FOR        DIARRHEA  Qty: 360 capsule, Refills: 1    Associated Diagnoses: Chronic diarrhea      pantoprazole (PROTONIX) 40 mg tablet TAKE 1 TABLET DAILY IN THE EARLY MORNING  Qty: 90 tablet, Refills: 1    Associated Diagnoses: Anemia      predniSONE 10 mg tablet Take 10 mg by mouth As directed      sacubitril-valsartan  (ENTRESTO) 24-26 MG TABS Take 1 tablet by mouth 2 (two) times a day      Thiamine Mononitrate (VITAMIN B1) 100 mg tablet Take 1 tablet (100 mg total) by mouth daily    Associated Diagnoses: Neuropathy           No discharge procedures on file.  ED SEPSIS DOCUMENTATION   Time reflects when diagnosis was documented in both MDM as applicable and the Disposition within this note       Time User Action Codes Description Comment    11/22/2024  3:26 AM Alexandro Ashford Add [I50.9] CHF exacerbation (HCC)     11/22/2024  3:27 AM Alexandro Ashford [R06.02] Shortness of breath     11/22/2024  4:16 AM Mara Thomason Add [I50.43] Acute on chronic combined systolic and diastolic congestive heart failure (HCC)                  Alexandro Ashford MD  11/22/24 0501

## 2024-11-22 NOTE — PHYSICAL THERAPY NOTE
PHYSICAL THERAPY EVALUATION/TREATMENT     11/22/24 1145   PT Last Visit   PT Visit Date 11/22/24   Note Type   Note type Evaluation   Pain Assessment   Pain Assessment Tool Wilson-Baker FACES   Wilson-Baker FACES Pain Rating 2  (Bilateral foot neuropathies)   Restrictions/Precautions   Other Precautions Chair Alarm;Bed Alarm;Fall Risk;Pain;O2   Home Living   Type of Home House   Home Layout Multi-level;Stairs to enter with rails  (20 stairs to enter from the front, no stairs to enter from the garage.  Multilevel home with a finished basement and bedroom on the second floor)   Bathroom Equipment Shower chair   Home Equipment Walker;Cane   Additional Comments Patient not using an assistive device prior to admission   Prior Function   Level of [place] Independent with ADLs;Independent with functional mobility;Needs assistance with IADLS   Lives With Spouse   Receives Help From Family   IADLs Family/Friend/Other provides transportation;Family/Friend/Other provides meals   Falls in the last 6 months 1 to 4   Comments Patient reports 2+ falls in the last 6 months.  Also states having history of decreased balance with gait activity   General   Additional Pertinent History Chart reviewed, patient admitted with acute respiratory insufficiency, CHF.  Patient now presents as generally weak, deconditioned with resulting gait dysfunction. patient with decreased safety awareness for use of assistive device for fall prevention as he is resistant to the use of a walker or a cane   Family/Caregiver Present No   Cognition   Overall Cognitive Status WFL   Arousal/Participation Cooperative   Orientation Level Oriented X4   Following Commands Follows multistep commands with increased time or repetition   Subjective   Subjective Patient states I want to use a walker at home   RLE Assessment   RLE Assessment   (Range of motion within functional limits, strength 3+/5)   LLE Assessment   LLE Assessment   (Range of motion within  functional limits, strength 3+/5)   Coordination   Movements are Fluid and Coordinated 0   Coordination and Movement Description Decreased coordination with gait and higher-level balance activity   Bed Mobility   Supine to Sit 4  Minimal assistance   Additional items Assist x 1;Verbal cues;LE management;HOB elevated;Bedrails   Additional Comments Patient out of bed to bedside chair at end of session, safety chair alarm in place call bell in place   Transfers   Sit to Stand 4  Minimal assistance   Additional items Assist x 1   Stand to Sit 5  Supervision   Additional items Assist x 1;Verbal cues  (Cueing for proper hand placement for safety)   Ambulation/Elevation   Gait Assistance   (Min to mod assist)   Additional items Assist x 1;Verbal cues   Assistive Device None  (Handhold versus reaching for furniture)   Distance 15 feet with change in direction, increased assist required for balance on change in direction as patient is reaching for furniture and resistant to the use of an assistive device   Balance   Static Sitting Fair   Dynamic Sitting Fair -   Static Standing Fair   Dynamic Standing Fair -   Ambulatory Fair -   Activity Tolerance   Activity Tolerance Patient limited by fatigue   Nurse Made Aware Yes   Assessment   Prognosis Good   Problem List Decreased strength;Decreased range of motion;Decreased endurance;Impaired balance;Decreased mobility;Decreased coordination;Impaired judgement;Decreased safety awareness;Pain   Assessment Patient seen for Physical Therapy evaluation. Patient admitted with Acute respiratory insufficiency.  Comorbidities affecting patient's physical performance include: CHF.  Personal factors affecting patient at time of initial evaluation include: lives in multi story house, stairs to enter home, inability to ambulate household distances, inability to navigate community distances, inability to navigate level surfaces without external assistance, limited home support, positive fall  history, inability to perform caregiver support/tasks, inability to perform physical activity, limited insight into impairments, inability to perform ADLS, and inability to perform IADLS . Prior to admission, patient was independent with functional mobility without assistive device, independent with ADLS, requiring assist for IADLS, living in a multi-level home, ambulating household distance, ambulating community distances, and home with family assist.  Please find objective findings from Physical Therapy assessment regarding body systems outlined above with impairments and limitations including weakness, decreased ROM, impaired balance, decreased endurance, impaired coordination, gait deviations, pain, decreased activity tolerance, decreased functional mobility tolerance, fall risk, and SOB upon exertion.  The Barthel Index was used as a functional outcome tool presenting with a score of Barthel Index Score: 60 today indicating marked limitations of functional mobility and ADLS.  Patient's clinical presentation is currently unstable/unpredictable as seen in patient's presentation of vital sign response, changing level of pain, increased fall risk, new onset of impairment of functional mobility, decreased endurance, and new onset of weakness. Pt would benefit from continued Physical Therapy treatment to address deficits as defined above and maximize level of functional mobility. As demonstrated by objective findings, the assigned level of complexity for this evaluation is high.The patient's -[facility] Basic Mobility Inpatient Short Form Raw Score is 18. A Raw score of greater than 16 suggests the patient may benefit from discharge to home. Please also refer to the recommendation of the Physical Therapist for safe discharge planning.   Goals   Patient Goals To go home and not fall   STG Expiration Date 11/29/24   Short Term Goal #1 transfers and gait with least restrictive assistive device independently   Short Term Goal  #2 gait endurance to functional household distances   LTG Expiration Date 12/06/24   Long Term Goal #1 strength BLEs 4/5   Long Term Goal #2 independent transfers and gait with out assistive device   Plan   Treatment/Interventions Functional transfer training;LE strengthening/ROM;Therapeutic exercise;Endurance training;Patient/family training;Equipment eval/education;Bed mobility;Gait training;Compensatory technique education   PT Frequency 3-5x/wk   Discharge Recommendation   Rehab Resource Intensity Level, PT III (Minimum Resource Intensity)   AM-PAC Basic Mobility Inpatient   Turning in Flat Bed Without Bedrails 4   Lying on Back to Sitting on Edge of Flat Bed Without Bedrails 3   Moving Bed to Chair 3   Standing Up From Chair Using Arms 3   Walk in Room 3   Climb 3-5 Stairs With Railing 2   Basic Mobility Inpatient Raw Score 18   Basic Mobility Standardized Score 41.05   [facility] Highest Level Of Mobility   -HL Goal 6: Walk 10 steps or more   -HLM Achieved 6: Walk 10 steps or more   Barthel Index   Feeding 10   Bathing 0   Grooming Score 5   Dressing Score 5   Bladder Score 10   Bowels Score 10   Toilet Use Score 5   Transfers (Bed/Chair) Score 10   Mobility (Level Surface) Score 0   Stairs Score 5   Barthel Index Score 60   Additional Treatment Session   Start Time 1130   End Time 1145   Treatment Assessment S:patient states being anxious to return home O: Bilateral lower extremity exercise completed as listed below A: Patient resistant to use of roller walker or cane despite education concerning fall prevention and improved balance.  Patient will benefit from continued physical therapy with progression as tolerated and increasing functional mobility with clinical staff as well   Exercises   Hip Flexion Sitting;10 reps;Bilateral  (Alternating)   Hip Abduction Sitting;10 reps;Bilateral  (Alternating)   Knee AROM Long Arc Quad Sitting;10 reps;Bilateral  (Alternating)   Ankle Pumps Sitting;10  reps;Bilateral  (Alternating, including heel toe raises)   Balance training  Sidestepping and backward stepping completed for balance and coordination   Licensure   [place] License Number  Vikki Pitts, PT 4 0 [id number] [id number]

## 2024-11-23 VITALS
OXYGEN SATURATION: 93 % | WEIGHT: 158.6 LBS | HEIGHT: 69 IN | RESPIRATION RATE: 16 BRPM | DIASTOLIC BLOOD PRESSURE: 59 MMHG | SYSTOLIC BLOOD PRESSURE: 122 MMHG | BODY MASS INDEX: 23.49 KG/M2 | HEART RATE: 66 BPM | TEMPERATURE: 98.3 F

## 2024-11-23 LAB
ALBUMIN SERPL BCG-MCNC: 2.9 G/DL (ref 3.5–5)
ALP SERPL-CCNC: 92 U/L (ref 34–104)
ALT SERPL W P-5'-P-CCNC: 15 U/L (ref 7–52)
ANION GAP SERPL CALCULATED.3IONS-SCNC: 6 MMOL/L (ref 4–13)
AST SERPL W P-5'-P-CCNC: 25 U/L (ref 13–39)
BASOPHILS # BLD AUTO: 0.02 THOUSANDS/ÂΜL (ref 0–0.1)
BASOPHILS NFR BLD AUTO: 0 % (ref 0–1)
BILIRUB SERPL-MCNC: 0.99 MG/DL (ref 0.2–1)
BUN SERPL-MCNC: 8 MG/DL (ref 5–25)
CALCIUM ALBUM COR SERPL-MCNC: 9.3 MG/DL (ref 8.3–10.1)
CALCIUM SERPL-MCNC: 8.4 MG/DL (ref 8.4–10.2)
CHLORIDE SERPL-SCNC: 103 MMOL/L (ref 96–108)
CO2 SERPL-SCNC: 34 MMOL/L (ref 21–32)
CREAT SERPL-MCNC: 0.8 MG/DL (ref 0.6–1.3)
EOSINOPHIL # BLD AUTO: 0.01 THOUSAND/ÂΜL (ref 0–0.61)
EOSINOPHIL NFR BLD AUTO: 0 % (ref 0–6)
ERYTHROCYTE [DISTWIDTH] IN BLOOD BY AUTOMATED COUNT: 13.2 % (ref 11.6–15.1)
GFR SERPL CREATININE-BSD FRML MDRD: 89 ML/MIN/1.73SQ M
GLUCOSE SERPL-MCNC: 92 MG/DL (ref 65–140)
HCT VFR BLD AUTO: 34.2 % (ref 36.5–49.3)
HGB BLD-MCNC: 11.2 G/DL (ref 12–17)
IMM GRANULOCYTES # BLD AUTO: 0.03 THOUSAND/UL (ref 0–0.2)
IMM GRANULOCYTES NFR BLD AUTO: 0 % (ref 0–2)
LYMPHOCYTES # BLD AUTO: 1.37 THOUSANDS/ÂΜL (ref 0.6–4.47)
LYMPHOCYTES NFR BLD AUTO: 15 % (ref 14–44)
MAGNESIUM SERPL-MCNC: 2.1 MG/DL (ref 1.9–2.7)
MCH RBC QN AUTO: 35.9 PG (ref 26.8–34.3)
MCHC RBC AUTO-ENTMCNC: 32.7 G/DL (ref 31.4–37.4)
MCV RBC AUTO: 110 FL (ref 82–98)
MONOCYTES # BLD AUTO: 0.78 THOUSAND/ÂΜL (ref 0.17–1.22)
MONOCYTES NFR BLD AUTO: 8 % (ref 4–12)
NEUTROPHILS # BLD AUTO: 7.26 THOUSANDS/ÂΜL (ref 1.85–7.62)
NEUTS SEG NFR BLD AUTO: 77 % (ref 43–75)
NRBC BLD AUTO-RTO: 0 /100 WBCS
PLATELET # BLD AUTO: 163 THOUSANDS/UL (ref 149–390)
PMV BLD AUTO: 10.9 FL (ref 8.9–12.7)
POTASSIUM SERPL-SCNC: 3.5 MMOL/L (ref 3.5–5.3)
PROT SERPL-MCNC: 5 G/DL (ref 6.4–8.4)
RBC # BLD AUTO: 3.12 MILLION/UL (ref 3.88–5.62)
SODIUM SERPL-SCNC: 143 MMOL/L (ref 135–147)
WBC # BLD AUTO: 9.47 THOUSAND/UL (ref 4.31–10.16)

## 2024-11-23 PROCEDURE — 99238 HOSP IP/OBS DSCHRG MGMT 30/<: CPT | Performed by: FAMILY MEDICINE

## 2024-11-23 PROCEDURE — 85025 COMPLETE CBC W/AUTO DIFF WBC: CPT | Performed by: FAMILY MEDICINE

## 2024-11-23 PROCEDURE — 99232 SBSQ HOSP IP/OBS MODERATE 35: CPT | Performed by: INTERNAL MEDICINE

## 2024-11-23 PROCEDURE — 80053 COMPREHEN METABOLIC PANEL: CPT | Performed by: NURSE PRACTITIONER

## 2024-11-23 PROCEDURE — 83735 ASSAY OF MAGNESIUM: CPT | Performed by: NURSE PRACTITIONER

## 2024-11-23 RX ORDER — FUROSEMIDE 20 MG/1
20 TABLET ORAL DAILY
Qty: 30 TABLET | Refills: 0 | Status: SHIPPED | OUTPATIENT
Start: 2024-11-24

## 2024-11-23 RX ORDER — CARVEDILOL 6.25 MG/1
6.25 TABLET ORAL 2 TIMES DAILY WITH MEALS
Qty: 60 TABLET | Refills: 0 | Status: SHIPPED | OUTPATIENT
Start: 2024-11-23

## 2024-11-23 RX ORDER — ALBUTEROL SULFATE 90 UG/1
2 INHALANT RESPIRATORY (INHALATION) EVERY 6 HOURS PRN
Qty: 8.5 G | Refills: 0 | Status: SHIPPED | OUTPATIENT
Start: 2024-11-23

## 2024-11-23 RX ORDER — PREDNISONE 10 MG/1
TABLET ORAL
Qty: 30 TABLET | Refills: 0 | Status: SHIPPED | OUTPATIENT
Start: 2024-11-23

## 2024-11-23 RX ORDER — PREDNISONE 20 MG/1
40 TABLET ORAL DAILY
Status: DISCONTINUED | OUTPATIENT
Start: 2024-11-23 | End: 2024-11-23 | Stop reason: HOSPADM

## 2024-11-23 RX ORDER — POTASSIUM CHLORIDE 1500 MG/1
40 TABLET, EXTENDED RELEASE ORAL EVERY 4 HOURS
Status: COMPLETED | OUTPATIENT
Start: 2024-11-23 | End: 2024-11-23

## 2024-11-23 RX ORDER — FUROSEMIDE 20 MG/1
20 TABLET ORAL DAILY
Status: DISCONTINUED | OUTPATIENT
Start: 2024-11-23 | End: 2024-11-23 | Stop reason: HOSPADM

## 2024-11-23 RX ADMIN — CYANOCOBALAMIN TAB 500 MCG 1000 MCG: 500 TAB at 08:33

## 2024-11-23 RX ADMIN — METHYLPREDNISOLONE SODIUM SUCCINATE 40 MG: 40 INJECTION, POWDER, FOR SOLUTION INTRAMUSCULAR; INTRAVENOUS at 08:33

## 2024-11-23 RX ADMIN — POTASSIUM CHLORIDE 40 MEQ: 1500 TABLET, EXTENDED RELEASE ORAL at 14:38

## 2024-11-23 RX ADMIN — FUROSEMIDE 20 MG: 20 TABLET ORAL at 09:47

## 2024-11-23 RX ADMIN — PANTOPRAZOLE SODIUM 40 MG: 40 TABLET, DELAYED RELEASE ORAL at 06:03

## 2024-11-23 RX ADMIN — SACUBITRIL AND VALSARTAN 1 TABLET: 24; 26 TABLET, FILM COATED ORAL at 08:35

## 2024-11-23 RX ADMIN — THIAMINE HCL TAB 100 MG 100 MG: 100 TAB at 08:33

## 2024-11-23 RX ADMIN — EMPAGLIFLOZIN 10 MG: 10 TABLET, FILM COATED ORAL at 08:36

## 2024-11-23 RX ADMIN — NICOTINE 1 PATCH: 21 PATCH, EXTENDED RELEASE TRANSDERMAL at 08:35

## 2024-11-23 RX ADMIN — CARVEDILOL 6.25 MG: 6.25 TABLET, FILM COATED ORAL at 08:33

## 2024-11-23 RX ADMIN — POTASSIUM CHLORIDE 40 MEQ: 1500 TABLET, EXTENDED RELEASE ORAL at 11:37

## 2024-11-23 NOTE — ASSESSMENT & PLAN NOTE
Follows with cardiology in [place]  July 2024 TTE with EF 45-50%  Had sudden difficulty breathing yesterday, had orthopnea.  No PND  Lower extremity swelling noted  Chest x-ray with pulmonary vascular congestion, CTA showing bilateral effusions right greater than left  BNP 1955  Treated with IV Lasix  Will transition to oral Lasix 20 mg daily which can be followed up by his outpatient cardiologist    Plan  Discussed with attending  Transition to Lasix 20 mg orally daily  Start Jardiance 10 mg daily - sent for price check  Continue home Entresto 24-26, Coreg  Check intake output, standing Daily weights  Cardiac status is stable

## 2024-11-23 NOTE — DISCHARGE SUMMARY
Discharge Summary - Hospitalist   Name: Del Ocampo 69 y.o. male I MRN: [id number]  Unit/Bed#: [address] [place] [address]-01 I Date of Admission: 11/22/2024   Date of Service: 11/23/2024 I Hospital Day: 1     Assessment & Plan  Acute respiratory insufficiency  Patient presents with SOB started yesterday morning when waking up, progressively worsening prompted ED visit.  Patient denies fever chills.  Reports cough started in ED.  Reports wheezing at home.  Reports was told to have COPD but on no medications for COPD at home.  Reports history of CHF but was never on diuretic, initial EF was around 20%.  Patient denies history of SOB in the past.  Reports did not take medications for 2 days.  SpO2 88% on room air in ED.  O2 2 L applied.  Satting mid 90s currently.  Chart reviewed.  History of combined CHF.  On Entresto Coreg.  Follows cardiologist in [place].  2D echo in July 2024 showed normal LV cavity size with mildly reduced systolic function around 45 to 50%, aortic sclerosis and trace aortic regurgitation, mildly dilated ascending aorta.  Nuclear stress test on 11/13/2024 showed no reversible defects.   Patient reports 50 pack - years smoking history.   Chest x-ray possible vascular congestion, pending final read  BNP 1935.  BNP was 213 in 3/2024.  Mild bilateral pedal edema on exam  Most likely multifactorial secondary to CHF and COPD exacerbation.  Management as below.  Check D-dimer  Check COVID flu RSV  Continue supplemental oxygen to maintain sats above 90%    11/23 - Resolved.  Likely multifactorial from CHF exacerbation and COPD exacerbation.  Patient is now saturating well on room air.  Patient is ambulating without difficulty.  No associated chest pain or shortness of breath.  Patient has been transition to oral Lasix and prednisone therapy.  Patient has been cleared for discharge with continued follow-up in the outpatient setting.    Acute on chronic combined systolic and diastolic congestive heart failure  (Formerly [facility])  Wt Readings from Last 3 Encounters:   11/23/24 71.9 kg (158 lb 9.6 oz)   09/25/24 67.7 kg (149 lb 3.2 oz)   08/19/24 67.1 kg (148 lb)     Most recent 2D echo as above  On Entresto Coreg at home.  Given IV Lasix 40 mg in ED.  Will continue Lasix 40 mg IV daily.  Telemetry  Continue  Entresto Coreg  Cardiac diet with fluid restriction 1800 cc per day  Daily weight, intake output  Consult cardiology    11/23 - Patient responded well to the IV Lasix.  Patient is being transition to oral Lasix therapy at 20 mg once daily per recommendations from cardiology.  Chronic obstructive pulmonary disease, unspecified COPD type (Formerly [facility])  Presumed COPD with possible exacerbation.  Patient was given Solu-Medrol and multiple DuoNebs by EMS.   Mild scattered expiratory wheezing on auscultation  Will continue Solu-Medrol 40 mg IV daily.  Xopenex as needed    11/23 - Respiratory status improved.  Now saturating well on room air with no associated chest pain or shortness of breath.  Will discontinue IV Solu-Medrol and initiate a prednisone taper.  Elevated troponin  Initial troponin 56.  Patient denies chest pain.  Denies history of CABG or PCI with stents.  EKG showed sinus tach, TWI in lead I V6, II, V2.   Type II MI ruled out per cardiology   Trend troponin  Telemetry    11/23 - Per cardiology, elevated troponin secondary to nonischemic myocardial injury due to acute on chronic heart failure.    Alcohol abuse  Has cut down on alcohol use per wife.  Last drink was a week ago per patient.  Reinforced alcohol cessation  Serum total bilirubin elevated  Total bilirubin 1.68.  AST ALT normal.  Alk phos mildly elevated.  Patient denies abdominal pain.  Does report chronic nausea vomiting about twice a week for about a year.  Reports history of diarrhea constipation alternating.  Reports vomited once today and had diarrhea about 8 times today.  Could be secondary to hepatic congestion from CHF exacerbation  IV Lasix  Repeat lab in the  morning    11/23 - Resolved.  Nicotine abuse  Nicotine patch, smoking cessation  History of bleeding ulcers  Continue PPI daily  Added Pepcid at bedtime as patient is on IV Solu-Medrol  PMR (polymyalgia rheumatica) (HCC)  Weaned off prednisone per patient.  Denies symptoms.    Rash  Reports rash on scalp and neck for about 1 year.  Was recently diagnosed with psoriasis by dermatologist in [facility].  Was prescribed a cream twice a day.  Had used it for 2 weeks and supposed to have a follow-up yesterday but did not go due to SOB.  Noticed rash on forehead and neck behind ears on exam.  Will hold off on cream while inpatient.  Follow-up dermatology outpatient.    Medical Problems       Resolved Problems  Date Reviewed: 11/22/2024   None       Discharging Physician / Practitioner: Ochoa Phillips MD  PCP: Rosina Morrow MD  Admission Date:   Admission Orders (From admission, onward)       Ordered        11/22/24 0344  INPATIENT ADMISSION  Once                          Discharge Date: 11/23/24    Consultations During Hospital Stay:  Cardiology    Reason for Admission: Del Ocampo is a 69 y.o. male with a PMH of CHF, possible COPD, GERD, Marinelli's esophagus, alcohol abuse, nicotine abuse, temporal arteritis who presents with SOB started yesterday morning when waking up, progressively worsening prompted ED visit.  Patient denies fever chills.  Reports productive cough with phlegm started in ED.  Reports wheezing at home.  Reports was told to have COPD but on no medications for COPD at home.  Reports history of CHF but was never on diuretic, initial EF was around 20%.  Patient denies history of SOB in the past despite CHF diagnosis.  Patient reports he did not take medications for 2 days and he normally skips medication about once a week.  Patient denies chest pain, headache, dizziness.  Patient reports having stomach problem for a while.  Will have nausea and vomiting about twice a week.  Also  "gets diarrhea and constipation alternating.  Reports appetite has been poor for a while due to GI symptoms.  Reports did not eat anything yesterday and only drank Pedialyte.  Vomited after drinking Pedialyte. Reports having watery diarrhea 8 times yesterday.  Takes Imodium as needed.  Patient denies abdominal pain.  No other complaints.     Hospital Course: Patient was seen and examined in the emergency department and was admitted to the hospital for further evaluation and management.  Patient presenting with complaints of shortness of breath found to have evidence of acute hypoxic respiratory failure from acute on chronic combined systolic and diastolic congestive heart failure and COPD with exacerbation.  Patient was diuresed with subsequent improvement in his oxygenation.  CHF exacerbation has resolved.  Patient transition to oral Lasix therapy in addition to oral prednisone.  Patient is not ambulating without difficulty and without shortness of breath.  Patient has been cleared for discharge with continued follow-up in the outpatient setting.  At the time of discharge patient reports feeling significantly improved and currently denies any acute complaints or concerns including chest pain or shortness of breath.    Please see above list of diagnoses and related plan for additional information.     Condition at Discharge: stable    Discharge Day Visit / Exam:   Vitals: Blood Pressure: 122/59 (11/23/24 0946)  Pulse: 66 (11/23/24 0946)  Temperature: 98.3 °F (36.8 °C) (11/23/24 0307)  Temp Source: Oral (11/22/24 2018)  Respirations: 16 (11/23/24 0307)  Height: 5' 8.5\" (174 cm) (11/22/24 0509)  Weight - Scale: 71.9 kg (158 lb 9.6 oz) (11/23/24 0600)  SpO2: 93 % (11/23/24 0946)  Physical Exam  HENT:      Head: Normocephalic.      Mouth/Throat:      Mouth: Mucous membranes are moist.   Eyes:      Extraocular Movements: Extraocular movements intact.   Cardiovascular:      Rate and Rhythm: Normal rate.   Pulmonary:      " Effort: Pulmonary effort is normal.      Comments: Occasional end expiratory wheezing  Abdominal:      Palpations: Abdomen is soft.      Tenderness: There is no abdominal tenderness.   Musculoskeletal:      Comments: Mild bilateral lower extremity edema   Skin:     General: Skin is warm.   Neurological:      Mental Status: He is alert and oriented to person, place, and time.   Psychiatric:         Mood and Affect: Mood normal.       Discussion with Family: Discussed with wife at bedside.    Discharge instructions/Information to patient and family:   See after visit summary for information provided to patient and family.      Provisions for Follow-Up Care:  See after visit summary for information related to follow-up care and any pertinent home health orders.      Mobility at time of Discharge:   Basic Mobility Inpatient Raw Score: 23  JH-HLM Goal: 7: Walk 25 feet or more  JH-HLM Achieved: 7: Walk 25 feet or more     Disposition:   Home    Discharge Medications:  See after visit summary for reconciled discharge medications provided to patient and/or family.      Administrative Statements   Discharge Statement:  I have spent a total time of 35 minutes in caring for this patient on the day of the visit/encounter.    **Please Note: This note may have been constructed using a voice recognition system**

## 2024-11-23 NOTE — ASSESSMENT & PLAN NOTE
Total bilirubin 1.68.  AST ALT normal.  Alk phos mildly elevated.  Patient denies abdominal pain.  Does report chronic nausea vomiting about twice a week for about a year.  Reports history of diarrhea constipation alternating.  Reports vomited once today and had diarrhea about 8 times today.  Could be secondary to hepatic congestion from CHF exacerbation  IV Lasix  Repeat lab in the morning    11/23 - Resolved.

## 2024-11-23 NOTE — ASSESSMENT & PLAN NOTE
Patient presents with SOB started yesterday morning when waking up, progressively worsening prompted ED visit.  Patient denies fever chills.  Reports cough started in ED.  Reports wheezing at home.  Reports was told to have COPD but on no medications for COPD at home.  Reports history of CHF but was never on diuretic, initial EF was around 20%.  Patient denies history of SOB in the past.  Reports did not take medications for 2 days.  SpO2 88% on room air in ED.  O2 2 L applied.  Satting mid 90s currently.  Chart reviewed.  History of combined CHF.  On Entresto Coreg.  Follows cardiologist in [place].  2D echo in July 2024 showed normal LV cavity size with mildly reduced systolic function around 45 to 50%, aortic sclerosis and trace aortic regurgitation, mildly dilated ascending aorta.  Nuclear stress test on 11/13/2024 showed no reversible defects.   Patient reports 50 pack - years smoking history.   Chest x-ray possible vascular congestion, pending final read  BNP 1935.  BNP was 213 in 3/2024.  Mild bilateral pedal edema on exam  Most likely multifactorial secondary to CHF and COPD exacerbation.  Management as below.  Check D-dimer  Check COVID flu RSV  Continue supplemental oxygen to maintain sats above 90%    11/23 - Resolved.  Likely multifactorial from CHF exacerbation and COPD exacerbation.  Patient is now saturating well on room air.  Patient is ambulating without difficulty.  No associated chest pain or shortness of breath.  Patient has been transition to oral Lasix and prednisone therapy.  Patient has been cleared for discharge with continued follow-up in the outpatient setting.

## 2024-11-23 NOTE — ASSESSMENT & PLAN NOTE
Initial troponin 56.  Patient denies chest pain.  Denies history of CABG or PCI with stents.  EKG showed sinus tach, TWI in lead I V6, II, V2.   Type II MI ruled out per cardiology   Trend troponin  Telemetry    11/23 - Per cardiology, elevated troponin secondary to nonischemic myocardial injury due to acute on chronic heart failure.

## 2024-11-23 NOTE — ASSESSMENT & PLAN NOTE
Wt Readings from Last 3 Encounters:   11/23/24 71.9 kg (158 lb 9.6 oz)   09/25/24 67.7 kg (149 lb 3.2 oz)   08/19/24 67.1 kg (148 lb)     Most recent 2D echo as above  On Entresto Coreg at home.  Given IV Lasix 40 mg in ED.  Will continue Lasix 40 mg IV daily.  Telemetry  Continue  Entresto Coreg  Cardiac diet with fluid restriction 1800 cc per day  Daily weight, intake output  Consult cardiology    11/23 - Patient responded well to the IV Lasix.  Patient is being transition to oral Lasix therapy at 20 mg once daily per recommendations from cardiology.

## 2024-11-23 NOTE — DISCHARGE INSTR - AVS FIRST PAGE
Please continue taking all medications as prescribed. Please follow up with your primary medical doctor within 1 week of discharge. Please follow up with cardiology in 1 to 2 weeks. Please return to the nearest emergency department if you develop any signs or symptoms of worsening disease or infection, including but not limited to chest pain, shortness of breath, abdominal pain, lightheadedness, dizziness, palpitations, fevers or chills.

## 2024-11-23 NOTE — PLAN OF CARE
Problem: RESPIRATORY - ADULT  Goal: Achieves optimal ventilation and oxygenation  Description: INTERVENTIONS:  - Assess for changes in respiratory status  - Assess for changes in mentation and behavior  - Position to facilitate oxygenation and minimize respiratory effort  - Oxygen administered by appropriate delivery if ordered  - Initiate smoking cessation education as indicated  - Encourage broncho-pulmonary hygiene including cough, deep breathe, Incentive Spirometry  - Assess the need for suctioning and aspirate as needed  - Assess and instruct to report SOB or any respiratory difficulty  - Respiratory Therapy support as indicated  Outcome: Progressing     Problem: PAIN - ADULT  Goal: Verbalizes/displays adequate comfort level or baseline comfort level  Description: Interventions:  - Encourage patient to monitor pain and request assistance  - Assess pain using appropriate pain scale  - Administer analgesics based on type and severity of pain and evaluate response  - Implement non-pharmacological measures as appropriate and evaluate response  - Consider cultural and social influences on pain and pain management  - Notify physician/advanced practitioner if interventions unsuccessful or patient reports new pain  Outcome: Progressing     Problem: INFECTION - ADULT  Goal: Absence or prevention of progression during hospitalization  Description: INTERVENTIONS:  - Assess and monitor for signs and symptoms of infection  - Monitor lab/diagnostic results  - Monitor all insertion sites, i.e. indwelling lines, tubes, and drains  - Monitor endotracheal if appropriate and nasal secretions for changes in amount and color  - [place] appropriate cooling/warming therapies per order  - Administer medications as ordered  - Instruct and encourage patient and family to use good hand hygiene technique  - Identify and instruct in appropriate isolation precautions for identified infection/condition  Outcome: Progressing  Goal:  Absence of fever/infection during neutropenic period  Description: INTERVENTIONS:  - Monitor WBC    Outcome: Progressing     Problem: SAFETY ADULT  Goal: Patient will remain free of falls  Description: INTERVENTIONS:  - Educate patient/family on patient safety including physical limitations  - Instruct patient to call for assistance with activity   - Consult OT/PT to assist with strengthening/mobility   - Keep Call bell within reach  - Keep bed low and locked with side rails adjusted as appropriate  - Keep care items and personal belongings within reach  - Initiate and maintain comfort rounds  - Make Fall Risk Sign visible to staff  - Offer Toileting every 2 Hours, in advance of need  - Obtain necessary fall risk management equipment: socks  - Apply yellow socks and bracelet for high fall risk patients  - Consider moving patient to room near nurses station  Outcome: Progressing  Goal: Maintain or return to baseline ADL function  Description: INTERVENTIONS:  -  Assess patient's ability to carry out ADLs; assess patient's baseline for ADL function and identify physical deficits which impact ability to perform ADLs (bathing, care of mouth/teeth, toileting, grooming, dressing, etc.)  - Assess/evaluate cause of self-care deficits   - Assess range of motion  - Assess patient's mobility; develop plan if impaired  - Assess patient's need for assistive devices and provide as appropriate  - Encourage maximum independence but intervene and supervise when necessary  - Involve family in performance of ADLs  - Assess for home care needs following discharge   - Consider OT consult to assist with ADL evaluation and planning for discharge  - Provide patient education as appropriate  Outcome: Progressing  Goal: Maintains/Returns to pre admission functional level  Description: INTERVENTIONS:  - Perform AM-PAC 6 Click Basic Mobility/ Daily Activity assessment daily.  - Set and communicate daily mobility goal to care team and  patient/family/caregiver.   - Collaborate with rehabilitation services on mobility goals if consulted  - Perform Range of Motion 3 times a day.  - Reposition patient every 2 hours.  - Dangle patient 3 times a day  - Stand patient 3 times a day  - Ambulate patient 3 times a day  - Out of bed to chair 3 times a day   - Out of bed for meals 3 times a day  - Out of bed for toileting  - Record patient progress and toleration of activity level   Outcome: Progressing     Problem: DISCHARGE PLANNING  Goal: Discharge to home or other facility with appropriate resources  Description: INTERVENTIONS:  - Identify barriers to discharge w/patient and caregiver  - Arrange for needed discharge resources and transportation as appropriate  - Identify discharge learning needs (meds, wound care, etc.)  - Arrange for interpretive services to assist at discharge as needed  - Refer to Case Management Department for coordinating discharge planning if the patient needs post-hospital services based on physician/advanced practitioner order or complex needs related to functional status, cognitive ability, or social support system  Outcome: Progressing     Problem: Knowledge Deficit  Goal: Patient/family/caregiver demonstrates understanding of disease process, treatment plan, medications, and discharge instructions  Description: Complete learning assessment and assess knowledge base.  Interventions:  - Provide teaching at level of understanding  - Provide teaching via preferred learning methods  Outcome: Progressing     Problem: CARDIOVASCULAR - ADULT  Goal: Maintains optimal cardiac output and hemodynamic stability  Description: INTERVENTIONS:  - Monitor I/O, vital signs and rhythm  - Monitor for S/S and trends of decreased cardiac output  - Administer and titrate ordered vasoactive medications to optimize hemodynamic stability  - Assess quality of pulses, skin color and temperature  - Assess for signs of decreased coronary artery perfusion  -  Instruct patient to report change in severity of symptoms  Outcome: Progressing  Goal: Absence of cardiac dysrhythmias or at baseline rhythm  Description: INTERVENTIONS:  - Continuous cardiac monitoring, vital signs, obtain 12 lead EKG if ordered  - Administer antiarrhythmic and heart rate control medications as ordered  - Monitor electrolytes and administer replacement therapy as ordered  Outcome: Progressing     Problem: Nutrition/Hydration-ADULT  Goal: Nutrient/Hydration intake appropriate for improving, restoring or maintaining nutritional needs  Description: Monitor and assess patient's nutrition/hydration status for malnutrition. Collaborate with interdisciplinary team and initiate plan and interventions as ordered.  Monitor patient's weight and dietary intake as ordered or per policy. Utilize nutrition screening tool and intervene as necessary. Determine patient's food preferences and provide high-protein, high-caloric foods as appropriate.     INTERVENTIONS:  - Monitor oral intake, urinary output, labs, and treatment plans  - Assess nutrition and hydration status and recommend course of action  - Evaluate amount of meals eaten  - Assist patient with eating if necessary   - Allow adequate time for meals  - Recommend/ encourage appropriate diets, oral nutritional supplements, and vitamin/mineral supplements  - Order, calculate, and assess calorie counts as needed  - Recommend, monitor, and adjust tube feedings and TPN/PPN based on assessed needs  - Assess need for intravenous fluids  - Provide specific nutrition/hydration education as appropriate  - Include patient/family/caregiver in decisions related to nutrition  Outcome: Progressing

## 2024-11-23 NOTE — ASSESSMENT & PLAN NOTE
Respiratory status is improved.  Denies any dyspnea  Likely secondary to volume overload, pleural effusions

## 2024-11-23 NOTE — ASSESSMENT & PLAN NOTE
11/13/2024 nuclear stress test with fixed apical defect, no reversible defects noted  Troponins on admission 56-61, secondary to nonischemic myocardial injury due to acute on heart failure  No chest pain  ECG with known left bundle branch block

## 2024-11-23 NOTE — UTILIZATION REVIEW
Initial Clinical Review    Admission: Date/Time/Statement:   Admission Orders (From admission, onward)       Ordered        11/22/24 0344  INPATIENT ADMISSION  Once                          Orders Placed This Encounter   Procedures    INPATIENT ADMISSION     Standing Status:   Standing     Number of Occurrences:   1     Level of Care:   Med Surg [16]     Estimated length of stay:   More than 2 Midnights     Certification:   I certify that inpatient services are medically necessary for this patient for a duration of greater than two midnights. See H&P and MD Progress Notes for additional information about the patient's course of treatment.     ED Arrival Information       Expected   -    Arrival   11/22/2024 02:21    Acuity   Urgent              Means of arrival   Ambulance    Escorted by   [facility]    Service   Hospitalist    Admission type   Emergency              Arrival complaint   Shortness of Breath             Chief Complaint   Patient presents with    Shortness of Breath     Pt arrived EMS for SOB hx oc COPD. EMS admin 3 round of Duo-Neb treatment, 125 mg Solu-Medrol & zofran.       Initial Presentation: 69 y.o. male ***    Anticipated Length of Stay/Certification Statement: ***    Date: ***   Day 2: ***    ED Treatment-Medication Administration from 11/22/2024 0220 to 11/22/2024 0505         Date/Time Order Dose Route Action     11/22/2024 0331 furosemide (LASIX) injection 40 mg 40 mg Intravenous Given     11/22/2024 0331 potassium chloride (Klor-Con M20) CR tablet 40 mEq 40 mEq Oral Given            Scheduled Medications:  carvedilol, 6.25 mg, Oral, BID With Meals  cyanocobalamin, 1,000 mcg, Oral, Daily  Empagliflozin, 10 mg, Oral, Daily  enoxaparin, 40 mg, Subcutaneous, Daily  famotidine, 20 mg, Oral, HS  furosemide, 40 mg, Intravenous, BID (diuretic)  methylPREDNISolone sodium succinate, 40 mg, Intravenous, Daily  nicotine, 1 patch, Transdermal, Daily  pantoprazole, 40 mg, Oral, Early  Morning  sacubitril-valsartan, 1 tablet, Oral, BID  Thiamine Mononitrate, 100 mg, Oral, Daily      Continuous IV Infusions:     PRN Meds:  acetaminophen, 650 mg, Oral, Q6H PRN  levalbuterol, 0.63 mg, Nebulization, Q6H PRN  loperamide, 2 mg, Oral, 4x Daily PRN  trimethobenzamide, 200 mg, Intramuscular, Q6H PRN      ED Triage Vitals   Temperature Pulse Respirations Blood Pressure SpO2 Pain Score   11/22/24 0230 11/22/24 0230 11/22/24 0230 11/22/24 0230 11/22/24 0230 11/22/24 0513   98.6 °F (37 °C) (!) 113 21 140/75 99 % No Pain     Weight (last 2 days)       Date/Time Weight    11/23/24 0600 71.9 (158.6)    11/22/24 0600 72.3 (159.4)    11/22/24 0509 72.3 (159.4)            Vital Signs (last 3 days)       Date/Time Temp Pulse Resp BP MAP (mmHg) SpO2 Calculated FIO2 (%) - Nasal Cannula Nasal Cannula O2 Flow Rate (L/min) O2 Device Patient Position - Orthostatic VS Pain    11/23/24 03:07:02 98.3 °F (36.8 °C) 69 16 105/67 80 92 % -- -- -- -- --    11/22/24 23:20:28 98.3 °F (36.8 °C) 66 18 137/67 90 94 % -- -- -- -- --    11/22/24 2050 -- -- -- -- -- 93 % -- -- None (Room air) -- No Pain    11/22/24 20:18:49 98.3 °F (36.8 °C) 66 18 146/61 89 94 % -- -- None (Room air) Lying No Pain    11/22/24 1436 -- -- -- -- -- -- -- -- -- -- No Pain    11/22/24 08:45:08 97.9 °F (36.6 °C) 107 18 123/72 89 89 % -- -- -- -- --    11/22/24 0530 -- -- -- -- -- -- -- -- -- -- No Pain    11/22/24 05:16:54 99.3 °F (37.4 °C) 95 18 147/92 110 93 % -- -- -- -- --    11/22/24 0513 -- -- -- -- -- -- -- -- -- -- No Pain    11/22/24 0430 -- 98 19 140/66 95 96 % 28 2 L/min Nasal cannula Lying --    11/22/24 0400 -- 108 33 160/96 114 94 % 28 2 L/min Nasal cannula Lying --    11/22/24 0330 -- 105 25 131/61 87 96 % 28 2 L/min Nasal cannula -- --    11/22/24 0315 -- 109 19 -- -- 88 % -- -- None (Room air) -- --    11/22/24 0230 98.6 °F (37 °C) 113 21 140/75 100 99 % 36 4 L/min Nasal cannula Lying --              Pertinent Labs/Diagnostic Test Results:    Radiology:   VAS VENOUS DUPLEX - LOWER LIMB BILATERAL   Final Interpretation by Geni Hitchcock MD (11/22 2336)      CTA chest pe study   Final Interpretation by Adam Garcia MD (11/22 1824)      No pulmonary embolism identified. Mild enlargement pulmonary artery indicative of pulmonary hypertension.      Small bilateral pleural effusions, right greater than left, with adjacent compressive atelectasis.      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      Coronary artery atherosclerotic calcifications.      Diffuse hepatic steatosis.      The study was marked in EPIC for immediate notification.      Workstation performed: [id number]         X-ray chest 1 view portable   Final Interpretation by Slim Bojorquez MD (11/22 0830)      Small bilateral pleural effusions.            Workstation performed: [id number]           Cardiology:  No orders to display     GI:  No orders to display       Results from last 7 days   Lab Units 11/22/24 0442   SARS-COV-2  Negative     Results from last 7 days   Lab Units 11/23/24 0444 11/22/24  0242   WBC Thousand/uL 9.47 10.43*   HEMOGLOBIN g/dL 11.2* 12.9   HEMATOCRIT % 34.2* 39.5   PLATELETS Thousands/uL 163 215   TOTAL NEUT ABS Thousands/µL 7.26 8.29*         Results from last 7 days   Lab Units 11/23/24 0444 11/22/24 0733 11/22/24  0242   SODIUM mmol/L 143 141 145   POTASSIUM mmol/L 3.5 3.7 3.6   CHLORIDE mmol/L 103 101 105   CO2 mmol/L 34* 23 28   ANION GAP mmol/L 6 17* 12   BUN mg/dL 8 5 5   CREATININE mg/dL 0.80 0.66 0.61   EGFR ml/min/1.73sq m 89 97 100   CALCIUM mg/dL 8.4 8.1* 8.5   MAGNESIUM mg/dL 2.1  --  1.5*     Results from last 7 days   Lab Units 11/23/24 0444 11/22/24  0242   AST U/L 25 33   ALT U/L 15 20   ALK PHOS U/L 92 122*   TOTAL PROTEIN g/dL 5.0* 5.7*   ALBUMIN g/dL 2.9* 3.3*   TOTAL BILIRUBIN mg/dL 0.99 1.68*         Results from last 7 days   Lab Units 11/23/24 0444 11/22/24 0733 11/22/24  0242  "  GLUCOSE RANDOM mg/dL 92 136 102             No results found for: \"BETA-HYDROXYBUTYRATE\"                   Results from last 7 days   Lab Units 11/22/24  0855 11/22/24 0442 11/22/24  0242   HS TNI 0HR ng/L  --   --  56*   HS TNI 2HR ng/L  --  61*  --    HSTNI D2 ng/L  --  5  --    HS TNI 4HR ng/L 53*  --   --    HSTNI D4 ng/L -3  --   --      Results from last 7 days   Lab Units 11/22/24  0442   D-DIMER QUANTITATIVE ug/ml FEU 1.56*                             Results from last 7 days   Lab Units 11/22/24  0242   BNP pg/mL 1,935*                                         Results from last 7 days   Lab Units 11/22/24 0442   INFLUENZA A PCR  Negative   INFLUENZA B PCR  Negative   RSV PCR  Negative                                               Past Medical History:   Diagnosis Date    Alcoholism ([facility])     Marinelli esophagus     Marinelli's esophagus     Bleeding ulcer     COPD (chronic obstructive pulmonary disease) ([facility])     Hospital visit found    Coronary artery disease     10 years    Diverticulitis of colon     Fatty liver     GERD (gastroesophageal reflux disease)     Years    Heart failure ([facility])     HL (hearing loss)     Years, work related    Inflammatory bowel disease     Years    PMR (polymyalgia rheumatica) ([facility])     Temporal arteritis ([facility])     Terminal ileitis without complication ([facility]) 04/15/2024    Visual impairment     Wears glasses     Present on Admission:   Nicotine abuse   Alcohol abuse   PMR (polymyalgia rheumatica) ([facility])   Chronic obstructive pulmonary disease, unspecified COPD type ([facility])   Acute on chronic combined systolic and diastolic congestive heart failure (HCC)   Elevated troponin      Admitting Diagnosis: Shortness of breath [R06.02]  CHF exacerbation (HCC) [I50.9]  Acute on chronic combined systolic and diastolic congestive heart failure (HCC) [I50.43]  Age/Sex: 69 y.o. male    Network Utilization Review Department  ATTENTION: Please call with any questions or concerns to [phone] and " carefully listen to the prompts so that you are directed to the right person. All voicemails are confidential.   For Discharge needs, contact Care Management DC Support Team at [phone] opt. [phone]  Send all requests for admission clinical reviews, approved or denied determinations and any other requests to dedicated fax number below belonging to the campus where the patient is receiving treatment. List of dedicated fax numbers for the Facilities:  FACILITY NAME UR FAX NUMBER   ADMISSION DENIALS (Administrative/Medical Necessity) [phone]   DISCHARGE SUPPORT TEAM (NETWORK) [phone]   PARENT CHILD HEALTH (Maternity/NICU/Pediatrics) [phone]   [facility] [phone]   [facility] [phone]   [facility] [phone]   [facility] [phone]   [facility] [phone]   [facility] [phone]   [facility] [phone]   [facility] [phone]   [facility] [phone]   [facility] [phone]   [facility] [phone]   [facility] [phone]

## 2024-11-23 NOTE — ASSESSMENT & PLAN NOTE
Presumed COPD with possible exacerbation.  Patient was given Solu-Medrol and multiple DuoNebs by EMS.   Mild scattered expiratory wheezing on auscultation  Will continue Solu-Medrol 40 mg IV daily.  Xopenex as needed    11/23 - Respiratory status improved.  Now saturating well on room air with no associated chest pain or shortness of breath.  Will discontinue IV Solu-Medrol and initiate a prednisone taper.

## 2024-11-23 NOTE — TREATMENT PLAN
Home Oxygen Qualifying Test     Patient name: Del Ocampo        : 1955   Date of Test:  2024  Diagnosis:    Home Oxygen Test:    **Medicare Guidelines require item(s) 1-5 on all ambulatory patients or 1 and 2 on non-ambulatory patients.    1. Baseline SPO2 on Room Air at rest 94 %   If <= 88% on Room Air add O2 via NC to obtain SpO2 >=88%. If LPM needed, document LPM 1 needed to reach =>88%    SPO2 during exertion on Room Air 98  %  During exertion monitor SPO2. If SPO2 increases >=89%, do not add supplemental oxygen    SPO2 on Oxygen at Rest 100 % at 1 LPM    SPO2 during exertion on Oxygen 100 % at 1 LPM    Test performed during exertion activity.      []  Supplemental Home Oxygen is indicated.    [x]  Client does not qualify for home oxygen.    Respiratory Additional Notes- Patient ambulated through hallway around nurses station and back to room.  He was a bit wobbly on his feet a couple of times but does not require Home O2.    Ivette Farley, RT

## 2024-11-23 NOTE — PROGRESS NOTES
"Progress Note - Cardiology   Name: Del Ocampo 69 y.o. male I MRN: [id number]  Unit/Bed#: [address] [place] [address] I Date of Admission: 11/22/2024   Date of Service: 11/23/2024 I Hospital Day: 1     Assessment & Plan  Acute respiratory insufficiency  Respiratory status is improved.  Denies any dyspnea  Likely secondary to volume overload, pleural effusions  Acute on chronic combined systolic and diastolic congestive heart failure (HCC)  Follows with cardiology in [place]  July 2024 TTE with EF 45-50%  Had sudden difficulty breathing yesterday, had orthopnea.  No PND  Lower extremity swelling noted  Chest x-ray with pulmonary vascular congestion, CTA showing bilateral effusions right greater than left  BNP 1957  Treated with IV Lasix  Will transition to oral Lasix 20 mg daily which can be followed up by his outpatient cardiologist    Plan  Discussed with attending  Transition to Lasix 20 mg orally daily  Start Jardiance 10 mg daily - sent for price check  Continue home Entresto 24-26, Coreg  Check intake output, standing Daily weights  Cardiac status is stable    Elevated troponin  11/13/2024 nuclear stress test with fixed apical defect, no reversible defects noted  Troponins on admission 56-61, secondary to nonischemic myocardial injury due to acute on heart failure  No chest pain  ECG with known left bundle branch block    Alcohol abuse  Reportedly no drinks for over 1 week, risk factor for heart failure  Nicotine abuse  Counseled on cessation  Chronic obstructive pulmonary disease, unspecified COPD type ([facility])  No wheezing noted on exam  Management per primary            Subjective / Objective:     Review of Systems  Patient is awake, alert and ambulating.  States that he feels well and denies any cardiac symptoms  Still has mild lower extremity swelling  Vitals: Blood pressure 135/64, pulse 71, temperature 98.3 °F (36.8 °C), resp. rate 16, height 5' 8.5\" (1.74 m), weight 71.9 kg (158 lb 9.6 oz), SpO2 92%.  Vitals:    " 11/22/24 0600 11/23/24 0600   Weight: 72.3 kg (159 lb 6.4 oz) 71.9 kg (158 lb 9.6 oz)     Body mass index is 23.76 kg/m².  BP Readings from Last 3 Encounters:   11/23/24 135/64   09/25/24 120/60   07/05/24 122/70     Orthostatic Blood Pressures      Flowsheet Row Most Recent Value   Blood Pressure 135/64 filed at 11/23/2024 0745   Patient Position - Orthostatic VS Lying filed at 11/22/2024 2018          I/O         11/21 0701 11/22 0700 11/22 0701  11/23 0700 11/23 0701 11/24 0700    I.V. (mL/kg)  10 (0.1)     Total Intake(mL/kg)  10 (0.1)     Urine (mL/kg/hr) 750 1220 (0.7)     Stool  0     Total Output 750 1220     Net -750 -1210            Unmeasured Stool Occurrence  2 x           Invasive Devices       Peripheral Intravenous Line  Duration             Long-Dwell Peripheral IV (Midline) 11/22/24 Left Brachial <1 day                      Intake/Output Summary (Last 24 hours) at 11/23/2024 0849  Last data filed at 11/23/2024 0444  Gross per 24 hour   Intake 10 ml   Output 870 ml   Net -860 ml         Physical Exam:   Physical Exam    Neurologic:  Alert & oriented x 3,  no focal deficits noted   Constitutional:  Well developed, well nourished,  With no acute distress  Eyes:  PERRL, conjunctiva normal   HENT:  Atraumatic, external ears normal, nose normal, .  NECK: Normal range of motion, no tenderness, neck is supple , No JVP  Respiratory:  Bilateral air entry, mostly clear to auscultation  Cardiovascular: Regular S1-S2 with a I/VI systolic murmur.  No pericardial rub or gallop audible  GI:  Soft, nondistended, audible bowel sounds, nontender, no hepatosplenomegaly appreciated  Musculoskeletal:  No tenderness, no deformities.    Extremities: Mild lower extremity edema  Psychiatric:  Speech and behavior appropriate             Medications/ Allergies:     Current Facility-Administered Medications   Medication Dose Route Frequency Provider Last Rate    acetaminophen  650 mg Oral Q6H PRN BREN Rodriguez       carvedilol  6.25 mg Oral BID With Meals Cucayetano Francorigordo, CRNP      cyanocobalamin  1,000 mcg Oral Daily Cucayetano Francorigordo, CRNP      Empagliflozin  10 mg Oral Daily Mani Flores DO      enoxaparin  40 mg Subcutaneous Daily Cuiwandy Castillok, CRNP      famotidine  20 mg Oral HS Cuiwandy Francorik, CRNP      furosemide  20 mg Oral Daily Isaiah Britt MD      levalbuterol  0.63 mg Nebulization Q6H PRN Cuiwandy Francorik, CRNP      loperamide  2 mg Oral 4x Daily PRN Cuiwandy Francorik, CRNP      methylPREDNISolone sodium succinate  40 mg Intravenous Daily Cuiwandy Thomason, CRNP      nicotine  1 patch Transdermal Daily Cuiwandy Castillok, CRNP      pantoprazole  40 mg Oral Early Morning Cuiwandy Francorik, CRNP      sacubitril-valsartan  1 tablet Oral BID Cuiwandy Thomason, CRNP      Thiamine Mononitrate  100 mg Oral Daily Cuwandy Francorigordo, CRNP      trimethobenzamide  200 mg Intramuscular Q6H PRN Cuiwandy Castillok, CRNP       acetaminophen, 650 mg, Q6H PRN  levalbuterol, 0.63 mg, Q6H PRN  loperamide, 2 mg, 4x Daily PRN  trimethobenzamide, 200 mg, Q6H PRN      No Known Allergies        Labs:   Troponins:      CBC with diff:  Results from last 7 days   Lab Units 11/23/24  0444 11/22/24  0242   WBC Thousand/uL 9.47 10.43*   HEMOGLOBIN g/dL 11.2* 12.9   HEMATOCRIT % 34.2* 39.5   MCV fL 110* 110*   PLATELETS Thousands/uL 163 215   RBC Million/uL 3.12* 3.58*   MCH pg 35.9* 36.0*   MCHC g/dL 32.7 32.7   RDW % 13.2 13.3   MPV fL 10.9 10.6   NRBC AUTO /100 WBCs 0 0       CMP:  Results from last 7 days   Lab Units 11/23/24  0444 11/22/24  0733 11/22/24  0242   SODIUM mmol/L 143 141 145   POTASSIUM mmol/L 3.5 3.7 3.6   CHLORIDE mmol/L 103 101 105   CO2 mmol/L 34* 23 28   ANION GAP mmol/L 6 17* 12   BUN mg/dL 8 5 5   CREATININE mg/dL 0.80 0.66 0.61   CALCIUM mg/dL 8.4 8.1* 8.5   AST U/L 25  --  33   ALT U/L 15  --  20   ALK PHOS U/L 92  --  122*   TOTAL PROTEIN g/dL 5.0*  --  5.7*   ALBUMIN g/dL 2.9*  --  3.3*   TOTAL BILIRUBIN mg/dL 0.99  --  1.68*   EGFR ml/min/1.73sq m 89 97  "100       Magnesium:  Results from last 7 days   Lab Units 11/23/24  0444 11/22/24  0242   MAGNESIUM mg/dL 2.1 1.5*     Coags:    TSH:    No components found for: \"TSH3\"  Lipid Profile:    Hgb A1c:    NT-proBNP: No results for input(s): \"NTBNP\" in the last 72 hours.     Imaging & Testing   I have personally reviewed pertinent reports.     VAS VENOUS DUPLEX - LOWER LIMB BILATERAL  Result Date: 11/22/2024  Narrative:  [facility] REPORT CLINICAL: Indications: Patient presents with bilateral lower extremity edema. Operative History: Patient denies any previous cardiovascular surgery Risk Factors The patient has history of CAD and smoking (current) 1-2 ppd.   CONCLUSION:  Impression: RIGHT LOWER LIMB: No evidence of acute or chronic deep vein thrombosis. No evidence of superficial thrombophlebitis noted. Doppler evaluation shows a normal response to augmentation maneuvers.. Popliteal, posterior tibial and anterior tibial arterial Doppler waveforms are monophasic.  LEFT LOWER LIMB: No evidence of acute or chronic deep vein thrombosis. No evidence of superficial thrombophlebitis noted. Doppler evaluation shows a normal response to augmentation maneuvers. Popliteal, posterior tibial and anterior tibial arterial Doppler waveforms are monophasic.  SIGNATURE: Electronically Signed by: XIANG MAURER MD on 2024-11-22 02:26:29 PM    X-ray chest 1 view portable  Result Date: 11/22/2024  Narrative: XR CHEST PORTABLE INDICATION: chest pain. COMPARISON: 1/10/2020 FINDINGS: Clear lungs. Small bilateral pleural effusions. Normal cardiomediastinal silhouette. Bones are unremarkable for age. Normal upper abdomen.     Impression: Small bilateral pleural effusions. Workstation performed: [id number]     CTA chest pe study  Result Date: 11/22/2024  Narrative: CTA - CHEST WITH IV CONTRAST - PULMONARY ANGIOGRAM INDICATION: SOB, elevated D dimer,r/o PE. COMPARISON: CT abdomen pelvis from 3/21/2024, chest radiograph from 11/22/2024 " TECHNIQUE: CTA examination of the chest was performed using angiographic technique according to a protocol specifically tailored to evaluate for pulmonary embolism. Multiplanar 2D reformatted images were created from the source data. In addition, coronal  3D MIP postprocessing was performed on the acquisition scanner. Radiation dose length product (DLP) for this visit: 440.41 mGy-cm . This examination, like all CT scans performed in the [facility], was performed utilizing techniques to minimize radiation dose exposure, including the use of iterative reconstruction and automated exposure control. IV Contrast: 85 mL of iohexol (OMNIPAQUE) FINDINGS: PULMONARY ARTERIAL TREE:  No pulmonary embolus. LUNGS: There is scarring involving the bilateral lung apices with associated calcifications. No consolidation noted. Compressive atelectasis involving the right greater than left lower lobes due to the adjacent pleural effusions. Mild centrilobular emphysema. The visualized airway appears widely patent. PLEURA: There are small lateral pleural effusions, right greater than left. No pneumothorax identified. HEART/GREAT VESSELS: There are coronary artery atherosclerotic calcifications. The coronary artery origins appear grossly normal. The heart is normal in size. There is no pericardial effusion. The main pulmonary artery is mildly enlarged indicative of pulmonary hypertension. There is mild aneurysmal dilatation of the mid ascending thoracic aorta measuring up to 4.5 cm in maximal dimensions. Mixed atherosclerotic plaque involving the visualized aortic arch and descending thoracic aorta. MEDIASTINUM AND ARIANE: No mediastinal or hilar lymphadenopathy is identified. CHEST WALL AND LOWER NECK: Unremarkable. VISUALIZED STRUCTURES IN THE UPPER ABDOMEN: Diffuse hepatic steatosis. OSSEOUS STRUCTURES: Multilevel thoracic spondylosis, with partial fusion of the T4-T6 vertebral bodies. Extensive disc height loss with  "sclerotic degenerative endplate changes at at T2-T3 and T7-T8. Mild chronic compression deformity involving the T7 superior endplate. Suture anchors from prior rotator cuff surgery involving the visualized right glenoid, and humeral head.     Impression: No pulmonary embolism identified. Mild enlargement pulmonary artery indicative of pulmonary hypertension. Small bilateral pleural effusions, right greater than left, with adjacent compressive atelectasis. 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 Coronary artery atherosclerotic calcifications. Diffuse hepatic steatosis. The study was marked in EPIC for immediate notification. Workstation performed: [id number]              Dr. Isaiah Britt MD, FACC      \"This note has been constructed using a voice recognition system.Therefore there may be syntax, spelling, and/or grammatical errors. Please call if you have any questions. \"  "

## 2024-11-23 NOTE — ASSESSMENT & PLAN NOTE
Reports rash on scalp and neck for about 1 year.  Was recently diagnosed with psoriasis by dermatologist in [facility].  Was prescribed a cream twice a day.  Had used it for 2 weeks and supposed to have a follow-up yesterday but did not go due to SOB.  Noticed rash on forehead and neck behind ears on exam.  Will hold off on cream while inpatient.  Follow-up dermatology outpatient.

## 2024-11-26 ENCOUNTER — TRANSITIONAL CARE MANAGEMENT (OUTPATIENT)
Dept: FAMILY MEDICINE CLINIC | Facility: CLINIC | Age: 69
End: 2024-11-26

## 2024-11-26 RX ORDER — HYDROCORTISONE 25 MG/G
CREAM TOPICAL 2 TIMES DAILY
COMMUNITY
Start: 2024-11-07

## 2024-11-26 RX ORDER — TRIAMCINOLONE ACETONIDE 1 MG/ML
LOTION TOPICAL
COMMUNITY
Start: 2024-11-07

## 2024-12-28 DIAGNOSIS — D64.9 ANEMIA: ICD-10-CM

## 2024-12-30 RX ORDER — PANTOPRAZOLE SODIUM 40 MG/1
TABLET, DELAYED RELEASE ORAL
Qty: 90 TABLET | Refills: 1 | Status: SHIPPED | OUTPATIENT
Start: 2024-12-30

## 2025-01-22 ENCOUNTER — OFFICE VISIT (OUTPATIENT)
Dept: FAMILY MEDICINE CLINIC | Facility: CLINIC | Age: 70
End: 2025-01-22
Payer: MEDICARE

## 2025-01-22 VITALS
WEIGHT: 152 LBS | HEART RATE: 68 BPM | RESPIRATION RATE: 18 BRPM | BODY MASS INDEX: 22.51 KG/M2 | DIASTOLIC BLOOD PRESSURE: 76 MMHG | SYSTOLIC BLOOD PRESSURE: 130 MMHG | HEIGHT: 69 IN | TEMPERATURE: 97 F

## 2025-01-22 DIAGNOSIS — R29.898 LEG WEAKNESS, BILATERAL: ICD-10-CM

## 2025-01-22 DIAGNOSIS — I50.43 ACUTE ON CHRONIC COMBINED SYSTOLIC (CONGESTIVE) AND DIASTOLIC (CONGESTIVE) HEART FAILURE (HCC): ICD-10-CM

## 2025-01-22 DIAGNOSIS — R29.6 MULTIPLE FALLS: Primary | ICD-10-CM

## 2025-01-22 DIAGNOSIS — I71.23 ANEURYSM OF DESCENDING THORACIC AORTA WITHOUT RUPTURE (HCC): ICD-10-CM

## 2025-01-22 DIAGNOSIS — G62.9 NEUROPATHY: ICD-10-CM

## 2025-01-22 DIAGNOSIS — F10.10 ALCOHOL ABUSE: ICD-10-CM

## 2025-01-22 DIAGNOSIS — Z11.59 NEED FOR HEPATITIS C SCREENING TEST: ICD-10-CM

## 2025-01-22 DIAGNOSIS — M35.3 PMR (POLYMYALGIA RHEUMATICA) (HCC): ICD-10-CM

## 2025-01-22 DIAGNOSIS — Z72.0 NICOTINE ABUSE: ICD-10-CM

## 2025-01-22 DIAGNOSIS — Z99.89 DEPENDENCE ON CANE: ICD-10-CM

## 2025-01-22 DIAGNOSIS — M31.6 TEMPORAL ARTERITIS (HCC): ICD-10-CM

## 2025-01-22 DIAGNOSIS — J44.9 CHRONIC OBSTRUCTIVE PULMONARY DISEASE, UNSPECIFIED COPD TYPE (HCC): ICD-10-CM

## 2025-01-22 DIAGNOSIS — M26.629 TMJ PAIN DYSFUNCTION SYNDROME: ICD-10-CM

## 2025-01-22 DIAGNOSIS — I50.42 CHRONIC COMBINED SYSTOLIC AND DIASTOLIC CONGESTIVE HEART FAILURE (HCC): ICD-10-CM

## 2025-01-22 PROBLEM — T45.1X5A IMMUNODEFICIENCY SECONDARY TO CHEMOTHERAPY (HCC): Status: ACTIVE | Noted: 2022-01-09

## 2025-01-22 PROBLEM — Z79.69 IMMUNODEFICIENCY SECONDARY TO CHEMOTHERAPY (HCC): Status: ACTIVE | Noted: 2022-01-09

## 2025-01-22 PROBLEM — R06.89 ACUTE RESPIRATORY INSUFFICIENCY: Status: RESOLVED | Noted: 2024-11-22 | Resolved: 2025-01-22

## 2025-01-22 PROBLEM — Z79.899 IMMUNODEFICIENCY SECONDARY TO CHEMOTHERAPY (HCC): Status: ACTIVE | Noted: 2022-01-09

## 2025-01-22 PROBLEM — R68.84 JAW PAIN: Status: ACTIVE | Noted: 2025-01-22

## 2025-01-22 PROBLEM — D84.821 IMMUNODEFICIENCY SECONDARY TO CHEMOTHERAPY (HCC): Status: ACTIVE | Noted: 2022-01-09

## 2025-01-22 PROCEDURE — G2211 COMPLEX E/M VISIT ADD ON: HCPCS | Performed by: FAMILY MEDICINE

## 2025-01-22 PROCEDURE — 99215 OFFICE O/P EST HI 40 MIN: CPT | Performed by: FAMILY MEDICINE

## 2025-01-22 RX ORDER — GABAPENTIN 100 MG/1
100 CAPSULE ORAL 3 TIMES DAILY
Qty: 270 CAPSULE | Refills: 1 | Status: SHIPPED | OUTPATIENT
Start: 2025-01-22 | End: 2025-07-21

## 2025-01-22 RX ORDER — FLUTICASONE FUROATE AND VILANTEROL 100; 25 UG/1; UG/1
1 POWDER RESPIRATORY (INHALATION) DAILY
Qty: 180 BLISTER | Refills: 1 | Status: SHIPPED | OUTPATIENT
Start: 2025-01-22 | End: 2025-07-21

## 2025-01-22 NOTE — PATIENT INSTRUCTIONS
Patient Education     Preventing falls in adults   The Basics   Written by the doctors and editors at [facility]   Am I at risk of falling? -- Falls can happen to anyone, no matter how old they are. Several things can increase your risk of a fall, including:   Vision problems   Having certain chronic health conditions, being sick, having recently been in the hospital, or having had surgery   Changing the medicines you take   An unsafe or unfamiliar setting (for example, a room with rugs or furniture that might trip you, or an area you don't know well)  Your risk of falling increases as you grow older. That's because getting older can make it harder to walk steadily and keep your balance. Also, the effects of falls are more serious for older people.  Overall, 3 to 4 out of every 10 people over the age of 65 fall each year. Many people who fracture a hip never fully recover to how they were before the fracture. If you have fallen in the past, you are at higher risk of falling again.  How can my doctor help me avoid falling? -- Your doctor can talk to you about the following things:   Past falls - It is important to tell your doctor about any times that you have fallen or almost fallen. They can then suggest ways to prevent another fall.   Your health conditions - Some health problems can put you at risk of falling. These include conditions that affect eyesight, hearing, muscle strength, or balance.   What to do if you are in the hospital - Falls can happen in the hospital because you are in an unfamiliar place. You might feel unsteady or drowsy from medicines or anesthesia. Or you might be attached to catheters or IV tubing that you could trip over. You are also likely to be weaker than usual.   Your medicines - Certain medicines can increase the risk of falling. These include some medicines for sleeping problems, anxiety, high blood pressure, or depression. Adding new medicines, or changing doses of some medicines, can  also affect your risk of falling.  The more your doctor knows about your situation, the better they can help you. For example, if you fell because you have a condition that causes pain, your doctor might suggest treatments to help with the pain. Or if any of your medicines are making you dizzy and more likely to fall, your doctor might switch you to a different medicine.  Is there anything I can do on my own? -- Yes. To help keep from falling, you can:   Make your home safer - To avoid falling at home, get rid of things that might make you trip or slip. This can include furniture, electrical cords, clutter, and loose rugs (figure 1). Keep your home well lit so you can easily see where you are going. Avoid storing things in high places so you don't have to reach or climb.   Wear non-slip socks or sturdy shoes that fit well - Wearing shoes with high heels or slippery soles, or shoes that are too loose, can lead to falls. Walking around in bare feet, or only socks, can also increase your risk of falling.   Take vitamin D pills - Taking vitamin D might lower the risk of falls in older people. This is because vitamin D helps make bones and muscles stronger. Your doctor can talk to you about whether you should take extra vitamin D, and how much.   Stay active - Moving your body regularly can help lower your risk of falling. It might also help prevent you from getting hurt if you do fall. It is best to do a few different activities that help with both strength and balance. There are many kinds of exercise that can be safe for older people. These include walking, swimming, and kenji chi (a Chinese martial art involving slow, gentle movements).   Use a cane, walker, or other safety devices - If your doctor recommends that you use a cane or walker, make sure that it's the right size and you know how to use it. There are other devices that might help you avoid falling, too. These include grab bars or a sturdy seat for the  shower, non-slip bath mats, and hand rails or treads for the stairs (to prevent slipping).   Take extra care if you have had surgery or are in the hospital - Ask for help with getting out of bed, getting up from a chair, or going to the bathroom. Your body needs time to heal, and it's normal to need help with these things while you recover. It can also help to keep your belongings within reach, and avoid walking around in the dark. If you need help, use the call button instead of trying to get up.  If you worry that you could fall, you can get an emergency alert system. This is usually an alarm button that lets you call for help if you fall and can't get up. If you live alone and you do not have an emergency alert system, always carry a cell phone or portable phone with you when moving around the house.  How do I get up from a fall? -- If you do fall, try not to be afraid. Stay calm, and take slow, deep breaths. If someone is near you, call for help right away. If you have an emergency alert system, use it.  Try to find out if you are hurt. If you are hurt badly, trying to get up can make things worse. If you do not think that you are hurt badly, come up with a plan to get up off of the floor.  Some tips to get up after a fall if you are alone:   Look around you for a piece of sturdy furniture such as a couch or chair. Try to move your body to the furniture. You might need to scoot, crawl, or roll to get close. Do these movements very slowly. If you feel any sharp pains, you might need to stop.   Once you are close to the furniture, roll onto your side. Pull your knees up toward your chest, and try to get on your hands and knees.   Put your hands on the seat of the couch or chair.   Bring 1 leg forward so the foot is flat on the floor. If you have a stronger leg, move this leg first.   Now, try to stand up. Once both feet are on the ground, slowly turn and lower yourself to sit down on the seat.  What should I do  after a fall? -- If you had a fall, see your doctor right away, even if you aren't hurt. Your doctor can try to figure out what caused you to fall, and how likely you are to fall again. They will do an exam and talk to you about your health problems, medicines, and activities. They can also check how well you walk, move, and balance. Then, they can suggest things you can do to lower your risk of falling again.  Many older people have a hard time recovering after a fall. Doing things to prevent falling can help you protect your health and independence.  All topics are updated as new evidence becomes available and our peer review process is complete.  This topic retrieved from [employer] on: Apr 04, 2024.  Topic 63257 Version 25.0  Release: 32.2.4 - C32.93  © 2024 UpToDate, Inc. and/or its affiliates. All rights reserved.  figure 1: How to avoid falling at home     This picture shows some of the things that can cause a fall in your home. Look around and remove any loose rugs, electrical cords, clutter, or furniture that could trip you.  Graphic 27442 Version 1.0  Consumer Information Use and Disclaimer   Disclaimer: This generalized information is a limited summary of diagnosis, treatment, and/or medication information. It is not meant to be comprehensive and should be used as a tool to help the user understand and/or assess potential diagnostic and treatment options. It does NOT include all information about conditions, treatments, medications, side effects, or risks that may apply to a specific patient. It is not intended to be medical advice or a substitute for the medical advice, diagnosis, or treatment of a health care provider based on the health care provider's examination and assessment of a patient's specific and unique circumstances. Patients must speak with a health care provider for complete information about their health, medical questions, and treatment options, including any risks or benefits regarding  use of medications. This information does not endorse any treatments or medications as safe, effective, or approved for treating a specific patient. UpToDate, Inc. and its affiliates disclaim any warranty or liability relating to this information or the use thereof.The use of this information is governed by the Terms of Use, available at https://www.[employer].com/en/know/clinical-effectiveness-terms. 2024© UpToDate, Inc. and its affiliates and/or licensors. All rights reserved.  Copyright   © 2024 UpToDate, Inc. and/or its affiliates. All rights reserved.

## 2025-01-22 NOTE — ASSESSMENT & PLAN NOTE
Pt sees Cardiology in [place]  Orders:  •  Fluticasone Furoate-Vilanterol 100-25 mcg/actuation inhaler; Inhale 1 puff daily Rinse mouth after use.  •  CBC; Future  •  Comprehensive metabolic panel; Future  •  Lipid Panel with Direct LDL reflex; Future  •  TSH, 3rd generation; Future

## 2025-01-22 NOTE — ASSESSMENT & PLAN NOTE
Smokes 1ppd  Advised oncessation  Orders:  •  CBC; Future  •  Comprehensive metabolic panel; Future  •  Lipid Panel with Direct LDL reflex; Future  •  TSH, 3rd generation; Future

## 2025-01-22 NOTE — ASSESSMENT & PLAN NOTE
Stable at this time  Orders:  •  CBC; Future  •  Comprehensive metabolic panel; Future  •  Lipid Panel with Direct LDL reflex; Future  •  TSH, 3rd generation; Future

## 2025-01-22 NOTE — PROGRESS NOTES
Name: Del Ocampo      : 1955      MRN: [id number]  Encounter Provider: Frank Lombardi, DO  Encounter Date: 2025   Encounter department: [facility]  :  Assessment & Plan  Multiple falls    Orders:  •  Ambulatory Referral to Physical Therapy; Future  •  CBC; Future  •  Comprehensive metabolic panel; Future  •  Lipid Panel with Direct LDL reflex; Future  •  TSH, 3rd generation; Future    Dependence on cane    Orders:  •  Ambulatory Referral to Physical Therapy; Future  •  CBC; Future  •  Comprehensive metabolic panel; Future  •  Lipid Panel with Direct LDL reflex; Future  •  TSH, 3rd generation; Future    Leg weakness, bilateral    Orders:  •  Ambulatory Referral to Physical Therapy; Future  •  CBC; Future  •  Comprehensive metabolic panel; Future  •  Lipid Panel with Direct LDL reflex; Future  •  TSH, 3rd generation; Future    PMR (polymyalgia rheumatica) (HCC)    Orders:  •  CBC; Future  •  Comprehensive metabolic panel; Future  •  Lipid Panel with Direct LDL reflex; Future  •  TSH, 3rd generation; Future    Acute on chronic combined systolic (congestive) and diastolic (congestive) heart failure (HCC)  Wt Readings from Last 3 Encounters:   25 68.9 kg (152 lb)   24 71.9 kg (158 lb 9.6 oz)   24 67.7 kg (149 lb 3.2 oz)             Orders:  •  CBC; Future  •  Comprehensive metabolic panel; Future  •  Lipid Panel with Direct LDL reflex; Future  •  TSH, 3rd generation; Future    Chronic obstructive pulmonary disease, unspecified COPD type (HCC)  Pt sees Cardiology in [place]  Orders:  •  Fluticasone Furoate-Vilanterol 100-25 mcg/actuation inhaler; Inhale 1 puff daily Rinse mouth after use.  •  CBC; Future  •  Comprehensive metabolic panel; Future  •  Lipid Panel with Direct LDL reflex; Future  •  TSH, 3rd generation; Future    Aneurysm of descending thoracic aorta without rupture (HCC)    Orders:  •  CBC; Future  •  Comprehensive metabolic panel; Future  •  Lipid Panel  with Direct LDL reflex; Future  •  TSH, 3rd generation; Future    Temporal arteritis (HCC)  Stable at this time  Orders:  •  CBC; Future  •  Comprehensive metabolic panel; Future  •  Lipid Panel with Direct LDL reflex; Future  •  TSH, 3rd generation; Future    Neuropathy    Orders:  •  gabapentin (Neurontin) 100 mg capsule; Take 1 capsule (100 mg total) by mouth 3 (three) times a day  •  CBC; Future  •  Comprehensive metabolic panel; Future  •  Lipid Panel with Direct LDL reflex; Future  •  TSH, 3rd generation; Future    TMJ pain dysfunction syndrome    Orders:  •  XR mandible 4+ vw; Future  •  Ambulatory Referral to Otolaryngology; Future    Chronic combined systolic and diastolic congestive heart failure (HCC)  Wt Readings from Last 3 Encounters:   01/22/25 68.9 kg (152 lb)   11/23/24 71.9 kg (158 lb 9.6 oz)   09/25/24 67.7 kg (149 lb 3.2 oz)             Orders:  •  Ambulatory Referral to Pulmonology; Future    Nicotine abuse  Smokes 1ppd  Advised oncessation  Orders:  •  CBC; Future  •  Comprehensive metabolic panel; Future  •  Lipid Panel with Direct LDL reflex; Future  •  TSH, 3rd generation; Future    Alcohol abuse  Pt drinks 1/2 bottle wine a day  Pt states his whole life he drank beer and he drank a lot  Pt advised oncessation  Orders:  •  CBC; Future  •  Comprehensive metabolic panel; Future  •  Lipid Panel with Direct LDL reflex; Future  •  TSH, 3rd generation; Future    Need for hepatitis C screening test    Orders:  •  Hepatitis C antibody; Future           History of Present Illness   Pt is here for the first time  Transferring care from [facility]    Pt states his legas are getting weak states he has been faklkling often - he has fallen 7 times in the last month  Pt does less so his legs are getting weaker  Pt would like to engage in physical therapy  Pt states he does have a walker and a cane at home    Pt also reports neuropathy - states its numb and pins and needles - started ten years  "ago    Wife also states pt has horrible pain when he chews  He is limited to soft noodles, eggs   This started before x mas  Pt has had temporal arteritis in the past  Pt had a televist was given predniosne  Pt was dx with TMJ      Pt also gets SOB - pt has a dx of COPD - wife states \"supposidly\"  This was dx while in the hospital in April  Has not seen pulmonary in follow up  Pt was given albuterol when he was discharged form the hospital does not use it.                Review of Systems   Respiratory:  Positive for shortness of breath.        Objective   /76   Pulse 68   Temp (!) 97 °F (36.1 °C)   Resp 18   Ht 5' 8.5\" (1.74 m)   Wt 68.9 kg (152 lb)   BMI 22.78 kg/m²      Physical Exam  Vitals and nursing note reviewed.   Constitutional:       General: He is not in acute distress.     Appearance: He is well-developed. He is not diaphoretic.   HENT:      Head: Normocephalic and atraumatic.      Right Ear: External ear normal.      Left Ear: External ear normal.      Nose: Nose normal.      Mouth/Throat:      Pharynx: No oropharyngeal exudate.   Eyes:      General: No scleral icterus.        Right eye: No discharge.         Left eye: No discharge.      Pupils: Pupils are equal, round, and reactive to light.   Neck:      Thyroid: No thyromegaly.   Cardiovascular:      Rate and Rhythm: Normal rate.      Heart sounds: Normal heart sounds. No murmur heard.  Pulmonary:      Effort: Pulmonary effort is normal. No respiratory distress.      Breath sounds: Wheezing present.   Abdominal:      General: Bowel sounds are normal. There is no distension.      Palpations: Abdomen is soft. There is no mass.      Tenderness: There is no abdominal tenderness. There is no guarding or rebound.   Musculoskeletal:         General: Normal range of motion.   Skin:     General: Skin is warm and dry.      Findings: No erythema or rash.      Comments: Dystrophic nails   Neurological:      Mental Status: He is alert.      " Coordination: Coordination normal.      Deep Tendon Reflexes: Reflexes normal.   Psychiatric:         Behavior: Behavior normal.       Falls Plan of Care: Patient referred to physical therapy.

## 2025-01-22 NOTE — ASSESSMENT & PLAN NOTE
Wt Readings from Last 3 Encounters:   01/22/25 68.9 kg (152 lb)   11/23/24 71.9 kg (158 lb 9.6 oz)   09/25/24 67.7 kg (149 lb 3.2 oz)             Orders:  •  Ambulatory Referral to Pulmonology; Future

## 2025-01-22 NOTE — ASSESSMENT & PLAN NOTE
Orders:  •  CBC; Future  •  Comprehensive metabolic panel; Future  •  Lipid Panel with Direct LDL reflex; Future  •  TSH, 3rd generation; Future

## 2025-01-22 NOTE — ASSESSMENT & PLAN NOTE
Pt drinks 1/2 bottle wine a day  Pt states his whole life he drank beer and he drank a lot  Pt advised oncessation  Orders:  •  CBC; Future  •  Comprehensive metabolic panel; Future  •  Lipid Panel with Direct LDL reflex; Future  •  TSH, 3rd generation; Future

## 2025-01-22 NOTE — ASSESSMENT & PLAN NOTE
Orders:  •  gabapentin (Neurontin) 100 mg capsule; Take 1 capsule (100 mg total) by mouth 3 (three) times a day  •  CBC; Future  •  Comprehensive metabolic panel; Future  •  Lipid Panel with Direct LDL reflex; Future  •  TSH, 3rd generation; Future

## 2025-01-27 ENCOUNTER — TELEPHONE (OUTPATIENT)
Dept: FAMILY MEDICINE CLINIC | Facility: CLINIC | Age: 70
End: 2025-01-27

## 2025-01-27 ENCOUNTER — EVALUATION (OUTPATIENT)
Facility: CLINIC | Age: 70
End: 2025-01-27
Payer: MEDICARE

## 2025-01-27 DIAGNOSIS — J44.9 CHRONIC OBSTRUCTIVE PULMONARY DISEASE, UNSPECIFIED COPD TYPE (HCC): Primary | ICD-10-CM

## 2025-01-27 DIAGNOSIS — Z99.89 DEPENDENCE ON CANE: ICD-10-CM

## 2025-01-27 DIAGNOSIS — R29.898 LEG WEAKNESS, BILATERAL: ICD-10-CM

## 2025-01-27 DIAGNOSIS — R26.2 AMBULATORY DYSFUNCTION: Primary | ICD-10-CM

## 2025-01-27 DIAGNOSIS — R29.6 MULTIPLE FALLS: ICD-10-CM

## 2025-01-27 PROCEDURE — 97162 PT EVAL MOD COMPLEX 30 MIN: CPT

## 2025-01-27 NOTE — TELEPHONE ENCOUNTER
Patients Spouse called the RX Refill Line. Message is being forwarded to the office.     Patients spouse is requesting an Alternative medication for Fluticasone Furoate-Vilanterol 100-25 mcg/actuation inhaler sent to [facility] because their insurance will not cover the medication. If there is no alternative patient would like a hard copy of the prescription because they can get the medication a lot cheaper through a [facility]. Please review    Please contact patients spouse at  [phone].

## 2025-01-27 NOTE — PROGRESS NOTES
PT Evaluation          POC expires Unit limit Auth Expiration date PT/OT + Visit Limit? Co-Insurance   25 N/A N/A BOMN No                                            Today's date: 2025  Patient name: Del Ocampo  : 1955  MRN: [id number]  Referring provider: Lombardi, Frank, DO  Dx:   Encounter Diagnosis     ICD-10-CM    1. Ambulatory dysfunction  R26.2       2. Multiple falls  R29.6 Ambulatory Referral to Physical Therapy      3. Dependence on cane  Z99.89 Ambulatory Referral to Physical Therapy      4. Leg weakness, bilateral  R29.898 Ambulatory Referral to Physical Therapy            Assessment  Assessment details: Patient is a 69 y.o. male who presents to OP PT to address history of falls, imbalance, and generalized weakness. PMH significant for neuropathy, CHF, and multiple hospitalizations. Patient reports 7 falls in the past month due to unsteadiness and dizziness. Based on history, dizziness appears to be blood pressure related, plan to check for orthostatic. Denies vertigo with bed mobility. Sensation to light touch intact bilateral and coordination slightly impaired with reduced speed. MMT revealed weakness throughout bilateral LE; noted increased weakness proximally. FSTS completed without UE support however, timing to complete FSTS is associated with high fall risks. Consider an low fall risk based on the TUG however, noted mild LOB with rotational turns. No significant discrepancies with TUG-C and TUG. Plan to further assess patient's mobility with 6MWT, 10MWT, mCTSIB, FGA, and ABC next session. Based on number of falls in the past month and completed testing, patient is at significant risk for falls and is recommended to use an AD in the community. Currently, patient's decreased somatosensory awareness, impaired static/dynamic balance, BLE weakness, impaired coordination, and decreased activity tolerance; patient is  at risk for falls and also limits his ability to complete ADLs and IADLs. Continue with PLOC to improve mobility and gait dysfunction to reduce fall risk.   Impairments: Abnormal coordination, Abnormal gait, Abnormal muscle tone, Abnormal or restricted ROM, Activity intolerance, Impaired balance, Impaired physical strength, Lacks appropriate HEP, Poor posture, Poor body mechanics, Pain with function, Safety issue, Weight-bearing intolerance, Abnormal movement, Difficulty understanding, Abnormal muscle firing  Understanding of Dx/Px/POC: Good  Prognosis: Good    Patient verbalized understanding of POC.         Please contact me if you have any questions or recommendations. Thank you for the referral and the opportunity to share in Del Ocampo's care.        Plan  Plan details: Complete remaining outcome measures (FGA, DGI, 6MWT, 10MWT, mCTSIB), check orthostatics; generalized balance  and strength  Patient would benefit from: Skilled PT  Planned modality interventions: Biofeedback, Cryotherapy, TENS, Thermotherapy  Planned therapy interventions: Abdominal trunk stabilization, ADL training, Balance, Balance/WB training, Breathing training, Body mechanics training, Coordination, Functional ROM exercises, Gait training, HEP, Joint Mobilization, Manual Therapy, Horta taping, Motor coordination training, Neuromuscular re-education, Patient education, Postural training, Strengthening, Stretching, Therapeutic activities, Therapeutic exercises, Therapeutic training, Transfer training, Activity modification, Work reintegration  Frequency: 2x/wk  Duration in weeks: 12 baljit  Plan of Care beginning date: 1/27/25    Plan of Care expiration date: 12 weeks - 4/21/2025  Treatment plan discussed with: Patient       Goals  Short Term Goals (4 weeks):    - Patient will improve time on TUG by 2.9 seconds to facilitate improved safety in all ambulation  - Patient will be independent in basic HEP 2-3 weeks  - Patient will improve  5xSTS score by 2.3 seconds to promote improved LE functional strength needed for ADLs      Long Term Goals (12 weeks):  - Patient will be independent in a comprehensive home exercise program  - Patient will improve scoring on DGI by 2.6 points to progress safety  - Patient will improve gait speed by 0.18 m/s to improve safety with community ambulation  - Patient will improve CARROLL by 6 points in order to improve static balance and reduce risk for falls  - Patient will improve scoring on FGA by 4 points to progress safety with dynamic tasks  - Patient will be able to demonstrate HT in gait without veering  - Patient will improve 6 Minute Walk Test score by 190 feet to promote improved cardiovascular endurance  - Patient will report 50% reduction in near falls in order to improve safety with functional tasks and reduce his risk for falls  - Patient will report going on walks at least 3 days per week to promote independence and improved cardiovascular endurance  - Patient will be able to ascend/descend stairs reciprocally with 1 UE assist to promote independence and safety with ADLs  - Patient will report 50% reduction in near falls when ambulating on uneven terrain      Cut off score    All date taken from [facility]TA Neuro Section or Rehab Measures      Carroll/56  MDC: 6 pts  Age Norms:  60-69: M - 55   F - 55  70-79: M - 54   F - 53  80-89: M - 53   F - 50 5xSTS: Román et al 2010  MDC: 2.3 sec  Age Norms:  60-69: 11.4 sec  70-79: 12.6 sec  80-89: 14.8 sec   TUG  MDC: 4.14 sec  Cut off score:  >13.5 sec community dwelling adults  >32.2 frail elderly  <20 I for basic transfers  >30 dependent on transfers 10 Meter Walk Test: Betsy et al 2011  MDC: 0.18 m/s  20-29: M - 1.35 m   F - 1.34 m  30-39: M - 1.43 m   F - 1.34 m  40-49: M - 1.43 m   F - 1.39 m  50-59: M - 1.43 m   F - 1.31 m  60-69: M - 1.34 m   F - 1.24 m  70-79: M - 1.26 m   F - 1.13 m  80-89: M - 0.97 m   F - 0.94 m    Household Ambulator < 0.4  m/s  Limited Community Ambulator 0.4 - 0.8 m/s  Community Ambulator 0.8 - 1.2 m/s  Safely cross the street > 1.2 m/s   FGA  MCID: 4 pts  Geriatrics/community < 22/30 fall risk  Geriatrics/community < 20/30 unexplained falls    DGI  MDC: vestibular - 4 pts  MDC: geriatric/community - 3 pts  Falls risk <19/24 mCTSIB  Norm: 20-60 yrs  Eyes open firm: norm sway 0.21-0.48  Eyes closed firm: norm sway 0.48-0.99  Eyes open foam: norm sway 0.38-0.71  Eyes closed foam: norm sway 0.70-2.22   6 Minute Walk Test  MDC: 190.98 ft  MCID: 164 ft    Age Norms  60-69: M - 1876 ft (571.80 m)  F - 1765 ft (537.98 m)  70-79: M - 1729 ft (527.00 m)  F - 1545 ft (470.92 m)  80-89: M - 1368 ft (416.97 m)  F - 1286 ft (391.97 m) ABC: Sam & Kyaw, 2003  <67% increased risk for falls   Sag[place]ore-Laly Monofilaments  Evaluator Size:        Force (grams):          Hand/Dorsal Thresholds:        Plantar Thresholds:  - 1.65                       - 0.008                       - Normal                                 - Normal  - 2.36                       - 0.02                         - Normal                                 - Normal  - 2.44                       - 0.04                         - Normal                                 - Normal  - 2.83                       - 0.07                         - Normal                                 - Normal  - 3.22                       - 0.16                         - Diminished light touch          - Normal  - 3.61                       - 0.40                         - Diminished light touch          - Normal  - 3.84                       - 0.60                         - Diminished protective           - Diminished light touch  - 4.08                       - 1.00                         - Diminished protective           - Diminished light touch  - 4.17                       - 1.40                         - Diminished protective           - Diminished light touch  - 4.31                       -  2.00                         - Diminished protective           - Diminished light touch  - 4.56                       - 4.00                         - Loss of protective sense      - Diminished protective  - 4.74                       - 6.00                         - Loss of protective sense      - Diminished protective  - 4.93                       - 8.00                         - Loss of protective sense      - Diminished protective  - 5.07                       - 10.0                         - Loss of protective sense     - Loss of protective sense  - 5.18                       - 15.0                         - Loss of protective sense     - Loss of protective sense  - 5.46                       - 26.0                         - Loss of protective sense     - Loss of protective sense  - 5.88                       - 60.0                         - Loss of protective sense     - Loss of protective sense  - 6.10                       - 100                          - Loss of protective sense     - Loss of protective sense  - 6.45                       - 180                          - Loss of protective sense     - Loss of protective sense  - 6.65                       - 300                          - Deep pressure sense only  - Deep pressure sense only         Subjective    History of Present Illness  - Mechanism of injury: Presents to balance PT due to history of falls. Over the past month, patient has had 7 falls  without injuries. (Questionable head strikes)Feels unsteady when walking on grass, carpet, reaching out of NADEGE. Does report occasional dizziness; denies room spinning with bed mobility. Dizziness occurs an few times week. Complaints of general weakness, decreased energy, and balance.   Has an trip planned in [facility] this April.  - Primary AD: Walker PRN   - Assist level at home: A with IADLs.   - Decreased fine motor tasks: Yes        Patient goal: 1) Improve his balance 2) Improve strength 3) Help out  around the house 4) Be able to visit his daughter in [facility].     Pain  - Current pain ratin/10  - At best pain rating: N/A  - At worst pain rating: N/A  - Location: N/A  - Aggravating factors: N/A    Social Support  - Steps to enter house: 13 JU   - Stairs in house: 13 steps to first   - Lives in: Multi-level home   - Lives with: Wife, Diann    - Employment status: Retired, mechanics   - Hand dominance: Right handed     Treatments  - Previous treatment: N/A  - Current treatment: N/A  - Diagnostic Testing: N/A      Objective     Vitals:   - BP: 100/60 mmHg   - HR: 75 bpm   - SpO2: 95%            LE MMT  - R Hip Flexion: 4-/5  L Hip Flexion: 3+/5  - R Hip Abduction: 4/5  L Hip Abduction: 4/5  - R Hip Adduction: 4/5  L Hip Adduction: 4/5  - R Knee Extension: 4/5  L Knee Extension: 4/5  - R Knee Flexion: 4/5  L Knee Flexion: 4/5  - R Ankle DF: 4/5   L Ankle DF: 4/5      Sensation  - Light touch: WFL    Coordination  - Heel to Shin: Slow bilateral  - Alternate Toe Taps: WFL bilateral  - Finger to Nose: Slower with LUE compared to RUE      Reflexes/Clonus  - Clonus: No, - beat  - Patellar DTR: 1+: Diminished      OT Screen  - Difficulty w/ clothing fasteners: No  - Difficulty w/ bathing: Yes  - Difficulty w/ dressing: Yes  - Difficulty w/ toileting: No        Gait  - Abnormalities: WBOS, shuffled gait, decreased bilateral heel strike, decreased bilateral step length, M-L instability with turns            Outcome Measures Initial Eval  25        5xSTS 21. Sec without UE support         TUG  - Regular  - Cognitive   9.65 sec  11.29  sec (counting backwards by 3)        10 meter NV        CARROLL NV        FGA NV        DGI NV        mCTSIB  - FTEO (firm)  - FTEC (firm)  - FTEO (foam)  - FTEC (foam)   NV        6MWT NV                                      Precautions: Fall risk, Neuropathy, CHF  Past Medical History:   Diagnosis Date    Alcoholism (HCC)     Marinelli esophagus     Marinelli's esophagus     Bleeding  ulcer     COPD (chronic obstructive pulmonary disease) (HCC)     Hospital visit found    Coronary artery disease     10 years    Diverticulitis of colon     Fatty liver     GERD (gastroesophageal reflux disease)     Years    Heart failure (HCC)     HL (hearing loss)     Years, work related    Inflammatory bowel disease     Years    PMR (polymyalgia rheumatica) (HCC)     Temporal arteritis (HCC)     Terminal ileitis without complication ([facility]) 04/15/2024    Visual impairment     Wears glasses

## 2025-01-28 RX ORDER — FLUTICASONE PROPIONATE AND SALMETEROL 100; 50 UG/1; UG/1
1 POWDER RESPIRATORY (INHALATION) 2 TIMES DAILY
Qty: 180 BLISTER | Refills: 3 | Status: SHIPPED | OUTPATIENT
Start: 2025-01-28 | End: 2026-01-23

## 2025-02-04 ENCOUNTER — OFFICE VISIT (OUTPATIENT)
Facility: CLINIC | Age: 70
End: 2025-02-04
Payer: MEDICARE

## 2025-02-04 DIAGNOSIS — Z99.89 DEPENDENCE ON CANE: ICD-10-CM

## 2025-02-04 DIAGNOSIS — R29.6 MULTIPLE FALLS: Primary | ICD-10-CM

## 2025-02-04 DIAGNOSIS — R29.898 LEG WEAKNESS, BILATERAL: ICD-10-CM

## 2025-02-04 DIAGNOSIS — R26.2 AMBULATORY DYSFUNCTION: ICD-10-CM

## 2025-02-04 PROCEDURE — 97530 THERAPEUTIC ACTIVITIES: CPT

## 2025-02-04 NOTE — PROGRESS NOTES
Daily Note     Today's date: 2025  Patient name: Del Ocampo  : 1955  MRN: [id number]  Referring provider: Lombardi, Frank, DO  Dx:   Encounter Diagnosis     ICD-10-CM    1. Multiple falls  R29.6       2. Dependence on cane  Z99.89       3. Leg weakness, bilateral  R29.898       4. Ambulatory dysfunction  R26.2                      Subjective: Patient reported he had an orthostatic hypotension (53/37 mmhg) and went to see the MD and got his medication changed today.       Objective: See treatment diary below    (Gait belt and contact guard at all times)      TA:   Completed 6MWT, 10MWT, ABC, mCTSIB, and FGA - see outcome measures below    TE:  Reviewed and completed HEP below       Access Code: [email or web address]  URL: [email or web address]  Date: 2025  Prepared by: Elyse Granados-Henry    Exercises  - Sit to Stand Without Arm Support  - 1 x daily - 7 x weekly - 2 sets - 5 reps  - Standing March with Counter Support  - 1 x daily - 7 x weekly - 3 sets - 10 reps  - Standing Partial Squat  - 1 x daily - 7 x weekly - 3 sets - 10 reps    Assessment: Patient tolerated session well with an focus on remaining outcome measures. Patient is an HIGH fall risk per mcTSIB and FGA. Demonstrate significantly impaired aerobic endurance as he was unable to complete the 6MWT.  On the 6MWT, noted instability with quick rotational turns especially with fatigue. Recommend use of gait belt at all times. Continue with PLOC to improve mobility and gait dysfunction to reduce fall risk.       Plan: Continue per plan of care.  Static/dynamic balance, LE strengthening        POC expires Unit limit Auth Expiration date PT/OT + Visit Limit? Co-Insurance   25 N/A N/A BOMN No                                       Outcome Measures Initial Eval  25 & 25        5xSTS 21. 5 Sec without UE support         TUG  - Regular  - Cognitive   9.65 sec  11.29  sec (counting backwards by 3)        10 meter 0.947 m/s        ABC  64.38%        FGA 14/30        DGI NT        mCTSIB  - FTEO (firm)  - FTEC (firm)  - FTEO (foam)  - FTEC (foam)   - 30 sec  - 30 sec  - 30 sec  - 10.42          6MWT 650 feet (Tested ended at 3 minutes and 40 seconds)

## 2025-02-05 ENCOUNTER — OFFICE VISIT (OUTPATIENT)
Facility: CLINIC | Age: 70
End: 2025-02-05
Payer: MEDICARE

## 2025-02-05 DIAGNOSIS — R29.898 LEG WEAKNESS, BILATERAL: ICD-10-CM

## 2025-02-05 DIAGNOSIS — R26.2 AMBULATORY DYSFUNCTION: ICD-10-CM

## 2025-02-05 DIAGNOSIS — R29.6 MULTIPLE FALLS: Primary | ICD-10-CM

## 2025-02-05 DIAGNOSIS — Z99.89 DEPENDENCE ON CANE: ICD-10-CM

## 2025-02-05 PROCEDURE — 97110 THERAPEUTIC EXERCISES: CPT

## 2025-02-05 PROCEDURE — 97530 THERAPEUTIC ACTIVITIES: CPT

## 2025-02-05 NOTE — PROGRESS NOTES
"Daily Note     Today's date: 2025  Patient name: Del Ocampo  : 1955  MRN: [id number]  Referring provider: Lombardi, Frank, DO  Dx:   Encounter Diagnosis     ICD-10-CM    1. Multiple falls  R29.6       2. Dependence on cane  Z99.89       3. Leg weakness, bilateral  R29.898       4. Ambulatory dysfunction  R26.2             Start Time: 1500  Stop Time: 1541  Total time in clinic (min): 41 minutes    Patient treated by DARIANA Tong under supervision from this PT. We discussed the case to ensure appropriate continuation and progression of care and I reviewed the documentation.     Subjective: Patient reports no recent falls and denies any changes since last session. Pt notes muscle soreness in their calves down to their feet since yesterday's session. Pt notes that they went to the cardiologist yesterday who adjusted their medications due to recent orthostatic hypotension. Reports ongoing swelling in legs, worse over the past few weeks, and notes that cardiology is aware as of yesterday's appointment.     Objective: See treatment diary below    (Gait belt and contact guard at all times)    Vitals:  BP: 98/58 mmHg (pre-session), 104/66 (following seated exercises)    Observation: symmetrical pitting edema in bilateral lower extremities grade 2; no increase in warmth or redness.   Patient education: discussed seeking medical care with any sudden or asymmetrical swelling, warmth, redness, etc. And patient verbalized understanding. Also discussed principles of muscle soreness following exercise.     TE:  -Seated LAQ's 1x10 (RPE 5/10)  -Seated marches 1x10 (RPE 5/10)  -STS from standard chair 10x, 5x   -FWD 6\" luis step overs, 1 minute x 2  -Lateral stepping at parallel bar, no UE support, 2 minutes        Assessment: Patient had fair tolerance for today's session with initiation of LE strength and balance exercises. Pt required frequent seated rest breaks during today's session due to pt reports of " LE mm fatigue. Pt was significantly challenged with luis step overs and noted increased difficulty with clearance of the LLE, indicating persistent mm strength/dynamic balance deficits that future sessions should continue to address. Pt will continue to benefit from skilled PT in order  to improve LE functional strength, functional mobility, and gait dysfunction to reduce the risk for future falls.       Plan: Continue per plan of care.  Static/dynamic balance, LE strengthening        POC expires Unit limit Auth Expiration date PT/OT + Visit Limit? Co-Insurance   4/21/25 N/A N/A BOMN No                                       Outcome Measures Initial Eval  1/27/25 & 2/4/25        5xSTS 21. 5 Sec without UE support         TUG  - Regular  - Cognitive   9.65 sec  11.29  sec (counting backwards by 3)        10 meter 0.947 m/s        ABC 64.38%        FGA 14/30        DGI NT        mCTSIB  - FTEO (firm)  - FTEC (firm)  - FTEO (foam)  - FTEC (foam)   - 30 sec  - 30 sec  - 30 sec  - 10.42          6MWT 650 feet (Tested ended at 3 minutes and 40 seconds)                            Access Code: [email or web address]  URL: [email or web address]  Date: 02/04/2025  Prepared by: Elyse Granados-Ginuba    Exercises  - Sit to Stand Without Arm Support  - 1 x daily - 7 x weekly - 2 sets - 5 reps  - Standing March with Counter Support  - 1 x daily - 7 x weekly - 3 sets - 10 reps  - Standing Partial Squat  - 1 x daily - 7 x weekly - 3 sets - 10 reps

## 2025-02-10 ENCOUNTER — OFFICE VISIT (OUTPATIENT)
Facility: CLINIC | Age: 70
End: 2025-02-10
Payer: MEDICARE

## 2025-02-10 DIAGNOSIS — R29.6 MULTIPLE FALLS: Primary | ICD-10-CM

## 2025-02-10 DIAGNOSIS — R26.2 AMBULATORY DYSFUNCTION: ICD-10-CM

## 2025-02-10 PROCEDURE — 97112 NEUROMUSCULAR REEDUCATION: CPT

## 2025-02-10 NOTE — PROGRESS NOTES
"Daily Note     Today's date: 2/10/2025  Patient name: Del Ocampo  : 1955  MRN: [id number]  Referring provider: Lombardi, Frank, DO  Dx:   Encounter Diagnosis     ICD-10-CM    1. Multiple falls  R29.6       2. Ambulatory dysfunction  R26.2                    Subjective:  Reported mild soreness after previous session. Has been complaint with HEP. No epsiodes of orthostatic hypotension in the past.     Objective: See treatment diary below    (Gait belt and contact guard at all times)    Vitals:  BP: 120/62 mmHg (pre-session), 104/66 (following seated exercises)      TE:  - Standing hip abduction with red T-band x 20 on each side  - Seated LAQ's 1x10  with red T-band x 1 minute  - STS with 5# TT x 15  -  Step up over 6\" luis forward/back with 5# TT x 20  - Lateral step over 6\" luis with 5# TT x 20   - Alt step up to 4\" step without AD x 20   - Static standing on airex + trunk rotation with #5.5 x 1 minute         Assessment: Patient had fair tolerance for today's session with initiation of LE strength and balance exercises. No episode of dizziness throughout PT session. Noted increase instability as patient fatigues with lateral LOB. Required A to recover LOB.  Pt will continue to benefit from skilled PT in order  to improve LE functional strength, functional mobility, and gait dysfunction to reduce the risk for future falls.       Plan: Continue per plan of care.  Static/dynamic balance, LE strengthening        POC expires Unit limit Auth Expiration date PT/OT + Visit Limit? Co-Insurance   25 N/A N/A BOMN No                                       Outcome Measures Initial Eval  25 & 25        5xSTS 21. 5 Sec without UE support         TUG  - Regular  - Cognitive   9.65 sec  11.29  sec (counting backwards by 3)        10 meter 0.947 m/s        ABC 64.38%        FGA         DGI NT        mCTSIB  - FTEO (firm)  - FTEC (firm)  - FTEO (foam)  - FTEC (foam)   - 30 sec  - 30 sec  - 30 sec  - " 10.42          6MWT 650 feet (Tested ended at 3 minutes and 40 seconds)                            Access Code: [email or web address]  URL: [email or web address]  Date: 02/04/2025  Prepared by: Elyse Granados-Henry    Exercises  - Sit to Stand Without Arm Support  - 1 x daily - 7 x weekly - 2 sets - 5 reps  - Standing March with Counter Support  - 1 x daily - 7 x weekly - 3 sets - 10 reps  - Standing Partial Squat  - 1 x daily - 7 x weekly - 3 sets - 10 reps

## 2025-02-13 ENCOUNTER — OFFICE VISIT (OUTPATIENT)
Facility: CLINIC | Age: 70
End: 2025-02-13
Payer: MEDICARE

## 2025-02-13 DIAGNOSIS — R29.6 MULTIPLE FALLS: Primary | ICD-10-CM

## 2025-02-13 DIAGNOSIS — R26.2 AMBULATORY DYSFUNCTION: ICD-10-CM

## 2025-02-13 PROCEDURE — 97112 NEUROMUSCULAR REEDUCATION: CPT

## 2025-02-13 NOTE — PROGRESS NOTES
"Daily Note     Today's date: 2025  Patient name: Del Ocampo  : 1955  MRN: [id number]  Referring provider: Lombardi, Frank, DO  Dx:   Encounter Diagnosis     ICD-10-CM    1. Multiple falls  R29.6       2. Ambulatory dysfunction  R26.2                  Subjective:  Reports B/L calf and foot soreness. Denies dizziness today. Wife states he started his new BP medication dose ~ 1 week ago.  Arrived almost 10 minutes late to session    Objective: See treatment diary below    (Gait belt and contact guard at all times)    Vitals:  BP: 98/56 mmHg (pre-session), 104/66 (post)  Hr: 65bpm, SpO2: 99%      TE/ NMR:  - Seated LAQ's 1x10  with red T-band x 1 minute  - STS with 5# TT x 15  - Lateral stepping on foam with RTB, 2 minutes, 0 UE  - Seated marches with RTB, 1 minute  -  Step up over 6\" luis forward/back with 5# TTT, 1 minute   - Lateral step over 6\" luis with 5# TT , 1 minute  - Self calf stretch against wall, 3 x 10\" hold (added to HEP-see below)    TA: Calf assessment, B/L swelling noted, no redness or heat. Pt has dx of CHF and wife says this is typical . MD aware    Assessment: Patient had fair tolerance for today's session with focus on functional strength/endurance activities and dynamic balance activities. Pt fatigues easily but able to tolerate 2 minute seated exercise and 1 minute standing exercise intervals. Progressed lateral stepping to compliant surface (foam) with One posterior LOB requiring BUE support on grab bar and CGA from PT to stabilize. Pt reports calf swelling, which PT assessed and are even B/L, no redness or warmth to touch.  Pt will continue to benefit from skilled PT in order  to improve LE functional strength, functional mobility, and gait dysfunction to reduce the risk for future falls.       Plan: Continue per plan of care.  Static/dynamic balance, LE strengthening        POC expires Unit limit Auth Expiration date PT/OT + Visit Limit? Co-Insurance   25 N/A N/A BOMN " No                                       Outcome Measures Initial Eval  1/27/25 & 2/4/25        5xSTS 21. 5 Sec without UE support         TUG  - Regular  - Cognitive   9.65 sec  11.29  sec (counting backwards by 3)        10 meter 0.947 m/s        ABC 64.38%        FGA 14/30        DGI NT        mCTSIB  - FTEO (firm)  - FTEC (firm)  - FTEO (foam)  - FTEC (foam)   - 30 sec  - 30 sec  - 30 sec  - 10.42          6MWT 650 feet (Tested ended at 3 minutes and 40 seconds)                            Access Code: [email or web address]  URL: [email or web address]  Date: 02/04/2025  Prepared by: Elyse Granados-Henry    Exercises  - Sit to Stand Without Arm Support  - 1 x daily - 7 x weekly - 2 sets - 5 reps  - Standing March with Counter Support  - 1 x daily - 7 x weekly - 3 sets - 10 reps  - Standing Partial Squat  - 1 x daily - 7 x weekly - 3 sets - 10 reps        Access Code: [email or web address]  URL: [email or web address]  Date: 02/13/2025  Prepared by: Tricia Mackenzie    Exercises  - Gastroc Stretch on Wall  - 1 x daily - 7 x weekly - 3 sets - 10 reps  - Standing Dorsiflexion Self-Mobilization on Step  - 1 x daily - 7 x weekly - 3 sets - 10 reps

## 2025-02-20 ENCOUNTER — APPOINTMENT (OUTPATIENT)
Dept: LAB | Facility: HOSPITAL | Age: 70
End: 2025-02-20
Attending: FAMILY MEDICINE
Payer: MEDICARE

## 2025-02-20 ENCOUNTER — RESULTS FOLLOW-UP (OUTPATIENT)
Dept: FAMILY MEDICINE CLINIC | Facility: CLINIC | Age: 70
End: 2025-02-20

## 2025-02-20 ENCOUNTER — OFFICE VISIT (OUTPATIENT)
Facility: CLINIC | Age: 70
End: 2025-02-20
Payer: MEDICARE

## 2025-02-20 DIAGNOSIS — Z99.89 DEPENDENCE ON CANE: ICD-10-CM

## 2025-02-20 DIAGNOSIS — F10.10 ALCOHOL ABUSE: ICD-10-CM

## 2025-02-20 DIAGNOSIS — G62.9 NEUROPATHY: ICD-10-CM

## 2025-02-20 DIAGNOSIS — M31.6 TEMPORAL ARTERITIS (HCC): ICD-10-CM

## 2025-02-20 DIAGNOSIS — Z72.0 NICOTINE ABUSE: ICD-10-CM

## 2025-02-20 DIAGNOSIS — I25.10 ATHEROSCLEROSIS OF NATIVE CORONARY ARTERY OF NATIVE HEART WITHOUT ANGINA PECTORIS: ICD-10-CM

## 2025-02-20 DIAGNOSIS — R29.898 LEG WEAKNESS, BILATERAL: ICD-10-CM

## 2025-02-20 DIAGNOSIS — R29.6 MULTIPLE FALLS: Primary | ICD-10-CM

## 2025-02-20 DIAGNOSIS — R29.6 MULTIPLE FALLS: ICD-10-CM

## 2025-02-20 DIAGNOSIS — I50.43 ACUTE ON CHRONIC COMBINED SYSTOLIC (CONGESTIVE) AND DIASTOLIC (CONGESTIVE) HEART FAILURE (HCC): ICD-10-CM

## 2025-02-20 DIAGNOSIS — J44.9 CHRONIC OBSTRUCTIVE PULMONARY DISEASE, UNSPECIFIED COPD TYPE (HCC): ICD-10-CM

## 2025-02-20 DIAGNOSIS — I50.42 CHRONIC COMBINED SYSTOLIC AND DIASTOLIC HEART FAILURE (HCC): ICD-10-CM

## 2025-02-20 DIAGNOSIS — M35.3 PMR (POLYMYALGIA RHEUMATICA) (HCC): ICD-10-CM

## 2025-02-20 DIAGNOSIS — R26.2 AMBULATORY DYSFUNCTION: ICD-10-CM

## 2025-02-20 DIAGNOSIS — Z11.59 NEED FOR HEPATITIS C SCREENING TEST: ICD-10-CM

## 2025-02-20 DIAGNOSIS — I71.23 ANEURYSM OF DESCENDING THORACIC AORTA WITHOUT RUPTURE (HCC): ICD-10-CM

## 2025-02-20 LAB
ALBUMIN SERPL BCG-MCNC: 3.2 G/DL (ref 3.5–5)
ALP SERPL-CCNC: 126 U/L (ref 34–104)
ALT SERPL W P-5'-P-CCNC: 58 U/L (ref 7–52)
ANION GAP SERPL CALCULATED.3IONS-SCNC: 15 MMOL/L (ref 4–13)
AST SERPL W P-5'-P-CCNC: 103 U/L (ref 13–39)
BILIRUB SERPL-MCNC: 1.49 MG/DL (ref 0.2–1)
BNP SERPL-MCNC: 238 PG/ML (ref 0–100)
BUN SERPL-MCNC: 13 MG/DL (ref 5–25)
CALCIUM ALBUM COR SERPL-MCNC: 9.2 MG/DL (ref 8.3–10.1)
CALCIUM SERPL-MCNC: 8.6 MG/DL (ref 8.4–10.2)
CHLORIDE SERPL-SCNC: 93 MMOL/L (ref 96–108)
CHOLEST SERPL-MCNC: 145 MG/DL (ref ?–200)
CO2 SERPL-SCNC: 31 MMOL/L (ref 21–32)
CREAT SERPL-MCNC: 1.05 MG/DL (ref 0.6–1.3)
ERYTHROCYTE [DISTWIDTH] IN BLOOD BY AUTOMATED COUNT: 14.2 % (ref 11.6–15.1)
GFR SERPL CREATININE-BSD FRML MDRD: 71 ML/MIN/1.73SQ M
GLUCOSE P FAST SERPL-MCNC: 105 MG/DL (ref 65–99)
HCT VFR BLD AUTO: 37.2 % (ref 36.5–49.3)
HDLC SERPL-MCNC: 59 MG/DL
HGB BLD-MCNC: 12.7 G/DL (ref 12–17)
LDLC SERPL CALC-MCNC: 62 MG/DL (ref 0–100)
MCH RBC QN AUTO: 36 PG (ref 26.8–34.3)
MCHC RBC AUTO-ENTMCNC: 34.1 G/DL (ref 31.4–37.4)
MCV RBC AUTO: 105 FL (ref 82–98)
PLATELET # BLD AUTO: 192 THOUSANDS/UL (ref 149–390)
PMV BLD AUTO: 12.3 FL (ref 8.9–12.7)
POTASSIUM SERPL-SCNC: 2.9 MMOL/L (ref 3.5–5.3)
PROT SERPL-MCNC: 5.6 G/DL (ref 6.4–8.4)
RBC # BLD AUTO: 3.53 MILLION/UL (ref 3.88–5.62)
SODIUM SERPL-SCNC: 139 MMOL/L (ref 135–147)
TRIGL SERPL-MCNC: 121 MG/DL (ref ?–150)
TSH SERPL DL<=0.05 MIU/L-ACNC: 2.78 UIU/ML (ref 0.45–4.5)
WBC # BLD AUTO: 9.01 THOUSAND/UL (ref 4.31–10.16)

## 2025-02-20 PROCEDURE — 86803 HEPATITIS C AB TEST: CPT

## 2025-02-20 PROCEDURE — 36415 COLL VENOUS BLD VENIPUNCTURE: CPT

## 2025-02-20 PROCEDURE — 97112 NEUROMUSCULAR REEDUCATION: CPT

## 2025-02-20 PROCEDURE — 80061 LIPID PANEL: CPT

## 2025-02-20 PROCEDURE — 85027 COMPLETE CBC AUTOMATED: CPT

## 2025-02-20 PROCEDURE — 83880 ASSAY OF NATRIURETIC PEPTIDE: CPT

## 2025-02-20 PROCEDURE — 84443 ASSAY THYROID STIM HORMONE: CPT

## 2025-02-20 PROCEDURE — 80053 COMPREHEN METABOLIC PANEL: CPT

## 2025-02-20 NOTE — PROGRESS NOTES
Daily Note     Today's date: 2025  Patient name: Del Ocampo  : 1955  MRN: [id number]  Referring provider: Lombardi, Frank, DO  Dx:   Encounter Diagnosis     ICD-10-CM    1. Multiple falls  R29.6       2. Ambulatory dysfunction  R26.2       3. Dependence on cane  Z99.89       4. Leg weakness, bilateral  R29.898         POC expires Unit limit Auth Expiration date PT/OT + Visit Limit? Co-Insurance   25 N/A N/A BOMN No                                     Start Time: 1415      Subjective: Pt denies any recent falls and notes no changes since last session.     Objective: See treatment diary below    (Gait belt and contact guard at all times)    Vitals:  BP: 90/52 mmHg (pre-session, asymptomatic), (96/52 mmHg following STS) (98/54 mmHg post-session, asymptomatic)  Hr: 77bpm, SpO2: 94%      TE/ NMR: (gait belt)  -STS with 5# TT, 1 minute x 3, PT CG  -Lateral 6in luis step overs with #5 TT, PT CG, 1 minute  -FWD/BWD luis step over with #5 TT, PT CG, 2 minutes  -FT stance on foam, 1 minute, PT CG  -Semi tandem stance on foam, 1 minute, PT CG      Assessment: Patient had fair tolerance for today's session with continued focus on LE functional strength/endurance activities and dynamic balance activities. Pt continues to report significant LE mm fatigue throughout PT session but is able to perform all sets successfully with proper seated rest breaks. Pt continues to demonstrate medial/lateral instability during luis step overs, likely due to dynamic balance impairments which future sessions should continue to address as tolerated by the pt. Pt will continue to benefit from skilled PT in order  to improve LE functional strength, functional mobility, and gait dysfunction to reduce the risk for future falls.       Plan: Continue per plan of care.  Static/dynamic balance, LE strengthening                Outcome Measures Initial Eval  25 & 25        5xSTS 21. 5 Sec without UE support         TUG  -  Regular  - Cognitive   9.65 sec  11.29  sec (counting backwards by 3)        10 meter 0.947 m/s        ABC 64.38%        FGA 14/30        DGI NT        mCTSIB  - FTEO (firm)  - FTEC (firm)  - FTEO (foam)  - FTEC (foam)   - 30 sec  - 30 sec  - 30 sec  - 10.42          6MWT 650 feet (Tested ended at 3 minutes and 40 seconds)                            Access Code: [email or web address]  URL: [email or web address]  Date: 02/04/2025  Prepared by: Elyse Granados-Henry    Exercises  - Sit to Stand Without Arm Support  - 1 x daily - 7 x weekly - 2 sets - 5 reps  - Standing March with Counter Support  - 1 x daily - 7 x weekly - 3 sets - 10 reps  - Standing Partial Squat  - 1 x daily - 7 x weekly - 3 sets - 10 reps        Access Code: [email or web address]  URL: [email or web address]  Date: 02/13/2025  Prepared by: Tricia Mackenzie    Exercises  - Gastroc Stretch on Wall  - 1 x daily - 7 x weekly - 3 sets - 10 reps  - Standing Dorsiflexion Self-Mobilization on Step  - 1 x daily - 7 x weekly - 3 sets - 10 reps

## 2025-02-21 LAB — HCV AB SER QL: NORMAL

## 2025-02-24 ENCOUNTER — OFFICE VISIT (OUTPATIENT)
Facility: CLINIC | Age: 70
End: 2025-02-24
Payer: MEDICARE

## 2025-02-24 DIAGNOSIS — R29.898 LEG WEAKNESS, BILATERAL: ICD-10-CM

## 2025-02-24 DIAGNOSIS — R29.6 MULTIPLE FALLS: Primary | ICD-10-CM

## 2025-02-24 DIAGNOSIS — R26.2 AMBULATORY DYSFUNCTION: ICD-10-CM

## 2025-02-24 DIAGNOSIS — Z99.89 DEPENDENCE ON CANE: ICD-10-CM

## 2025-02-24 PROCEDURE — 97112 NEUROMUSCULAR REEDUCATION: CPT

## 2025-02-24 NOTE — PROGRESS NOTES
Daily Note     Today's date: 2025  Patient name: Del Ocampo  : 1955  MRN: [id number]  Referring provider: Lombardi, Frank, DO  Dx:   Encounter Diagnosis     ICD-10-CM    1. Multiple falls  R29.6       2. Ambulatory dysfunction  R26.2       3. Dependence on cane  Z99.89       4. Leg weakness, bilateral  R29.898         POC expires Unit limit Auth Expiration date PT/OT + Visit Limit? Co-Insurance   25 N/A N/A BOMN No                                            Subjective: Pt denies any recent falls and notes no changes since last session. Reports feeling weak this week likely due to low potassium (recent lab work) and received new medication for potassium. Asked to go easy today.    Objective: See treatment diary below    (Gait belt and contact guard at all times)    Vitals:  BP: 92/54 mmHg (pre-session, asymptomatic),  Hr: 79 bpm, SpO2: 94%      TE/ NMR: (gait belt)  - Step up to medium RR 2 minutes  - Step up to medium RR + HT 2 minutes  - Standing on medium RR + HT/HN 1 minute each  - FA, EC on airex 1 minute x 2   -  FT, EC on airex 1 minute x 2  - Toe taps to medium RR from foam pad x 2 minutes  - Lateral toe taps to medium RR from foam pad x 2 minutes       Assessment: Patient had fair tolerance for today's session with continued focus on LE functional strength/endurance activities and dynamic balance activities. With seated rest breaks, he tolerated PT session well. Occasional forward LOB without UE support while stepping on to uneven surfaces. Improved static balance with decreased swaying on non-complaint surface with eyes closed; however still required assistance to prevent excessive anterior lean. Pt will continue to benefit from skilled PT in order  to improve LE functional strength, functional mobility, and gait dysfunction to reduce the risk for future falls.       Plan: Continue per plan of care.  Static/dynamic balance, LE strengthening                Outcome Measures Initial  Eval  1/27/25 & 2/4/25        5xSTS 21. 5 Sec without UE support         TUG  - Regular  - Cognitive   9.65 sec  11.29  sec (counting backwards by 3)        10 meter 0.947 m/s        ABC 64.38%        FGA 14/30        DGI NT        mCTSIB  - FTEO (firm)  - FTEC (firm)  - FTEO (foam)  - FTEC (foam)   - 30 sec  - 30 sec  - 30 sec  - 10.42          6MWT 650 feet (Tested ended at 3 minutes and 40 seconds)                            Access Code: [email or web address]  URL: [email or web address]  Date: 02/04/2025  Prepared by: Elyse Granados-Ginuba    Exercises  - Sit to Stand Without Arm Support  - 1 x daily - 7 x weekly - 2 sets - 5 reps  - Standing March with Counter Support  - 1 x daily - 7 x weekly - 3 sets - 10 reps  - Standing Partial Squat  - 1 x daily - 7 x weekly - 3 sets - 10 reps        Access Code: [email or web address]  URL: [email or web address]  Date: 02/13/2025  Prepared by: Tricia Mackenzie    Exercises  - Gastroc Stretch on Wall  - 1 x daily - 7 x weekly - 3 sets - 10 reps  - Standing Dorsiflexion Self-Mobilization on Step  - 1 x daily - 7 x weekly - 3 sets - 10 reps

## 2025-02-27 ENCOUNTER — APPOINTMENT (OUTPATIENT)
Dept: LAB | Facility: HOSPITAL | Age: 70
End: 2025-02-27
Payer: MEDICARE

## 2025-02-27 ENCOUNTER — EVALUATION (OUTPATIENT)
Facility: CLINIC | Age: 70
End: 2025-02-27
Payer: MEDICARE

## 2025-02-27 DIAGNOSIS — R26.2 AMBULATORY DYSFUNCTION: ICD-10-CM

## 2025-02-27 DIAGNOSIS — Z99.89 DEPENDENCE ON CANE: ICD-10-CM

## 2025-02-27 DIAGNOSIS — R29.6 MULTIPLE FALLS: Primary | ICD-10-CM

## 2025-02-27 DIAGNOSIS — R29.898 LEG WEAKNESS, BILATERAL: ICD-10-CM

## 2025-02-27 PROCEDURE — 97530 THERAPEUTIC ACTIVITIES: CPT

## 2025-02-27 NOTE — PROGRESS NOTES
PT Evaluation          POC expires Unit limit Auth Expiration date PT/OT + Visit Limit? Co-Insurance   25 N/A N/A BOMN No                                            Today's date: 2025  Patient name: Del Ocampo  : 1955  MRN: [id number]  Referring provider: Lombardi, Frank, DO  Dx:   No diagnosis found.        Assessment  Assessment details: Patient is a 69 y.o. male who presents to OP PT to address history of falls, imbalance, and generalized weakness. PMH significant for neuropathy, CHF, and multiple hospitalizations. Patient is making slow but steady progress towards therapy goals. Continues to report generalized weakness; this may also maybe contributed to new co-mobilities including abnormal electrolytes and diagnosis of orthostatic hypotension.  On outcome measures, he made improvement for the following outcome measures: 6MWT, FSTS, FGA and ABC. Despite improvements, he is still consider an HIGH fall risk per FSTS, ABC and FGA. Today, he completed the full 6 minute walk test and did not end the test early; also demonstrated an 250 feet improvement which is significant. His endurance is still consider well below aerobic capacity per age group norms. Patient has met 4 goals and is still progressing towards remaining goals. Overall, patient is making improvement but continues to demonstrate decreased bilateral LE, impaired static/dynamic balance, decreased somatosensory awareness, poor reactive balance strategies, and decreased activity tolerance which is limiting his independence in the community and increases his fall risk. Plan to continue with physical therapy to address above deficits in order to improve QOL, reduce fall risk, and increase community independence.       Impairments: Abnormal coordination, Abnormal gait, Abnormal muscle tone, Abnormal or restricted ROM, Activity intolerance, Impaired balance, Impaired  physical strength, Lacks appropriate HEP, Poor posture, Poor body mechanics, Pain with function, Safety issue, Weight-bearing intolerance, Abnormal movement, Difficulty understanding, Abnormal muscle firing  Understanding of Dx/Px/POC: Good  Prognosis: Good    Patient verbalized understanding of POC.         Please contact me if you have any questions or recommendations. Thank you for the referral and the opportunity to share in Del Ocampo's care.        Plan  Plan details: Complete remaining outcome measures (FGA, DGI, 6MWT, 10MWT, mCTSIB), check orthostatics; generalized balance  and strength  Patient would benefit from: Skilled PT  Planned modality interventions: Biofeedback, Cryotherapy, TENS, Thermotherapy  Planned therapy interventions: Abdominal trunk stabilization, ADL training, Balance, Balance/WB training, Breathing training, Body mechanics training, Coordination, Functional ROM exercises, Gait training, HEP, Joint Mobilization, Manual Therapy, Horta taping, Motor coordination training, Neuromuscular re-education, Patient education, Postural training, Strengthening, Stretching, Therapeutic activities, Therapeutic exercises, Therapeutic training, Transfer training, Activity modification, Work reintegration  Frequency: 2x/wk  Duration in weeks: 12 svitlanaels  Plan of Care beginning date: 1/27/25    Plan of Care expiration date: 12 weeks - 5/22/2025  Treatment plan discussed with: Patient       Goals  Short Term Goals (4 weeks):    - Patient will improve time on TUG by 2.9 seconds to facilitate improved safety in all ambulation - MET  - Patient will be independent in basic HEP 2-3 weeks  - Patient will improve 5xSTS score by 2.3 seconds to promote improved LE functional strength needed for ADLs -  MET  - Patient will complete the FSTS in less than 10 seconds without UE support to demonstrate an improvement in LE strength      Long Term Goals (12 weeks):  - Patient will be independent in a comprehensive  home exercise program  - Patient will improve scoring on DGI by 2.6 points to progress safety  - Patient will improve gait speed by 0.18 m/s to improve safety with community ambulation  - Patient will improve CARROLL by 6 points in order to improve static balance and reduce risk for falls  - Patient will improve scoring on FGA by 4 points to progress safety with dynamic tasks - MET  - Patient will score greater than 23/30 on the FGA to indicate improve dynamic tasks   - Patient will be able to demonstrate HT in gait without veering  - Patient will improve 6 Minute Walk Test score by 190 feet to promote improved cardiovascular endurance - MET  - Patient will ambulate greater than 1000 feet on the 6MWT without any standing rest breaks to promote improved CV endurance (New)  - Patient will report 50% reduction in near falls in order to improve safety with functional tasks and reduce his risk for falls  - Patient will report going on walks at least 3 days per week to promote independence and improved cardiovascular endurance  - Patient will be able to ascend/descend stairs reciprocally with 1 UE assist to promote independence and safety with ADLs  - Patient will report 50% reduction in near falls when ambulating on uneven terrain      Cut off score    All date taken from [facility] Neuro Section or Rehab Measures      Carroll/56  MDC: 6 pts  Age Norms:  60-69: M - 55   F - 55  70-79: M - 54   F - 53  80-89: M - 53   F - 50 5xSTS: Román et al 2010  MDC: 2.3 sec  Age Norms:  60-69: 11.4 sec  70-79: 12.6 sec  80-89: 14.8 sec   TUG  MDC: 4.14 sec  Cut off score:  >13.5 sec community dwelling adults  >32.2 frail elderly  <20 I for basic transfers  >30 dependent on transfers 10 Meter Walk Test: Betsy et al 2011  MDC: 0.18 m/s  20-29: M - 1.35 m   F - 1.34 m  30-39: M - 1.43 m   F - 1.34 m  40-49: M - 1.43 m   F - 1.39 m  50-59: M - 1.43 m   F - 1.31 m  60-69: M - 1.34 m   F - 1.24 m  70-79: M - 1.26 m   F - 1.13  m  80-89: M - 0.97 m   F - 0.94 m    Household Ambulator < 0.4 m/s  Limited Community Ambulator 0.4 - 0.8 m/s  Community Ambulator 0.8 - 1.2 m/s  Safely cross the street > 1.2 m/s   FGA  MCID: 4 pts  Geriatrics/community < 22/30 fall risk  Geriatrics/community < 20/30 unexplained falls    DGI  MDC: vestibular - 4 pts  MDC: geriatric/community - 3 pts  Falls risk <19/24 mCTSIB  Norm: 20-60 yrs  Eyes open firm: norm sway 0.21-0.48  Eyes closed firm: norm sway 0.48-0.99  Eyes open foam: norm sway 0.38-0.71  Eyes closed foam: norm sway 0.70-2.22   6 Minute Walk Test  MDC: 190.98 ft  MCID: 164 ft    Age Norms  60-69: M - 1876 ft (571.80 m)  F - 1765 ft (537.98 m)  70-79: M - 1729 ft (527.00 m)  F - 1545 ft (470.92 m)  80-89: M - 1368 ft (416.97 m)  F - 1286 ft (391.97 m) ABC: Sam & Kyaw, 2003  <67% increased risk for falls   [place] [employer]  Evaluator Size:        Force (grams):          Hand/Dorsal Thresholds:        Plantar Thresholds:  - 1.65                       - 0.008                       - Normal                                 - Normal  - 2.36                       - 0.02                         - Normal                                 - Normal  - 2.44                       - 0.04                         - Normal                                 - Normal  - 2.83                       - 0.07                         - Normal                                 - Normal  - 3.22                       - 0.16                         - Diminished light touch          - Normal  - 3.61                       - 0.40                         - Diminished light touch          - Normal  - 3.84                       - 0.60                         - Diminished protective           - Diminished light touch  - 4.08                       - 1.00                         - Diminished protective           - Diminished light touch  - 4.17                       - 1.40                         - Diminished protective            - Diminished light touch  - 4.31                       - 2.00                         - Diminished protective           - Diminished light touch  - 4.56                       - 4.00                         - Loss of protective sense      - Diminished protective  - 4.74                       - 6.00                         - Loss of protective sense      - Diminished protective  - 4.93                       - 8.00                         - Loss of protective sense      - Diminished protective  - 5.07                       - 10.0                         - Loss of protective sense     - Loss of protective sense  - 5.18                       - 15.0                         - Loss of protective sense     - Loss of protective sense  - 5.46                       - 26.0                         - Loss of protective sense     - Loss of protective sense  - 5.88                       - 60.0                         - Loss of protective sense     - Loss of protective sense  - 6.10                       - 100                          - Loss of protective sense     - Loss of protective sense  - 6.45                       - 180                          - Loss of protective sense     - Loss of protective sense  - 6.65                       - 300                          - Deep pressure sense only  - Deep pressure sense only         Subjective    History of Present Illness  - Mechanism of injury: Presents to balance PT due to history of falls. Over the past month, patient has had 7 falls  without injuries. (Questionable head strikes)Feels unsteady when walking on grass, carpet, reaching out of NADEGE. Does report occasional dizziness; denies room spinning with bed mobility. Dizziness occurs an few times week. Complaints of general weakness, decreased energy, and balance.   Has an trip planned in [facility] this April.    Update 2/27/25: Patient has not had any falls. Continues to feel generalized weakness; which may be contributed  by electrolyte abnormalities and low blood pressure. Pt was diagnosed with orthostatic hypotension and had an medication change. Reports his LE feel stronger and his balance is getting better  - Primary AD: Walker PRN   - Assist level at home: A with IADLs.   - Decreased fine motor tasks: Yes        Patient goal: 1) Improve his balance 2) Improve strength 3) Help out around the house 4) Be able to visit his daughter in [place].     Pain  - Current pain ratin/10  - At best pain rating: N/A  - At worst pain rating: N/A  - Location: N/A  - Aggravating factors: N/A    Social Support  - Steps to enter house: 13 JU   - Stairs in house: 13 steps to first   - Lives in: Multi-level home   - Lives with: Wife, Diann    - Employment status: Retired, mechanics   - Hand dominance: Right handed     Treatments  - Previous treatment: N/A  - Current treatment: N/A  - Diagnostic Testing: N/A      Objective     Vitals:   - BP: 100/60 mmHg   - HR: 75 bpm   - SpO2: 95%            LE MMT  - R Hip Flexion: 4-/5  L Hip Flexion: 3+/5  - R Hip Abduction: 4/5  L Hip Abduction: 4/5  - R Hip Adduction: 4/5  L Hip Adduction: 4/5  - R Knee Extension: 4/5  L Knee Extension: 4/5  - R Knee Flexion: 4/5  L Knee Flexion: 4/5  - R Ankle DF: 4/5   L Ankle DF: 4/5      Sensation  - Light touch: WFL    Coordination  - Heel to Shin: Slow bilateral  - Alternate Toe Taps: WFL bilateral  - Finger to Nose: Slower with LUE compared to RUE      Reflexes/Clonus  - Clonus: No, - beat  - Patellar DTR: 1+: Diminished      OT Screen  - Difficulty w/ clothing fasteners: No  - Difficulty w/ bathing: Yes  - Difficulty w/ dressing: Yes  - Difficulty w/ toileting: No        Gait  - Abnormalities: WBOS, shuffled gait, decreased bilateral heel strike, decreased bilateral step length, M-L instability with turns              Outcome Measures Initial Eval  25 & 25 Re-eval  25       5xSTS 21. 5 Sec without UE support  12.0 second without UE support         TUG  - Regular  - Cognitive   9.65 sec  11.29  sec (counting backwards by 3)   7.57 seconds     8.35 second       10 meter 0.947 m/s 1.1 m/s       ABC 64.38% 84.48%       FGA 14/30 20/30       DGI NT        mCTSIB  - FTEO (firm)  - FTEC (firm)  - FTEO (foam)  - FTEC (foam)   - 30 sec  - 30 sec  - 30 sec  - 10.42     NT due to time constraint       6MWT 650 feet (Tested ended at 3 minutes and 40 seconds) 900 feet (1 standing rest break)                                    Precautions: Fall risk, Neuropathy, CHF  Past Medical History:   Diagnosis Date    Alcoholism ([facility])     Marinelli esophagus     Marinelli's esophagus     Bleeding ulcer     COPD (chronic obstructive pulmonary disease) ([facility])     Hospital visit found    Coronary artery disease     10 years    Diverticulitis of colon     Fatty liver     GERD (gastroesophageal reflux disease)     Years    Heart failure ([facility])     HL (hearing loss)     Years, work related    Inflammatory bowel disease     Years    PMR (polymyalgia rheumatica) ([facility])     Temporal arteritis ([facility])     Terminal ileitis without complication ([facility]) 04/15/2024    Visual impairment     Wears glasses

## 2025-03-03 ENCOUNTER — OFFICE VISIT (OUTPATIENT)
Facility: CLINIC | Age: 70
End: 2025-03-03
Payer: MEDICARE

## 2025-03-03 DIAGNOSIS — R29.6 MULTIPLE FALLS: Primary | ICD-10-CM

## 2025-03-03 DIAGNOSIS — R29.898 LEG WEAKNESS, BILATERAL: ICD-10-CM

## 2025-03-03 DIAGNOSIS — R26.2 AMBULATORY DYSFUNCTION: ICD-10-CM

## 2025-03-03 DIAGNOSIS — Z99.89 DEPENDENCE ON CANE: ICD-10-CM

## 2025-03-03 PROCEDURE — 97110 THERAPEUTIC EXERCISES: CPT

## 2025-03-03 PROCEDURE — 97112 NEUROMUSCULAR REEDUCATION: CPT

## 2025-03-03 NOTE — PROGRESS NOTES
"Daily Note     Today's date: 3/3/2025  Patient name: Del Ocampo  : 1955  MRN: [id number]  Referring provider: Lombardi, Frank, DO  Dx:   Encounter Diagnosis     ICD-10-CM    1. Multiple falls  R29.6       2. Dependence on cane  Z99.89       3. Leg weakness, bilateral  R29.898       4. Ambulatory dysfunction  R26.2         POC expires Unit limit Auth Expiration date PT/OT + Visit Limit? Co-Insurance   25 N/A N/A BOMN No                                            Subjective: No falls over the weekend. Reported he felt sore after his last re-evaluation  Objective: See treatment diary below    (Gait belt and contact guard at all times)    Vitals:  BP: 110/58 mmHg (pre-session, asymptomatic),        TE/ NMR: (gait belt)  - Sled push/pull, #25, 50 feet x 4 (2 sets)  - [address] [employer] carry with 5.5# TT, 75 feet x 4 (2 sets)  - Forward walking + step up/down 6\" box x 10 (2 sets)      Active rest break  - Seated hip abduction, blue T-band 2 minutes  - Seated LAQ x 2 minutes  - Seated bicep curls with #2 DB x 2 minutes           Assessment: Patient had good tolerance for today's session with continued focus on LE functional strength/endurance activities and dynamic balance activities. Switched between standing and standing exercise due to higher intensity standing exercises. Worked functional mobility with added weight to [place] airport [place] as he is traveling next month. Pt will continue to benefit from skilled PT in order  to improve LE functional strength, functional mobility, and gait dysfunction to reduce the risk for future falls.       Plan: Continue per plan of care.  Static/dynamic balance, LE strengthening                Outcome Measures Initial Eval  25 & 25        5xSTS 21. 5 Sec without UE support         TUG  - Regular  - Cognitive   9.65 sec  11.29  sec (counting backwards by 3)        10 meter 0.947 m/s        ABC 64.38%        FGA         DGI NT      "   mCTSIB  - FTEO (firm)  - FTEC (firm)  - FTEO (foam)  - FTEC (foam)   - 30 sec  - 30 sec  - 30 sec  - 10.42          6MWT 650 feet (Tested ended at 3 minutes and 40 seconds)                            Access Code: [email or web address]  URL: [email or web address]  Date: 02/04/2025  Prepared by: Elyse Granados-Henry    Exercises  - Sit to Stand Without Arm Support  - 1 x daily - 7 x weekly - 2 sets - 5 reps  - Standing March with Counter Support  - 1 x daily - 7 x weekly - 3 sets - 10 reps  - Standing Partial Squat  - 1 x daily - 7 x weekly - 3 sets - 10 reps        Access Code: [email or web address]  URL: [email or web address]  Date: 02/13/2025  Prepared by: Tricia Mackenzie    Exercises  - Gastroc Stretch on Wall  - 1 x daily - 7 x weekly - 3 sets - 10 reps  - Standing Dorsiflexion Self-Mobilization on Step  - 1 x daily - 7 x weekly - 3 sets - 10 reps

## 2025-03-05 ENCOUNTER — OFFICE VISIT (OUTPATIENT)
Facility: CLINIC | Age: 70
End: 2025-03-05
Payer: MEDICARE

## 2025-03-05 DIAGNOSIS — R26.2 AMBULATORY DYSFUNCTION: ICD-10-CM

## 2025-03-05 DIAGNOSIS — Z99.89 DEPENDENCE ON CANE: ICD-10-CM

## 2025-03-05 DIAGNOSIS — R29.898 LEG WEAKNESS, BILATERAL: ICD-10-CM

## 2025-03-05 DIAGNOSIS — R29.6 MULTIPLE FALLS: Primary | ICD-10-CM

## 2025-03-05 PROCEDURE — 97110 THERAPEUTIC EXERCISES: CPT

## 2025-03-05 PROCEDURE — 97112 NEUROMUSCULAR REEDUCATION: CPT

## 2025-03-05 NOTE — PROGRESS NOTES
"Daily Note     Today's date: 3/5/2025  Patient name: Del Ocampo  : 1955  MRN: [id number]  Referring provider: Lombardi, Frank, DO  Dx:   Encounter Diagnosis     ICD-10-CM    1. Multiple falls  R29.6       2. Dependence on cane  Z99.89       3. Leg weakness, bilateral  R29.898       4. Ambulatory dysfunction  R26.2         POC expires Unit limit Auth Expiration date PT/OT + Visit Limit? Co-Insurance   25 N/A N/A BOMN No                                   Subjective: Patient states that he was sore after the last session.    (Gait belt and contact guard at all times)    Vitals:  BP: 101/58 mmHg (pre-session, asymptomatic)    TE/ NMR: (gait belt)  - Sled push/pull, #25, 100 feet x 2 sets  - Ambulation with 10# TT: 250 ft x2  - Step up/down 6\" box x 2 min     Active rest break  - Seated hip abduction, blue T-band 2 minutes  - Seated LAQ x 2 minutes  - Seated bicep curls with #2 DB x 2 minutes       Assessment: Patient had good tolerance for today's session with continued focus on LE functional strength/endurance activities and dynamic balance activities. Patient most challenged with sled push to this date as noted by SoB and requiring prolonged rest break. Requested longer break after active rest break secondary to muscular and CV fatigue. Improved tolerance with second set; vitals WNL. M/L instability noted with stepping up and down step, likely due to decreased proprioceptive awareness as this surface was softer than overground. Pt will continue to benefit from skilled PT in order  to improve LE functional strength, functional mobility, and gait dysfunction to reduce the risk for future falls.       Plan: Continue per plan of care.  Static/dynamic balance, LE strengthening                Outcome Measures Initial Eval  25 & 25        5xSTS 21. 5 Sec without UE support         TUG  - Regular  - Cognitive   9.65 sec  11.29  sec (counting backwards by 3)        10 meter 0.947 m/s        ABC 64.38%  "       A 14/30        DGI NT        mCTSIB  - FTEO (firm)  - FTEC (firm)  - FTEO (foam)  - FTEC (foam)   - 30 sec  - 30 sec  - 30 sec  - 10.42          6MWT 650 feet (Tested ended at 3 minutes and 40 seconds)                            Access Code: [email or web address]  URL: [email or web address]  Date: 02/04/2025  Prepared by: Elyse Granados-Henry    Exercises  - Sit to Stand Without Arm Support  - 1 x daily - 7 x weekly - 2 sets - 5 reps  - Standing March with Counter Support  - 1 x daily - 7 x weekly - 3 sets - 10 reps  - Standing Partial Squat  - 1 x daily - 7 x weekly - 3 sets - 10 reps        Access Code: [email or web address]  URL: [email or web address]  Date: 02/13/2025  Prepared by: Tricia Mackenzie    Exercises  - Gastroc Stretch on Wall  - 1 x daily - 7 x weekly - 3 sets - 10 reps  - Standing Dorsiflexion Self-Mobilization on Step  - 1 x daily - 7 x weekly - 3 sets - 10 reps

## 2025-03-06 ENCOUNTER — APPOINTMENT (OUTPATIENT)
Facility: CLINIC | Age: 70
End: 2025-03-06
Payer: MEDICARE

## 2025-03-13 ENCOUNTER — APPOINTMENT (OUTPATIENT)
Facility: CLINIC | Age: 70
End: 2025-03-13
Payer: MEDICARE

## 2025-03-24 ENCOUNTER — APPOINTMENT (OUTPATIENT)
Facility: CLINIC | Age: 70
End: 2025-03-24
Payer: MEDICARE

## 2025-03-24 ENCOUNTER — OFFICE VISIT (OUTPATIENT)
Dept: FAMILY MEDICINE CLINIC | Facility: CLINIC | Age: 70
End: 2025-03-24
Payer: MEDICARE

## 2025-03-24 VITALS
BODY MASS INDEX: 23.25 KG/M2 | RESPIRATION RATE: 18 BRPM | TEMPERATURE: 97.6 F | DIASTOLIC BLOOD PRESSURE: 80 MMHG | HEIGHT: 69 IN | WEIGHT: 157 LBS | SYSTOLIC BLOOD PRESSURE: 126 MMHG | HEART RATE: 81 BPM | OXYGEN SATURATION: 99 %

## 2025-03-24 DIAGNOSIS — M35.3 PMR (POLYMYALGIA RHEUMATICA) (HCC): ICD-10-CM

## 2025-03-24 DIAGNOSIS — I50.42 CHRONIC COMBINED SYSTOLIC AND DIASTOLIC CONGESTIVE HEART FAILURE (HCC): ICD-10-CM

## 2025-03-24 DIAGNOSIS — F10.10 ALCOHOL ABUSE: ICD-10-CM

## 2025-03-24 DIAGNOSIS — E87.6 HYPOKALEMIA: ICD-10-CM

## 2025-03-24 DIAGNOSIS — K76.0 HEPATIC STEATOSIS: Primary | ICD-10-CM

## 2025-03-24 DIAGNOSIS — Z12.5 SCREENING FOR PROSTATE CANCER: ICD-10-CM

## 2025-03-24 DIAGNOSIS — I50.43 ACUTE ON CHRONIC COMBINED SYSTOLIC AND DIASTOLIC CONGESTIVE HEART FAILURE (HCC): ICD-10-CM

## 2025-03-24 PROBLEM — Z86.79 HISTORY OF CHF (CONGESTIVE HEART FAILURE): Status: RESOLVED | Noted: 2024-03-21 | Resolved: 2025-03-24

## 2025-03-24 PROCEDURE — 99214 OFFICE O/P EST MOD 30 MIN: CPT | Performed by: FAMILY MEDICINE

## 2025-03-24 PROCEDURE — G2211 COMPLEX E/M VISIT ADD ON: HCPCS | Performed by: FAMILY MEDICINE

## 2025-03-24 RX ORDER — TORSEMIDE 20 MG/1
TABLET ORAL
COMMUNITY
Start: 2025-02-04

## 2025-03-24 RX ORDER — POTASSIUM CHLORIDE 750 MG/1
10 TABLET, EXTENDED RELEASE ORAL 2 TIMES DAILY
COMMUNITY

## 2025-03-24 RX ORDER — ALBUTEROL SULFATE 90 UG/1
2 INHALANT RESPIRATORY (INHALATION) EVERY 6 HOURS PRN
Qty: 8.5 G | Refills: 0 | Status: SHIPPED | OUTPATIENT
Start: 2025-03-24

## 2025-03-24 RX ORDER — CARVEDILOL PHOSPHATE 10 MG/1
10 CAPSULE, EXTENDED RELEASE ORAL DAILY
COMMUNITY
Start: 2025-02-05

## 2025-03-24 NOTE — PROGRESS NOTES
Name: Del Ocampo      : 1955      MRN: [id number]  Encounter Provider: Frank Lombardi, DO  Encounter Date: 3/24/2025   Encounter department: [facility]  :  Assessment & Plan  Hepatic steatosis  Pt advised to stop etoh       Alcohol abuse  Drink 1/2 bottle wine a day       PMR (polymyalgia rheumatica) (HCC)  stable       Chronic combined systolic and diastolic congestive heart failure (HCC)  Wt Readings from Last 3 Encounters:   25 71.2 kg (157 lb)   25 68.9 kg (152 lb)   24 71.9 kg (158 lb 9.6 oz)               Orders:    CBC; Future    Lipid Panel with Direct LDL reflex; Future    Hypokalemia  Takes 1-0 meq bid  Pt potassium is now acceptable  Orders:    Comprehensive metabolic panel; Future    Comprehensive metabolic panel; Future    Screening for prostate cancer    Orders:    PSA, Total Screen; Future    Acute on chronic combined systolic and diastolic congestive heart failure (HCC)  Wt Readings from Last 3 Encounters:   25 71.2 kg (157 lb)   25 68.9 kg (152 lb)   24 71.9 kg (158 lb 9.6 oz)               Orders:    albuterol (ProAir HFA) 90 mcg/act inhaler; Inhale 2 puffs every 6 (six) hours as needed for wheezing           History of Present Illness   Pt is here for a follow up    Pt states he was doing physical therapy - wrenched his knee so he took a week off and def noticed a diff      Review of Systems   Constitutional:  Negative for activity change, appetite change, chills, diaphoresis, fatigue, fever and unexpected weight change.   HENT:  Negative for congestion, dental problem, ear pain, mouth sores, sinus pressure, sinus pain, sore throat and trouble swallowing.    Eyes:  Negative for photophobia, discharge and itching.   Respiratory:  Negative for apnea, chest tightness and shortness of breath.    Cardiovascular:  Negative for chest pain, palpitations and leg swelling.   Gastrointestinal:  Negative for abdominal distention, abdominal pain,  "blood in stool, nausea and vomiting.   Endocrine: Negative for cold intolerance, heat intolerance, polydipsia, polyphagia and polyuria.   Genitourinary:  Negative for difficulty urinating.   Musculoskeletal:  Negative for arthralgias.   Skin:  Negative for color change and wound.   Neurological:  Negative for dizziness, syncope, speech difficulty and headaches.   Hematological:  Negative for adenopathy.   Psychiatric/Behavioral:  Negative for agitation and behavioral problems.        Objective   /80   Pulse 81   Temp 97.6 °F (36.4 °C)   Resp 18   Ht 5' 8.5\" (1.74 m)   Wt 71.2 kg (157 lb)   SpO2 99%   BMI 23.52 kg/m²      Physical Exam  Vitals and nursing note reviewed.   Constitutional:       General: He is not in acute distress.     Appearance: He is well-developed. He is not diaphoretic.   HENT:      Head: Normocephalic and atraumatic.      Right Ear: External ear normal.      Left Ear: External ear normal.      Nose: Nose normal.      Mouth/Throat:      Pharynx: No oropharyngeal exudate.   Eyes:      General: No scleral icterus.        Right eye: No discharge.         Left eye: No discharge.      Pupils: Pupils are equal, round, and reactive to light.   Neck:      Thyroid: No thyromegaly.   Cardiovascular:      Rate and Rhythm: Normal rate.      Heart sounds: Normal heart sounds. No murmur heard.  Pulmonary:      Effort: Pulmonary effort is normal. No respiratory distress.      Breath sounds: Normal breath sounds. No wheezing.   Abdominal:      General: Bowel sounds are normal. There is no distension.      Palpations: Abdomen is soft. There is no mass.      Tenderness: There is no abdominal tenderness. There is no guarding or rebound.   Musculoskeletal:         General: Normal range of motion.   Skin:     General: Skin is warm and dry.      Findings: No erythema or rash.   Neurological:      Mental Status: He is alert.      Coordination: Coordination normal.      Deep Tendon Reflexes: Reflexes " normal.   Psychiatric:         Behavior: Behavior normal.

## 2025-03-24 NOTE — ASSESSMENT & PLAN NOTE
Wt Readings from Last 3 Encounters:   03/24/25 71.2 kg (157 lb)   01/22/25 68.9 kg (152 lb)   11/23/24 71.9 kg (158 lb 9.6 oz)               Orders:    CBC; Future    Lipid Panel with Direct LDL reflex; Future

## 2025-03-25 ENCOUNTER — EVALUATION (OUTPATIENT)
Facility: CLINIC | Age: 70
End: 2025-03-25
Payer: MEDICARE

## 2025-03-25 DIAGNOSIS — Z99.89 DEPENDENCE ON CANE: Primary | ICD-10-CM

## 2025-03-25 DIAGNOSIS — R29.6 MULTIPLE FALLS: ICD-10-CM

## 2025-03-25 DIAGNOSIS — R29.898 LEG WEAKNESS, BILATERAL: ICD-10-CM

## 2025-03-25 DIAGNOSIS — R26.2 AMBULATORY DYSFUNCTION: ICD-10-CM

## 2025-03-25 PROCEDURE — 97112 NEUROMUSCULAR REEDUCATION: CPT

## 2025-03-25 NOTE — PROGRESS NOTES
PT RE-Evaluation         POC expires Unit limit Auth Expiration date PT/OT + Visit Limit? Co-Insurance   25 N/A N/A BOMN No                                            Today's date: 3/25/2025  Patient name: Del Ocampo  : 1955  MRN: [id number]  Referring provider: Lombardi, Frank, DO  Dx:   Encounter Diagnosis     ICD-10-CM    1. Dependence on cane  Z99.89       2. Multiple falls  R29.6       3. Leg weakness, bilateral  R29.898       4. Ambulatory dysfunction  R26.2               Assessment  Assessment details: Patient is a 70 y.o. male who presents to OP PT to address history of falls, imbalance, and generalized weakness. PMH significant for neuropathy, CHF, and multiple hospitalizations. Patient has not been in therapy in for 3 weeks due to an fall (no head strike) leading to R knee pain and generalized weakness. Patient with R knee pain s/p fall however, improved significantly with no current complaints of R knee pain with AROM and during mobility. /10 R knee tenderness with palpation of the medial aspect of the knee. Patient followed up with PCP and was recommend to continue with PT.  Due to hiatus and deconditioning; a regression noted on the following outcome measures: FSTS, ABC, TUG, TUG-C, and mCTSIB.  Outcome measures were not limited by R knee pain however, limited by generalized weakness. Greatest regression noted on the ABC with an approx 30% reduction in confidence level on the ABC likely due to recent fall. Patient is consider an HIGH fall risk per FSTS, ABC, TUG, TUG-C, and mCTSIB at this time. Did not complete FGA, 6MWT and 10MWT due to patient arriving to therapy session 20 minutes late; plan to complete next visit. He did not met any additional goals at this time secondary to hiatus from therapy and recent fall. Due to history of falls and regression in outcome measures from PT hiatus, it is vital for patient to  continue with PT session. Educated patient to continue with HEP at home.  Overall, patient is making improvement but continues to demonstrate decreased bilateral LE, impaired static/dynamic balance, decreased somatosensory awareness, poor reactive balance strategies, and decreased activity tolerance which is limiting his independence in the community and increases his fall risk. Plan to continue with physical therapy to address above deficits in order to improve QOL, reduce fall risk, and increase community independence.       Impairments: Abnormal coordination, Abnormal gait, Abnormal muscle tone, Abnormal or restricted ROM, Activity intolerance, Impaired balance, Impaired physical strength, Lacks appropriate HEP, Poor posture, Poor body mechanics, Pain with function, Safety issue, Weight-bearing intolerance, Abnormal movement, Difficulty understanding, Abnormal muscle firing  Understanding of Dx/Px/POC: Good  Prognosis: Good    Patient verbalized understanding of POC.         Please contact me if you have any questions or recommendations. Thank you for the referral and the opportunity to share in Del Ocampo's care.        Plan  Plan details: Generalized balance  and strength  Patient would benefit from: Skilled PT  Planned modality interventions: Biofeedback, Cryotherapy, TENS, Thermotherapy  Planned therapy interventions: Abdominal trunk stabilization, ADL training, Balance, Balance/WB training, Breathing training, Body mechanics training, Coordination, Functional ROM exercises, Gait training, HEP, Joint Mobilization, Manual Therapy, Horta taping, Motor coordination training, Neuromuscular re-education, Patient education, Postural training, Strengthening, Stretching, Therapeutic activities, Therapeutic exercises, Therapeutic training, Transfer training, Activity modification, Work reintegration  Frequency: 2x/wk  Duration in weeks: 12 weeks   Plan of Care beginning date: 3/25/25  Plan of Care expiration  date: 12 weeks - 2025  Treatment plan discussed with: Patient       Goals  Short Term Goals (4 weeks):    - Patient will improve time on TUG by 2.9 seconds to facilitate improved safety in all ambulation - MET  - Patient will be independent in basic HEP 2-3 weeks  - Patient will improve 5xSTS score by 2.3 seconds to promote improved LE functional strength needed for ADLs -  MET  - Patient will complete the FSTS in less than 10 seconds without UE support to demonstrate an improvement in LE strength      Long Term Goals (12 weeks):  - Patient will be independent in a comprehensive home exercise program  - Patient will improve scoring on DGI by 2.6 points to progress safety  - Patient will improve gait speed by 0.18 m/s to improve safety with community ambulation  - Patient will improve CARROLL by 6 points in order to improve static balance and reduce risk for falls  - Patient will improve scoring on FGA by 4 points to progress safety with dynamic tasks - MET  - Patient will score greater than 23/30 on the FGA to indicate improve dynamic tasks   - Patient will be able to demonstrate HT in gait without veering  - Patient will improve 6 Minute Walk Test score by 190 feet to promote improved cardiovascular endurance - MET  - Patient will ambulate greater than 1000 feet on the 6MWT without any standing rest breaks to promote improved CV endurance (New)  - Patient will report 50% reduction in near falls in order to improve safety with functional tasks and reduce his risk for falls  - Patient will report going on walks at least 3 days per week to promote independence and improved cardiovascular endurance  - Patient will be able to ascend/descend stairs reciprocally with 1 UE assist to promote independence and safety with ADLs  - Patient will report 50% reduction in near falls when ambulating on uneven terrain      Cut off score    All date taken from APTA Neuro Section or Rehab Measures      Carroll/56  MDC: 6 pts  Age  Norms:  60-69: M - 55   F - 55  70-79: M - 54   F - 53  80-89: M - 53   F - 50 5xSTS: Román et al 2010  MDC: 2.3 sec  Age Norms:  60-69: 11.4 sec  70-79: 12.6 sec  80-89: 14.8 sec   TUG  MDC: 4.14 sec  Cut off score:  >13.5 sec community dwelling adults  >32.2 frail elderly  <20 I for basic transfers  >30 dependent on transfers 10 Meter Walk Test: Betsy et al 2011  MDC: 0.18 m/s  20-29: M - 1.35 m   F - 1.34 m  30-39: M - 1.43 m   F - 1.34 m  40-49: M - 1.43 m   F - 1.39 m  50-59: M - 1.43 m   F - 1.31 m  60-69: M - 1.34 m   F - 1.24 m  70-79: M - 1.26 m   F - 1.13 m  80-89: M - 0.97 m   F - 0.94 m    Household Ambulator < 0.4 m/s  Limited Community Ambulator 0.4 - 0.8 m/s  Community Ambulator 0.8 - 1.2 m/s  Safely cross the street > 1.2 m/s   FGA  MCID: 4 pts  Geriatrics/community < 22/30 fall risk  Geriatrics/community < 20/30 unexplained falls    DGI  MDC: vestibular - 4 pts  MDC: geriatric/community - 3 pts  Falls risk <19/24 mCTSIB  Norm: 20-60 yrs  Eyes open firm: norm sway 0.21-0.48  Eyes closed firm: norm sway 0.48-0.99  Eyes open foam: norm sway 0.38-0.71  Eyes closed foam: norm sway 0.70-2.22   6 Minute Walk Test  MDC: 190.98 ft  MCID: 164 ft    Age Norms  60-69: M - 1876 ft (571.80 m)  F - 1765 ft (537.98 m)  70-79: M - 1729 ft (527.00 m)  F - 1545 ft (470.92 m)  80-89: M - 1368 ft (416.97 m)  F - 1286 ft (391.97 m) ABC: Sam & Kyaw, 2003  <67% increased risk for falls   [place] Mon[employer]  Evaluator Size:        Force (grams):          Hand/Dorsal Thresholds:        Plantar Thresholds:  - 1.65                       - 0.008                       - Normal                                 - Normal  - 2.36                       - 0.02                         - Normal                                 - Normal  - 2.44                       - 0.04                         - Normal                                 - Normal  - 2.83                       - 0.07                         -  Normal                                 - Normal  - 3.22                       - 0.16                         - Diminished light touch          - Normal  - 3.61                       - 0.40                         - Diminished light touch          - Normal  - 3.84                       - 0.60                         - Diminished protective           - Diminished light touch  - 4.08                       - 1.00                         - Diminished protective           - Diminished light touch  - 4.17                       - 1.40                         - Diminished protective           - Diminished light touch  - 4.31                       - 2.00                         - Diminished protective           - Diminished light touch  - 4.56                       - 4.00                         - Loss of protective sense      - Diminished protective  - 4.74                       - 6.00                         - Loss of protective sense      - Diminished protective  - 4.93                       - 8.00                         - Loss of protective sense      - Diminished protective  - 5.07                       - 10.0                         - Loss of protective sense     - Loss of protective sense  - 5.18                       - 15.0                         - Loss of protective sense     - Loss of protective sense  - 5.46                       - 26.0                         - Loss of protective sense     - Loss of protective sense  - 5.88                       - 60.0                         - Loss of protective sense     - Loss of protective sense  - 6.10                       - 100                          - Loss of protective sense     - Loss of protective sense  - 6.45                       - 180                          - Loss of protective sense     - Loss of protective sense  - 6.65                       - 300                          - Deep pressure sense only  - Deep pressure sense only         Subjective    History of  Present Illness  - Mechanism of injury: Presents to balance PT due to history of falls. Over the past month, patient has had 7 falls  without injuries. (Questionable head strikes)Feels unsteady when walking on grass, carpet, reaching out of NADEGE. Does report occasional dizziness; denies room spinning with bed mobility. Dizziness occurs an few times week. Complaints of general weakness, decreased energy, and balance.   Has an trip planned in [place] this April.    Update 25: Patient has not had any falls. Continues to feel generalized weakness; which may be contributed by electrolyte abnormalities and low blood pressure. Pt was diagnosed with orthostatic hypotension and had an medication change. Reports his LE feel stronger and his balance is getting better    Update: 3/25/25: Patient has not been seen in PT for 3 weeks due to an fall without an head strike. Reported no pain at time of fall and walked without any pain afterwards. Reported significant R knee pain the day afternoon which has improved over the next few weeks. Per wife, went to his PCP and reported no concerns about R knee pain as it improved. Currently, patient reports no pain with ambulation or stairs. Wife also reports patient has not been eating but has continued with drinking (MD aware) leading to patient feeling weak. Patient seen by PCP yesterday and recommended to continue with PT.       - Primary AD: Walker PRN   - Assist level at home: A with IADLs.   - Decreased fine motor tasks: Yes        Patient goal: 1) Improve his balance 2) Improve strength 3) Help out around the house 4) Be able to visit his daughter in [place].     Pain  - Current pain ratin/10  - At best pain ratin/10  - At worst pain ratin/10 ( with palpation at R VMO)  - Location: R knee pain  - Aggravating factors:   - no knee pain with active knee flexion/extension  - no knee pain with weight bearing or ambulation without AD  -  1/10 with palpation at R VMO  area; above the medial joint knee pain     Social Support  - Steps to enter house: 13 JU   - Stairs in house: 13 steps to first   - Lives in: Multi-level home   - Lives with: Wife, Diann    - Employment status: Retired, mechanics   - Hand dominance: Right handed     Treatments  - Previous treatment: N/A  - Current treatment: N/A  - Diagnostic Testing: N/A      Objective     Vitals:   - BP: 100/60 mmHg   - HR: 75 bpm   - SpO2: 95%            LE MMT  - R Hip Flexion: 4-/5  L Hip Flexion: 3+/5  - R Hip Abduction: 4/5  L Hip Abduction: 4/5  - R Hip Adduction: 4/5  L Hip Adduction: 4/5  - R Knee Extension: 4/5  L Knee Extension: 4/5  - R Knee Flexion: 4/5  L Knee Flexion: 4/5  - R Ankle DF: 4/5   L Ankle DF: 4/5      Sensation  - Light touch: WFL    Coordination  - Heel to Shin: Slow bilateral  - Alternate Toe Taps: WFL bilateral  - Finger to Nose: Slower with LUE compared to RUE      Reflexes/Clonus  - Clonus: No, - beat  - Patellar DTR: 1+: Diminished      OT Screen  - Difficulty w/ clothing fasteners: No  - Difficulty w/ bathing: Yes  - Difficulty w/ dressing: Yes  - Difficulty w/ toileting: No        Gait  - Abnormalities: WBOS, shuffled gait, decreased bilateral heel strike, decreased bilateral step length, M-L instability with turns              Outcome Measures Initial Eval  1/27/25 & 2/4/25 Re-eval  2/27/25 Re-eval  3/25/25      5xSTS 21. 5 Sec without UE support  12.0 second without UE support  17.39 sec with hands on thigh       TUG  - Regular  - Cognitive   9.65 sec  11.29  sec (counting backwards by 3)   7.57 seconds     8.35 second   10.36 sec without AD     11.38 sec (counting backward by 3)         10 meter 0.947 m/s 1.1 m/s NT      ABC 64.38% 84.48% 55.63%      FGA 14/30 20/30 NT- complete next visit       DGI NT        mCTSIB  - FTEO (firm)  - FTEC (firm)  - FTEO (foam)  - FTEC (foam)   - 30 sec  - 30 sec  - 30 sec  - 10.42     NT due to time constraint - 30 seconds  - 30 seconds  - 7 seconds   - NT        6MWT 650 feet (Tested ended at 3 minutes and 40 seconds) 900 feet (1 standing rest break) NT - complete NV                                    Precautions: Fall risk, Neuropathy, CHF  Past Medical History:   Diagnosis Date    Alcoholism ([facility])     Marinelli esophagus     Marinelli's esophagus     Bleeding ulcer     COPD (chronic obstructive pulmonary disease) ([facility])     Hospital visit found    Coronary artery disease     10 years    Diverticulitis of colon     Fatty liver     GERD (gastroesophageal reflux disease)     Years    Heart failure (HCC)     HL (hearing loss)     Years, work related    Inflammatory bowel disease     Years    PMR (polymyalgia rheumatica) ([facility])     Temporal arteritis ([facility])     Terminal ileitis without complication ([facility]) 04/15/2024    Visual impairment     Wears glasses

## 2025-03-27 ENCOUNTER — OFFICE VISIT (OUTPATIENT)
Facility: CLINIC | Age: 70
End: 2025-03-27
Payer: MEDICARE

## 2025-03-27 DIAGNOSIS — R29.898 LEG WEAKNESS, BILATERAL: ICD-10-CM

## 2025-03-27 DIAGNOSIS — R26.2 AMBULATORY DYSFUNCTION: ICD-10-CM

## 2025-03-27 DIAGNOSIS — R29.6 MULTIPLE FALLS: Primary | ICD-10-CM

## 2025-03-27 PROCEDURE — 97530 THERAPEUTIC ACTIVITIES: CPT

## 2025-03-27 NOTE — PROGRESS NOTES
Daily Note     Today's date: 3/27/2025  Patient name: Del Ocampo  : 1955  MRN: [id number]  Referring provider: Lombardi, Frank, DO  Dx:   Encounter Diagnosis     ICD-10-CM    1. Multiple falls  R29.6       2. Ambulatory dysfunction  R26.2       3. Leg weakness, bilateral  R29.898           POC expires Unit limit Auth Expiration date PT/OT + Visit Limit? Co-Insurance   25 N/A N/A BOMN No                                   Subjective: Patient states that he is still experiencing pain in R knee though not limiting him from completing activity. Arrived 10 min late    (Gait belt and contact guard at all times)    Vitals:  BP: 102/58mmHg (pre-session, asymptomatic)    TA:   Completed 6MWT, 10mWT, and FGA outcome measures    TE/ NMR: (gait belt)  - standing calf stretch on wedge x 4 min    Assessment: Patient had good tolerance for today's session with focus on completing 6MWT, 10mWT and FGA outcome measures. Pt demonstrated significant decrease in endurance as measured by 6MWT. Although FGA and gait speed did not signifcantly change a regression indicates decreased balance and fall risk. Gait speed and 6MWT  both regressed to/below baseline measurements. Regression may be a result of missing 3 weeks of PT s/p fall resulting in R knee pain. Pt will continue to benefit from skilled PT in order  to improve LE strength, functional mobility, endurance and gait dysfunction to reduce the risk for future falls.       Plan: Continue per plan of care.  Static/dynamic balance, LE strengthening            Outcome Measures Initial Eval  25 & 25 Re-eval  25 Re-eval  3/25/25  3/27/25       5xSTS 21. 5 Sec without UE support  12.0 second without UE support  17.39 sec with hands on thigh          TUG  - Regular  - Cognitive    9.65 sec  11.29  sec (counting backwards by 3)    7.57 seconds      8.35 second    10.36 sec without AD      11.38 sec (counting backward by 3)             10 meter 0.947 m/s 1.1 m/s NT   0.88 m/s       ABC 64.38% 84.48% 55.63%         FGA 14/30 20/30 NT- complete next visit   17/30       DGI NT             mCTSIB  - FTEO (firm)  - FTEC (firm)  - FTEO (foam)  - FTEC (foam)    - 30 sec  - 30 sec  - 30 sec  - 10.42       NT due to time constraint - 30 seconds  - 30 seconds  - 7 seconds   - NT          6MWT 650 feet (Tested ended at 3 minutes and 40 seconds) 900 feet (1 standing rest break) NT - complete NV   650 ft (2 standing rest breaks)                                              Access Code: [email or web address]  URL: [email or web address]  Date: 02/04/2025  Prepared by: Elyse Granados-Henry    Exercises  - Sit to Stand Without Arm Support  - 1 x daily - 7 x weekly - 2 sets - 5 reps  - Standing March with Counter Support  - 1 x daily - 7 x weekly - 3 sets - 10 reps  - Standing Partial Squat  - 1 x daily - 7 x weekly - 3 sets - 10 reps        Access Code: [email or web address]  URL: [email or web address]  Date: 02/13/2025  Prepared by: Tricia Mackenzie    Exercises  - Gastroc Stretch on Wall  - 1 x daily - 7 x weekly - 3 sets - 10 reps  - Standing Dorsiflexion Self-Mobilization on Step  - 1 x daily - 7 x weekly - 3 sets - 10 reps

## 2025-04-11 ENCOUNTER — APPOINTMENT (EMERGENCY)
Dept: RADIOLOGY | Facility: HOSPITAL | Age: 70
DRG: 433 | End: 2025-04-11
Payer: MEDICARE

## 2025-04-11 ENCOUNTER — HOSPITAL ENCOUNTER (INPATIENT)
Facility: HOSPITAL | Age: 70
LOS: 5 days | Discharge: HOME HEALTH | DRG: 433 | End: 2025-04-17
Attending: EMERGENCY MEDICINE | Admitting: INTERNAL MEDICINE
Payer: MEDICARE

## 2025-04-11 DIAGNOSIS — R53.1 GENERALIZED WEAKNESS: ICD-10-CM

## 2025-04-11 DIAGNOSIS — R17 JAUNDICE: ICD-10-CM

## 2025-04-11 DIAGNOSIS — K21.9 GASTROESOPHAGEAL REFLUX DISEASE: ICD-10-CM

## 2025-04-11 DIAGNOSIS — J44.1 CHRONIC OBSTRUCTIVE PULMONARY DISEASE WITH ACUTE EXACERBATION (HCC): ICD-10-CM

## 2025-04-11 DIAGNOSIS — K52.9 CHRONIC DIARRHEA: ICD-10-CM

## 2025-04-11 DIAGNOSIS — R60.0 LOWER EXTREMITY EDEMA: ICD-10-CM

## 2025-04-11 DIAGNOSIS — R18.8 ASCITES: ICD-10-CM

## 2025-04-11 DIAGNOSIS — I50.42 CHRONIC COMBINED SYSTOLIC AND DIASTOLIC CONGESTIVE HEART FAILURE (HCC): ICD-10-CM

## 2025-04-11 DIAGNOSIS — J90 PLEURAL EFFUSION: Primary | ICD-10-CM

## 2025-04-11 DIAGNOSIS — F10.10 ALCOHOL ABUSE: ICD-10-CM

## 2025-04-11 DIAGNOSIS — K76.0 HEPATIC STEATOSIS: ICD-10-CM

## 2025-04-11 DIAGNOSIS — D69.6 THROMBOCYTOPENIA (HCC): ICD-10-CM

## 2025-04-11 DIAGNOSIS — R79.89 ELEVATED LFTS: ICD-10-CM

## 2025-04-11 DIAGNOSIS — K86.89 PANCREATIC INSUFFICIENCY: ICD-10-CM

## 2025-04-11 DIAGNOSIS — Z72.0 NICOTINE ABUSE: ICD-10-CM

## 2025-04-11 PROBLEM — G93.40 ACUTE ENCEPHALOPATHY: Status: ACTIVE | Noted: 2025-04-11

## 2025-04-11 PROBLEM — R93.89 ABNORMAL CT SCAN: Status: ACTIVE | Noted: 2025-04-11

## 2025-04-11 LAB
2HR DELTA HS TROPONIN: -5 NG/L
4HR DELTA HS TROPONIN: -19 NG/L
ALBUMIN SERPL BCG-MCNC: 2.6 G/DL (ref 3.5–5)
ALP SERPL-CCNC: 154 U/L (ref 34–104)
ALT SERPL W P-5'-P-CCNC: 35 U/L (ref 7–52)
AMMONIA PLAS-SCNC: 36 UMOL/L (ref 18–72)
ANION GAP SERPL CALCULATED.3IONS-SCNC: 15 MMOL/L (ref 4–13)
AST SERPL W P-5'-P-CCNC: 100 U/L (ref 13–39)
ATRIAL RATE: 96 BPM
BASE EX.OXY STD BLDV CALC-SCNC: 84.8 % (ref 60–80)
BASE EXCESS BLDV CALC-SCNC: 2.9 MMOL/L
BASOPHILS # BLD AUTO: 0.02 THOUSANDS/ÂΜL (ref 0–0.1)
BASOPHILS NFR BLD AUTO: 0 % (ref 0–1)
BILIRUB DIRECT SERPL-MCNC: 2.44 MG/DL (ref 0–0.2)
BILIRUB SERPL-MCNC: 4.76 MG/DL (ref 0.2–1)
BNP SERPL-MCNC: 886 PG/ML (ref 0–100)
BUN SERPL-MCNC: 7 MG/DL (ref 5–25)
CALCIUM SERPL-MCNC: 8.2 MG/DL (ref 8.4–10.2)
CARDIAC TROPONIN I PNL SERPL HS: 37 NG/L (ref ?–50)
CARDIAC TROPONIN I PNL SERPL HS: 51 NG/L (ref ?–50)
CARDIAC TROPONIN I PNL SERPL HS: 56 NG/L (ref ?–50)
CHLORIDE SERPL-SCNC: 96 MMOL/L (ref 96–108)
CO2 SERPL-SCNC: 26 MMOL/L (ref 21–32)
CREAT SERPL-MCNC: 0.82 MG/DL (ref 0.6–1.3)
EOSINOPHIL # BLD AUTO: 0 THOUSAND/ÂΜL (ref 0–0.61)
EOSINOPHIL NFR BLD AUTO: 0 % (ref 0–6)
ERYTHROCYTE [DISTWIDTH] IN BLOOD BY AUTOMATED COUNT: 15.5 % (ref 11.6–15.1)
ETHANOL SERPL-MCNC: <10 MG/DL
FLUAV AG UPPER RESP QL IA.RAPID: NEGATIVE
FLUBV AG UPPER RESP QL IA.RAPID: NEGATIVE
GFR SERPL CREATININE-BSD FRML MDRD: 88 ML/MIN/1.73SQ M
GLUCOSE SERPL-MCNC: 101 MG/DL (ref 65–140)
GLUCOSE SERPL-MCNC: 110 MG/DL (ref 65–140)
HCO3 BLDV-SCNC: 27 MMOL/L (ref 24–30)
HCT VFR BLD AUTO: 40.5 % (ref 36.5–49.3)
HGB BLD-MCNC: 13.6 G/DL (ref 12–17)
IMM GRANULOCYTES # BLD AUTO: 0.05 THOUSAND/UL (ref 0–0.2)
IMM GRANULOCYTES NFR BLD AUTO: 1 % (ref 0–2)
LIPASE SERPL-CCNC: 57 U/L (ref 11–82)
LYMPHOCYTES # BLD AUTO: 0.54 THOUSANDS/ÂΜL (ref 0.6–4.47)
LYMPHOCYTES NFR BLD AUTO: 8 % (ref 14–44)
MAGNESIUM SERPL-MCNC: 1.9 MG/DL (ref 1.9–2.7)
MCH RBC QN AUTO: 37.7 PG (ref 26.8–34.3)
MCHC RBC AUTO-ENTMCNC: 33.6 G/DL (ref 31.4–37.4)
MCV RBC AUTO: 112 FL (ref 82–98)
MONOCYTES # BLD AUTO: 0.71 THOUSAND/ÂΜL (ref 0.17–1.22)
MONOCYTES NFR BLD AUTO: 10 % (ref 4–12)
NEUTROPHILS # BLD AUTO: 5.5 THOUSANDS/ÂΜL (ref 1.85–7.62)
NEUTS SEG NFR BLD AUTO: 81 % (ref 43–75)
NRBC BLD AUTO-RTO: 0 /100 WBCS
O2 CT BLDV-SCNC: 16.7 ML/DL
P AXIS: 85 DEGREES
PCO2 BLDV: 39.9 MM HG (ref 42–50)
PH BLDV: 7.45 [PH] (ref 7.3–7.4)
PLATELET # BLD AUTO: 86 THOUSANDS/UL (ref 149–390)
PMV BLD AUTO: 13 FL (ref 8.9–12.7)
PO2 BLDV: 53.2 MM HG (ref 35–45)
POTASSIUM SERPL-SCNC: 3.5 MMOL/L (ref 3.5–5.3)
PR INTERVAL: 178 MS
PROT SERPL-MCNC: 5.2 G/DL (ref 6.4–8.4)
QRS AXIS: 45 DEGREES
QRSD INTERVAL: 130 MS
QT INTERVAL: 388 MS
QTC INTERVAL: 490 MS
RBC # BLD AUTO: 3.61 MILLION/UL (ref 3.88–5.62)
SARS-COV+SARS-COV-2 AG RESP QL IA.RAPID: NEGATIVE
SODIUM SERPL-SCNC: 137 MMOL/L (ref 135–147)
T WAVE AXIS: 88 DEGREES
VENTRICULAR RATE: 96 BPM
WBC # BLD AUTO: 6.82 THOUSAND/UL (ref 4.31–10.16)

## 2025-04-11 PROCEDURE — 71260 CT THORAX DX C+: CPT

## 2025-04-11 PROCEDURE — 99285 EMERGENCY DEPT VISIT HI MDM: CPT | Performed by: EMERGENCY MEDICINE

## 2025-04-11 PROCEDURE — 83735 ASSAY OF MAGNESIUM: CPT | Performed by: NURSE PRACTITIONER

## 2025-04-11 PROCEDURE — 87811 SARS-COV-2 COVID19 W/OPTIC: CPT | Performed by: EMERGENCY MEDICINE

## 2025-04-11 PROCEDURE — 36415 COLL VENOUS BLD VENIPUNCTURE: CPT | Performed by: EMERGENCY MEDICINE

## 2025-04-11 PROCEDURE — 99222 1ST HOSP IP/OBS MODERATE 55: CPT | Performed by: NURSE PRACTITIONER

## 2025-04-11 PROCEDURE — 82077 ASSAY SPEC XCP UR&BREATH IA: CPT | Performed by: EMERGENCY MEDICINE

## 2025-04-11 PROCEDURE — 87804 INFLUENZA ASSAY W/OPTIC: CPT | Performed by: EMERGENCY MEDICINE

## 2025-04-11 PROCEDURE — 82140 ASSAY OF AMMONIA: CPT | Performed by: EMERGENCY MEDICINE

## 2025-04-11 PROCEDURE — 80048 BASIC METABOLIC PNL TOTAL CA: CPT | Performed by: EMERGENCY MEDICINE

## 2025-04-11 PROCEDURE — 82948 REAGENT STRIP/BLOOD GLUCOSE: CPT

## 2025-04-11 PROCEDURE — 74177 CT ABD & PELVIS W/CONTRAST: CPT

## 2025-04-11 PROCEDURE — 84484 ASSAY OF TROPONIN QUANT: CPT | Performed by: NURSE PRACTITIONER

## 2025-04-11 PROCEDURE — 96374 THER/PROPH/DIAG INJ IV PUSH: CPT

## 2025-04-11 PROCEDURE — 85025 COMPLETE CBC W/AUTO DIFF WBC: CPT | Performed by: EMERGENCY MEDICINE

## 2025-04-11 PROCEDURE — 94640 AIRWAY INHALATION TREATMENT: CPT

## 2025-04-11 PROCEDURE — 99285 EMERGENCY DEPT VISIT HI MDM: CPT

## 2025-04-11 PROCEDURE — 84484 ASSAY OF TROPONIN QUANT: CPT | Performed by: EMERGENCY MEDICINE

## 2025-04-11 PROCEDURE — 93005 ELECTROCARDIOGRAM TRACING: CPT

## 2025-04-11 PROCEDURE — 80076 HEPATIC FUNCTION PANEL: CPT | Performed by: EMERGENCY MEDICINE

## 2025-04-11 PROCEDURE — 82805 BLOOD GASES W/O2 SATURATION: CPT | Performed by: EMERGENCY MEDICINE

## 2025-04-11 PROCEDURE — 83880 ASSAY OF NATRIURETIC PEPTIDE: CPT | Performed by: EMERGENCY MEDICINE

## 2025-04-11 PROCEDURE — 83690 ASSAY OF LIPASE: CPT | Performed by: EMERGENCY MEDICINE

## 2025-04-11 RX ORDER — PANTOPRAZOLE SODIUM 40 MG/1
40 TABLET, DELAYED RELEASE ORAL
Status: DISCONTINUED | OUTPATIENT
Start: 2025-04-12 | End: 2025-04-17 | Stop reason: HOSPADM

## 2025-04-11 RX ORDER — ALBUTEROL SULFATE 0.83 MG/ML
5 SOLUTION RESPIRATORY (INHALATION) ONCE
Status: COMPLETED | OUTPATIENT
Start: 2025-04-11 | End: 2025-04-11

## 2025-04-11 RX ORDER — METHYLPREDNISOLONE SODIUM SUCCINATE 40 MG/ML
40 INJECTION, POWDER, LYOPHILIZED, FOR SOLUTION INTRAMUSCULAR; INTRAVENOUS DAILY
Status: DISCONTINUED | OUTPATIENT
Start: 2025-04-12 | End: 2025-04-13

## 2025-04-11 RX ORDER — FOLIC ACID 1 MG/1
1 TABLET ORAL DAILY
Status: DISCONTINUED | OUTPATIENT
Start: 2025-04-12 | End: 2025-04-17 | Stop reason: HOSPADM

## 2025-04-11 RX ORDER — LANOLIN ALCOHOL/MO/W.PET/CERES
100 CREAM (GRAM) TOPICAL DAILY
Status: DISCONTINUED | OUTPATIENT
Start: 2025-04-12 | End: 2025-04-17 | Stop reason: HOSPADM

## 2025-04-11 RX ORDER — FUROSEMIDE 10 MG/ML
40 INJECTION INTRAMUSCULAR; INTRAVENOUS ONCE
Status: COMPLETED | OUTPATIENT
Start: 2025-04-11 | End: 2025-04-11

## 2025-04-11 RX ORDER — CARVEDILOL 3.12 MG/1
3.12 TABLET ORAL 2 TIMES DAILY WITH MEALS
Status: DISCONTINUED | OUTPATIENT
Start: 2025-04-12 | End: 2025-04-17 | Stop reason: HOSPADM

## 2025-04-11 RX ORDER — FAMOTIDINE 20 MG/1
20 TABLET, FILM COATED ORAL
Status: DISCONTINUED | OUTPATIENT
Start: 2025-04-11 | End: 2025-04-17 | Stop reason: HOSPADM

## 2025-04-11 RX ORDER — CEFTRIAXONE 1 G/50ML
1000 INJECTION, SOLUTION INTRAVENOUS EVERY 24 HOURS
Status: DISCONTINUED | OUTPATIENT
Start: 2025-04-11 | End: 2025-04-12

## 2025-04-11 RX ORDER — FLUTICASONE FUROATE AND VILANTEROL 100; 25 UG/1; UG/1
1 POWDER RESPIRATORY (INHALATION)
Status: DISCONTINUED | OUTPATIENT
Start: 2025-04-12 | End: 2025-04-17 | Stop reason: HOSPADM

## 2025-04-11 RX ORDER — NICOTINE 21 MG/24HR
1 PATCH, TRANSDERMAL 24 HOURS TRANSDERMAL DAILY
Status: DISCONTINUED | OUTPATIENT
Start: 2025-04-12 | End: 2025-04-17 | Stop reason: HOSPADM

## 2025-04-11 RX ORDER — METHYLPREDNISOLONE SODIUM SUCCINATE 125 MG/2ML
80 INJECTION, POWDER, LYOPHILIZED, FOR SOLUTION INTRAMUSCULAR; INTRAVENOUS ONCE
Status: COMPLETED | OUTPATIENT
Start: 2025-04-11 | End: 2025-04-11

## 2025-04-11 RX ORDER — ONDANSETRON 2 MG/ML
4 INJECTION INTRAMUSCULAR; INTRAVENOUS EVERY 6 HOURS PRN
Status: DISCONTINUED | OUTPATIENT
Start: 2025-04-11 | End: 2025-04-17 | Stop reason: HOSPADM

## 2025-04-11 RX ORDER — GUAIFENESIN 100 MG/5ML
200 SOLUTION ORAL EVERY 4 HOURS PRN
Status: DISCONTINUED | OUTPATIENT
Start: 2025-04-11 | End: 2025-04-17 | Stop reason: HOSPADM

## 2025-04-11 RX ORDER — METRONIDAZOLE 500 MG/100ML
500 INJECTION, SOLUTION INTRAVENOUS EVERY 12 HOURS
Status: DISCONTINUED | OUTPATIENT
Start: 2025-04-11 | End: 2025-04-12

## 2025-04-11 RX ORDER — CARVEDILOL PHOSPHATE 10 MG/1
10 CAPSULE, EXTENDED RELEASE ORAL DAILY
Status: DISCONTINUED | OUTPATIENT
Start: 2025-04-12 | End: 2025-04-11

## 2025-04-11 RX ORDER — GABAPENTIN 100 MG/1
100 CAPSULE ORAL 3 TIMES DAILY
Status: DISCONTINUED | OUTPATIENT
Start: 2025-04-11 | End: 2025-04-17 | Stop reason: HOSPADM

## 2025-04-11 RX ADMIN — IOHEXOL 100 ML: 350 INJECTION, SOLUTION INTRAVENOUS at 18:17

## 2025-04-11 RX ADMIN — ALBUTEROL SULFATE 5 MG: 2.5 SOLUTION RESPIRATORY (INHALATION) at 17:26

## 2025-04-11 RX ADMIN — GABAPENTIN 100 MG: 100 CAPSULE ORAL at 22:23

## 2025-04-11 RX ADMIN — CEFTRIAXONE 1000 MG: 1 INJECTION, SOLUTION INTRAVENOUS at 22:27

## 2025-04-11 RX ADMIN — FUROSEMIDE 40 MG: 10 INJECTION, SOLUTION INTRAMUSCULAR; INTRAVENOUS at 20:22

## 2025-04-11 RX ADMIN — FAMOTIDINE 20 MG: 20 TABLET, FILM COATED ORAL at 22:22

## 2025-04-11 RX ADMIN — IPRATROPIUM BROMIDE 0.5 MG: 0.5 SOLUTION RESPIRATORY (INHALATION) at 17:26

## 2025-04-11 RX ADMIN — METRONIDAZOLE 500 MG: 500 INJECTION, SOLUTION INTRAVENOUS at 23:25

## 2025-04-11 RX ADMIN — METHYLPREDNISOLONE SODIUM SUCCINATE 80 MG: 125 INJECTION, POWDER, FOR SOLUTION INTRAMUSCULAR; INTRAVENOUS at 22:23

## 2025-04-11 NOTE — ED PROVIDER NOTES
Time reflects when diagnosis was documented in both MDM as applicable and the Disposition within this note       Time User Action Codes Description Comment    4/11/2025  8:22 PM Rosanna Augustin Add [J90] Pleural effusion     4/11/2025  8:22 PM Rosanna Augustin Add [R18.8] Ascites     4/11/2025  8:22 PM Rosanna Augustin Add [R60.0] Lower extremity edema     4/11/2025  8:22 PM Rosanna Augustin Add [R17] Jaundice     4/11/2025 10:04 PM Mara Thomason Add [R79.89] Elevated LFTs           ED Disposition       ED Disposition   Admit    Condition   Stable    Date/Time   Fri Apr 11, 2025  8:22 PM    Comment   Case was discussed with AUBREY and the patient's admission status was agreed to be Admission Status: observation status to the service of Dr. Sapp.               Assessment & Plan       Medical Decision Making  Pt is a 69yo M who presents with altered mental status and shortness of breath.     Differential diagnosis to include but not limited to hypoglycemia, arrhythmia, hyperammonemia, hypercarbia, COPD exacerbation, viral syndrome, ascites.  Will plan for labs and imaging.  Will treat symptomatically and reassess.  See ED course for results and details.    Plan to admit pt to [facility] [place]. Pt discussed with admitting team and admission orders placed. Pt admitted without incident.           Amount and/or Complexity of Data Reviewed  Labs: ordered. Decision-making details documented in ED Course.  Radiology: ordered. Decision-making details documented in ED Course.  ECG/medicine tests:  Decision-making details documented in ED Course.    Risk  Prescription drug management.  Decision regarding hospitalization.        ED Course as of 04/11/25 2237   Fri Apr 11, 2025   1703 POC Glucose: 110  WNL   1706 ECG 12 lead  Procedure Note: EKG  Date/Time: 04/11/25 5:06 PM   Interpreted by: Rosanna Augustin MD  Indications / Diagnosis: SOB  ECG reviewed by me, the ED Physician: yes   The EKG demonstrates:  Rhythm: normal sinus with  PVCs  Intervals: normal intervals  Axis: normal axis  QRS/Blocks: wide QRS with LBBB morphology  ST Changes: No acute ST Changes, no STD/JU.   1730 pH, Edi(!): 7.449  Likely 2/2 tachypnea.    1730 pCO2, Edi(!): 39.9  Likely 2/2 tachypnea. No evidence of hypercarbia.    1732 ETHANOL: <10  Negative.    1732 Ammonia: 36  WNL   1734 FLU/COVID Rapid Antigen (30 min. TAT) - Preferred screening test in ED  Negative.    1739 BNP(!): 886  Elevated to a greater degree than prior.    1739 hs TnI 0hr(!): 56  Stable from prior. Likely elevated 2/2 fluid overload.   1745 Basic metabolic panel(!)  Reviewed and without actionable derangement.    1745 LIPASE: 57  WNL   1745 Total Bilirubin(!): 4.76  Elevated to a greater degree than prior.    1745 AST(!): 100  Stable from prior.    1745 Albumin(!): 2.6  Low.    1749 WBC: 6.82  WNL   1749 Platelet Count(!): 86  Thrombocytopenia new when compared to prior.    1928 Awaiting CT read.    1955 CT chest abdomen pelvis w contrast  1.  Trace bilateral pleural effusions.  2.  4.5 cm fusiform ectasia of the ascending thoracic aorta. 1 year follow-up CT scan recommended.  3.  Enlarged fatty liver with mild ascites.  4.  Abnormal wall thickening of the sigmoid colon. Suspect portal hypertensive colopathy. Cannot exclude colitis in the appropriate clinical situation.  5.  Nonspecific wall thickening of the gallbladder likely related to liver disease and ascites. Clinical correlation is advised.   1957 Pt reassessed. Pt and wife made aware of all results and recommendation for admission. Agreeable.    2001 Delta 2hr hsTnI: -5  WNL       Medications   albuterol inhalation solution 5 mg (5 mg Nebulization Given 4/11/25 1726)   ipratropium (ATROVENT) 0.02 % inhalation solution 0.5 mg (0.5 mg Nebulization Given 4/11/25 1726)   iohexol (OMNIPAQUE) 350 MG/ML injection (MULTI-DOSE) 100 mL (100 mL Intravenous Given 4/11/25 1817)   furosemide (LASIX) injection 40 mg (40 mg Intravenous Given 4/11/25 2022)        ED Risk Strat Scores                    No data recorded        SBIRT 20yo+      Flowsheet Row Most Recent Value   Initial Alcohol Screen: US AUDIT-C     1. How often do you have a drink containing alcohol? 0 Filed at: 04/11/2025 1924   2. How many drinks containing alcohol do you have on a typical day you are drinking?  0 Filed at: 04/11/2025 1924   3a. Male UNDER 65: How often do you have five or more drinks on one occasion? 0 Filed at: 04/11/2025 1924   3b. FEMALE Any Age, or MALE 65+: How often do you have 4 or more drinks on one occassion? 0 Filed at: 04/11/2025 1924   Audit-C Score 0 Filed at: 04/11/2025 1924   BRIGIDA: How many times in the past year have you...    Used an illegal drug or used a prescription medication for non-medical reasons? Never Filed at: 04/11/2025 1924                            History of Present Illness       Chief Complaint   Patient presents with    Altered Mental Status     Patient arrived EMS from home. Per EMS, family reports patient has had AMS. Patient presents w/ distended abdomen and jaundice eyes. Patient reports he drinks daily but has not been able to drink due to N/V. Patient c/o cough and SOB.        Past Medical History:   Diagnosis Date    Alcoholism (HCC)     Marinelli esophagus     Marinelli's esophagus     Bleeding ulcer     CHF (congestive heart failure) (HCC)     COPD (chronic obstructive pulmonary disease) (HCC)     Hospital visit found    Coronary artery disease     10 years    Diverticulitis of colon     Fatty liver     GERD (gastroesophageal reflux disease)     Years    Heart failure (HCC)     HL (hearing loss)     Years, work related    Inflammatory bowel disease     Years    PMR (polymyalgia rheumatica) (HCC)     Temporal arteritis (HCC)     Terminal ileitis without complication ([facility]) 04/15/2024    Visual impairment     Wears glasses      Past Surgical History:   Procedure Laterality Date    COLONOSCOPY      FRACTURE SURGERY      knee  left     KNEE SURGERY  Left     ROTATOR CUFF REPAIR      right    TONSILLECTOMY        Family History   Problem Relation Age of Onset    Heart failure Mother     Alcohol abuse Father     Other (pmr) Father     Diabetes Sister     Uterine cancer Sister     Skin cancer Brother       Social History     Tobacco Use    Smoking status: Every Day     Current packs/day: 1.00     Average packs/day: 1 pack/day for 50.0 years (50.0 ttl pk-yrs)     Types: Cigarettes     Passive exposure: Current    Smokeless tobacco: Never   Vaping Use    Vaping status: Never Used   Substance Use Topics    Alcohol use: Yes     Alcohol/week: 3.0 standard drinks of alcohol     Types: 3 Shots of liquor per week     Comment: 6-9 shots daily    Drug use: Not Currently      E-Cigarette/Vaping    E-Cigarette Use Never User       E-Cigarette/Vaping Substances    Nicotine No     THC No     CBD No     Flavoring No     Other No     Unknown No       I have reviewed and agree with the history as documented.     Pt is a 71yo M who presents for shortness of breath and altered mental status.  Per EMS, patient's family was concerned that he was altered.  Patient reports he is here for shortness of breath, cough, and abdominal pain.  Patient reports his symptoms have been going on for approximately 1 week.  Patient denies any known fevers.  Patient unsure of his last bowel movement.  Patient does report nausea and vomiting with last vomiting 2 to 3 days ago.  Patient has had decreased p.o. intake.  Patient reports he does have history of daily alcohol use but has not had a drink in several days.  Patient reports he typically takes his medications regularly but has not for the past 2 days because he has been feeling unwell.  Patient has torsemide listed as needed if weight above 155.  Patient reports he has not taken it recently.  Patient reports bilateral lower extremity swelling that is chronic and unchanged from his baseline.          Objective       ED Triage Vitals [04/11/25 1635]    Temperature Pulse Blood Pressure Respirations SpO2 Patient Position - Orthostatic VS   98.8 °F (37.1 °C) 99 100/55 (!) 24 93 % Sitting      Temp Source Heart Rate Source BP Location FiO2 (%) Pain Score    Oral Monitor Right arm -- 8      Vitals      Date and Time Temp Pulse SpO2 Resp BP Pain Score FACES Pain Rating User   04/11/25 2155 98.5 °F (36.9 °C) 105 92 % 18 105/69 -- -- DII   04/11/25 2153 -- -- -- -- -- 6 -- AP   04/11/25 2115 -- 103 94 % 20 110/55 -- -- DD   04/11/25 2100 -- 104 95 % 20 134/61 -- -- MB   04/11/25 2045 -- 101 96 % 20 117/72 -- -- MB   04/11/25 2030 -- 98 96 % 20 105/62 -- -- MB   04/11/25 2015 -- 98 93 % 20 111/62 -- -- DD   04/11/25 2000 -- 97 94 % 21 111/59 -- -- MB   04/11/25 1900 -- 96 97 % 21 109/56 -- -- DD   04/11/25 1845 -- 95 96 % 26 111/62 -- -- OE   04/11/25 1830 -- 95 94 % 21 121/56 -- -- OE   04/11/25 1815 -- 101 97 % 26 106/65 -- -- AM   04/11/25 1800 -- 93 97 % 24 101/59 -- -- AM   04/11/25 1730 -- 93 94 % 22 114/56 -- -- AM   04/11/25 1635 98.8 °F (37.1 °C) 99 93 % 24 100/55 8 -- CG            Physical Exam  Vitals reviewed.   Constitutional:       Appearance: He is well-developed. He is ill-appearing. He is not toxic-appearing or diaphoretic.   HENT:      Head: Normocephalic and atraumatic.      Right Ear: External ear normal.      Left Ear: External ear normal.      Nose: Nose normal.      Mouth/Throat:      Pharynx: Oropharynx is clear.   Eyes:      General: Scleral icterus present.      Extraocular Movements: Extraocular movements intact.      Pupils: Pupils are equal, round, and reactive to light.   Cardiovascular:      Rate and Rhythm: Normal rate and regular rhythm.      Heart sounds: Normal heart sounds.   Pulmonary:      Effort: Pulmonary effort is normal. No respiratory distress.      Breath sounds: Transmitted upper airway sounds present. No stridor. Wheezing present.   Abdominal:      General: Bowel sounds are normal.      Palpations: Abdomen is soft. There is  no mass.      Tenderness: There is abdominal tenderness. There is no guarding.   Musculoskeletal:         General: Normal range of motion.      Cervical back: Neck supple.      Right lower leg: Edema (pitting, symmetric) present.      Left lower leg: Edema (pitting, symmetric) present.   Skin:     General: Skin is warm and dry.      Capillary Refill: Capillary refill takes less than 2 seconds.   Neurological:      General: No focal deficit present.      Mental Status: He is alert and oriented to person, place, and time.   Psychiatric:         Speech: Speech normal.         Behavior: Behavior is cooperative.       Wt Readings from Last 3 Encounters:   04/11/25 69.9 kg (154 lb)   03/24/25 71.2 kg (157 lb)   01/22/25 68.9 kg (152 lb)         Results Reviewed       Procedure Component Value Units Date/Time    HS Troponin I 4hr [[id number]] Collected: 04/11/25 2232    Lab Status: In process Specimen: Blood from Arm, Right Updated: 04/11/25 2234    Magnesium [[id number]]  (Normal) Collected: 04/11/25 1924    Lab Status: Final result Specimen: Blood from Arm, Left Updated: 04/11/25 2228     Magnesium 1.9 mg/dL     HS Troponin I 2hr [[id number]]  (Abnormal) Collected: 04/11/25 1924    Lab Status: Final result Specimen: Blood from Arm, Left Updated: 04/11/25 2001     hs TnI 2hr 51 ng/L      Delta 2hr hsTnI -5 ng/L     CBC and differential [[id number]]  (Abnormal) Collected: 04/11/25 1707    Lab Status: Final result Specimen: Blood from Arm, Left Updated: 04/11/25 1748     WBC 6.82 Thousand/uL      RBC 3.61 Million/uL      Hemoglobin 13.6 g/dL      Hematocrit 40.5 %       fL      MCH 37.7 pg      MCHC 33.6 g/dL      RDW 15.5 %      MPV 13.0 fL      Platelets 86 Thousands/uL      nRBC 0 /100 WBCs      Segmented % 81 %      Immature Grans % 1 %      Lymphocytes % 8 %      Monocytes % 10 %      Eosinophils Relative 0 %      Basophils Relative 0 %      Absolute Neutrophils 5.50 Thousands/µL      Absolute Immature Grans 0.05  Thousand/uL      Absolute Lymphocytes 0.54 Thousands/µL      Absolute Monocytes 0.71 Thousand/µL      Eosinophils Absolute 0.00 Thousand/µL      Basophils Absolute 0.02 Thousands/µL     Basic metabolic panel [[id number]]  (Abnormal) Collected: 04/11/25 1707    Lab Status: Final result Specimen: Blood from Arm, Left Updated: 04/11/25 1743     Sodium 137 mmol/L      Potassium 3.5 mmol/L      Chloride 96 mmol/L      CO2 26 mmol/L      ANION GAP 15 mmol/L      BUN 7 mg/dL      Creatinine 0.82 mg/dL      Glucose 101 mg/dL      Calcium 8.2 mg/dL      eGFR 88 ml/min/1.73sq m     Narrative:      National Kidney Disease Foundation guidelines for Chronic Kidney Disease (CKD):     Stage 1 with normal or high GFR (GFR > 90 mL/min/1.73 square meters)    Stage 2 Mild CKD (GFR = 60-89 mL/min/1.73 square meters)    Stage 3A Moderate CKD (GFR = 45-59 mL/min/1.73 square meters)    Stage 3B Moderate CKD (GFR = 30-44 mL/min/1.73 square meters)    Stage 4 Severe CKD (GFR = 15-29 mL/min/1.73 square meters)    Stage 5 End Stage CKD (GFR <15 mL/min/1.73 square meters)  Note: GFR calculation is accurate only with a steady state creatinine    Hepatic function panel [[id number]]  (Abnormal) Collected: 04/11/25 1707    Lab Status: Final result Specimen: Blood from Arm, Left Updated: 04/11/25 1743     Total Bilirubin 4.76 mg/dL      Bilirubin, Direct 2.44 mg/dL      Alkaline Phosphatase 154 U/L       U/L      ALT 35 U/L      Total Protein 5.2 g/dL      Albumin 2.6 g/dL     Lipase [[id number]]  (Normal) Collected: 04/11/25 1707    Lab Status: Final result Specimen: Blood from Arm, Left Updated: 04/11/25 1743     Lipase 57 u/L     HS Troponin 0hr (reflex protocol) [[id number]]  (Abnormal) Collected: 04/11/25 1707    Lab Status: Final result Specimen: Blood from Arm, Left Updated: 04/11/25 1739     hs TnI 0hr 56 ng/L     B-Type Natriuretic Peptide(BNP) [[id number]]  (Abnormal) Collected: 04/11/25 1707    Lab Status: Final result Specimen: Blood  from Arm, Left Updated: 04/11/25 1738      pg/mL     FLU/COVID Rapid Antigen (30 min. TAT) - Preferred screening test in ED [[id number]]  (Normal) Collected: 04/11/25 1707    Lab Status: Final result Specimen: Nares from Nose Updated: 04/11/25 1734     SARS COV Rapid Antigen Negative     Influenza A Rapid Antigen Negative     Influenza B Rapid Antigen Negative    Narrative:      This test has been performed using the Ren[employer] Katiuska 2 FLU+SARS Antigen test under the Emergency Use Authorization (EUA). This test has been validated by the  and verified by the performing laboratory. The Katiuska uses lateral flow immunofluorescent sandwich assay to detect SARS-COV, Influenza A and Influenza B Antigen.     The Quidel Katiuska 2 SARS Antigen test does not differentiate between SARS-CoV and SARS-CoV-2.     Negative results are presumptive and may be confirmed with a molecular assay, if necessary, for patient management. Negative results do not rule out SARS-CoV-2 or influenza infection and should not be used as the sole basis for treatment or patient management decisions. A negative test result may occur if the level of antigen in a sample is below the limit of detection of this test.     Positive results are indicative of the presence of viral antigens, but do not rule out bacterial infection or co-infection with other viruses.     All test results should be used as an adjunct to clinical observations and other information available to the provider.    FOR PEDIATRIC PATIENTS - copy/paste COVID Guidelines URL to browser: [email or web address]    Ammonia [[id number]]  (Normal) Collected: 04/11/25 1707    Lab Status: Final result Specimen: Blood from Arm, Left Updated: 04/11/25 1731     Ammonia 36 umol/L     Ethanol [[id number]]  (Normal) Collected: 04/11/25 1707    Lab Status: Final result Specimen: Blood from Arm, Left Updated: 04/11/25 1731     Ethanol Lvl <10  mg/dL     Blood gas, venous [[id number]]  (Abnormal) Collected: 04/11/25 1723    Lab Status: Final result Specimen: Blood from Arm, Left Updated: 04/11/25 1730     pH, Edi 7.449     pCO2, Edi 39.9 mm Hg      pO2, Edi 53.2 mm Hg      HCO3, Edi 27.0 mmol/L      Base Excess, Edi 2.9 mmol/L      O2 Content, Edi 16.7 ml/dL      O2 HGB, VENOUS 84.8 %     Fingerstick Glucose (POCT) [[id number]]  (Normal) Collected: 04/11/25 1700    Lab Status: Final result Specimen: Blood Updated: 04/11/25 1701     POC Glucose 110 mg/dl             CT chest abdomen pelvis w contrast   Final Interpretation by Reji Rosario MD (04/11 1952)      1.  Trace bilateral pleural effusions.   2.  4.5 cm fusiform ectasia of the ascending thoracic aorta. 1 year follow-up CT scan recommended.   3.  Enlarged fatty liver with mild ascites.   4.  Abnormal wall thickening of the sigmoid colon. Suspect portal hypertensive colopathy. Cannot exclude colitis in the appropriate clinical situation.   5.  Nonspecific wall thickening of the gallbladder likely related to liver disease and ascites. Clinical correlation is advised.               Workstation performed: [id number]         US abdomen limited    (Results Pending)       Procedures    ED Medication and Procedure Management   Prior to Admission Medications   Prescriptions Last Dose Informant Patient Reported? Taking?   Fluticasone-Salmeterol (Advair) 100-50 mcg/dose inhaler 4/10/2025  No Yes   Sig: Inhale 1 puff 2 (two) times a day Rinse mouth after use.   Thiamine Mononitrate (VITAMIN B1) 100 mg tablet 4/10/2025  No Yes   Sig: Take 1 tablet (100 mg total) by mouth daily   albuterol (ProAir HFA) 90 mcg/act inhaler   No No   Sig: Inhale 2 puffs every 6 (six) hours as needed for wheezing   carvedilol (COREG CR) 10 MG 24 hr capsule 4/10/2025 Morning  Yes Yes   Sig: Take 10 mg by mouth daily   cyanocobalamin (VITAMIN B-12) 100 MCG tablet 4/10/2025  Yes Yes   Sig: Take 1,000 mcg by mouth daily   gabapentin  (Neurontin) 100 mg capsule 4/10/2025  No Yes   Sig: Take 1 capsule (100 mg total) by mouth 3 (three) times a day   hydrocortisone 2.5 % cream   Yes No   Sig: Apply topically 2 (two) times a day   loperamide (IMODIUM) 2 mg capsule   No No   Sig: TAKE 1 CAPSULE 4 TIMES     DAILY AS NEEDED FOR        DIARRHEA   pantoprazole (PROTONIX) 40 mg tablet 4/10/2025 Morning  No Yes   Sig: TAKE 1 TABLET DAILY IN THE EARLY MORNING   potassium chloride (Klor-Con 10) 10 mEq tablet 4/10/2025  Yes Yes   Sig: Take 10 mEq by mouth 2 (two) times a day   torsemide (DEMADEX) 20 mg tablet   Yes No   Sig: If weight going above 155 take one tab daily   triamcinolone (KENALOG) 0.1 % lotion   Yes No   Sig: Apply topically      Facility-Administered Medications: None     Current Discharge Medication List        CONTINUE these medications which have NOT CHANGED    Details   carvedilol (COREG CR) 10 MG 24 hr capsule Take 10 mg by mouth daily      cyanocobalamin (VITAMIN B-12) 100 MCG tablet Take 1,000 mcg by mouth daily      Fluticasone-Salmeterol (Advair) 100-50 mcg/dose inhaler Inhale 1 puff 2 (two) times a day Rinse mouth after use.  Qty: 180 blister, Refills: 3    Comments: Substitution to a formulary equivalent within the same pharmaceutical class is authorized.  Associated Diagnoses: Chronic obstructive pulmonary disease, unspecified COPD type (HCC)      gabapentin (Neurontin) 100 mg capsule Take 1 capsule (100 mg total) by mouth 3 (three) times a day  Qty: 270 capsule, Refills: 1    Associated Diagnoses: Neuropathy      pantoprazole (PROTONIX) 40 mg tablet TAKE 1 TABLET DAILY IN THE EARLY MORNING  Qty: 90 tablet, Refills: 1    Associated Diagnoses: Anemia      potassium chloride (Klor-Con 10) 10 mEq tablet Take 10 mEq by mouth 2 (two) times a day      Thiamine Mononitrate (VITAMIN B1) 100 mg tablet Take 1 tablet (100 mg total) by mouth daily    Associated Diagnoses: Neuropathy      albuterol (ProAir HFA) 90 mcg/act inhaler Inhale 2 puffs  every 6 (six) hours as needed for wheezing  Qty: 8.5 g, Refills: 0    Comments: Substitution to a formulary equivalent within the same pharmaceutical class is authorized.  Associated Diagnoses: Acute on chronic combined systolic and diastolic congestive heart failure (HCC)      hydrocortisone 2.5 % cream Apply topically 2 (two) times a day      loperamide (IMODIUM) 2 mg capsule TAKE 1 CAPSULE 4 TIMES     DAILY AS NEEDED FOR        DIARRHEA  Qty: 360 capsule, Refills: 1    Associated Diagnoses: Chronic diarrhea      torsemide (DEMADEX) 20 mg tablet If weight going above 155 take one tab daily      triamcinolone (KENALOG) 0.1 % lotion Apply topically           No discharge procedures on file.  ED SEPSIS DOCUMENTATION   Time reflects when diagnosis was documented in both MDM as applicable and the Disposition within this note       Time User Action Codes Description Comment    4/11/2025  8:22 PM Rosanna Augustin Add [J90] Pleural effusion     4/11/2025  8:22 PM Rosanna Augustin Add [R18.8] Ascites     4/11/2025  8:22 PM Rosanna Augustin Add [R60.0] Lower extremity edema     4/11/2025  8:22 PM Rosanna Augustin Add [R17] Jaundice     4/11/2025 10:04 PM Mara Thomason Add [R79.89] Elevated LFTs                  Rosanna Augustin MD  04/11/25 5342

## 2025-04-12 ENCOUNTER — APPOINTMENT (INPATIENT)
Dept: RADIOLOGY | Facility: HOSPITAL | Age: 70
DRG: 433 | End: 2025-04-12
Payer: MEDICARE

## 2025-04-12 PROBLEM — R79.89 ELEVATED TROPONIN: Status: ACTIVE | Noted: 2025-04-12

## 2025-04-12 PROBLEM — D69.6 THROMBOCYTOPENIA (HCC): Status: ACTIVE | Noted: 2025-04-12

## 2025-04-12 PROBLEM — I77.89 ECTASIA OF ARTERY (HCC): Status: ACTIVE | Noted: 2025-04-12

## 2025-04-12 PROBLEM — Z71.89 GOALS OF CARE, COUNSELING/DISCUSSION: Status: ACTIVE | Noted: 2025-04-12

## 2025-04-12 LAB
ALBUMIN SERPL BCG-MCNC: 2.2 G/DL (ref 3.5–5)
ALP SERPL-CCNC: 129 U/L (ref 34–104)
ALT SERPL W P-5'-P-CCNC: 28 U/L (ref 7–52)
ANION GAP SERPL CALCULATED.3IONS-SCNC: 13 MMOL/L (ref 4–13)
AST SERPL W P-5'-P-CCNC: 76 U/L (ref 13–39)
BILIRUB SERPL-MCNC: 3.95 MG/DL (ref 0.2–1)
BILIRUB UR QL STRIP: NEGATIVE
BUN SERPL-MCNC: 8 MG/DL (ref 5–25)
CALCIUM ALBUM COR SERPL-MCNC: 9.1 MG/DL (ref 8.3–10.1)
CALCIUM SERPL-MCNC: 7.7 MG/DL (ref 8.4–10.2)
CARDIAC TROPONIN I PNL SERPL HS: 47 NG/L (ref 8–18)
CHLORIDE SERPL-SCNC: 98 MMOL/L (ref 96–108)
CHOLEST SERPL-MCNC: 133 MG/DL (ref ?–200)
CLARITY UR: CLEAR
CO2 SERPL-SCNC: 27 MMOL/L (ref 21–32)
COLOR UR: YELLOW
CREAT SERPL-MCNC: 0.91 MG/DL (ref 0.6–1.3)
ERYTHROCYTE [DISTWIDTH] IN BLOOD BY AUTOMATED COUNT: 15.3 % (ref 11.6–15.1)
GFR SERPL CREATININE-BSD FRML MDRD: 83 ML/MIN/1.73SQ M
GLUCOSE SERPL-MCNC: 147 MG/DL (ref 65–140)
GLUCOSE UR STRIP-MCNC: NEGATIVE MG/DL
HCT VFR BLD AUTO: 36.2 % (ref 36.5–49.3)
HDLC SERPL-MCNC: 5 MG/DL
HGB BLD-MCNC: 12.3 G/DL (ref 12–17)
HGB UR QL STRIP.AUTO: NEGATIVE
INR PPP: 1.64 (ref 0.85–1.19)
KETONES UR STRIP-MCNC: NEGATIVE MG/DL
LDLC SERPL CALC-MCNC: 82 MG/DL (ref 0–100)
LEUKOCYTE ESTERASE UR QL STRIP: NEGATIVE
MAGNESIUM SERPL-MCNC: 1.8 MG/DL (ref 1.9–2.7)
MCH RBC QN AUTO: 38 PG (ref 26.8–34.3)
MCHC RBC AUTO-ENTMCNC: 34 G/DL (ref 31.4–37.4)
MCV RBC AUTO: 112 FL (ref 82–98)
NITRITE UR QL STRIP: NEGATIVE
PH UR STRIP.AUTO: 6 [PH]
PLATELET # BLD AUTO: 76 THOUSANDS/UL (ref 149–390)
PMV BLD AUTO: 13.1 FL (ref 8.9–12.7)
POTASSIUM SERPL-SCNC: 3.2 MMOL/L (ref 3.5–5.3)
PROT SERPL-MCNC: 4.4 G/DL (ref 6.4–8.4)
PROT UR STRIP-MCNC: NEGATIVE MG/DL
PROTHROMBIN TIME: 19.9 SECONDS (ref 12.3–15)
RBC # BLD AUTO: 3.24 MILLION/UL (ref 3.88–5.62)
SODIUM SERPL-SCNC: 138 MMOL/L (ref 135–147)
SP GR UR STRIP.AUTO: <1.005 (ref 1–1.03)
TRIGL SERPL-MCNC: 229 MG/DL (ref ?–150)
UROBILINOGEN UR STRIP-ACNC: <2 MG/DL
WBC # BLD AUTO: 4.97 THOUSAND/UL (ref 4.31–10.16)

## 2025-04-12 PROCEDURE — 70450 CT HEAD/BRAIN W/O DYE: CPT

## 2025-04-12 PROCEDURE — 80053 COMPREHEN METABOLIC PANEL: CPT | Performed by: NURSE PRACTITIONER

## 2025-04-12 PROCEDURE — 76705 ECHO EXAM OF ABDOMEN: CPT

## 2025-04-12 PROCEDURE — 85610 PROTHROMBIN TIME: CPT | Performed by: NURSE PRACTITIONER

## 2025-04-12 PROCEDURE — 83735 ASSAY OF MAGNESIUM: CPT | Performed by: NURSE PRACTITIONER

## 2025-04-12 PROCEDURE — 85027 COMPLETE CBC AUTOMATED: CPT | Performed by: NURSE PRACTITIONER

## 2025-04-12 PROCEDURE — 81003 URINALYSIS AUTO W/O SCOPE: CPT | Performed by: NURSE PRACTITIONER

## 2025-04-12 PROCEDURE — 99233 SBSQ HOSP IP/OBS HIGH 50: CPT | Performed by: INTERNAL MEDICINE

## 2025-04-12 PROCEDURE — 84484 ASSAY OF TROPONIN QUANT: CPT | Performed by: NURSE PRACTITIONER

## 2025-04-12 PROCEDURE — 87505 NFCT AGENT DETECTION GI: CPT | Performed by: NURSE PRACTITIONER

## 2025-04-12 PROCEDURE — 80061 LIPID PANEL: CPT | Performed by: NURSE PRACTITIONER

## 2025-04-12 RX ORDER — POTASSIUM CHLORIDE 1500 MG/1
40 TABLET, EXTENDED RELEASE ORAL ONCE
Status: COMPLETED | OUTPATIENT
Start: 2025-04-12 | End: 2025-04-12

## 2025-04-12 RX ORDER — MAGNESIUM SULFATE HEPTAHYDRATE 40 MG/ML
2 INJECTION, SOLUTION INTRAVENOUS ONCE
Status: COMPLETED | OUTPATIENT
Start: 2025-04-12 | End: 2025-04-12

## 2025-04-12 RX ORDER — LEVALBUTEROL INHALATION SOLUTION 0.63 MG/3ML
0.63 SOLUTION RESPIRATORY (INHALATION) EVERY 6 HOURS PRN
Status: DISCONTINUED | OUTPATIENT
Start: 2025-04-12 | End: 2025-04-17 | Stop reason: HOSPADM

## 2025-04-12 RX ADMIN — FOLIC ACID 1 MG: 1 TABLET ORAL at 08:59

## 2025-04-12 RX ADMIN — GABAPENTIN 100 MG: 100 CAPSULE ORAL at 21:45

## 2025-04-12 RX ADMIN — Medication 1 TABLET: at 08:59

## 2025-04-12 RX ADMIN — THIAMINE HCL TAB 100 MG 100 MG: 100 TAB at 08:59

## 2025-04-12 RX ADMIN — GABAPENTIN 100 MG: 100 CAPSULE ORAL at 08:59

## 2025-04-12 RX ADMIN — METHYLPREDNISOLONE SODIUM SUCCINATE 40 MG: 40 INJECTION, POWDER, FOR SOLUTION INTRAMUSCULAR; INTRAVENOUS at 09:00

## 2025-04-12 RX ADMIN — GABAPENTIN 100 MG: 100 CAPSULE ORAL at 16:43

## 2025-04-12 RX ADMIN — POTASSIUM CHLORIDE 40 MEQ: 1500 TABLET, EXTENDED RELEASE ORAL at 09:59

## 2025-04-12 RX ADMIN — MAGNESIUM SULFATE HEPTAHYDRATE 2 G: 40 INJECTION, SOLUTION INTRAVENOUS at 11:35

## 2025-04-12 RX ADMIN — FAMOTIDINE 20 MG: 20 TABLET, FILM COATED ORAL at 21:45

## 2025-04-12 RX ADMIN — METRONIDAZOLE 500 MG: 500 INJECTION, SOLUTION INTRAVENOUS at 10:03

## 2025-04-12 RX ADMIN — PSYLLIUM HUSK 1 PACKET: 3.4 POWDER ORAL at 17:53

## 2025-04-12 RX ADMIN — CYANOCOBALAMIN TAB 500 MCG 1000 MCG: 500 TAB at 08:59

## 2025-04-12 RX ADMIN — NICOTINE 1 PATCH: 21 PATCH, EXTENDED RELEASE TRANSDERMAL at 09:01

## 2025-04-12 RX ADMIN — FLUTICASONE FUROATE AND VILANTEROL TRIFENATATE 1 PUFF: 100; 25 POWDER RESPIRATORY (INHALATION) at 09:05

## 2025-04-12 RX ADMIN — PANTOPRAZOLE SODIUM 40 MG: 40 TABLET, DELAYED RELEASE ORAL at 09:03

## 2025-04-12 NOTE — ASSESSMENT & PLAN NOTE
On Advair inhaler, albuterol inhaler as needed at home.  Patient is on Solu-Medrol 40 mg IV daily which will be continued  Robitussin as needed  Patient on Rocephin for possible colitis as below.  Substituted Advair with Breo  Xopenex as needed  O2 saturation is in mid 90s on 2 L oxygen

## 2025-04-12 NOTE — ASSESSMENT & PLAN NOTE
As above  + Jaundice on exam  CT showed enlarged fatty liver which he is known to have and nonspecific wall thickening of the gallbladder.  Patient has no formal diagnosis of cirrhosis  GI input pending   LFTs are improving slightly

## 2025-04-12 NOTE — ASSESSMENT & PLAN NOTE
Wife reported that she felt like patient is going to die on the day of admission.  Patient is having frequent hospitalization and patient is not going to quit alcohol.  Wife would prefer patient to be comfortable and was questioning about hospice  Discussed with wife that patient might not be a candidate for hospice but can provide palliative care referral due to multiple comorbid conditions and continued alcohol use.

## 2025-04-12 NOTE — ASSESSMENT & PLAN NOTE
As above  + Jaundice on exam  CT showed enlarged fatty liver which he is known to have and nonspecific wall thickening of the gallbladder.  Will Check RUQ ultrasound in a.m.  Consult GI  Repeat CMP, INR in the morning

## 2025-04-12 NOTE — PROGRESS NOTES
Progress Note - Hospitalist   Name: Del Ocampo 70 y.o. male I MRN: [id number]  Unit/Bed#: 4 [address] I Date of Admission: 4/11/2025   Date of Service: 4/12/2025 I Hospital Day: 0    Assessment & Plan  Acute encephalopathy  Patient presents with altered mental status for 1 day and generalized weakness poor oral intake for about 3 weeks per wife.  Has been in bed for about 1 week.  Reports SOB for about 1 week. Patient drinks alcohol daily, 6-9 shots of vodka daily per wife.  Last drink was yesterday.  Patient has chronic diarrhea nausea vomiting abdominal pain per wife at bedside.  Ammonia level normal.  WBC normal, no bands, afebrile.  CT chest abdomen pelvis with IV contrast showed - Trace bilateral pleural effusions.  Enlarged fatty liver with mild ascites. Abnormal wall thickening of the sigmoid colon. Suspect portal hypertensive colopathy. Cannot exclude colitis in the appropriate clinical situation. Nonspecific wall thickening of the gallbladder likely related to liver disease and ascites. Clinical correlation is advised.   VBG unremarkable  CMP showed , alk phos 154, albumin 2.6, TB 4.76.  Platelet count 86.  Management as below.  The patient's mental status has improved and is close to baseline.  CAT scan of the brain without any acute intracranial abnormality with chronic microangiopathic changes    Chronic obstructive pulmonary disease with acute exacerbation (HCC)  On Advair inhaler, albuterol inhaler as needed at home.  Patient is on Solu-Medrol 40 mg IV daily which will be continued  Robitussin as needed  Patient on Rocephin for possible colitis as below.  Substituted Advair with Breo  Xopenex as needed  O2 saturation is in mid 90s on 2 L oxygen      Elevated LFTs  As above  + Jaundice on exam  CT showed enlarged fatty liver which he is known to have and nonspecific wall thickening of the gallbladder.  Patient has no formal diagnosis of cirrhosis  GI input pending   LFTs are improving  slightly  Generalized weakness  Suspect due to alcohol abuse, poor oral intake, chronic diarrhea nausea vomiting.   UA was unremarkable  PT /OT evaluation and treatment  Fall precautions  Alcohol abuse  CIWA protocol  Patient is not going to quit per wife.  Continue thiamine folic acid and multivitamin  Chronic combined systolic and diastolic congestive heart failure (HCC)  Wt Readings from Last 3 Encounters:   04/12/25 68 kg (149 lb 14.6 oz)   03/24/25 71.2 kg (157 lb)   01/22/25 68.9 kg (152 lb)   On torsemide 20 mg p.o. daily if weight above 155 pounds at home.  2D echo in February 2025 showed grossly normal LV and RV size and systolic function, mild AR.    EKG sinus rhythm with PVCs, wide QRS with left BBB, no acute ischemic changes.  Mild pedal edema on exam.  Trace bilateral pleural effusions and mild ascites on CT.  No pulm edema on CT.  Albumin 2.6.  Could be contributing to pleural effusions and ascites.  Patient received 40 mg of Lasix in the ED.  Continue to hold further diuretics  Daily weight, intake output    Abnormal CT scan  As above, cannot exclude sigmoid colitis on CT.  Reports chronic diarrhea about 10 times a day.  And chronic nausea vomiting a few times a day.  Also with chronic diffuse abdominal pain.  Patient was initially started on ceftriaxone and Flagyl which can be discontinued as patient has low clinical signs of infection  Follow-up stool studies  Nicotine abuse  Nicotine patch, smoking cessation  History of bleeding ulcers  Continue PPI daily  PMR (polymyalgia rheumatica) (HCC)  Noted history  Hepatic steatosis  Noted history  Triglycerides 229 with LDL level of 82  Thrombocytopenia (HCC)  Likely due to alcohol abuse  Follow-up RUQ ultrasound  Monitor CBC  Hold pharmacologic DVT prophylaxis  Ectasia of artery (HCC)  CT showed - Fusiform ectasia of the ascending thoracic aorta measuring up to 45 mm is stable. Recommendation is for follow-up low radiation dose chest CT in one  year.   Elevated troponin  Troponin 56-51-37  Denies chest pain  EKG as above  Most likely non-MI troponin elevation    Goals of care, counseling/discussion  Wife reported that she felt like patient is going to die on the day of admission.  Patient is having frequent hospitalization and patient is not going to quit alcohol.  Wife would prefer patient to be comfortable and was questioning about hospice  Discussed with wife that patient might not be a candidate for hospice but can provide palliative care referral due to multiple comorbid conditions and continued alcohol use.    VTE Pharmacologic Prophylaxis: VTE Score: 4 Moderate Risk (Score 3-4) - Pharmacological DVT Prophylaxis Contraindicated. Sequential Compression Devices Ordered.    Mobility:   Basic Mobility Inpatient Raw Score: 9  JH-HLM Goal: 3: Sit at edge of bed  JH-HLM Achieved: 2: Bed activities/Dependent transfer  JH-HLM Goal NOT achieved. Continue with multidisciplinary rounding and encourage appropriate mobility to improve upon JH-HLM goals.    Patient Centered Rounds: I performed bedside rounds with nursing staff today.   Discussions with Specialists or Other Care Team Provider: Gastroenterology    Education and Discussions with Family / Patient: Updated  (wife) at bedside.    Current Length of Stay: 0 day(s)  Current Patient Status: Inpatient   Certification Statement: The patient will continue to require additional inpatient hospital stay due to altered mental status, weakness, chronic diarrhea, elevated LFTs  Discharge Plan: Anticipate discharge in 48-72 hrs to home.    Code Status: Level 3 - DNAR and DNI    Subjective   Patient is feeling better today.  Patient is more alert and awake and oriented per wife at bedside.  Patient has minimal abdominal discomfort.  Denies any chest pain, shortness of breath, cough headache or dizziness.    Objective :  Temp:  [97.2 °F (36.2 °C)-98.8 °F (37.1 °C)] 97.2 °F (36.2 °C)  HR:  [] 71  BP:  ()/(51-72) 96/52  Resp:  [17-26] 17  SpO2:  [92 %-97 %] 92 %  O2 Device: Nasal cannula  Nasal Cannula O2 Flow Rate (L/min):  [2 L/min] 2 L/min    Body mass index is 22.79 kg/m².     Input and Output Summary (last 24 hours):     Intake/Output Summary (Last 24 hours) at 4/12/2025 1350  Last data filed at 4/12/2025 0301  Gross per 24 hour   Intake --   Output 775 ml   Net -775 ml       Physical Exam  Constitutional:       Appearance: Normal appearance.   HENT:      Head: Normocephalic and atraumatic.   Eyes:      General: Scleral icterus present.      Extraocular Movements: Extraocular movements intact.      Pupils: Pupils are equal, round, and reactive to light.   Cardiovascular:      Rate and Rhythm: Normal rate and regular rhythm.      Heart sounds: No murmur heard.     No gallop.   Pulmonary:      Effort: Pulmonary effort is normal.      Breath sounds: Normal breath sounds.   Abdominal:      General: Bowel sounds are normal. There is distension.      Palpations: Abdomen is soft.      Tenderness: There is no abdominal tenderness.   Musculoskeletal:         General: No swelling or deformity. Normal range of motion.      Cervical back: Normal range of motion and neck supple.   Skin:     General: Skin is warm and dry.   Neurological:      General: No focal deficit present.      Mental Status: He is alert.           Lines/Drains:  Lines/Drains/Airways       Active Status       Name Placement date Placement time Site Days    External Urinary Catheter Medium 04/11/25 2022  -- less than 1                      Telemetry:  Telemetry Orders (From admission, onward)               24 Hour Telemetry Monitoring  Continuous x 24 Hours (Telem)        Expiring   Question:  Reason for 24 Hour Telemetry  Answer:  PCI/EP study (including pacer and ICD implementation), Cardiac surgery, MI, abnormal cardiac cath, and chest pain- rule out MI                     Telemetry Reviewed: Normal Sinus Rhythm  Indication for Continued  Telemetry Use: No indication for continued use. Will discontinue.                Lab Results: I have reviewed the following results:   Results from last 7 days   Lab Units 04/12/25  0308 04/11/25  1707   WBC Thousand/uL 4.97 6.82   HEMOGLOBIN g/dL 12.3 13.6   HEMATOCRIT % 36.2* 40.5   PLATELETS Thousands/uL 76* 86*   SEGS PCT %  --  81*   LYMPHO PCT %  --  8*   MONO PCT %  --  10   EOS PCT %  --  0     Results from last 7 days   Lab Units 04/12/25  0308   SODIUM mmol/L 138   POTASSIUM mmol/L 3.2*   CHLORIDE mmol/L 98   CO2 mmol/L 27   BUN mg/dL 8   CREATININE mg/dL 0.91   ANION GAP mmol/L 13   CALCIUM mg/dL 7.7*   ALBUMIN g/dL 2.2*   TOTAL BILIRUBIN mg/dL 3.95*   ALK PHOS U/L 129*   ALT U/L 28   AST U/L 76*   GLUCOSE RANDOM mg/dL 147*     Results from last 7 days   Lab Units 04/12/25  0308   INR  1.64*     Results from last 7 days   Lab Units 04/11/25  1700   POC GLUCOSE mg/dl 110               Recent Cultures (last 7 days):         Imaging Results Review: I reviewed radiology reports from this admission including: CT chest, CT abdomen/pelvis, and CT head.  Other Study Results Review: EKG was reviewed.     Last 24 Hours Medication List:     Current Facility-Administered Medications:     carvedilol (COREG) tablet 3.125 mg, BID With Meals    cefTRIAXone (ROCEPHIN) IVPB (premix in dextrose) 1,000 mg 50 mL, Q24H, Last Rate: 1,000 mg (04/11/25 2227)    cyanocobalamin (VITAMIN B-12) tablet 1,000 mcg, Daily    famotidine (PEPCID) tablet 20 mg, HS    Fluticasone Furoate-Vilanterol 100-25 mcg/actuation 1 puff, Daily    folic acid (FOLVITE) tablet 1 mg, Daily    gabapentin (NEURONTIN) capsule 100 mg, TID    guaiFENesin (ROBITUSSIN) oral liquid 200 mg, Q4H PRN    levalbuterol (XOPENEX) inhalation solution 0.63 mg, Q6H PRN    methylPREDNISolone sodium succinate (Solu-MEDROL) injection 40 mg, Daily    multivitamin-minerals (CENTRUM) tablet 1 tablet, Daily    nicotine (NICODERM CQ) 21 mg/24 hr TD 24 hr patch 1 patch, Daily     ondansetron (ZOFRAN) injection 4 mg, Q6H PRN    pantoprazole (PROTONIX) EC tablet 40 mg, Daily Before Breakfast    thiamine tablet 100 mg, Daily    Administrative Statements   Today, Patient Was Seen By: Janine Shahid MD      **Please Note: This note may have been constructed using a voice recognition system.**

## 2025-04-12 NOTE — ASSESSMENT & PLAN NOTE
As above, cannot exclude sigmoid colitis on CT.  Reports chronic diarrhea about 10 times a day.  And chronic nausea vomiting a few times a day.  Also with chronic diffuse abdominal pain.  Patient was initially started on ceftriaxone and Flagyl which can be discontinued as patient has low clinical signs of infection  Follow-up stool studies

## 2025-04-12 NOTE — ASSESSMENT & PLAN NOTE
As above, cannot exclude sigmoid colitis on CT.  Reports chronic diarrhea about 10 times a day.  And chronic nausea vomiting a few times a day.  Also with chronic diffuse abdominal pain.  Start Rocephin Flagyl empirically  Check stool culture, C. Difficile  Clear liquid diet  Consult GI

## 2025-04-12 NOTE — CONSULTS
Consultation -  [facility]  Del Ocampo 70 y.o. male MRN: [id number]  Unit/Bed#: [address] [place] [address] Encounter: [id number]        Inpatient consult to gastroenterology  Consult performed by: Patricia Carter MD  Consult ordered by: BREN Rodriguez          Reason for Consult / Principal Problem:   Elevated transaminases      ASSESSMENT AND PLAN:      70 y.o male with hx of COPD, CHF, and alcohol use disorder who presented to the ED with increased confusion, weakness and poor oral intake for several weeks.   In the ED, pt was satting in low 90's, and team was c/f COPD exacerbation. Labs were notable for hypokalemia, hypomagnesemia, hyperglycemia,  and ALT 35, T bili 4.76, Alb 2.2, AlkP 154. Transaminases and T bili are already downtrending this AM. Pt also has thrombocytopenia, MCV elevated all c/w chronic alcohol abuse.  CT showed hepatic steatosis and ascites.     Elevated transaminases  Suspect that pt has alcoholic hepatitis vs. Decompensated cirrhosis (no final diagnosis with FibroScan yet) given severe thrombocytopenia and ascites. In addition, encephalopathy may be due to liver disease, infection, COPD exacerbation.     Recommendations:   -follow daily transaminases and T bili  -monitor for alcohol wd  -obtain liver US with dopplers.     Encephalopathy: Avoid opioids.   Ascites: mild therefore unable to tap. Low Na diet. Once pt more stable can consider diuretics for ascites, although cardiology may start earlier for pulmonary edema in setting of CHF exacerbation.   SBP: non-confirmed but pt empirically on Ceftriaxone and Flagyl  EV: none seen on EGD 2024  -f/u with Hepatology as an outpatient    Sigmoid thickening  Pt had recent COY last year which showed severe diverticulosis. Colon colopathy is also possible given pt fluid overload status.  Would not repeat COY at this point.     Diarrhea:   -start metamucil BID   -re-start CREON 38790 U TID with meals but before that send stool  pancreatic elastase  -avoid dairy products  -keep stool diary to quantify stool frequency.     ______________________________________________________________________    HPI:  70 y.o male with hx of COPD, CHF, and alcohol use disorder who presented to the ED with increased confusion, weakness and poor oral intake for several weeks.   In the ED, pt was satting in low 90's, and team was c/f COPD exacerbation. Labs were notable for hypokalemia, hypomagnesemia, hyperglycemia,  and ALT 35, T bili 4.76, Alb 2.2, AlkP 154. Transaminases and T bili are already downtrending this AM. Pt also has thrombocytopenia, MCV elevated all c/w chronic alcohol abuse.     CT abdomen:   Trace bilateral pleural effusions.  2.  4.5 cm fusiform ectasia of the ascending thoracic aorta. 1 year follow-up CT scan recommended.  3.  Enlarged fatty liver with mild ascites.  4.  Abnormal wall thickening of the sigmoid colon. Suspect portal hypertensive colopathy. Cannot exclude colitis in the appropriate clinical situation.  5.  Nonspecific wall thickening of the gallbladder likely related to liver disease and ascites. Clinical correlation is advised.     EGD 2024  Irregular Z-line  Small type I hiatal hernia  Mild abnormal mucosa with erosion in the antrum; performed cold forceps biopsy  The duodenal bulb, 1st part of the duodenum and 2nd part of the duodenum appeared normal.    COY 2024  Extensive diverticulosis of moderate severity in the ascending colon, transverse colon, descending colon and sigmoid colon  Normal.  Mild edematous and erythematous mucosa in the sigmoid colon; performed cold forceps biopsy  Performed forceps biopsies in the ascending colon  Small (grade 1) hemorrhoid     During interview, pt and wife report that he has had a long hx of frequent, semisolid stool with incontinence. Last year he had full w/u that included COY/EGD with biopsies, fecal calp neg so no IBD. Infectious w/u neg: C.diff neg O and P neg. TI appeared  inflamed on CT but normal in COY. Providers also treated him with CREON due to suspicion of pancreatic insufficiency. He was also given imodium and fiber with improvement of diarrhea.   Per wife, pt has been drinking heavily for many years, several shots of hard liquor per day. He has stopped for a few weeks but usually relapses.   At the moment pt reports many bowel movements per day. He denies weight loss, n/v.   Per wife, pt is doing significantly better than when he first came in, despite the pt reporting some delusions ie being in a big garden. He says that he recognizes these were not real but reports having them.     REVIEW OF SYSTEMS:    CONSTITUTIONAL: Denies any fever, chills, rigors, and weight loss.  HEENT: No earache or tinnitus. Denies hearing loss or visual disturbances.  CARDIOVASCULAR: No chest pain or palpitations.   RESPIRATORY: Denies any cough, hemoptysis, shortness of breath or dyspnea on exertion.  GASTROINTESTINAL: As noted in the History of Present Illness.   GENITOURINARY: No problems with urination. Denies any hematuria or dysuria.  NEUROLOGIC: No dizziness or vertigo, denies headaches.   MUSCULOSKELETAL: Denies any muscle or joint pain.   SKIN: Denies skin rashes or itching.   ENDOCRINE: Denies excessive thirst. Denies intolerance to heat or cold.  PSYCHOSOCIAL: Denies depression or anxiety. Denies any recent memory loss.       Historical Information   Past Medical History:   Diagnosis Date    Alcoholism (HCC)     Marinelli esophagus     Marinelli's esophagus     Bleeding ulcer     CHF (congestive heart failure) (HCC)     COPD (chronic obstructive pulmonary disease) ([facility])     Hospital visit found    Coronary artery disease     10 years    Diverticulitis of colon     Fatty liver     GERD (gastroesophageal reflux disease)     Years    Heart failure (HCC)     HL (hearing loss)     Years, work related    Inflammatory bowel disease     Years    PMR (polymyalgia rheumatica) ([facility])     Temporal  arteritis (HCC)     Terminal ileitis without complication (HCC) 04/15/2024    Visual impairment     Wears glasses     Past Surgical History:   Procedure Laterality Date    COLONOSCOPY      FRACTURE SURGERY      knee  left     KNEE SURGERY Left     ROTATOR CUFF REPAIR      right    TONSILLECTOMY       Social History   Social History     Substance and Sexual Activity   Alcohol Use Yes    Alcohol/week: 3.0 standard drinks of alcohol    Types: 3 Shots of liquor per week    Comment: 6-9 shots daily     Social History     Substance and Sexual Activity   Drug Use Not Currently     Social History     Tobacco Use   Smoking Status Every Day    Current packs/day: 1.00    Average packs/day: 1 pack/day for 50.0 years (50.0 ttl pk-yrs)    Types: Cigarettes    Passive exposure: Current   Smokeless Tobacco Never     Family History   Problem Relation Age of Onset    Heart failure Mother     Alcohol abuse Father     Other (pmr) Father     Diabetes Sister     Uterine cancer Sister     Skin cancer Brother        Meds/Allergies       Medications Prior to Admission:     carvedilol (COREG CR) 10 MG 24 hr capsule    cyanocobalamin (VITAMIN B-12) 100 MCG tablet    Fluticasone-Salmeterol (Advair) 100-50 mcg/dose inhaler    gabapentin (Neurontin) 100 mg capsule    pantoprazole (PROTONIX) 40 mg tablet    potassium chloride (Klor-Con 10) 10 mEq tablet    Thiamine Mononitrate (VITAMIN B1) 100 mg tablet    albuterol (ProAir HFA) 90 mcg/act inhaler    hydrocortisone 2.5 % cream    loperamide (IMODIUM) 2 mg capsule    torsemide (DEMADEX) 20 mg tablet    triamcinolone (KENALOG) 0.1 % lotion    Current Facility-Administered Medications:     carvedilol (COREG) tablet 3.125 mg, BID With Meals    cefTRIAXone (ROCEPHIN) IVPB (premix in dextrose) 1,000 mg 50 mL, Q24H, Last Rate: 1,000 mg (04/11/25 2227)    cyanocobalamin (VITAMIN B-12) tablet 1,000 mcg, Daily    famotidine (PEPCID) tablet 20 mg, HS    Fluticasone Furoate-Vilanterol 100-25 mcg/actuation 1  "puff, Daily    folic acid (FOLVITE) tablet 1 mg, Daily    gabapentin (NEURONTIN) capsule 100 mg, TID    guaiFENesin (ROBITUSSIN) oral liquid 200 mg, Q4H PRN    levalbuterol (XOPENEX) inhalation solution 0.63 mg, Q6H PRN    methylPREDNISolone sodium succinate (Solu-MEDROL) injection 40 mg, Daily    metroNIDAZOLE (FLAGYL) IVPB (premix) 500 mg 100 mL, Q12H, Last Rate: 500 mg (04/11/25 2325)    multivitamin-minerals (CENTRUM) tablet 1 tablet, Daily    nicotine (NICODERM CQ) 21 mg/24 hr TD 24 hr patch 1 patch, Daily    ondansetron (ZOFRAN) injection 4 mg, Q6H PRN    pantoprazole (PROTONIX) EC tablet 40 mg, Daily Before Breakfast    thiamine tablet 100 mg, Daily    No Known Allergies        Objective     Blood pressure 102/52, pulse 73, temperature (!) 97.2 °F (36.2 °C), resp. rate 17, height 5' 8\" (1.727 m), weight 68 kg (149 lb 14.6 oz), SpO2 96%. Body mass index is 22.79 kg/m².      Intake/Output Summary (Last 24 hours) at 4/12/2025 0752  Last data filed at 4/12/2025 0301  Gross per 24 hour   Intake --   Output 775 ml   Net -775 ml         PHYSICAL EXAM:      General Appearance:   Alert, cooperative, no distress   HEENT:   Normocephalic, atraumatic, anicteric.     Neck:  Supple, symmetrical, trachea midline   Lungs:   Clear to auscultation bilaterally; no rales, rhonchi or wheezing; respirations unlabored    Heart::   Regular rate and rhythm; no murmur, rub, or gallop.   Abdomen:   Soft, non-tender, +-distended; normal bowel sounds; no masses, no organomegaly    Genitalia:   Deferred    Rectal:   Deferred    Extremities:  No cyanosis, clubbing or edema    Pulses:  2+ and symmetric all extremities    Skin:  + jaundice, rashes, or lesions    Lymph nodes:  No palpable cervical lymphadenopathy        Lab Results:   Admission on 04/11/2025   Component Date Value    WBC 04/11/2025 6.82     RBC 04/11/2025 3.61 (L)     Hemoglobin 04/11/2025 13.6     Hematocrit 04/11/2025 40.5     MCV 04/11/2025 112 (H)     MCH 04/11/2025 37.7 " (H)     MCHC 04/11/2025 33.6     RDW 04/11/2025 15.5 (H)     MPV 04/11/2025 13.0 (H)     Platelets 04/11/2025 86 (L)     nRBC 04/11/2025 0     Segmented % 04/11/2025 81 (H)     Immature Grans % 04/11/2025 1     Lymphocytes % 04/11/2025 8 (L)     Monocytes % 04/11/2025 10     Eosinophils Relative 04/11/2025 0     Basophils Relative 04/11/2025 0     Absolute Neutrophils 04/11/2025 5.50     Absolute Immature Grans 04/11/2025 0.05     Absolute Lymphocytes 04/11/2025 0.54 (L)     Absolute Monocytes 04/11/2025 0.71     Eosinophils Absolute 04/11/2025 0.00     Basophils Absolute 04/11/2025 0.02     Ammonia 04/11/2025 36     Sodium 04/11/2025 137     Potassium 04/11/2025 3.5     Chloride 04/11/2025 96     CO2 04/11/2025 26     ANION GAP 04/11/2025 15 (H)     BUN 04/11/2025 7     Creatinine 04/11/2025 0.82     Glucose 04/11/2025 101     Calcium 04/11/2025 8.2 (L)     eGFR 04/11/2025 88     Total Bilirubin 04/11/2025 4.76 (H)     Bilirubin, Direct 04/11/2025 2.44 (H)     Alkaline Phosphatase 04/11/2025 154 (H)     AST 04/11/2025 100 (H)     ALT 04/11/2025 35     Total Protein 04/11/2025 5.2 (L)     Albumin 04/11/2025 2.6 (L)     SARS COV Rapid Antigen 04/11/2025 Negative     Influenza A Rapid Antigen 04/11/2025 Negative     Influenza B Rapid Antigen 04/11/2025 Negative     hs TnI 0hr 04/11/2025 56 (H)     BNP 04/11/2025 886 (H)     Lipase 04/11/2025 57     Ethanol Lvl 04/11/2025 <10     POC Glucose 04/11/2025 110     pH, Edi 04/11/2025 7.449 (H)     pCO2, Edi 04/11/2025 39.9 (L)     pO2, Edi 04/11/2025 53.2 (H)     HCO3, Edi 04/11/2025 27.0     Base Excess, Edi 04/11/2025 2.9     O2 Content, Edi 04/11/2025 16.7     O2 HGB, VENOUS 04/11/2025 84.8 (H)     Ventricular Rate 04/11/2025 96     Atrial Rate 04/11/2025 96     MS Interval 04/11/2025 178     QRSD Interval 04/11/2025 130     QT Interval 04/11/2025 388     QTC Interval 04/11/2025 490     P Axis 04/11/2025 85     QRS Axis 04/11/2025 45     T Wave [place] 04/11/2025 88     hs  TnI 2hr 04/11/2025 51 (H)     Delta 2hr hsTnI 04/11/2025 -5     hs TnI 4hr 04/11/2025 37     Delta 4hr hsTnI 04/11/2025 -19     Magnesium 04/11/2025 1.9     Color, UA 04/12/2025 Yellow     Clarity, UA 04/12/2025 Clear     Specific Gravity, UA 04/12/2025 <1.005 (L)     pH, UA 04/12/2025 6.0     Leukocytes, UA 04/12/2025 Negative     Nitrite, UA 04/12/2025 Negative     Protein, UA 04/12/2025 Negative     Glucose, UA 04/12/2025 Negative     Ketones, UA 04/12/2025 Negative     Urobilinogen, UA 04/12/2025 <2.0     Bilirubin, UA 04/12/2025 Negative     Occult Blood, UA 04/12/2025 Negative     Sodium 04/12/2025 138     Potassium 04/12/2025 3.2 (L)     Chloride 04/12/2025 98     CO2 04/12/2025 27     ANION GAP 04/12/2025 13     BUN 04/12/2025 8     Creatinine 04/12/2025 0.91     Glucose 04/12/2025 147 (H)     Calcium 04/12/2025 7.7 (L)     Corrected Calcium 04/12/2025 9.1     AST 04/12/2025 76 (H)     ALT 04/12/2025 28     Alkaline Phosphatase 04/12/2025 129 (H)     Total Protein 04/12/2025 4.4 (L)     Albumin 04/12/2025 2.2 (L)     Total Bilirubin 04/12/2025 3.95 (H)     eGFR 04/12/2025 83     Magnesium 04/12/2025 1.8 (L)     WBC 04/12/2025 4.97     RBC 04/12/2025 3.24 (L)     Hemoglobin 04/12/2025 12.3     Hematocrit 04/12/2025 36.2 (L)     MCV 04/12/2025 112 (H)     MCH 04/12/2025 38.0 (H)     MCHC 04/12/2025 34.0     RDW 04/12/2025 15.3 (H)     Platelets 04/12/2025 76 (L)     MPV 04/12/2025 13.1 (H)     Protime 04/12/2025 19.9 (H)     INR 04/12/2025 1.64 (H)     HS TnI random 04/12/2025 47 (H)     Cholesterol 04/12/2025 133     Triglycerides 04/12/2025 229 (H)     HDL, Direct 04/12/2025 5 (L)     LDL Calculated 04/12/2025 82        Imaging Studies: I have personally reviewed pertinent imaging studies.

## 2025-04-12 NOTE — H&P
H&P - Hospitalist   Name: Del Ocampo 70 y.o. male I MRN: [id number]  Unit/Bed#: [address] [place] [address] I Date of Admission: 4/11/2025   Date of Service: 4/12/2025 I Hospital Day: 0     Assessment & Plan  Generalized weakness  Suspect due to alcohol abuse, poor oral intake, chronic diarrhea nausea vomiting.  Check UA  PT OT  Fall precautions  Acute encephalopathy  Patient presents with altered mental status started today and generalized weakness poor oral intake for about 3 weeks per wife.  Has been in bed for about 1 week.  Reports SOB for about 1 week.  Patient was not responding today per wife.  Patient drinks alcohol daily, 6-9 shots of vodka daily per wife.  Last drink was yesterday.  Patient has chronic diarrhea nausea vomiting abdominal pain per wife at bedside.  Ammonia level normal.  WBC normal, no bands, afebrile.  CT chest abdomen pelvis with IV contrast showed - Trace bilateral pleural effusions.  Enlarged fatty liver with mild ascites. Abnormal wall thickening of the sigmoid colon. Suspect portal hypertensive colopathy. Cannot exclude colitis in the appropriate clinical situation. Nonspecific wall thickening of the gallbladder likely related to liver disease and ascites. Clinical correlation is advised.   VBG unremarkable  CMP showed , alk phos 154, albumin 2.6, TB 4.76.  Platelet count 86.  Management as below.  Patient awake alert oriented to place and person on exam, follows commands.  Will check CT head  Neuro checks    Chronic obstructive pulmonary disease with acute exacerbation (HCC)  On Advair inhaler, albuterol inhaler as needed at home.  Diffuse wheezing on auscultation.  Chronic smoker's cough per wife.   Start Solu-Medrol  Robitussin as needed  Patient on Rocephin for possible colitis as below.  Substitute Advair with Breo  Xopenex as needed  Satting high 80s to low 90s on room air.  O2 2 L applied.      Alcohol abuse  CIWA protocol  Patient is not going to quit per wife.  Chronic combined  systolic and diastolic congestive heart failure (HCC)  Wt Readings from Last 3 Encounters:   04/11/25 69.9 kg (154 lb)   03/24/25 71.2 kg (157 lb)   01/22/25 68.9 kg (152 lb)   On torsemide 20 mg p.o. daily if weight above 155 pounds at home.  2D echo in February 2025 showed grossly normal LV and RV size and systolic function, mild AR.    EKG sinus rhythm with PVCs, wide QRS with left BBB, no acute ischemic changes.  Mild pedal edema on exam.  Trace bilateral pleural effusions and mild ascites on CT.  No pulm edema on CT.  Albumin 2.6.  Could be contributing to pleural effusions and ascites.  Was given Lasix 40 mg IV in ED.  Will hold further diuretic.  Daily weight, intake output    Elevated LFTs  As above  + Jaundice on exam  CT showed enlarged fatty liver which he is known to have and nonspecific wall thickening of the gallbladder.  Will Check RUQ ultrasound in a.m.  Consult GI  Repeat CMP, INR in the morning  Abnormal CT scan  As above, cannot exclude sigmoid colitis on CT.  Reports chronic diarrhea about 10 times a day.  And chronic nausea vomiting a few times a day.  Also with chronic diffuse abdominal pain.  Start Rocephin Flagyl empirically  Check stool culture, C. Difficile  Clear liquid diet  Consult GI  Nicotine abuse  Nicotine patch, smoking cessation  History of bleeding ulcers  Continue PPI daily  PMR (polymyalgia rheumatica) (HCC)  Noted history  Hepatic steatosis  Noted history  Check lipid panel  Thrombocytopenia (HCC)  Likely due to alcohol abuse  Follow-up RUQ ultrasound  Monitor CBC  Hold pharmacologic DVT prophylaxis  Ectasia of artery (HCC)  CT showed - Fusiform ectasia of the ascending thoracic aorta measuring up to 45 mm is stable. Recommendation is for follow-up low radiation dose chest CT in one year.   Elevated troponin  Troponin 56-51-37  Denies chest pain  EKG as above  Most likely non-MI troponin elevation  Telemetry  Repeat troponin in the morning      VTE Pharmacologic  Prophylaxis: VTE Score: 4 Moderate Risk (Score 3-4) - Pharmacological DVT Prophylaxis Contraindicated. Sequential Compression Devices Ordered.  Code Status: Level 3 - DNAR and DNI   Discussion with family: Updated  (wife) at bedside.    Anticipated Length of Stay: Patient will be admitted on an inpatient basis with an anticipated length of stay of greater than 2 midnights secondary to AMS, generalized weakness.    History of Present Illness   Chief Complaint: Not responding, generalized weakness    Del Ocampo is a 70 y.o. male with a PMH of alcohol abuse, CHF, COPD, bleeding ulcer, fatty liver, PMR, nicotine abuse who presents with altered mental status started today and generalized weakness poor oral intake for about 3 weeks per wife.  Patient has been in bed for about 1 week per wife.  Patient reports SOB for about 1 week.  Patient was not responding today per wife.  She thought he was dying.  Patient drinks alcohol daily, 6-9 shots of vodka daily per wife.  Last drink was yesterday.  Patient has chronic diarrhea nausea vomiting abdominal pain per wife at bedside.  Wife reports diarrhea about 10 times a day, small amount though, pasty in nature. Vomiting a few times a day. Patient only eats banana yogurt cereal in past 3 weeks.  Patient denies chest pain headache dizziness fever chills.  No other complaints.          Review of Systems   Constitutional:  Positive for activity change, appetite change and fatigue.   Respiratory:  Positive for cough and shortness of breath.    Gastrointestinal:  Positive for abdominal pain, diarrhea, nausea and vomiting.   Psychiatric/Behavioral:          Not responding today   All other systems reviewed and are negative.      Historical Information   Past Medical History:   Diagnosis Date    Alcoholism (HCC)     Marinelli esophagus     Marinelli's esophagus     Bleeding ulcer     CHF (congestive heart failure) ([facility])     COPD (chronic obstructive pulmonary disease) ([facility])      Hospital visit found    Coronary artery disease     10 years    Diverticulitis of colon     Fatty liver     GERD (gastroesophageal reflux disease)     Years    Heart failure (HCC)     HL (hearing loss)     Years, work related    Inflammatory bowel disease     Years    PMR (polymyalgia rheumatica) (HCC)     Temporal arteritis (HCC)     Terminal ileitis without complication (HCC) 04/15/2024    Visual impairment     Wears glasses     Past Surgical History:   Procedure Laterality Date    COLONOSCOPY      FRACTURE SURGERY      knee  left     KNEE SURGERY Left     ROTATOR CUFF REPAIR      right    TONSILLECTOMY       Social History     Tobacco Use    Smoking status: Every Day     Current packs/day: 1.00     Average packs/day: 1 pack/day for 50.0 years (50.0 ttl pk-yrs)     Types: Cigarettes     Passive exposure: Current    Smokeless tobacco: Never   Vaping Use    Vaping status: Never Used   Substance and Sexual Activity    Alcohol use: Yes     Alcohol/week: 3.0 standard drinks of alcohol     Types: 3 Shots of liquor per week     Comment: 6-9 shots daily    Drug use: Not Currently    Sexual activity: Not on file     E-Cigarette/Vaping    E-Cigarette Use Never User      E-Cigarette/Vaping Substances    Nicotine No     THC No     CBD No     Flavoring No     Other No     Unknown No      Family history non-contributory  Social History:  Marital Status: /Civil Union   Occupation: Retired  Patient Pre-hospital Living Situation: Home  Patient Pre-hospital Level of Mobility: walks with walker  Patient Pre-hospital Diet Restrictions: Poor oral intake at home    Meds/Allergies   I have reviewed home medications with patient family member.  Prior to Admission medications    Medication Sig Start Date End Date Taking? Authorizing Provider   carvedilol (COREG CR) 10 MG 24 hr capsule Take 10 mg by mouth daily 2/5/25  Yes Historical Provider, MD   cyanocobalamin (VITAMIN B-12) 100 MCG tablet Take 1,000 mcg by mouth daily   Yes  Historical Provider, MD   Fluticasone-Salmeterol (Advair) 100-50 mcg/dose inhaler Inhale 1 puff 2 (two) times a day Rinse mouth after use. 1/28/25 1/23/26 Yes Frank Lombardi, DO   gabapentin (Neurontin) 100 mg capsule Take 1 capsule (100 mg total) by mouth 3 (three) times a day 1/22/25 7/21/25 Yes Frank Lombardi, DO   pantoprazole (PROTONIX) 40 mg tablet TAKE 1 TABLET DAILY IN THE EARLY MORNING 12/30/24  Yes Sherrie Hurtado PA-C   potassium chloride (Klor-Con 10) 10 mEq tablet Take 10 mEq by mouth 2 (two) times a day   Yes Historical Provider, MD   Thiamine Mononitrate (VITAMIN B1) 100 mg tablet Take 1 tablet (100 mg total) by mouth daily 6/4/24  Yes Rosina Morrow MD   albuterol (ProAir HFA) 90 mcg/act inhaler Inhale 2 puffs every 6 (six) hours as needed for wheezing 3/24/25   Frank Lombardi, DO   hydrocortisone 2.5 % cream Apply topically 2 (two) times a day 11/7/24   Historical Provider, MD   loperamide (IMODIUM) 2 mg capsule TAKE 1 CAPSULE 4 TIMES     DAILY AS NEEDED FOR        DIARRHEA 7/10/24   Alan Fernandez DO   torsemide (DEMADEX) 20 mg tablet If weight going above 155 take one tab daily 2/4/25   Historical Provider, MD   triamcinolone (KENALOG) 0.1 % lotion Apply topically 11/7/24   Historical Provider, MD     No Known Allergies    Objective :  Temp:  [98.5 °F (36.9 °C)-98.8 °F (37.1 °C)] 98.5 °F (36.9 °C)  HR:  [] 105  BP: (100-134)/(55-72) 105/69  Resp:  [18-26] 18  SpO2:  [92 %-97 %] 92 %  O2 Device: Nasal cannula  Nasal Cannula O2 Flow Rate (L/min):  [2 L/min] 2 L/min    Physical Exam  Vitals and nursing note reviewed.   Constitutional:       Appearance: He is well-developed.      Comments: Patient appears weak.   HENT:      Head: Normocephalic and atraumatic.   Eyes:      General: Scleral icterus present.      Comments: Mild scleral icterus.   Neck:      Thyroid: No thyromegaly.      Vascular: No JVD.      Trachea: No tracheal deviation.   Cardiovascular:      Rate and Rhythm: Normal rate and  "regular rhythm.      Heart sounds: Normal heart sounds.   Pulmonary:      Effort: Pulmonary effort is normal. No respiratory distress.      Breath sounds: Wheezing present. No rales.      Comments: Diffuse expiratory wheezing on auscultation, nonproductive cough during exam, on room air, satting high 80s to low 90s on room air.  Abdominal:      General: Bowel sounds are normal. There is distension.      Palpations: Abdomen is soft.      Tenderness: There is abdominal tenderness.      Comments: Abdominal appears distended which is not new per wife, diffuse mild tenderness.   Musculoskeletal:      Cervical back: Neck supple.      Comments: Mild bilateral pedal edema.   Skin:     General: Skin is warm and dry.      Comments: Mild jaundice to skin.   Neurological:      General: No focal deficit present.      Mental Status: He is alert.      Comments: Patient awake alert oriented to place and person, follows commands   Psychiatric:         Mood and Affect: Mood normal.      Comments: .          Lines/Drains:  Lines/Drains/Airways       Active Status       Name Placement date Placement time Site Days    External Urinary Catheter Medium 04/11/25 2022  -- less than 1                          Lab Results: I have reviewed the following results:  Results from last 7 days   Lab Units 04/11/25  1707   WBC Thousand/uL 6.82   HEMOGLOBIN g/dL 13.6   HEMATOCRIT % 40.5   PLATELETS Thousands/uL 86*   SEGS PCT % 81*   LYMPHO PCT % 8*   MONO PCT % 10   EOS PCT % 0     Results from last 7 days   Lab Units 04/11/25  1707   SODIUM mmol/L 137   POTASSIUM mmol/L 3.5   CHLORIDE mmol/L 96   CO2 mmol/L 26   BUN mg/dL 7   CREATININE mg/dL 0.82   ANION GAP mmol/L 15*   CALCIUM mg/dL 8.2*   ALBUMIN g/dL 2.6*   TOTAL BILIRUBIN mg/dL 4.76*   ALK PHOS U/L 154*   ALT U/L 35   AST U/L 100*   GLUCOSE RANDOM mg/dL 101         Results from last 7 days   Lab Units 04/11/25  1700   POC GLUCOSE mg/dl 110     No results found for: \"HGBA1C\"        Imaging " Results Review: I reviewed radiology reports from this admission including: CT chest and CT abdomen/pelvis.  Other Study Results Review: EKG was reviewed.     Administrative Statements       ** Please Note: This note has been constructed using a voice recognition system. **

## 2025-04-12 NOTE — ASSESSMENT & PLAN NOTE
Likely due to alcohol abuse  Follow-up RUQ ultrasound  Monitor CBC  Hold pharmacologic DVT prophylaxis

## 2025-04-12 NOTE — PLAN OF CARE
Problem: PAIN - ADULT  Goal: Verbalizes/displays adequate comfort level or baseline comfort level  Description: Interventions:- Encourage patient to monitor pain and request assistance- Assess pain using appropriate pain scale- Administer analgesics based on type and severity of pain and evaluate response- Implement non-pharmacological measures as appropriate and evaluate response- Consider cultural and social influences on pain and pain management- Notify physician/advanced practitioner if interventions unsuccessful or patient reports new pain  Outcome: Progressing     Problem: INFECTION - ADULT  Goal: Absence or prevention of progression during hospitalization  Description: INTERVENTIONS:- Assess and monitor for signs and symptoms of infection- Monitor lab/diagnostic results- Monitor all insertion sites, i.e. indwelling lines, tubes, and drains- Monitor endotracheal if appropriate and nasal secretions for changes in amount and color- [place] appropriate cooling/warming therapies per order- Administer medications as ordered- Instruct and encourage patient and family to use good hand hygiene technique- Identify and instruct in appropriate isolation precautions for identified infection/condition  Outcome: Progressing     Problem: DISCHARGE PLANNING  Goal: Discharge to home or other facility with appropriate resources  Description: INTERVENTIONS:- Identify barriers to discharge w/patient and caregiver- Arrange for needed discharge resources and transportation as appropriate- Identify discharge learning needs (meds, wound care, etc.)- Arrange for interpretive services to assist at discharge as needed- Refer to Case Management Department for coordinating discharge planning if the patient needs post-hospital services based on physician/advanced practitioner order or complex needs related to functional status, cognitive ability, or social support system  Outcome: Progressing     Problem: Knowledge Deficit  Goal:  Patient/family/caregiver demonstrates understanding of disease process, treatment plan, medications, and discharge instructions  Description: Complete learning assessment and assess knowledge base.Interventions:- Provide teaching at level of understanding- Provide teaching via preferred learning methods  Outcome: Progressing

## 2025-04-12 NOTE — ASSESSMENT & PLAN NOTE
Patient presents with altered mental status started today and generalized weakness poor oral intake for about 3 weeks per wife.  Has been in bed for about 1 week.  Reports SOB for about 1 week.  Patient was not responding today per wife.  Patient drinks alcohol daily, 6-9 shots of vodka daily per wife.  Last drink was yesterday.  Patient has chronic diarrhea nausea vomiting abdominal pain per wife at bedside.  Ammonia level normal.  WBC normal, no bands, afebrile.  CT chest abdomen pelvis with IV contrast showed - Trace bilateral pleural effusions.  Enlarged fatty liver with mild ascites. Abnormal wall thickening of the sigmoid colon. Suspect portal hypertensive colopathy. Cannot exclude colitis in the appropriate clinical situation. Nonspecific wall thickening of the gallbladder likely related to liver disease and ascites. Clinical correlation is advised.   VBG unremarkable  CMP showed , alk phos 154, albumin 2.6, TB 4.76.  Platelet count 86.  Management as below.  Patient awake alert oriented to place and person on exam, follows commands.  Will check CT head  Neuro checks

## 2025-04-12 NOTE — ASSESSMENT & PLAN NOTE
Patient presents with altered mental status for 1 day and generalized weakness poor oral intake for about 3 weeks per wife.  Has been in bed for about 1 week.  Reports SOB for about 1 week. Patient drinks alcohol daily, 6-9 shots of vodka daily per wife.  Last drink was yesterday.  Patient has chronic diarrhea nausea vomiting abdominal pain per wife at bedside.  Ammonia level normal.  WBC normal, no bands, afebrile.  CT chest abdomen pelvis with IV contrast showed - Trace bilateral pleural effusions.  Enlarged fatty liver with mild ascites. Abnormal wall thickening of the sigmoid colon. Suspect portal hypertensive colopathy. Cannot exclude colitis in the appropriate clinical situation. Nonspecific wall thickening of the gallbladder likely related to liver disease and ascites. Clinical correlation is advised.   VBG unremarkable  CMP showed , alk phos 154, albumin 2.6, TB 4.76.  Platelet count 86.  Management as below.  The patient's mental status has improved and is close to baseline.  CAT scan of the brain without any acute intracranial abnormality with chronic microangiopathic changes

## 2025-04-12 NOTE — ASSESSMENT & PLAN NOTE
On Advair inhaler, albuterol inhaler as needed at home.  Diffuse wheezing on auscultation.  Chronic smoker's cough per wife.   Start Solu-Medrol  Robitussin as needed  Patient on Rocephin for possible colitis as below.  Substitute Advair with Breo  Xopenex as needed  Satting high 80s to low 90s on room air.  O2 2 L applied.

## 2025-04-12 NOTE — ASSESSMENT & PLAN NOTE
Wt Readings from Last 3 Encounters:   04/12/25 68 kg (149 lb 14.6 oz)   03/24/25 71.2 kg (157 lb)   01/22/25 68.9 kg (152 lb)   On torsemide 20 mg p.o. daily if weight above 155 pounds at home.  2D echo in February 2025 showed grossly normal LV and RV size and systolic function, mild AR.    EKG sinus rhythm with PVCs, wide QRS with left BBB, no acute ischemic changes.  Mild pedal edema on exam.  Trace bilateral pleural effusions and mild ascites on CT.  No pulm edema on CT.  Albumin 2.6.  Could be contributing to pleural effusions and ascites.  Patient received 40 mg of Lasix in the ED.  Continue to hold further diuretics  Daily weight, intake output

## 2025-04-12 NOTE — ASSESSMENT & PLAN NOTE
Suspect due to alcohol abuse, poor oral intake, chronic diarrhea nausea vomiting.   UA was unremarkable  PT /OT evaluation and treatment  Fall precautions

## 2025-04-12 NOTE — ASSESSMENT & PLAN NOTE
Suspect due to alcohol abuse, poor oral intake, chronic diarrhea nausea vomiting.  Check UA  PT OT  Fall precautions

## 2025-04-12 NOTE — ASSESSMENT & PLAN NOTE
CT showed - Fusiform ectasia of the ascending thoracic aorta measuring up to 45 mm is stable. Recommendation is for follow-up low radiation dose chest CT in one year.

## 2025-04-12 NOTE — ASSESSMENT & PLAN NOTE
Wt Readings from Last 3 Encounters:   04/11/25 69.9 kg (154 lb)   03/24/25 71.2 kg (157 lb)   01/22/25 68.9 kg (152 lb)   On torsemide 20 mg p.o. daily if weight above 155 pounds at home.  2D echo in February 2025 showed grossly normal LV and RV size and systolic function, mild AR.    EKG sinus rhythm with PVCs, wide QRS with left BBB, no acute ischemic changes.  Mild pedal edema on exam.  Trace bilateral pleural effusions and mild ascites on CT.  No pulm edema on CT.  Albumin 2.6.  Could be contributing to pleural effusions and ascites.  Was given Lasix 40 mg IV in ED.  Will hold further diuretic.  Daily weight, intake output

## 2025-04-12 NOTE — ASSESSMENT & PLAN NOTE
CIWA protocol  Patient is not going to quit per wife.  Continue thiamine folic acid and multivitamin

## 2025-04-12 NOTE — ASSESSMENT & PLAN NOTE
Troponin 56-51-37  Denies chest pain  EKG as above  Most likely non-MI troponin elevation  Telemetry  Repeat troponin in the morning

## 2025-04-13 PROBLEM — K52.9 CHRONIC DIARRHEA: Status: ACTIVE | Noted: 2025-04-13

## 2025-04-13 LAB
ALBUMIN SERPL BCG-MCNC: 2.3 G/DL (ref 3.5–5)
ALP SERPL-CCNC: 140 U/L (ref 34–104)
ALT SERPL W P-5'-P-CCNC: 29 U/L (ref 7–52)
ANION GAP SERPL CALCULATED.3IONS-SCNC: 13 MMOL/L (ref 4–13)
AST SERPL W P-5'-P-CCNC: 58 U/L (ref 13–39)
BILIRUB SERPL-MCNC: 3.18 MG/DL (ref 0.2–1)
BUN SERPL-MCNC: 15 MG/DL (ref 5–25)
C COLI+JEJUNI TUF STL QL NAA+PROBE: NEGATIVE
C DIFF TOX GENS STL QL NAA+PROBE: NEGATIVE
CALCIUM ALBUM COR SERPL-MCNC: 9.2 MG/DL (ref 8.3–10.1)
CALCIUM SERPL-MCNC: 7.8 MG/DL (ref 8.4–10.2)
CHLORIDE SERPL-SCNC: 97 MMOL/L (ref 96–108)
CO2 SERPL-SCNC: 28 MMOL/L (ref 21–32)
CREAT SERPL-MCNC: 0.95 MG/DL (ref 0.6–1.3)
EC STX1+STX2 GENES STL QL NAA+PROBE: NEGATIVE
GFR SERPL CREATININE-BSD FRML MDRD: 79 ML/MIN/1.73SQ M
GLUCOSE SERPL-MCNC: 142 MG/DL (ref 65–140)
MAGNESIUM SERPL-MCNC: 2.4 MG/DL (ref 1.9–2.7)
POTASSIUM SERPL-SCNC: 2.9 MMOL/L (ref 3.5–5.3)
PROT SERPL-MCNC: 4.4 G/DL (ref 6.4–8.4)
SALMONELLA SP SPAO STL QL NAA+PROBE: NEGATIVE
SHIGELLA SP+EIEC IPAH STL QL NAA+PROBE: NEGATIVE
SODIUM SERPL-SCNC: 138 MMOL/L (ref 135–147)

## 2025-04-13 PROCEDURE — 99233 SBSQ HOSP IP/OBS HIGH 50: CPT | Performed by: INTERNAL MEDICINE

## 2025-04-13 PROCEDURE — 80053 COMPREHEN METABOLIC PANEL: CPT | Performed by: INTERNAL MEDICINE

## 2025-04-13 PROCEDURE — 83735 ASSAY OF MAGNESIUM: CPT | Performed by: INTERNAL MEDICINE

## 2025-04-13 PROCEDURE — 82653 EL-1 FECAL QUANTITATIVE: CPT | Performed by: STUDENT IN AN ORGANIZED HEALTH CARE EDUCATION/TRAINING PROGRAM

## 2025-04-13 RX ORDER — PREDNISONE 20 MG/1
40 TABLET ORAL DAILY
Status: DISCONTINUED | OUTPATIENT
Start: 2025-04-14 | End: 2025-04-15

## 2025-04-13 RX ORDER — POTASSIUM CHLORIDE 1500 MG/1
40 TABLET, EXTENDED RELEASE ORAL ONCE
Status: COMPLETED | OUTPATIENT
Start: 2025-04-13 | End: 2025-04-13

## 2025-04-13 RX ORDER — MIDODRINE HYDROCHLORIDE 5 MG/1
5 TABLET ORAL
Status: DISCONTINUED | OUTPATIENT
Start: 2025-04-13 | End: 2025-04-17 | Stop reason: HOSPADM

## 2025-04-13 RX ADMIN — FAMOTIDINE 20 MG: 20 TABLET, FILM COATED ORAL at 21:25

## 2025-04-13 RX ADMIN — PSYLLIUM HUSK 1 PACKET: 3.4 POWDER ORAL at 16:31

## 2025-04-13 RX ADMIN — NICOTINE 1 PATCH: 21 PATCH, EXTENDED RELEASE TRANSDERMAL at 09:09

## 2025-04-13 RX ADMIN — GABAPENTIN 100 MG: 100 CAPSULE ORAL at 20:45

## 2025-04-13 RX ADMIN — PANCRELIPASE 36000 UNITS: 120000; 24000; 76000 CAPSULE, DELAYED RELEASE PELLETS ORAL at 16:29

## 2025-04-13 RX ADMIN — Medication 1 TABLET: at 09:07

## 2025-04-13 RX ADMIN — FLUTICASONE FUROATE AND VILANTEROL TRIFENATATE 1 PUFF: 100; 25 POWDER RESPIRATORY (INHALATION) at 10:51

## 2025-04-13 RX ADMIN — FOLIC ACID 1 MG: 1 TABLET ORAL at 09:08

## 2025-04-13 RX ADMIN — GABAPENTIN 100 MG: 100 CAPSULE ORAL at 09:09

## 2025-04-13 RX ADMIN — GABAPENTIN 100 MG: 100 CAPSULE ORAL at 16:26

## 2025-04-13 RX ADMIN — PSYLLIUM HUSK 1 PACKET: 3.4 POWDER ORAL at 10:53

## 2025-04-13 RX ADMIN — POTASSIUM CHLORIDE 40 MEQ: 1500 TABLET, EXTENDED RELEASE ORAL at 10:51

## 2025-04-13 RX ADMIN — THIAMINE HCL TAB 100 MG 100 MG: 100 TAB at 09:08

## 2025-04-13 RX ADMIN — PANTOPRAZOLE SODIUM 40 MG: 40 TABLET, DELAYED RELEASE ORAL at 06:07

## 2025-04-13 RX ADMIN — CYANOCOBALAMIN TAB 500 MCG 1000 MCG: 500 TAB at 09:08

## 2025-04-13 RX ADMIN — POTASSIUM CHLORIDE 40 MEQ: 1500 TABLET, EXTENDED RELEASE ORAL at 16:25

## 2025-04-13 RX ADMIN — METHYLPREDNISOLONE SODIUM SUCCINATE 40 MG: 40 INJECTION, POWDER, FOR SOLUTION INTRAMUSCULAR; INTRAVENOUS at 09:09

## 2025-04-13 RX ADMIN — MIDODRINE HYDROCHLORIDE 5 MG: 5 TABLET ORAL at 16:26

## 2025-04-13 NOTE — ASSESSMENT & PLAN NOTE
Has had workup for this in the past  - Continue Metamucil  - Will start Creon 36,000 units before meals based on weight  -Follow-up pancreatic elastase which was sent today

## 2025-04-13 NOTE — PLAN OF CARE
Problem: Potential for Falls  Goal: Patient will remain free of falls  Description: INTERVENTIONS:- Educate patient/family on patient safety including physical limitations- Instruct patient to call for assistance with activity - Consult OT/PT to assist with strengthening/mobility - Keep Call bell within reach- Keep bed low and locked with side rails adjusted as appropriate- Keep care items and personal belongings within reach- Initiate and maintain comfort rounds- Make Fall Risk Sign visible to staff- Offer Toileting every 2 Hours, in advance of need- Initiate/Maintain bed alarm- Obtain necessary fall risk management equipment: - Apply yellow socks and bracelet for high fall risk patients- Consider moving patient to room near nurses station  Outcome: Progressing     Problem: PAIN - ADULT  Goal: Verbalizes/displays adequate comfort level or baseline comfort level  Description: Interventions:- Encourage patient to monitor pain and request assistance- Assess pain using appropriate pain scale- Administer analgesics based on type and severity of pain and evaluate response- Implement non-pharmacological measures as appropriate and evaluate response- Consider cultural and social influences on pain and pain management- Notify physician/advanced practitioner if interventions unsuccessful or patient reports new pain  Outcome: Progressing     Problem: INFECTION - ADULT  Goal: Absence or prevention of progression during hospitalization  Description: INTERVENTIONS:- Assess and monitor for signs and symptoms of infection- Monitor lab/diagnostic results- Monitor all insertion sites, i.e. indwelling lines, tubes, and drains- Monitor endotracheal if appropriate and nasal secretions for changes in amount and color- [place] appropriate cooling/warming therapies per order- Administer medications as ordered- Instruct and encourage patient and family to use good hand hygiene technique- Identify and instruct in appropriate isolation  precautions for identified infection/condition  Outcome: Progressing     Problem: SAFETY ADULT  Goal: Patient will remain free of falls  Description: INTERVENTIONS:- Educate patient/family on patient safety including physical limitations- Instruct patient to call for assistance with activity - Consult OT/PT to assist with strengthening/mobility - Keep Call bell within reach- Keep bed low and locked with side rails adjusted as appropriate- Keep care items and personal belongings within reach- Initiate and maintain comfort rounds- Make Fall Risk Sign visible to staff- Offer Toileting every 2 Hours, in advance of need- Initiate/Maintain alarm- Obtain necessary fall risk management equipment: - Apply yellow socks and bracelet for high fall risk patients- Consider moving patient to room near nurses station  Outcome: Progressing  Goal: Maintain or return to baseline ADL function  Description: INTERVENTIONS:-  Assess patient's ability to carry out ADLs; assess patient's baseline for ADL function and identify physical deficits which impact ability to perform ADLs (bathing, care of mouth/teeth, toileting, grooming, dressing, etc.)- Assess/evaluate cause of self-care deficits - Assess range of motion- Assess patient's mobility; develop plan if impaired- Assess patient's need for assistive devices and provide as appropriate- Encourage maximum independence but intervene and supervise when necessary- Involve family in performance of ADLs- Assess for home care needs following discharge - Consider OT consult to assist with ADL evaluation and planning for discharge- Provide patient education as appropriate  Outcome: Progressing  Goal: Maintains/Returns to pre admission functional level  Description: INTERVENTIONS:- Perform AM-PAC 6 Click Basic Mobility/ Daily Activity assessment daily.- Set and communicate daily mobility goal to care team and patient/family/caregiver. - Collaborate with rehabilitation services on mobility goals if  consulted- Perform Range of Motion 3 times a day.- Reposition patient every 2 hours.- Dangle patient 3 times a day- Stand patient 3 times a day- Ambulate patient 3 times a day- Out of bed to chair 3 times a day - Out of bed for meals 3 times a day- Out of bed for toileting- Record patient progress and toleration of activity level   Outcome: Progressing     Problem: DISCHARGE PLANNING  Goal: Discharge to home or other facility with appropriate resources  Description: INTERVENTIONS:- Identify barriers to discharge w/patient and caregiver- Arrange for needed discharge resources and transportation as appropriate- Identify discharge learning needs (meds, wound care, etc.)- Arrange for interpretive services to assist at discharge as needed- Refer to Case Management Department for coordinating discharge planning if the patient needs post-hospital services based on physician/advanced practitioner order or complex needs related to functional status, cognitive ability, or social support system  Outcome: Progressing     Problem: Knowledge Deficit  Goal: Patient/family/caregiver demonstrates understanding of disease process, treatment plan, medications, and discharge instructions  Description: Complete learning assessment and assess knowledge base.Interventions:- Provide teaching at level of understanding- Provide teaching via preferred learning methods  Outcome: Progressing     Problem: Prexisting or High Potential for Compromised Skin Integrity  Goal: Skin integrity is maintained or improved  Description: INTERVENTIONS:- Identify patients at risk for skin breakdown- Assess and monitor skin integrity- Assess and monitor nutrition and hydration status- Monitor labs - Assess for incontinence - Turn and reposition patient- Assist with mobility/ambulation- Relieve pressure over bony prominences- Avoid friction and shearing- Provide appropriate hygiene as needed including keeping skin clean and dry- Evaluate need for skin  moisturizer/barrier cream- Collaborate with interdisciplinary team - Patient/family teaching- Consider wound care consult   Outcome: Progressing     Problem: RESPIRATORY - ADULT  Goal: Achieves optimal ventilation and oxygenation  Description: INTERVENTIONS:- Assess for changes in respiratory status- Assess for changes in mentation and behavior- Position to facilitate oxygenation and minimize respiratory effort- Oxygen administered by appropriate delivery if ordered- Initiate smoking cessation education as indicated- Encourage broncho-pulmonary hygiene including cough, deep breathe, Incentive Spirometry- Assess the need for suctioning and aspirate as needed- Assess and instruct to report SOB or any respiratory difficulty- Respiratory Therapy support as indicated  Outcome: Progressing     Problem: NEUROSENSORY - ADULT  Goal: Achieves maximal functionality and self care  Description: INTERVENTIONS- Monitor swallowing and airway patency with patient fatigue and changes in neurological status- Encourage and assist patient to increase activity and self care. - Encourage visually impaired, hearing impaired and aphasic patients to use assistive/communication devices  Outcome: Progressing     Problem: CARDIOVASCULAR - ADULT  Goal: Maintains optimal cardiac output and hemodynamic stability  Description: INTERVENTIONS:- Monitor I/O, vital signs and rhythm- Monitor for S/S and trends of decreased cardiac output- Administer and titrate ordered vasoactive medications to optimize hemodynamic stability- Assess quality of pulses, skin color and temperature- Assess for signs of decreased coronary artery perfusion- Instruct patient to report change in severity of symptoms  Outcome: Progressing  Goal: Absence of cardiac dysrhythmias or at baseline rhythm  Description: INTERVENTIONS:- Continuous cardiac monitoring, vital signs, obtain 12 lead EKG if ordered- Administer antiarrhythmic and heart rate control medications as ordered-  Monitor electrolytes and administer replacement therapy as ordered  Outcome: Progressing     Problem: GASTROINTESTINAL - ADULT  Goal: Maintains or returns to baseline bowel function  Description: INTERVENTIONS:- Assess bowel function- Encourage oral fluids to ensure adequate hydration- Administer IV fluids if ordered to ensure adequate hydration- Administer ordered medications as needed- Encourage mobilization and activity- Consider nutritional services referral to assist patient with adequate nutrition and appropriate food choices  Outcome: Progressing     Problem: MUSCULOSKELETAL - ADULT  Goal: Maintain or return mobility to safest level of function  Description: INTERVENTIONS:- Assess patient's ability to carry out ADLs; assess patient's baseline for ADL function and identify physical deficits which impact ability to perform ADLs (bathing, care of mouth/teeth, toileting, grooming, dressing, etc.)- Assess/evaluate cause of self-care deficits - Assess range of motion- Assess patient's mobility- Assess patient's need for assistive devices and provide as appropriate- Encourage maximum independence but intervene and supervise when necessary- Involve family in performance of ADLs- Assess for home care needs following discharge - Consider OT consult to assist with ADL evaluation and planning for discharge- Provide patient education as appropriate  Outcome: Progressing

## 2025-04-13 NOTE — ASSESSMENT & PLAN NOTE
Wife reported that she felt like patient is going to die on the day of admission.  Patient is having frequent hospitalization and patient is not going to quit alcohol.  Wife would prefer patient to be comfortable and was questioning about hospice  Discussed with wife that patient might not be a candidate for hospice but can provide palliative care referral due to multiple comorbid conditions and continued alcohol use.  Wife would also like some resources at home

## 2025-04-13 NOTE — PROGRESS NOTES
Progress Note - Hospitalist   Name: Del Ocampo 70 y.o. male I MRN: [id number]  Unit/Bed#: 4 [address] I Date of Admission: 4/11/2025   Date of Service: 4/13/2025 I Hospital Day: 1    Assessment & Plan  Acute encephalopathy  Patient presents with altered mental status for 1 day and generalized weakness poor oral intake for about 3 weeks per wife.  Has been in bed for about 1 week.  Reports SOB for about 1 week. Patient drinks alcohol daily, 6-9 shots of vodka daily per wife.  Last drink was yesterday.  Patient has chronic diarrhea nausea vomiting abdominal pain per wife at bedside.  Ammonia level normal.  WBC normal, no bands, afebrile.  CT chest abdomen pelvis with IV contrast showed - Trace bilateral pleural effusions.  Enlarged fatty liver with mild ascites. Abnormal wall thickening of the sigmoid colon. Suspect portal hypertensive colopathy. Cannot exclude colitis in the appropriate clinical situation. Nonspecific wall thickening of the gallbladder likely related to liver disease and ascites. Clinical correlation is advised.   VBG unremarkable  CMP showed , alk phos 154, albumin 2.6, TB 4.76.  Platelet count 86.  Management as below.  The patient's mental status has improved and is close to baseline.  CAT scan of the brain without any acute intracranial abnormality with chronic microangiopathic changes    Chronic obstructive pulmonary disease with acute exacerbation (HCC)  On Advair inhaler, albuterol inhaler as needed at home.  Patient is on Solu-Medrol 40 mg IV daily which will be changed to p.o. prednisone in a.m.  Robitussin as needed  Patient has been on ceftriaxone for possible colitis initially which was discontinued.  Substituted Advair with Breo  Xopenex as needed  O2 saturation is in mid 90s on 2 L oxygen.  Titrate down oxygen as tolerated      Elevated LFTs  Highly suspicious for cirrhosis in the setting of thrombocytopenia with hypoalbuminemia with pleural effusions and ascites  Patient's  MELD score is 18 and MELD sodium score is 16.  CT showed enlarged fatty liver which he is known to have and nonspecific wall thickening of the gallbladder.  Patient has no formal diagnosis of cirrhosis but most likely patient has decompensated cirrhosis  [facility] discrimin[facility] factor is 34.9-patient less likely has acute alcoholic hepatitis and most likely decompensated cirrhosis.  GI recommending to hold off on steroids at the current  Right upper quadrant ultrasound with diffusely increased hepatic echogenicity consistent with hepatic steatosis and cannot exclude cirrhosis with diffuse gallbladder wall thickening  Generalized weakness  Suspect due to alcohol abuse, poor oral intake, chronic diarrhea nausea vomiting.   UA was unremarkable  PT /OT evaluation and treatment  Fall precautions  Alcohol abuse  CIWA protocol  Patient is not going to quit per wife.  Continue thiamine folic acid and multivitamin  Chronic combined systolic and diastolic congestive heart failure (HCC)  Wt Readings from Last 3 Encounters:   04/13/25 70.8 kg (156 lb)   03/24/25 71.2 kg (157 lb)   01/22/25 68.9 kg (152 lb)   On torsemide 20 mg p.o. daily if weight above 155 pounds at home.  2D echo in February 2025 showed grossly normal LV and RV size and systolic function, mild AR.    EKG sinus rhythm with PVCs, wide QRS with left BBB, no acute ischemic changes.  Mild pedal edema on exam.  Trace bilateral pleural effusions and mild ascites on CT.  No pulm edema on CT.  Albumin 2.6.  Could be contributing to pleural effusions and ascites.  Patient received 40 mg of Lasix in the ED.    Patient might need Lasix 20 mg p.o. daily and small dose Aldactone in the setting of hypokalemia with small pleural effusions and ascites-holding diuretics at the current time due to soft blood pressures  Daily weight, intake output    Abnormal CT scan    Reports chronic diarrhea about 10 times a day.  And chronic nausea vomiting a few times a day.  Also with  chronic diffuse abdominal pain.  Patient was initially started on ceftriaxone and Flagyl which can be discontinued as patient has low clinical signs of infection  Stool for bacterial panel was negative for Salmonella, Shigella, E. coli, Campylobacter and Shigella.  Pending pancreatic elastase  Patient started on Creon and psyllium  Nicotine abuse  Nicotine patch, smoking cessation  History of bleeding ulcers  Continue PPI daily  PMR (polymyalgia rheumatica) (HCC)  Noted history  Hepatic steatosis  Noted history  Triglycerides 229 with LDL level of 82  Possible secondary to alcohol abuse  Thrombocytopenia (HCC)  Likely due to alcohol abuse  Follow-up RUQ ultrasound  Monitor CBC  Hold pharmacologic DVT prophylaxis  Ectasia of artery (HCC)  CT showed - Fusiform ectasia of the ascending thoracic aorta measuring up to 45 mm is stable. Recommendation is for follow-up low radiation dose chest CT in one year.   Elevated troponin  Troponin 56-51-37  Denies chest pain  EKG as above  Most likely non-MI troponin elevation    Goals of care, counseling/discussion  Wife reported that she felt like patient is going to die on the day of admission.  Patient is having frequent hospitalization and patient is not going to quit alcohol.  Wife would prefer patient to be comfortable and was questioning about hospice  Discussed with wife that patient might not be a candidate for hospice but can provide palliative care referral due to multiple comorbid conditions and continued alcohol use.  Wife would also like some resources at home  Hypokalemia  Potassium level 2.9 and patient ordered for KCl 40 mg x 2 doses  Follow BMP and magnesium level    VTE Pharmacologic Prophylaxis: VTE Score: 4 Moderate Risk (Score 3-4) - Pharmacological DVT Prophylaxis Contraindicated. Sequential Compression Devices Ordered.    Mobility:   Basic Mobility Inpatient Raw Score: 9  -[facility] Goal: 3: Sit at edge of bed  -HL Achieved: 2: Bed activities/Dependent  transfer  JH-HLM Goal NOT achieved. Continue with multidisciplinary rounding and encourage appropriate mobility to improve upon J[facility]-HLM goals.    Patient Centered Rounds: I performed bedside rounds with nursing staff today.   Discussions with Specialists or Other Care Team Provider: Gastroenterology    Education and Discussions with Family / Patient: Updated  (wife) at bedside.    Current Length of Stay: 1 day(s)  Current Patient Status: Inpatient   Certification Statement: The patient will continue to require additional inpatient hospital stay due to generalized weakness, elevated LFTs, hepatic steatosis  Discharge Plan: Anticipate discharge in 24-48 hrs to pending course    Code Status: Level 3 - DNAR and DNI    Subjective   Patient reports fatigue and tiredness.  Still having some loose stools.  Denies any abdominal pain, chest pain or shortness of breath.    Objective :  Temp:  [97.4 °F (36.3 °C)-97.6 °F (36.4 °C)] 97.4 °F (36.3 °C)  HR:  [59-89] 74  BP: (97-99)/(56-57) 98/57  Resp:  [18] 18  SpO2:  [91 %-95 %] 93 %  O2 Device: Nasal cannula  Nasal Cannula O2 Flow Rate (L/min):  [2 L/min] 2 L/min    Body mass index is 23.72 kg/m².     Input and Output Summary (last 24 hours):     Intake/Output Summary (Last 24 hours) at 4/13/2025 1352  Last data filed at 4/13/2025 0602  Gross per 24 hour   Intake 20 ml   Output 350 ml   Net -330 ml       Physical Exam  Constitutional:       Appearance: Normal appearance.   HENT:      Head: Normocephalic and atraumatic.   Eyes:      Extraocular Movements: Extraocular movements intact.      Pupils: Pupils are equal, round, and reactive to light.   Cardiovascular:      Rate and Rhythm: Normal rate and regular rhythm.      Heart sounds: No murmur heard.     No gallop.   Pulmonary:      Effort: Pulmonary effort is normal.      Breath sounds: Normal breath sounds.   Abdominal:      General: Bowel sounds are normal. There is distension.      Palpations: Abdomen is soft.       Tenderness: There is no abdominal tenderness.   Musculoskeletal:         General: No swelling or deformity. Normal range of motion.      Cervical back: Normal range of motion and neck supple.   Skin:     General: Skin is warm and dry.   Neurological:      General: No focal deficit present.      Mental Status: He is alert.           Lines/Drains:  Lines/Drains/Airways       Active Status       Name Placement date Placement time Site Days    External Urinary Catheter Medium 04/11/25 2022  -- 1                      Telemetry:  Telemetry Orders (From admission, onward)               24 Hour Telemetry Monitoring  Continuous x 24 Hours (Telem)        Expiring   Question:  Reason for 24 Hour Telemetry  Answer:  PCI/EP study (including pacer and ICD implementation), Cardiac surgery, MI, abnormal cardiac cath, and chest pain- rule out MI                     Telemetry Reviewed: Normal Sinus Rhythm  Indication for Continued Telemetry Use: No indication for continued use. Will discontinue.                Lab Results: I have reviewed the following results:   Results from last 7 days   Lab Units 04/12/25  0308 04/11/25  1707   WBC Thousand/uL 4.97 6.82   HEMOGLOBIN g/dL 12.3 13.6   HEMATOCRIT % 36.2* 40.5   PLATELETS Thousands/uL 76* 86*   SEGS PCT %  --  81*   LYMPHO PCT %  --  8*   MONO PCT %  --  10   EOS PCT %  --  0     Results from last 7 days   Lab Units 04/13/25  0558   SODIUM mmol/L 138   POTASSIUM mmol/L 2.9*   CHLORIDE mmol/L 97   CO2 mmol/L 28   BUN mg/dL 15   CREATININE mg/dL 0.95   ANION GAP mmol/L 13   CALCIUM mg/dL 7.8*   ALBUMIN g/dL 2.3*   TOTAL BILIRUBIN mg/dL 3.18*   ALK PHOS U/L 140*   ALT U/L 29   AST U/L 58*   GLUCOSE RANDOM mg/dL 142*     Results from last 7 days   Lab Units 04/12/25  0308   INR  1.64*     Results from last 7 days   Lab Units 04/11/25  1700   POC GLUCOSE mg/dl 110               Recent Cultures (last 7 days):   Results from last 7 days   Lab Units 04/12/25  0620   C DIFF TOXIN B BY PCR   Negative       Imaging Results Review: I reviewed radiology reports from this admission including: CT chest, CT abdomen/pelvis, CT head, and Ultrasound(s).  Other Study Results Review: EKG was reviewed.     Last 24 Hours Medication List:     Current Facility-Administered Medications:     carvedilol (COREG) tablet 3.125 mg, BID With Meals    cyanocobalamin (VITAMIN B-12) tablet 1,000 mcg, Daily    famotidine (PEPCID) tablet 20 mg, HS    Fluticasone Furoate-Vilanterol 100-25 mcg/actuation 1 puff, Daily    folic acid (FOLVITE) tablet 1 mg, Daily    gabapentin (NEURONTIN) capsule 100 mg, TID    guaiFENesin (ROBITUSSIN) oral liquid 200 mg, Q4H PRN    levalbuterol (XOPENEX) inhalation solution 0.63 mg, Q6H PRN    methylPREDNISolone sodium succinate (Solu-MEDROL) injection 40 mg, Daily    multivitamin-minerals (CENTRUM) tablet 1 tablet, Daily    nicotine (NICODERM CQ) 21 mg/24 hr TD 24 hr patch 1 patch, Daily    ondansetron (ZOFRAN) injection 4 mg, Q6H PRN    pancrelipase (Lip-Prot-Amyl) (CREON) delayed release capsule 36,000 Units, TID With Meals    pantoprazole (PROTONIX) EC tablet 40 mg, Daily Before Breakfast    potassium chloride (Klor-Con M20) CR tablet 40 mEq, Once    psyllium (METAMUCIL) 1 packet, BID before lunch/dinner    thiamine tablet 100 mg, Daily    Administrative Statements   Today, Patient Was Seen By: Janine Shahid MD      **Please Note: This note may have been constructed using a voice recognition system.**

## 2025-04-13 NOTE — PROGRESS NOTES
Progress Note - Gastroenterology   Name: Del Ocampo 70 y.o. male I MRN: [id number]  Unit/Bed#: 4 [place] 409-01 I Date of Admission: 4/11/2025   Date of Service: 4/13/2025 I Hospital Day: 1    Assessment & Plan  Elevated LFTs  MELD 3.0: 18 at 4/13/2025  5:58 AM  MELD-Na: 16 at 4/13/2025  5:58 AM  Calculated from:  Serum Creatinine: 0.95 mg/dL (Using min of 1 mg/dL) at 4/13/2025  5:58 AM  Serum Sodium: 138 mmol/L (Using max of 137 mmol/L) at 4/13/2025  5:58 AM  Total Bilirubin: 3.18 mg/dL at 4/13/2025  5:58 AM  Serum Albumin: 2.3 g/dL at 4/13/2025  5:58 AM  INR(ratio): 1.64 at 4/12/2025  3:08 AM  Age at listing (hypothetical): 70 years  Sex: Male at 4/13/2025  5:58 AM    -highly suspicious for cirrhosis in the setting of thrombocytopenia, pleural effusions, ascites, low albumin, and elevated INR.  Both ultrasound and CT scan is showing an enlarged fatty liver.  -Patient would benefit from outpatient follow-up with hepatology and a possible elastography to confirm cirrhosis  -No signs of any liver lesions on imaging at this time  -Recommend consideration of starting low-dose Lasix and Aldactone for fluid overload, Lasix 20 mg and Aldactone 50 mg with monitoring creatinine  -Patient has had longstanding alcohol abuse, technically Madrey discriminant factor is 34.9 but this is less likely acute alcoholic hepatitis and most likely more related to decompensated cirrhosis.  Can hold off on steroids for alcoholic hepatitis is at this time unless he would significantly worsen  - No signs of asterixis on exam.  Patient is alert  - Continue to monitor MELD labs closely  - No history of esophageal varices on EGD in 2024.    Alcohol abuse  -recommend alcohol cessation, patient seems more willing to consider this today per my discussion with him   Hepatic steatosis  -as above   Abnormal CT scan  -Patient noted to have some sigmoid colon thickening.  Suspect this is secondary to colopathy from fluid overload.  Had colonoscopy  last year without any signs of colitis.  Would not recommend repeat colonoscopy at this time  Chronic diarrhea  Has had workup for this in the past  - Continue Metamucil  - Will start Creon 36,000 units before meals based on weight  -Follow-up pancreatic elastase which was sent today        Subjective   Patient reports having 2 mushy bowel movements today.  No signs of GI bleeding.  Denies any abdominal pain.  Tolerating diet.  Denies nausea or vomiting.    Objective :  Temp:  [97.4 °F (36.3 °C)-97.6 °F (36.4 °C)] 97.4 °F (36.3 °C)  HR:  [59-89] 74  BP: (97-99)/(56-57) 98/57  Resp:  [18] 18  SpO2:  [91 %-95 %] 93 %  O2 Device: Nasal cannula  Nasal Cannula O2 Flow Rate (L/min):  [2 L/min] 2 L/min    Physical Exam  Constitutional:       General: He is not in acute distress.     Appearance: Normal appearance. He is well-developed.   HENT:      Head: Normocephalic and atraumatic.      Mouth/Throat:      Mouth: Mucous membranes are moist.   Eyes:      General: No scleral icterus.  Neck:      Trachea: No tracheal deviation.   Cardiovascular:      Rate and Rhythm: Normal rate and regular rhythm.      Heart sounds: Normal heart sounds. No murmur heard.  Pulmonary:      Effort: Pulmonary effort is normal. No respiratory distress.      Breath sounds: Normal breath sounds. No wheezing or rales.   Chest:      Chest wall: No tenderness.   Abdominal:      General: Bowel sounds are normal. There is distension.      Palpations: Abdomen is soft. There is no mass.      Tenderness: There is no abdominal tenderness. There is no guarding or rebound.   Musculoskeletal:         General: Normal range of motion.      Cervical back: Normal range of motion and neck supple.   Skin:     General: Skin is warm and dry.      Coloration: Skin is not pale.      Findings: No rash.   Neurological:      Mental Status: He is alert and oriented to person, place, and time. Mental status is at baseline.      Comments: No asterixis on exam   Psychiatric:          Mood and Affect: Mood normal.         Behavior: Behavior normal.           Lab Results: I have reviewed the following results:

## 2025-04-13 NOTE — ASSESSMENT & PLAN NOTE
Wt Readings from Last 3 Encounters:   04/13/25 70.8 kg (156 lb)   03/24/25 71.2 kg (157 lb)   01/22/25 68.9 kg (152 lb)   On torsemide 20 mg p.o. daily if weight above 155 pounds at home.  2D echo in February 2025 showed grossly normal LV and RV size and systolic function, mild AR.    EKG sinus rhythm with PVCs, wide QRS with left BBB, no acute ischemic changes.  Mild pedal edema on exam.  Trace bilateral pleural effusions and mild ascites on CT.  No pulm edema on CT.  Albumin 2.6.  Could be contributing to pleural effusions and ascites.  Patient received 40 mg of Lasix in the ED.    Patient might need Lasix 20 mg p.o. daily and small dose Aldactone in the setting of hypokalemia with small pleural effusions and ascites-holding diuretics at the current time due to soft blood pressures  Daily weight, intake output

## 2025-04-13 NOTE — ASSESSMENT & PLAN NOTE
Highly suspicious for cirrhosis in the setting of thrombocytopenia with hypoalbuminemia with pleural effusions and ascites  Patient's MELD score is 18 and MELD sodium score is 16.  CT showed enlarged fatty liver which he is known to have and nonspecific wall thickening of the gallbladder.  Patient has no formal diagnosis of cirrhosis but most likely patient has decompensated cirrhosis  Maddey discriminant factor is 34.9-patient less likely has acute alcoholic hepatitis and most likely decompensated cirrhosis.  GI recommending to hold off on steroids at the current  Right upper quadrant ultrasound with diffusely increased hepatic echogenicity consistent with hepatic steatosis and cannot exclude cirrhosis with diffuse gallbladder wall thickening

## 2025-04-13 NOTE — ASSESSMENT & PLAN NOTE
Reports chronic diarrhea about 10 times a day.  And chronic nausea vomiting a few times a day.  Also with chronic diffuse abdominal pain.  Patient was initially started on ceftriaxone and Flagyl which can be discontinued as patient has low clinical signs of infection  Stool for bacterial panel was negative for Salmonella, Shigella, E. coli, Campylobacter and Shigella.  Pending pancreatic elastase  Patient started on Creon and psyllium

## 2025-04-13 NOTE — ASSESSMENT & PLAN NOTE
On Advair inhaler, albuterol inhaler as needed at home.  Patient is on Solu-Medrol 40 mg IV daily which will be changed to p.o. prednisone in a.m.  Robitussin as needed  Patient has been on ceftriaxone for possible colitis initially which was discontinued.  Substituted Advair with Breo  Xopenex as needed  O2 saturation is in mid 90s on 2 L oxygen.  Titrate down oxygen as tolerated

## 2025-04-13 NOTE — ASSESSMENT & PLAN NOTE
-recommend alcohol cessation, patient seems more willing to consider this today per my discussion with him

## 2025-04-13 NOTE — ASSESSMENT & PLAN NOTE
MELD 3.0: 18 at 4/13/2025  5:58 AM  MELD-Na: 16 at 4/13/2025  5:58 AM  Calculated from:  Serum Creatinine: 0.95 mg/dL (Using min of 1 mg/dL) at 4/13/2025  5:58 AM  Serum Sodium: 138 mmol/L (Using max of 137 mmol/L) at 4/13/2025  5:58 AM  Total Bilirubin: 3.18 mg/dL at 4/13/2025  5:58 AM  Serum Albumin: 2.3 g/dL at 4/13/2025  5:58 AM  INR(ratio): 1.64 at 4/12/2025  3:08 AM  Age at listing (hypothetical): 70 years  Sex: Male at 4/13/2025  5:58 AM    -highly suspicious for cirrhosis in the setting of thrombocytopenia, pleural effusions, ascites, low albumin, and elevated INR.  Both ultrasound and CT scan is showing an enlarged fatty liver.  -Patient would benefit from outpatient follow-up with hepatology and a possible elastography to confirm cirrhosis  -No signs of any liver lesions on imaging at this time  -Recommend consideration of starting low-dose Lasix and Aldactone for fluid overload, Lasix 20 mg and Aldactone 50 mg with monitoring creatinine  -Patient has had longstanding alcohol abuse, technically Madrey discriminant factor is 34.9 but this is less likely acute alcoholic hepatitis and most likely more related to decompensated cirrhosis.  Can hold off on steroids for alcoholic hepatitis is at this time unless he would significantly worsen  - No signs of asterixis on exam.  Patient is alert  - Continue to monitor MELD labs closely  - No history of esophageal varices on EGD in 2024.

## 2025-04-13 NOTE — ASSESSMENT & PLAN NOTE
-Patient noted to have some sigmoid colon thickening.  Suspect this is secondary to colopathy from fluid overload.  Had colonoscopy last year without any signs of colitis.  Would not recommend repeat colonoscopy at this time

## 2025-04-13 NOTE — ASSESSMENT & PLAN NOTE
Wt Readings from Last 3 Encounters:   04/13/25 70.8 kg (156 lb)   03/24/25 71.2 kg (157 lb)   01/22/25 68.9 kg (152 lb)

## 2025-04-14 LAB
ALBUMIN SERPL BCG-MCNC: 2.2 G/DL (ref 3.5–5)
ALP SERPL-CCNC: 147 U/L (ref 34–104)
ALT SERPL W P-5'-P-CCNC: 32 U/L (ref 7–52)
ANION GAP SERPL CALCULATED.3IONS-SCNC: 13 MMOL/L (ref 4–13)
AST SERPL W P-5'-P-CCNC: 65 U/L (ref 13–39)
ATRIAL RATE: 96 BPM
BILIRUB SERPL-MCNC: 2.71 MG/DL (ref 0.2–1)
BUN SERPL-MCNC: 15 MG/DL (ref 5–25)
CALCIUM ALBUM COR SERPL-MCNC: 9.5 MG/DL (ref 8.3–10.1)
CALCIUM SERPL-MCNC: 8.1 MG/DL (ref 8.4–10.2)
CHLORIDE SERPL-SCNC: 99 MMOL/L (ref 96–108)
CO2 SERPL-SCNC: 27 MMOL/L (ref 21–32)
CREAT SERPL-MCNC: 0.82 MG/DL (ref 0.6–1.3)
ELASTASE PANC STL-MCNT: 317 UG/G
ERYTHROCYTE [DISTWIDTH] IN BLOOD BY AUTOMATED COUNT: 15.5 % (ref 11.6–15.1)
GFR SERPL CREATININE-BSD FRML MDRD: 88 ML/MIN/1.73SQ M
GLUCOSE SERPL-MCNC: 131 MG/DL (ref 65–140)
HCT VFR BLD AUTO: 33.9 % (ref 36.5–49.3)
HGB BLD-MCNC: 11.8 G/DL (ref 12–17)
MCH RBC QN AUTO: 37.9 PG (ref 26.8–34.3)
MCHC RBC AUTO-ENTMCNC: 34.8 G/DL (ref 31.4–37.4)
MCV RBC AUTO: 109 FL (ref 82–98)
P AXIS: 85 DEGREES
PLATELET # BLD AUTO: 107 THOUSANDS/UL (ref 149–390)
PMV BLD AUTO: 13.8 FL (ref 8.9–12.7)
POTASSIUM SERPL-SCNC: 3.2 MMOL/L (ref 3.5–5.3)
PR INTERVAL: 178 MS
PROT SERPL-MCNC: 4.2 G/DL (ref 6.4–8.4)
QRS AXIS: 45 DEGREES
QRSD INTERVAL: 130 MS
QT INTERVAL: 388 MS
QTC INTERVAL: 490 MS
RBC # BLD AUTO: 3.11 MILLION/UL (ref 3.88–5.62)
SODIUM SERPL-SCNC: 139 MMOL/L (ref 135–147)
T WAVE AXIS: 88 DEGREES
VENTRICULAR RATE: 96 BPM
WBC # BLD AUTO: 11.62 THOUSAND/UL (ref 4.31–10.16)

## 2025-04-14 PROCEDURE — 97167 OT EVAL HIGH COMPLEX 60 MIN: CPT

## 2025-04-14 PROCEDURE — 93010 ELECTROCARDIOGRAM REPORT: CPT | Performed by: INTERNAL MEDICINE

## 2025-04-14 PROCEDURE — 97535 SELF CARE MNGMENT TRAINING: CPT

## 2025-04-14 PROCEDURE — 85027 COMPLETE CBC AUTOMATED: CPT | Performed by: INTERNAL MEDICINE

## 2025-04-14 PROCEDURE — 97110 THERAPEUTIC EXERCISES: CPT

## 2025-04-14 PROCEDURE — 80053 COMPREHEN METABOLIC PANEL: CPT | Performed by: INTERNAL MEDICINE

## 2025-04-14 PROCEDURE — 97163 PT EVAL HIGH COMPLEX 45 MIN: CPT

## 2025-04-14 PROCEDURE — 99232 SBSQ HOSP IP/OBS MODERATE 35: CPT | Performed by: INTERNAL MEDICINE

## 2025-04-14 RX ORDER — POTASSIUM CHLORIDE 1500 MG/1
40 TABLET, EXTENDED RELEASE ORAL ONCE
Status: COMPLETED | OUTPATIENT
Start: 2025-04-14 | End: 2025-04-14

## 2025-04-14 RX ORDER — SPIRONOLACTONE 25 MG/1
50 TABLET ORAL DAILY
Status: DISCONTINUED | OUTPATIENT
Start: 2025-04-14 | End: 2025-04-17

## 2025-04-14 RX ORDER — FUROSEMIDE 20 MG/1
20 TABLET ORAL DAILY
Status: DISCONTINUED | OUTPATIENT
Start: 2025-04-14 | End: 2025-04-16

## 2025-04-14 RX ADMIN — CARVEDILOL 3.12 MG: 3.12 TABLET, FILM COATED ORAL at 16:52

## 2025-04-14 RX ADMIN — MIDODRINE HYDROCHLORIDE 5 MG: 5 TABLET ORAL at 16:52

## 2025-04-14 RX ADMIN — PANTOPRAZOLE SODIUM 40 MG: 40 TABLET, DELAYED RELEASE ORAL at 06:09

## 2025-04-14 RX ADMIN — GABAPENTIN 100 MG: 100 CAPSULE ORAL at 16:52

## 2025-04-14 RX ADMIN — FOLIC ACID 1 MG: 1 TABLET ORAL at 08:28

## 2025-04-14 RX ADMIN — THIAMINE HCL TAB 100 MG 100 MG: 100 TAB at 08:28

## 2025-04-14 RX ADMIN — FLUTICASONE FUROATE AND VILANTEROL TRIFENATATE 1 PUFF: 100; 25 POWDER RESPIRATORY (INHALATION) at 08:30

## 2025-04-14 RX ADMIN — POTASSIUM CHLORIDE 40 MEQ: 1500 TABLET, EXTENDED RELEASE ORAL at 12:27

## 2025-04-14 RX ADMIN — CYANOCOBALAMIN TAB 500 MCG 1000 MCG: 500 TAB at 08:28

## 2025-04-14 RX ADMIN — POTASSIUM CHLORIDE 40 MEQ: 1500 TABLET, EXTENDED RELEASE ORAL at 10:35

## 2025-04-14 RX ADMIN — SPIRONOLACTONE 50 MG: 25 TABLET ORAL at 10:35

## 2025-04-14 RX ADMIN — FAMOTIDINE 20 MG: 20 TABLET, FILM COATED ORAL at 21:15

## 2025-04-14 RX ADMIN — PSYLLIUM HUSK 1 PACKET: 3.4 POWDER ORAL at 16:53

## 2025-04-14 RX ADMIN — Medication 1 TABLET: at 08:28

## 2025-04-14 RX ADMIN — MIDODRINE HYDROCHLORIDE 5 MG: 5 TABLET ORAL at 12:27

## 2025-04-14 RX ADMIN — GABAPENTIN 100 MG: 100 CAPSULE ORAL at 21:16

## 2025-04-14 RX ADMIN — PANCRELIPASE 36000 UNITS: 120000; 24000; 76000 CAPSULE, DELAYED RELEASE PELLETS ORAL at 12:27

## 2025-04-14 RX ADMIN — NICOTINE 1 PATCH: 21 PATCH, EXTENDED RELEASE TRANSDERMAL at 08:29

## 2025-04-14 RX ADMIN — FUROSEMIDE 20 MG: 20 TABLET ORAL at 10:35

## 2025-04-14 RX ADMIN — GABAPENTIN 100 MG: 100 CAPSULE ORAL at 08:28

## 2025-04-14 RX ADMIN — CARVEDILOL 3.12 MG: 3.12 TABLET, FILM COATED ORAL at 08:28

## 2025-04-14 RX ADMIN — PANCRELIPASE 36000 UNITS: 120000; 24000; 76000 CAPSULE, DELAYED RELEASE PELLETS ORAL at 16:53

## 2025-04-14 RX ADMIN — MIDODRINE HYDROCHLORIDE 5 MG: 5 TABLET ORAL at 06:09

## 2025-04-14 RX ADMIN — PANCRELIPASE 36000 UNITS: 120000; 24000; 76000 CAPSULE, DELAYED RELEASE PELLETS ORAL at 08:30

## 2025-04-14 RX ADMIN — PREDNISONE 40 MG: 20 TABLET ORAL at 08:28

## 2025-04-14 RX ADMIN — PSYLLIUM HUSK 1 PACKET: 3.4 POWDER ORAL at 12:27

## 2025-04-14 NOTE — PLAN OF CARE
Problem: Potential for Falls  Goal: Patient will remain free of falls  Description: INTERVENTIONS:- Educate patient/family on patient safety including physical limitations- Instruct patient to call for assistance with activity - Consult OT/PT to assist with strengthening/mobility - Keep Call bell within reach- Keep bed low and locked with side rails adjusted as appropriate- Keep care items and personal belongings within reach- Initiate and maintain comfort rounds- Make Fall Risk Sign visible to staff- Offer Toileting every 2 Hours, in advance of need- Initiate/Maintain bed alarm- Obtain necessary fall risk management equipment: - Apply yellow socks and bracelet for high fall risk patients- Consider moving patient to room near nurses station    Outcome: Progressing     Problem: PAIN - ADULT  Goal: Verbalizes/displays adequate comfort level or baseline comfort level  Description: Interventions:- Encourage patient to monitor pain and request assistance- Assess pain using appropriate pain scale- Administer analgesics based on type and severity of pain and evaluate response- Implement non-pharmacological measures as appropriate and evaluate response- Consider cultural and social influences on pain and pain management- Notify physician/advanced practitioner if interventions unsuccessful or patient reports new pain  Outcome: Progressing     Problem: INFECTION - ADULT  Goal: Absence or prevention of progression during hospitalization  Description: INTERVENTIONS:- Assess and monitor for signs and symptoms of infection- Monitor lab/diagnostic results- Monitor all insertion sites, i.e. indwelling lines, tubes, and drains- Monitor endotracheal if appropriate and nasal secretions for changes in amount and color- [place] appropriate cooling/warming therapies per order- Administer medications as ordered- Instruct and encourage patient and family to use good hand hygiene technique- Identify and instruct in appropriate isolation  precautions for identified infection/condition  Outcome: Progressing     Problem: SAFETY ADULT  Goal: Patient will remain free of falls  Description: INTERVENTIONS:- Educate patient/family on patient safety including physical limitations- Instruct patient to call for assistance with activity - Consult OT/PT to assist with strengthening/mobility - Keep Call bell within reach- Keep bed low and locked with side rails adjusted as appropriate- Keep care items and personal belongings within reach- Initiate and maintain comfort rounds- Make Fall Risk Sign visible to staff- Offer Toileting every 2 Hours, in advance of need- Initiate/Maintain bed alarm- Obtain necessary fall risk management equipment: - Apply yellow socks and bracelet for high fall risk patients- Consider moving patient to room near nurses station  Outcome: Progressing  Goal: Maintain or return to baseline ADL function  Description: INTERVENTIONS:-  Assess patient's ability to carry out ADLs; assess patient's baseline for ADL function and identify physical deficits which impact ability to perform ADLs (bathing, care of mouth/teeth, toileting, grooming, dressing, etc.)- Assess/evaluate cause of self-care deficits - Assess range of motion- Assess patient's mobility; develop plan if impaired- Assess patient's need for assistive devices and provide as appropriate- Encourage maximum independence but intervene and supervise when necessary- Involve family in performance of ADLs- Assess for home care needs following discharge - Consider OT consult to assist with ADL evaluation and planning for discharge- Provide patient education as appropriate  Outcome: Progressing  Goal: Maintains/Returns to pre admission functional level  Description: INTERVENTIONS:- Perform AM-PAC 6 Click Basic Mobility/ Daily Activity assessment daily.- Set and communicate daily mobility goal to care team and patient/family/caregiver. - Collaborate with rehabilitation services on mobility goals  if consulted- Perform Range of Motion 3 times a day.- Reposition patient every 2 hours.- Dangle patient 2 times a day- Stand patient 2 times a day- Ambulate patient 2 times a day- Out of bed to chair 2 times a day - Out of bed for meals 2 times a day- Out of bed for toileting- Record patient progress and toleration of activity level   Outcome: Progressing     Problem: DISCHARGE PLANNING  Goal: Discharge to home or other facility with appropriate resources  Description: INTERVENTIONS:- Identify barriers to discharge w/patient and caregiver- Arrange for needed discharge resources and transportation as appropriate- Identify discharge learning needs (meds, wound care, etc.)- Arrange for interpretive services to assist at discharge as needed- Refer to Case Management Department for coordinating discharge planning if the patient needs post-hospital services based on physician/advanced practitioner order or complex needs related to functional status, cognitive ability, or social support system  Outcome: Progressing     Problem: Knowledge Deficit  Goal: Patient/family/caregiver demonstrates understanding of disease process, treatment plan, medications, and discharge instructions  Description: Complete learning assessment and assess knowledge base.Interventions:- Provide teaching at level of understanding- Provide teaching via preferred learning methods  Outcome: Progressing     Problem: Prexisting or High Potential for Compromised Skin Integrity  Goal: Skin integrity is maintained or improved  Description: INTERVENTIONS:- Identify patients at risk for skin breakdown- Assess and monitor skin integrity- Assess and monitor nutrition and hydration status- Monitor labs - Assess for incontinence - Turn and reposition patient- Assist with mobility/ambulation- Relieve pressure over bony prominences- Avoid friction and shearing- Provide appropriate hygiene as needed including keeping skin clean and dry- Evaluate need for skin  moisturizer/barrier cream- Collaborate with interdisciplinary team - Patient/family teaching- Consider wound care consult   Outcome: Progressing     Problem: RESPIRATORY - ADULT  Goal: Achieves optimal ventilation and oxygenation  Description: INTERVENTIONS:- Assess for changes in respiratory status- Assess for changes in mentation and behavior- Position to facilitate oxygenation and minimize respiratory effort- Oxygen administered by appropriate delivery if ordered- Initiate smoking cessation education as indicated- Encourage broncho-pulmonary hygiene including cough, deep breathe, Incentive Spirometry- Assess the need for suctioning and aspirate as needed- Assess and instruct to report SOB or any respiratory difficulty- Respiratory Therapy support as indicated  Outcome: Progressing     Problem: NEUROSENSORY - ADULT  Goal: Achieves maximal functionality and self care  Description: INTERVENTIONS- Monitor swallowing and airway patency with patient fatigue and changes in neurological status- Encourage and assist patient to increase activity and self care. - Encourage visually impaired, hearing impaired and aphasic patients to use assistive/communication devices  Outcome: Progressing     Problem: CARDIOVASCULAR - ADULT  Goal: Maintains optimal cardiac output and hemodynamic stability  Description: INTERVENTIONS:- Monitor I/O, vital signs and rhythm- Monitor for S/S and trends of decreased cardiac output- Administer and titrate ordered vasoactive medications to optimize hemodynamic stability- Assess quality of pulses, skin color and temperature- Assess for signs of decreased coronary artery perfusion- Instruct patient to report change in severity of symptoms  Outcome: Progressing  Goal: Absence of cardiac dysrhythmias or at baseline rhythm  Description: INTERVENTIONS:- Continuous cardiac monitoring, vital signs, obtain 12 lead EKG if ordered- Administer antiarrhythmic and heart rate control medications as ordered-  Monitor electrolytes and administer replacement therapy as ordered  Outcome: Progressing     Problem: GASTROINTESTINAL - ADULT  Goal: Maintains or returns to baseline bowel function  Description: INTERVENTIONS:- Assess bowel function- Encourage oral fluids to ensure adequate hydration- Administer IV fluids if ordered to ensure adequate hydration- Administer ordered medications as needed- Encourage mobilization and activity- Consider nutritional services referral to assist patient with adequate nutrition and appropriate food choices  Outcome: Progressing     Problem: MUSCULOSKELETAL - ADULT  Goal: Maintain or return mobility to safest level of function  Description: INTERVENTIONS:- Assess patient's ability to carry out ADLs; assess patient's baseline for ADL function and identify physical deficits which impact ability to perform ADLs (bathing, care of mouth/teeth, toileting, grooming, dressing, etc.)- Assess/evaluate cause of self-care deficits - Assess range of motion- Assess patient's mobility- Assess patient's need for assistive devices and provide as appropriate- Encourage maximum independence but intervene and supervise when necessary- Involve family in performance of ADLs- Assess for home care needs following discharge - Consider OT consult to assist with ADL evaluation and planning for discharge- Provide patient education as appropriate  Outcome: Progressing

## 2025-04-14 NOTE — PLAN OF CARE
Problem: Potential for Falls  Goal: Patient will remain free of falls  Description: INTERVENTIONS:- Educate patient/family on patient safety including physical limitations- Instruct patient to call for assistance with activity - Consult OT/PT to assist with strengthening/mobility - Keep Call bell within reach- Keep bed low and locked with side rails adjusted as appropriate- Keep care items and personal belongings within reach- Initiate and maintain comfort rounds- Make Fall Risk Sign visible to staff- Offer Toileting every 2 Hours, in advance of need- Initiate/Maintain bed alarm- Obtain necessary fall risk management equipment: - Apply yellow socks and bracelet for high fall risk patients- Consider moving patient to room near nurses station  INTERVENTIONS:- Educate patient/family on patient safety including physical limitations- Instruct patient to call for assistance with activity - Consult OT/PT to assist with strengthening/mobility - Keep Call bell within reach- Keep bed low and locked with side rails adjusted as appropriate- Keep care items and personal belongings within reach- Initiate and maintain comfort rounds- Make Fall Risk Sign visible to staff- Offer Toileting every 2 Hours, in advance of need- Initiate/Maintain bed alarm- Obtain necessary fall risk management equipment: - Apply yellow socks and bracelet for high fall risk patients- Consider moving patient to room near nurses station  Outcome: Progressing     Problem: PAIN - ADULT  Goal: Verbalizes/displays adequate comfort level or baseline comfort level  Description: Interventions:- Encourage patient to monitor pain and request assistance- Assess pain using appropriate pain scale- Administer analgesics based on type and severity of pain and evaluate response- Implement non-pharmacological measures as appropriate and evaluate response- Consider cultural and social influences on pain and pain management- Notify physician/advanced practitioner if  interventions unsuccessful or patient reports new pain  Outcome: Progressing     Problem: INFECTION - ADULT  Goal: Absence or prevention of progression during hospitalization  Description: INTERVENTIONS:- Assess and monitor for signs and symptoms of infection- Monitor lab/diagnostic results- Monitor all insertion sites, i.e. indwelling lines, tubes, and drains- Monitor endotracheal if appropriate and nasal secretions for changes in amount and color- [place] appropriate cooling/warming therapies per order- Administer medications as ordered- Instruct and encourage patient and family to use good hand hygiene technique- Identify and instruct in appropriate isolation precautions for identified infection/condition  Outcome: Progressing     Problem: SAFETY ADULT  Goal: Patient will remain free of falls  Description: INTERVENTIONS:- Educate patient/family on patient safety including physical limitations- Instruct patient to call for assistance with activity - Consult OT/PT to assist with strengthening/mobility - Keep Call bell within reach- Keep bed low and locked with side rails adjusted as appropriate- Keep care items and personal belongings within reach- Initiate and maintain comfort rounds- Make Fall Risk Sign visible to staff- Offer Toileting every 2 Hours, in advance of need- Initiate/Maintain bed alarm- Obtain necessary fall risk management equipment: - Apply yellow socks and bracelet for high fall risk patients- Consider moving patient to room near nurses station  INTERVENTIONS:- Educate patient/family on patient safety including physical limitations- Instruct patient to call for assistance with activity - Consult OT/PT to assist with strengthening/mobility - Keep Call bell within reach- Keep bed low and locked with side rails adjusted as appropriate- Keep care items and personal belongings within reach- Initiate and maintain comfort rounds- Make Fall Risk Sign visible to staff- Offer Toileting every 2 Hours, in  advance of need- Initiate/Maintain bed alarm- Obtain necessary fall risk management equipment: - Apply yellow socks and bracelet for high fall risk patients- Consider moving patient to room near nurses station  Outcome: Progressing  Goal: Maintain or return to baseline ADL function  Description: INTERVENTIONS:-  Assess patient's ability to carry out ADLs; assess patient's baseline for ADL function and identify physical deficits which impact ability to perform ADLs (bathing, care of mouth/teeth, toileting, grooming, dressing, etc.)- Assess/evaluate cause of self-care deficits - Assess range of motion- Assess patient's mobility; develop plan if impaired- Assess patient's need for assistive devices and provide as appropriate- Encourage maximum independence but intervene and supervise when necessary- Involve family in performance of ADLs- Assess for home care needs following discharge - Consider OT consult to assist with ADL evaluation and planning for discharge- Provide patient education as appropriate  Outcome: Progressing  Goal: Maintains/Returns to pre admission functional level  Description: INTERVENTIONS:- Perform AM-PAC 6 Click Basic Mobility/ Daily Activity assessment daily.- Set and communicate daily mobility goal to care team and patient/family/caregiver. - Collaborate with rehabilitation services on mobility goals if consulted- Perform Range of Motion 3 times a day.- Reposition patient every 2 hours.- Dangle patient 3 times a day- Stand patient 3 times a day- Ambulate patient 3 times a day- Out of bed to chair 3 times a day - Out of bed for meals 3 times a day- Out of bed for toileting- Record patient progress and toleration of activity level   Outcome: Progressing     Problem: DISCHARGE PLANNING  Goal: Discharge to home or other facility with appropriate resources  Description: INTERVENTIONS:- Identify barriers to discharge w/patient and caregiver- Arrange for needed discharge resources and transportation as  appropriate- Identify discharge learning needs (meds, wound care, etc.)- Arrange for interpretive services to assist at discharge as needed- Refer to Case Management Department for coordinating discharge planning if the patient needs post-hospital services based on physician/advanced practitioner order or complex needs related to functional status, cognitive ability, or social support system  Outcome: Progressing     Problem: Knowledge Deficit  Goal: Patient/family/caregiver demonstrates understanding of disease process, treatment plan, medications, and discharge instructions  Description: Complete learning assessment and assess knowledge base.Interventions:- Provide teaching at level of understanding- Provide teaching via preferred learning methods  Outcome: Progressing     Problem: Prexisting or High Potential for Compromised Skin Integrity  Goal: Skin integrity is maintained or improved  Description: INTERVENTIONS:- Identify patients at risk for skin breakdown- Assess and monitor skin integrity- Assess and monitor nutrition and hydration status- Monitor labs - Assess for incontinence - Turn and reposition patient- Assist with mobility/ambulation- Relieve pressure over bony prominences- Avoid friction and shearing- Provide appropriate hygiene as needed including keeping skin clean and dry- Evaluate need for skin moisturizer/barrier cream- Collaborate with interdisciplinary team - Patient/family teaching- Consider wound care consult   Outcome: Progressing     Problem: RESPIRATORY - ADULT  Goal: Achieves optimal ventilation and oxygenation  Description: INTERVENTIONS:- Assess for changes in respiratory status- Assess for changes in mentation and behavior- Position to facilitate oxygenation and minimize respiratory effort- Oxygen administered by appropriate delivery if ordered- Initiate smoking cessation education as indicated- Encourage broncho-pulmonary hygiene including cough, deep breathe, Incentive Spirometry-  Assess the need for suctioning and aspirate as needed- Assess and instruct to report SOB or any respiratory difficulty- Respiratory Therapy support as indicated  Outcome: Progressing     Problem: NEUROSENSORY - ADULT  Goal: Achieves maximal functionality and self care  Description: INTERVENTIONS- Monitor swallowing and airway patency with patient fatigue and changes in neurological status- Encourage and assist patient to increase activity and self care. - Encourage visually impaired, hearing impaired and aphasic patients to use assistive/communication devices  Outcome: Progressing     Problem: CARDIOVASCULAR - ADULT  Goal: Maintains optimal cardiac output and hemodynamic stability  Description: INTERVENTIONS:- Monitor I/O, vital signs and rhythm- Monitor for S/S and trends of decreased cardiac output- Administer and titrate ordered vasoactive medications to optimize hemodynamic stability- Assess quality of pulses, skin color and temperature- Assess for signs of decreased coronary artery perfusion- Instruct patient to report change in severity of symptoms  Outcome: Progressing  Goal: Absence of cardiac dysrhythmias or at baseline rhythm  Description: INTERVENTIONS:- Continuous cardiac monitoring, vital signs, obtain 12 lead EKG if ordered- Administer antiarrhythmic and heart rate control medications as ordered- Monitor electrolytes and administer replacement therapy as ordered  Outcome: Progressing     Problem: GASTROINTESTINAL - ADULT  Goal: Maintains or returns to baseline bowel function  Description: INTERVENTIONS:- Assess bowel function- Encourage oral fluids to ensure adequate hydration- Administer IV fluids if ordered to ensure adequate hydration- Administer ordered medications as needed- Encourage mobilization and activity- Consider nutritional services referral to assist patient with adequate nutrition and appropriate food choices  Outcome: Progressing     Problem: MUSCULOSKELETAL - ADULT  Goal: Maintain or  return mobility to safest level of function  Description: INTERVENTIONS:- Assess patient's ability to carry out ADLs; assess patient's baseline for ADL function and identify physical deficits which impact ability to perform ADLs (bathing, care of mouth/teeth, toileting, grooming, dressing, etc.)- Assess/evaluate cause of self-care deficits - Assess range of motion- Assess patient's mobility- Assess patient's need for assistive devices and provide as appropriate- Encourage maximum independence but intervene and supervise when necessary- Involve family in performance of ADLs- Assess for home care needs following discharge - Consider OT consult to assist with ADL evaluation and planning for discharge- Provide patient education as appropriate  Outcome: Progressing

## 2025-04-14 NOTE — CASE MANAGEMENT
Case Management Assessment & Discharge Planning Note    Patient name Del Ocampo  Location [address] [address]/[address] [address]-* MRN [id number]  : 1955 Date 2025       Current Admission Date: 2025  Current Admission Diagnosis:Acute encephalopathy   Patient Active Problem List    Diagnosis Date Noted Date Diagnosed    Chronic diarrhea 2025     Thrombocytopenia (HCC) 2025     Ectasia of artery (HCC) 2025     Elevated troponin 2025     Goals of care, counseling/discussion 2025     Acute encephalopathy 2025     Generalized weakness 2025     Abnormal CT scan 2025     Jaw pain 2025     Rash 2024     Chronic combined systolic and diastolic congestive heart failure (HCC) 2024     Serum total bilirubin elevated 2024     Elevated LFTs 2024     Temporal arteritis (HCC)      Neuropathy 2024     Chronic obstructive pulmonary disease with acute exacerbation (HCC) 2024     Gastroesophageal reflux disease 2024     Pancreatic insufficiency 04/15/2024     Hepatic steatosis 2024     Ascites 2024     Alcohol abuse 2024     Hypokalemia 2024     Nicotine abuse 2024     History of bleeding ulcers 2024     Aneurysm of descending thoracic aorta without rupture (HCC) 2024     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     Left bundle-branch block, unspecified 01/10/2020     PMR (polymyalgia rheumatica) (HCC) 2015       LOS (days): 2  Geometric Mean LOS (GMLOS) (days): 3.3  Days to GMLOS:0.9     OBJECTIVE:    Risk of Unplanned Readmission Score: 21.49     Current admission status: Inpatient    Preferred Pharmacy:   [facility]/pharmacy #[id number] - [place], [place] - [address]  [address] [address]  Phone: [phone] Fax: [phone]    [facility] - [place] - [address]  [address]  [place]  [address]  Phone: [phone] Fax: [phone]    Primary Care Provider: Frank Lombardi, DO    Primary Insurance: MEDICARE  Secondary Insurance: [employer]    ASSESSMENT:  Active Health Care Proxies    There are no active Health Care Proxies on file.         Readmission Root Cause  30 Day Readmission: No    Patient Information  Admitted from:: Home  Mental Status: Alert  During Assessment patient was accompanied by: Spouse (Wife Diann was at bedside)  Assessment information provided by:: Spouse (Wife Diann)  Primary Caregiver: Spouse  Caregiver's Name:: Wife Diann  Caregiver's Relationship to Patient:: Significant Other  Caregiver's Telephone Number:: [phone]  Support Systems: Spouse/significant other, Family members  County of Residence: [place]  What city do you live in?: [place]  Home entry access options. Select all that apply.: Stairs (Home has a first floor in-law suite that is handicap accessibble, no JU if patient needs to use. Currently not using.)  Type of Current Residence: 3 story home  Upon entering residence, is there a bedroom on the main floor (no further steps)?: No  Upon entering residence, is there a bathroom on the main floor (no further steps)?: No (Powder room/half bath on main floor.)  Indicate which floors of current residence have a bathroom (select all the apply):: 2nd Floor  Number of steps to 2nd floor from main floor: One Flight (There is a stair glide from lower level to 2nd level where poweder room is located.)  Number of steps to 3rd floor from main floor: Two Flights  Living Arrangements: Lives w/ Spouse/significant other      Patient Information Continued  Income Source: Pension/detention  Does patient have prescription coverage?: Yes  Can the patient afford their medications and any related supplies (such as glucometers or test strips)?: Yes  Does patient receive dialysis treatments?: No  Does patient have a history of substance abuse?: No  Does patient have a history of Mental  Health Diagnosis?: No    Means of Transportation  Means of Transport to Appts:: Family transport      Social Determinants of Health (SDOH)      Flowsheet Row Most Recent Value   Housing Stability    In the last 12 months, was there a time when you were not able to pay the mortgage or rent on time? N   In the past 12 months, how many times have you moved where you were living? 0   At any time in the past 12 months, were you homeless or living in a shelter (including now)? N   Transportation Needs    In the past 12 months, has lack of transportation kept you from medical appointments or from getting medications? no   In the past 12 months, has lack of transportation kept you from meetings, work, or from getting things needed for daily living? No   Food Insecurity    Within the past 12 months, you worried that your food would run out before you got the money to buy more. Never true   Within the past 12 months, the food you bought just didn't last and you didn't have money to get more. Never true   Utilities    In the past 12 months has the electric, gas, oil, or water company threatened to shut off services in your home? No            DISCHARGE DETAILS:    Discharge planning discussed with:: Patient and patient's romina Tidwell  Freedom of Choice: Yes     CM contacted family/caregiver?: Yes  Were Treatment Team discharge recommendations reviewed with patient/caregiver?: Yes  Did patient/caregiver verbalize understanding of patient care needs?: Yes  Were patient/caregiver advised of the risks associated with not following Treatment Team discharge recommendations?: Yes    Contacts  Patient Contacts: Romina Tidwell  Relationship to Patient:: Family  Contact Method: In Person  Reason/Outcome: Discharge Planning    Requested Home Health Care         Home Health Agency Name:: Other ([facility])    DME Referral Provided  Referral made for DME?: No    Other Referral/Resources/Interventions Provided:  Referral Comments: CM sent  blanket referrals for STR in [facility].    CM met with patient and patient's wife Diann at bedside to introduce self/role, complete assessment and discuss possible rehab recommendations. Diann stated that patient currently needs assistance with ADL's including using the bathroom, bathing, and dressing. CM discussed that PT/OT evals are pending but should be completed when appropriate/when patient is able to be evaluated.     Patient and Diann stated they are agreeable to STR at discharge if recommended. CM made patient/Diann aware CM will send out referrals to STR facilities and once PT/OT evaluations are completed, CM will return to discuss facility options. Patient/Diann verbalized their understanding.     [facility] and will update once PT/OT evals are completed. CM will continue to follow patient for additional discharge needs.

## 2025-04-14 NOTE — PROGRESS NOTES
Progress Note - Hospitalist   Name: Del Ocampo 70 y.o. male I MRN: [id number]  Unit/Bed#: 4 [address] I Date of Admission: 4/11/2025   Date of Service: 4/14/2025 I Hospital Day: 2    Assessment & Plan  Acute encephalopathy  Patient presents with altered mental status for 1 day and generalized weakness poor oral intake for about 3 weeks per wife.  Has been in bed for about 1 week.  Reports SOB for about 1 week. Patient drinks alcohol daily, 6-9 shots of vodka daily per wife.  Last drink was yesterday.  Patient has chronic diarrhea nausea vomiting abdominal pain per wife at bedside.  Ammonia level normal.  WBC normal, no bands, afebrile.  CT chest abdomen pelvis with IV contrast showed - Trace bilateral pleural effusions.  Enlarged fatty liver with mild ascites. Abnormal wall thickening of the sigmoid colon. Suspect portal hypertensive colopathy. Cannot exclude colitis in the appropriate clinical situation. Nonspecific wall thickening of the gallbladder likely related to liver disease and ascites. Clinical correlation is advised.   VBG unremarkable  CMP showed , alk phos 154, albumin 2.6, TB 4.76.  Platelet count 86.  Management as below.  The patient's mental status has improved and is close to baseline.  CAT scan of the brain without any acute intracranial abnormality with chronic microangiopathic changes    Chronic obstructive pulmonary disease with acute exacerbation (HCC)  On Advair inhaler, albuterol inhaler as needed at home.  Patient was on IV Solu-Medrol initially which was changed to prednisone 40 mg p.o. daily  Robitussin as needed  Patient has been on ceftriaxone for possible colitis initially which was discontinued.  Substituted Advair with Breo  Xopenex as needed  O2 saturation is in mid 90s on 2 L oxygen.  Titrate down oxygen as tolerated      Elevated LFTs  Highly suspicious for cirrhosis in the setting of thrombocytopenia with hypoalbuminemia with pleural effusions and ascites  Patient's  MELD score is 18 and MELD sodium score is 16.  CT showed enlarged fatty liver which he is known to have and nonspecific wall thickening of the gallbladder.  Patient has no formal diagnosis of cirrhosis but most likely patient has decompensated cirrhosis  [facility]patient less likely has acute alcoholic hepatitis and most likely decompensated cirrhosis.  GI recommending to hold off on steroids at the current time  Right upper quadrant ultrasound with diffusely increased hepatic echogenicity consistent with hepatic steatosis and cannot exclude cirrhosis with diffuse gallbladder wall thickening    Generalized weakness  Suspect due to alcohol abuse, poor oral intake, chronic diarrhea nausea vomiting.   UA was unremarkable  PT /OT evaluation and treatment  Fall precautions  Alcohol abuse  CIWA protocol  Patient is not going to quit per wife.  Continue thiamine folic acid and multivitamin  Chronic combined systolic and diastolic congestive heart failure (HCC)  Wt Readings from Last 3 Encounters:   04/14/25 71.8 kg (158 lb 6.4 oz)   03/24/25 71.2 kg (157 lb)   01/22/25 68.9 kg (152 lb)   On torsemide 20 mg p.o. daily if weight above 155 pounds at home.  2D echo in February 2025 showed grossly normal LV and RV size and systolic function, mild AR.    EKG sinus rhythm with PVCs, wide QRS with left BBB, no acute ischemic changes.  Mild pedal edema on exam.  Trace bilateral pleural effusions and mild ascites on CT.  No pulm edema on CT.  Albumin 2.6.  Could be contributing to pleural effusions and ascites.  Patient received 40 mg of Lasix in the ED.    Started on Lasix 20 mg p.o. daily along with Aldactone 50 mg p.o. daily  Daily weight, intake output    Abnormal CT scan    Reports chronic diarrhea about 10 times a day.  And chronic nausea vomiting a few times a day.  Also with chronic diffuse abdominal pain.  Patient was initially started on ceftriaxone and Flagyl which can be discontinued as  patient has low clinical signs of infection  Stool for bacterial panel was negative for Salmonella, Shigella, E. coli, Campylobacter and Shigella.  Pancreatic elastase level was normal  Patient started on Creon and psyllium  Nicotine abuse  Nicotine patch, smoking cessation  History of bleeding ulcers  Continue PPI daily  PMR (polymyalgia rheumatica) (HCC)  Noted history  Hepatic steatosis  Noted history  Triglycerides 229 with LDL level of 82  Possible secondary to alcohol abuse  Thrombocytopenia (HCC)  Likely due to alcohol abuse  Follow-up RUQ ultrasound  Monitor CBC  Hold pharmacologic DVT prophylaxis  Ectasia of artery (HCC)  CT showed - Fusiform ectasia of the ascending thoracic aorta measuring up to 45 mm is stable. Recommendation is for follow-up low radiation dose chest CT in one year.   Elevated troponin  Troponin 56-51-37  Denies chest pain  EKG as above  Most likely non-MI troponin elevation    Goals of care, counseling/discussion  Wife reported that she felt like patient is going to die on the day of admission.  Patient is having frequent hospitalization and patient is not going to quit alcohol.  Wife would prefer patient to be comfortable and was questioning about hospice  Discussed with wife that patient might not be a candidate for hospice but can provide palliative care referral due to multiple comorbid conditions and continued alcohol use.  Wife would also like some resources at home  Hypokalemia  Potassium level has improved to 3.2  Patient started on Aldactone 50 mg p.o. daily and given extra supplementation of KCl 40 meq      VTE Pharmacologic Prophylaxis: VTE Score: 4 Moderate Risk (Score 3-4) - Pharmacological DVT Prophylaxis Contraindicated. Sequential Compression Devices Ordered.    Mobility:   Basic Mobility Inpatient Raw Score: 9  JH-HLM Goal: 3: Sit at edge of bed  JH-HLM Achieved: 2: Bed activities/Dependent transfer  JH-HLM Goal NOT achieved. Continue with multidisciplinary rounding and  encourage appropriate mobility to improve upon -[facility] goals.    Patient Centered Rounds: I performed bedside rounds with nursing staff today.   Discussions with Specialists or Other Care Team Provider: GI    Education and Discussions with Family / Patient: Updated  (wife) at bedside.    Current Length of Stay: 2 day(s)  Current Patient Status: Inpatient   Certification Statement: The patient will continue to require additional inpatient hospital stay due to elevated LFTs with suspected cirrhosis, generalized weakness  Discharge Plan: Anticipate discharge in 24-48 hrs to pending course    Code Status: Level 3 - DNAR and DNI    Subjective   Patient is feeling better.  Able to tolerate meals daily.  Continues to have soft stools.  Denies any abdominal pain, shortness of breath or cough    Objective :  Temp:  [97.5 °F (36.4 °C)-98.1 °F (36.7 °C)] 97.5 °F (36.4 °C)  HR:  [73-87] 83  BP: ()/(56-65) 126/62  Resp:  [14-20] 14  SpO2:  [91 %-94 %] 91 %  O2 Device: None (Room air)  Nasal Cannula O2 Flow Rate (L/min):  [2 L/min] 2 L/min    Body mass index is 24.08 kg/m².     Input and Output Summary (last 24 hours):     Intake/Output Summary (Last 24 hours) at 4/14/2025 1418  Last data filed at 4/14/2025 0839  Gross per 24 hour   Intake 200 ml   Output 900 ml   Net -700 ml       Physical Exam  Constitutional:       Appearance: Normal appearance.   HENT:      Head: Normocephalic and atraumatic.   Eyes:      Extraocular Movements: Extraocular movements intact.      Pupils: Pupils are equal, round, and reactive to light.   Cardiovascular:      Rate and Rhythm: Normal rate and regular rhythm.      Heart sounds: No murmur heard.     No gallop.   Pulmonary:      Effort: Pulmonary effort is normal.      Breath sounds: Normal breath sounds.   Abdominal:      General: Bowel sounds are normal. There is distension.      Palpations: Abdomen is soft.      Tenderness: There is no abdominal tenderness.   Musculoskeletal:          General: No swelling or deformity. Normal range of motion.      Cervical back: Normal range of motion and neck supple.   Skin:     General: Skin is warm and dry.   Neurological:      General: No focal deficit present.      Mental Status: He is alert.           Lines/Drains:  Lines/Drains/Airways       Active Status       Name Placement date Placement time Site Days    External Urinary Catheter Medium 04/11/25 2022  -- 2                      Telemetry:  Telemetry Orders (From admission, onward)               24 Hour Telemetry Monitoring  Continuous x 24 Hours (Telem)        Expiring   Question:  Reason for 24 Hour Telemetry  Answer:  PCI/EP study (including pacer and ICD implementation), Cardiac surgery, MI, abnormal cardiac cath, and chest pain- rule out MI                     Telemetry Reviewed: Normal Sinus Rhythm  Indication for Continued Telemetry Use: No indication for continued use. Will discontinue.                Lab Results: I have reviewed the following results:   Results from last 7 days   Lab Units 04/14/25  0547 04/12/25  0308 04/11/25  1707   WBC Thousand/uL 11.62*   < > 6.82   HEMOGLOBIN g/dL 11.8*   < > 13.6   HEMATOCRIT % 33.9*   < > 40.5   PLATELETS Thousands/uL 107*   < > 86*   SEGS PCT %  --   --  81*   LYMPHO PCT %  --   --  8*   MONO PCT %  --   --  10   EOS PCT %  --   --  0    < > = values in this interval not displayed.     Results from last 7 days   Lab Units 04/14/25  0547   SODIUM mmol/L 139   POTASSIUM mmol/L 3.2*   CHLORIDE mmol/L 99   CO2 mmol/L 27   BUN mg/dL 15   CREATININE mg/dL 0.82   ANION GAP mmol/L 13   CALCIUM mg/dL 8.1*   ALBUMIN g/dL 2.2*   TOTAL BILIRUBIN mg/dL 2.71*   ALK PHOS U/L 147*   ALT U/L 32   AST U/L 65*   GLUCOSE RANDOM mg/dL 131     Results from last 7 days   Lab Units 04/12/25  0308   INR  1.64*     Results from last 7 days   Lab Units 04/11/25  1700   POC GLUCOSE mg/dl 110               Recent Cultures (last 7 days):   Results from last 7 days   Lab Units  04/12/25  0620   C DIFF TOXIN B BY PCR  Negative       Imaging Results Review: I reviewed radiology reports from this admission including: CT chest, CT abdomen/pelvis, and Ultrasound(s).      Last 24 Hours Medication List:     Current Facility-Administered Medications:     carvedilol (COREG) tablet 3.125 mg, BID With Meals    cyanocobalamin (VITAMIN B-12) tablet 1,000 mcg, Daily    famotidine (PEPCID) tablet 20 mg, HS    Fluticasone Furoate-Vilanterol 100-25 mcg/actuation 1 puff, Daily    folic acid (FOLVITE) tablet 1 mg, Daily    furosemide (LASIX) tablet 20 mg, Daily    gabapentin (NEURONTIN) capsule 100 mg, TID    guaiFENesin (ROBITUSSIN) oral liquid 200 mg, Q4H PRN    levalbuterol (XOPENEX) inhalation solution 0.63 mg, Q6H PRN    midodrine (PROAMATINE) tablet 5 mg, TID AC    multivitamin-minerals (CENTRUM) tablet 1 tablet, Daily    nicotine (NICODERM CQ) 21 mg/24 hr TD 24 hr patch 1 patch, Daily    ondansetron (ZOFRAN) injection 4 mg, Q6H PRN    pancrelipase (Lip-Prot-Amyl) (CREON) delayed release capsule 36,000 Units, TID With Meals    pantoprazole (PROTONIX) EC tablet 40 mg, Daily Before Breakfast    predniSONE tablet 40 mg, Daily    psyllium (METAMUCIL) 1 packet, BID before lunch/dinner    spironolactone (ALDACTONE) tablet 50 mg, Daily    thiamine tablet 100 mg, Daily    Administrative Statements   Today, Patient Was Seen By: Janine Shahid MD      **Please Note: This note may have been constructed using a voice recognition system.**

## 2025-04-14 NOTE — ASSESSMENT & PLAN NOTE
On Advair inhaler, albuterol inhaler as needed at home.  Patient was on IV Solu-Medrol initially which was changed to prednisone 40 mg p.o. daily  Robitussin as needed  Patient has been on ceftriaxone for possible colitis initially which was discontinued.  Substituted Advair with Breo  Xopenex as needed  O2 saturation is in mid 90s on 2 L oxygen.  Titrate down oxygen as tolerated

## 2025-04-14 NOTE — ASSESSMENT & PLAN NOTE
MELD 3.0: 17 at 4/14/2025  5:47 AM  MELD-Na: 15 at 4/14/2025  5:47 AM  Calculated from:  Serum Creatinine: 0.82 mg/dL (Using min of 1 mg/dL) at 4/14/2025  5:47 AM  Serum Sodium: 139 mmol/L (Using max of 137 mmol/L) at 4/14/2025  5:47 AM  Total Bilirubin: 2.71 mg/dL at 4/14/2025  5:47 AM  Serum Albumin: 2.2 g/dL at 4/14/2025  5:47 AM  INR(ratio): 1.64 at 4/12/2025  3:08 AM  Age at listing (hypothetical): 70 years  Sex: Male at 4/14/2025  5:47 AM    -highly suspicious for cirrhosis in the setting of thrombocytopenia, pleural effusions, ascites, low albumin, and elevated INR.  Both ultrasound and CT scan is showing an enlarged fatty liver.  -Patient would benefit from outpatient follow-up with hepatology and a possible elastography to confirm cirrhosis  -No signs of any liver lesions on imaging at this time  -Pt started on low-dose Lasix 20 mg and Aldactone 50 mg, per primary team also started on Midodrine 5mg TID due to soft BPs. Recommend cardiology consultation given hx of CHF on torsemide if weight >155lb daily at home  -Patient has had longstanding alcohol abuse, technically Madrey discriminant factor is 34.9 but this is less likely acute alcoholic hepatitis and most likely more related to decompensated cirrhosis.  Can hold off on steroids for alcoholic hepatitis is at this time unless he would significantly worsen  - No signs of asterixis on exam.  Patient is alert  - Continue to monitor MELD labs closely  - No history of esophageal varices on EGD in 2024.

## 2025-04-14 NOTE — ASSESSMENT & PLAN NOTE
Wt Readings from Last 3 Encounters:   04/14/25 71.8 kg (158 lb 6.4 oz)   03/24/25 71.2 kg (157 lb)   01/22/25 68.9 kg (152 lb)   On torsemide 20 mg p.o. daily if weight above 155 pounds at home.  2D echo in February 2025 showed grossly normal LV and RV size and systolic function, mild AR.    EKG sinus rhythm with PVCs, wide QRS with left BBB, no acute ischemic changes.  Mild pedal edema on exam.  Trace bilateral pleural effusions and mild ascites on CT.  No pulm edema on CT.  Albumin 2.6.  Could be contributing to pleural effusions and ascites.  Patient received 40 mg of Lasix in the ED.    Started on Lasix 20 mg p.o. daily along with Aldactone 50 mg p.o. daily  Daily weight, intake output

## 2025-04-14 NOTE — ASSESSMENT & PLAN NOTE
Has had workup for this in the past. C.diff, enteric panel negative this admission. Fecal elastase . Per patient's wife stool has more consistency to it today.   - Continue Metamucil BID  - Continue Creon 36,000 units before meals based on weight  -Monitor stool output

## 2025-04-14 NOTE — PROGRESS NOTES
Progress Note - Gastroenterology   Name: Del Ocampo 70 y.o. male I MRN: [id number]  Unit/Bed#: 4 [place] [address]01 I Date of Admission: 4/11/2025   Date of Service: 4/14/2025 I Hospital Day: 2    Assessment & Plan  Elevated LFTs  MELD 3.0: 17 at 4/14/2025  5:47 AM  MELD-Na: 15 at 4/14/2025  5:47 AM  Calculated from:  Serum Creatinine: 0.82 mg/dL (Using min of 1 mg/dL) at 4/14/2025  5:47 AM  Serum Sodium: 139 mmol/L (Using max of 137 mmol/L) at 4/14/2025  5:47 AM  Total Bilirubin: 2.71 mg/dL at 4/14/2025  5:47 AM  Serum Albumin: 2.2 g/dL at 4/14/2025  5:47 AM  INR(ratio): 1.64 at 4/12/2025  3:08 AM  Age at listing (hypothetical): 70 years  Sex: Male at 4/14/2025  5:47 AM    -highly suspicious for cirrhosis in the setting of thrombocytopenia, pleural effusions, ascites, low albumin, and elevated INR.  Both ultrasound and CT scan is showing an enlarged fatty liver.  -Patient would benefit from outpatient follow-up with hepatology and a possible elastography to confirm cirrhosis  -No signs of any liver lesions on imaging at this time  -Pt started on low-dose Lasix 20 mg and Aldactone 50 mg, per primary team also started on Midodrine 5mg TID due to soft BPs. Recommend cardiology consultation given hx of CHF on torsemide if weight >155lb daily at home  -Patient has had longstanding alcohol abuse, technically Madrey discriminant factor is 34.9 but this is less likely acute alcoholic hepatitis and most likely more related to decompensated cirrhosis.  Can hold off on steroids for alcoholic hepatitis is at this time unless he would significantly worsen  - No signs of asterixis on exam.  Patient is alert  - Continue to monitor MELD labs closely  - No history of esophageal varices on EGD in 2024.    Alcohol abuse  -recommend alcohol cessation, patient seems more willing to consider this today per my discussion with him   Hepatic steatosis  -as above   Abnormal CT scan  -Patient noted to have some sigmoid colon thickening.   Suspect this is secondary to colopathy from fluid overload.  Had colonoscopy last year without any signs of colitis.  Would not recommend repeat colonoscopy at this time  Chronic diarrhea  Has had workup for this in the past. C.diff, enteric panel negative this admission. Fecal elastase . Per patient's wife stool has more consistency to it today.   - Continue Metamucil BID  - Continue Creon 36,000 units before meals based on weight  -Monitor stool output    Hypokalemia          Subjective   Pt laying in bed. Reports eating more today and yesterday. He had a few Bm so far since wife has been at bedside but she reports the stool has more consistency to it.     Objective :  Temp:  [97.5 °F (36.4 °C)-98.1 °F (36.7 °C)] 97.5 °F (36.4 °C)  HR:  [73-87] 81  BP: ()/(52-65) 100/52  Resp:  [14-20] 18  SpO2:  [90 %-94 %] 90 %  O2 Device: None (Room air)  Nasal Cannula O2 Flow Rate (L/min):  [2 L/min] 2 L/min    Physical Exam  Vitals and nursing note reviewed.   Constitutional:       General: He is not in acute distress.     Appearance: He is well-developed.   HENT:      Head: Normocephalic and atraumatic.   Eyes:      Conjunctiva/sclera: Conjunctivae normal.   Cardiovascular:      Rate and Rhythm: Normal rate and regular rhythm.      Heart sounds: No murmur heard.  Pulmonary:      Effort: Pulmonary effort is normal. No respiratory distress.      Breath sounds: Normal breath sounds.   Abdominal:      Palpations: Abdomen is soft.      Tenderness: There is no abdominal tenderness.   Musculoskeletal:         General: No swelling.      Cervical back: Neck supple.   Skin:     General: Skin is warm and dry.      Capillary Refill: Capillary refill takes less than 2 seconds.   Neurological:      Mental Status: He is alert and oriented to person, place, and time.   Psychiatric:         Mood and Affect: Mood normal.           Lab Results: I have reviewed the following results:

## 2025-04-14 NOTE — OCCUPATIONAL THERAPY NOTE
Occupational Therapy Evaluation/Treatment       04/14/25 1415   OT Last Visit   OT Visit Date 04/14/25   Note Type   Note type Evaluation   Pain Assessment   Pain Assessment Tool 0-10   Pain Score No Pain   Restrictions/Precautions   Other Precautions Bed Alarm;Chair Alarm;Fall Risk   Home Living   Type of Home House   Home Layout Two level;Ramped entrance;1/2 bath on main level  (stair glide to 1st floor, full flight of steps to bedroom and full bath)   Bathroom Shower/Tub   (pt has been sponge bathing with assist)   Bathroom Toilet Standard   Home Equipment Walker;Cane   Prior Function   Level of [place] Needs assistance with IADLS;Independent with functional mobility;Needs assistance with ADLs   Lives With Spouse   Receives Help From Family   IADLs Family/Friend/Other provides transportation;Family/Friend/Other provides meals;Family/Friend/Other provides medication management   Vocational Retired   Lifestyle   Reciprocal Relationships wife present for session   Intrinsic Gratification fishing   ADL   Eating Assistance 5  Supervision/Setup   Grooming Assistance 4  Minimal Assistance   UB Bathing Assistance 4  Minimal Assistance   LB Bathing Assistance 3  Moderate Assistance   UB Dressing Assistance 4  Minimal Assistance   LB Dressing Assistance 3  Moderate Assistance   Toileting Assistance  3  Moderate Assistance   Bed Mobility   Supine to Sit 3  Moderate assistance   Additional items Assist x 1;Verbal cues   Sit to Supine 3  Moderate assistance   Additional items Assist x 1;Verbal cues   Transfers   Sit to Stand 3  Moderate assistance   Additional items Assist x 1;Verbal cues   Stand to Sit 3  Moderate assistance   Additional items Assist x 1;Verbal cues   Functional Mobility   Functional Mobility 3  Moderate assistance   Additional Comments to bathroom with RW   Balance   Static Sitting Fair   Dynamic Sitting Fair -   Static Standing Fair -   Dynamic Standing Poor +   Activity Tolerance   Activity Tolerance  Patient limited by fatigue   Nurse Made Aware yesJluis   RUE Assessment   RUE Assessment WFL   LUE Assessment   LUE Assessment WFL   Cognition   Overall Cognitive Status WFL   Arousal/Participation Cooperative   Attention Attends with cues to redirect   Orientation Level Oriented X4   Following Commands Follows multistep commands with increased time or repetition   Comments pt is slow to respond at times, improved throughout session   Assessment   Limitation Decreased ADL status;Decreased UE strength;Decreased Safe judgement during ADL;Decreased endurance;Decreased self-care trans;Decreased high-level ADLs  (decreased balance and mobility)   Prognosis Good   Assessment Patient evaluated by Occupational Therapy.  Patient admitted with Acute encephalopathy.  The patients occupational profile, medical and therapy history includes a extensive additional review of physical, cognitive, or psychosocial history related to current functional performance.  Comorbidities affecting functional mobility and ADLS include: CHF and COPD, alcohol abuse.  Prior to admission, patient was independent with functional mobility with walker, requiring assist for ADLS, and requiring assist for IADLS.  The evaluation identifies the following performance deficits: weakness, impaired balance, decreased endurance, increased fall risk, new onset of impairment of functional mobility, decreased ADLS, decreased IADLS, decreased activity tolerance, decreased safety awareness, impaired judgement, and decreased strength, that result in activity limitations and/or participation restrictions. This evaluation requires clinical decision making of high complexity, because the patient presents with comorbidites that affect occupational performance and required significant modification of tasks or assistance with consideration of multiple treatment options.  The Barthel Index was used as a functional outcome tool presenting with a score of Barthel Index  Score: 30, indicating marked limitations of functional mobility and ADLS.  The patient's raw score on the AM-PAC Daily Activity Inpatient Short Form is 16. A raw score of less than 19 suggests the patient may benefit from discharge to post-acute rehabilitation services. Please refer to the recommendation of the Occupational Therapist for safe discharge planning.  Patient will benefit from skilled Occupational Therapy services to address above deficits and facilitate a safe return to prior level of function.   Goals   Patient Goals to go home   STG Time Frame   (1-7 days)   Short Term Goal  Goals established to promote Patient Goals: to go home:  Grooming: supervision standing at sink; Bathing: min assist; Upper Body Dressing supervision; Lower Body Dressing: min assist; Toileting: supervision; Patient will increase ambulatory standard toilet transfer to min assist with rolling walker to increase performance and safety with ADLS and functional mobility; Patient will increase standing tolerance to 4 minutes during ADL task to decrease assistance level and decrease fall risk; Patient will increase bed mobility to min assist in preparation for ADLS and transfers; Patient will increase functional mobility to and from bathroom with rolling walker with min assist to increase performance with ADLS and to use a toilet; Patient will tolerate 5 minutes of UE ROM/strengthening to increase general activity tolerance and performance in ADLS/IADLS; Patient will improve functional activity tolerance to 10 minutes of sustained functional tasks to increase participation in basic self-care and decrease assistance level;  Patient will increase dynamic sitting balance to fair to improve the ability to sit at edge of bed or on a chair for ADLS;  Patient will increase dynamic standing balance to fair- to improve postural stability and decrease fall risk during standing ADLS and transfers.   LTG Time Frame   (8-14 days)   Long Term Goal  Grooming: independent standing at sink; Bathing: supervision; Upper Body Dressing independent; Lower Body Dressing: supervision; Toileting: independent; Patient will increase ambulatory standard toilet transfer to supervision with rolling walker to increase performance and safety with ADLS and functional mobility; Patient will increase standing tolerance to 8 minutes during ADL task to decrease assistance level and decrease fall risk; Patient will increase bed mobility to supervision in preparation for ADLS and transfers; Patient will increase functional mobility to and from bathroom with rolling walker with supervision to increase performance with ADLS and to use a toilet; Patient will tolerate 10 minutes of UE ROM/strengthening to increase general activity tolerance and performance in ADLS/IADLS; Patient will improve functional activity tolerance to 20 minutes of sustained functional tasks to increase participation in basic self-care and decrease assistance level;  Patient will increase dynamic sitting balance to fair+ to improve the ability to sit at edge of bed or on a chair for ADLS;  Patient will increase dynamic standing balance to fair to improve postural stability and decrease fall risk during standing ADLS and transfers.  Pt will score >/= 20/24 on AM-PAC Daily Activity Inpatient scale to promote safe independence with ADLs and functional mobility; Pt will score >/= 60/100 on Barthel Index in order to decrease caregiver assistance needed and increase ability to perform ADLs and functional mobility.   Plan   Treatment Interventions ADL retraining;Functional transfer training;UE strengthening/ROM;Endurance training;Equipment evaluation/education;Patient/family training;Activityengagement;Compensatory technique education   Goal Expiration Date 04/28/25   OT Frequency 3-5x/wk   Discharge Recommendation   Rehab Resource Intensity Level, OT II (Moderate Resource Intensity)   AM-PAC Daily Activity Inpatient    Lower Body Dressing 2   Bathing 2   Toileting 2   Upper Body Dressing 3   Grooming 3   Eating 4   Daily Activity Raw Score 16   Daily Activity Standardized Score (Calc for Raw Score >=11) 35.96   AM-PAC Applied Cognition Inpatient   Following a Speech/Presentation 3   Understanding Ordinary Conversation 4   Taking Medications 3   Remembering Where Things Are Placed or Put Away 3   Remembering List of 4-5 Errands 3   Taking Care of Complicated Tasks 3   Applied Cognition Raw Score 19   Applied Cognition Standardized Score 39.77   Barthel Index   Feeding 5   Bathing 0   Grooming Score 0   Dressing Score 5   Bladder Score 0   Bowels Score 10   Toilet Use Score 5   Transfers (Bed/Chair) Score 5   Mobility (Level Surface) Score 0   Stairs Score 0   Barthel Index Score 30   Additional Treatment Session   Start Time 1405   End Time 1415   Treatment Assessment S: denies pain O: Completed standard toilet transfer with mod assist.  Hygiene seated for BM supervision.  Patient stood at sink to wash hands with min assist.  Functional mobility form bathroom with RW mod assist.  Stand to sit min assist.  A: Tolerated well.  Cooperative. Patient with blood in toilet after BM, pt reports having hemorrhoids, nurse aware Patient requires mod assist for mobility and LB ADLS.  Patient will benefit from continued OT services to maximize functional performance with ADLS P: II-moderate resource intensity   Licensure   [place] License Number  Jayne Chatman MS OTR/L [id number]

## 2025-04-14 NOTE — ASSESSMENT & PLAN NOTE
Reports chronic diarrhea about 10 times a day.  And chronic nausea vomiting a few times a day.  Also with chronic diffuse abdominal pain.  Patient was initially started on ceftriaxone and Flagyl which can be discontinued as patient has low clinical signs of infection  Stool for bacterial panel was negative for Salmonella, Shigella, E. coli, Campylobacter and Shigella.  Pancreatic elastase level was normal  Patient started on Creon and psyllium

## 2025-04-14 NOTE — PHYSICAL THERAPY NOTE
PHYSICAL THERAPY EVALUATION/TREATMENT     04/14/25 0715   PT Last Visit   PT Visit Date 04/14/25   Note Type   Note type Evaluation   Pain Assessment   Pain Assessment Tool Wilson-Baker FACES   Wilson-Baker FACES Pain Rating 0   Restrictions/Precautions   Other Precautions Chair Alarm;Bed Alarm;Fall Risk   Home Living   Type of Home House   Home Layout Two level;Ramped entrance;Other (Comment)  (Ramp to a stair glide to enter the home)   Home Equipment Walker;Cane   Additional Comments patient with progressive weakness prior to admission, with limited time out of bed over the past several days and requiring a walker.  Prior to the past few weeks patient was ambulating independently with a cane at times   Prior Function   Level of [place] Needs assistance with ADLs;Needs assistance with functional mobility;Needs assistance with IADLS   Lives With Spouse   Receives Help From Family;Outpatient therapy  (Attending outpatient PT)   IADLs Family/Friend/Other provides transportation;Family/Friend/Other provides meals;Family/Friend/Other provides medication management   Comments Spouse present at reports assisting patient with all ADLs and mobility most recently due to progressive weakness   General   Additional Pertinent History Chart reviewed, patient admitted with acute encephalopathy.  Patient with history of EtOH.  Now presents as generally weak and deconditioned with resulting gait dysfunction   Family/Caregiver Present Yes  (Spouse present)   Cognition   Overall Cognitive Status Impaired   Arousal/Participation Cooperative   Attention Attends with cues to redirect   Orientation Level Oriented X4   Following Commands Follows multistep commands with increased time or repetition   Comments Patient easily distracted at times and slow to respond to questions   Subjective   Subjective Patient states feeling unsteady with walking   RLE Assessment   RLE Assessment   (Range of motion within functional limits, strength  3+/5)   LLE Assessment   LLE Assessment   (Range of motion within functional limits, strength 3+/5)   Coordination   Movements are Fluid and Coordinated 0   Coordination and Movement Description Decreased coordination with transfers and gait activity   Bed Mobility   Supine to Sit 3  Moderate assistance   Additional items Assist x 1   Transfers   Sit to Stand 3  Moderate assistance   Additional items Assist x 1;Impulsive;Verbal cues   Stand to Sit 3  Moderate assistance   Additional items Assist x 1;Verbal cues   Ambulation/Elevation   Gait Assistance 3  Moderate assist   Additional items Assist x 1;Verbal cues;Tactile cues   Assistive Device Rolling walker   Distance 20 feet with change in direction, shortened stride length decreased foot clearance bilaterally   Balance   Static Sitting Fair   Dynamic Sitting Fair -   Static Standing Fair -   Dynamic Standing Poor +   Ambulatory Poor +   Activity Tolerance   Activity Tolerance Patient limited by fatigue;Treatment limited secondary to medical complications (Comment)   Nurse Made Aware Yes   Assessment   Prognosis Good   Problem List Decreased strength;Decreased range of motion;Decreased endurance;Impaired balance;Decreased mobility;Decreased coordination;Decreased safety awareness;Impaired judgement   Assessment Patient seen for Physical Therapy evaluation. Patient admitted with Acute encephalopathy.  Comorbidities affecting patient's physical performance include:alcohol abuse, CHF, COPD, bleeding ulcer, fatty liver, PMR, nicotine abuse .Personal factors affecting patient at time of initial evaluation include: lives in two story house, ambulating with assistive device, inability to ambulate household distances, inability to navigate community distances, inability to navigate level surfaces without external assistance, inability to perform dynamic tasks in community, decreased cognition, limited home support, positive fall history, inability to perform caregiver  support/tasks, inability to perform physical activity, limited insight into impairments, inability to perform ADLS, inability to perform IADLS , and inability to live alone. Prior to admission, patient was independent with functional mobility with cane or more recently a walker, independent with ADLS, requiring assist for IADLS, living in a multi-level home, ambulating household distance, and home with family assist.  Please find objective findings from Physical Therapy assessment regarding body systems outlined above with impairments and limitations including weakness, decreased ROM, impaired balance, decreased endurance, impaired coordination, gait deviations, decreased activity tolerance, decreased functional mobility tolerance, fall risk, and decreased cognition.  The Barthel Index was used as a functional outcome tool presenting with a score of Barthel Index Score: 30 today indicating marked limitations of functional mobility and ADLS.  Patient's clinical presentation is currently unstable/unpredictable as seen in patient's presentation of vital sign response, changing level of pain, varying levels of cognitive performance, increased fall risk, new onset of impairment of functional mobility, decreased endurance, and new onset of weakness. Pt would benefit from continued Physical Therapy treatment to address deficits as defined above and maximize level of functional mobility. As demonstrated by objective findings, the assigned level of complexity for this evaluation is high.The patient's -[facility] Basic Mobility Inpatient Short Form Raw Score is 12. A Raw score of less than or equal to 16 suggests the patient may benefit from discharge to post-acute rehabilitation services. Please also refer to the recommendation of the Physical Therapist for safe discharge planning.   Goals   Patient Goals To get out of the hospital and go home   STG Expiration Date 04/21/25   Short Term Goal #1 Transfers and gait with roller  walker with supervision   Short Term Goal #2 Gait endurance to 60 feet   LTG Expiration Date 04/28/25   Long Term Goal #1 Strength bilateral lower extremities 4/5   Long Term Goal #2 Independent transfers and gait with roller walker for functional household distance of   Plan   Treatment/Interventions ADL retraining;Functional transfer training;LE strengthening/ROM;Elevations;Therapeutic exercise;Endurance training;Patient/family training;Equipment eval/education;Bed mobility;Gait training;Compensatory technique education   PT Frequency 3-5x/wk   Discharge Recommendation   Rehab Resource Intensity Level, PT II (Moderate Resource Intensity)   AM-PAC Basic Mobility Inpatient   Turning in Flat Bed Without Bedrails 3   Lying on Back to Sitting on Edge of Flat Bed Without Bedrails 2   Moving Bed to Chair 2   Standing Up From Chair Using Arms 2   Walk in Room 2   Climb 3-5 Stairs With Railing 1   Basic Mobility Inpatient Raw Score 12   Basic Mobility Standardized Score 32.23   [facility] Highest Level Of Mobility   -[facility] Goal 4: Move to chair/commode   -[facility] Achieved 6: Walk 10 steps or more   Barthel Index   Feeding 5   Bathing 0   Grooming Score 0   Dressing Score 5   Bladder Score 0   Bowels Score 5   Toilet Use Score 5   Transfers (Bed/Chair) Score 5   Mobility (Level Surface) Score 0   Stairs Score 0   Barthel Index Score 25   Additional Treatment Session   Start Time 1340   End Time 1355   Treatment Assessment s: Patient without pain complaints O: Bilateral lower extremity exercise completed as listed below a: Patient will benefit from continued physical therapy with progression as tolerated and increasing functional mobility with clinical staff as well.   Exercises   Hip Flexion Sitting;5 reps;Bilateral  (Alternating)   Hip Abduction Sitting;5 reps;Bilateral  (Alternating)   Knee AROM Long Arc Quad Sitting;5 reps;Bilateral  (Alternating)   Balance training  Sidestepping completed for balance and coordination    Licensure   [place] License Number  Vikki Gisselle PT [id number]

## 2025-04-14 NOTE — ASSESSMENT & PLAN NOTE
Potassium level has improved to 3.2  Patient started on Aldactone 50 mg p.o. daily and given extra supplementation of KCl 40 meq

## 2025-04-14 NOTE — ASSESSMENT & PLAN NOTE
Highly suspicious for cirrhosis in the setting of thrombocytopenia with hypoalbuminemia with pleural effusions and ascites  Patient's MELD score is 18 and MELD sodium score is 16.  CT showed enlarged fatty liver which he is known to have and nonspecific wall thickening of the gallbladder.  Patient has no formal diagnosis of cirrhosis but most likely patient has decompensated cirrhosis  Maddey discriminant factor is 34.9-patient less likely has acute alcoholic hepatitis and most likely decompensated cirrhosis.  GI recommending to hold off on steroids at the current time  Right upper quadrant ultrasound with diffusely increased hepatic echogenicity consistent with hepatic steatosis and cannot exclude cirrhosis with diffuse gallbladder wall thickening

## 2025-04-15 ENCOUNTER — APPOINTMENT (INPATIENT)
Dept: RADIOLOGY | Facility: HOSPITAL | Age: 70
DRG: 433 | End: 2025-04-15
Payer: MEDICARE

## 2025-04-15 LAB
ALBUMIN SERPL BCG-MCNC: 2.2 G/DL (ref 3.5–5)
ALP SERPL-CCNC: 136 U/L (ref 34–104)
ALT SERPL W P-5'-P-CCNC: 39 U/L (ref 7–52)
ANION GAP SERPL CALCULATED.3IONS-SCNC: 9 MMOL/L (ref 4–13)
AST SERPL W P-5'-P-CCNC: 66 U/L (ref 13–39)
BACTERIA UR QL AUTO: ABNORMAL /HPF
BILIRUB SERPL-MCNC: 2.77 MG/DL (ref 0.2–1)
BILIRUB UR QL STRIP: NEGATIVE
BUN SERPL-MCNC: 14 MG/DL (ref 5–25)
CALCIUM ALBUM COR SERPL-MCNC: 9.7 MG/DL (ref 8.3–10.1)
CALCIUM SERPL-MCNC: 8.3 MG/DL (ref 8.4–10.2)
CAOX CRY URNS QL MICRO: ABNORMAL /HPF
CHLORIDE SERPL-SCNC: 101 MMOL/L (ref 96–108)
CLARITY UR: CLEAR
CO2 SERPL-SCNC: 26 MMOL/L (ref 21–32)
COLOR UR: ABNORMAL
CREAT SERPL-MCNC: 0.79 MG/DL (ref 0.6–1.3)
ERYTHROCYTE [DISTWIDTH] IN BLOOD BY AUTOMATED COUNT: 15.6 % (ref 11.6–15.1)
GFR SERPL CREATININE-BSD FRML MDRD: 89 ML/MIN/1.73SQ M
GLUCOSE SERPL-MCNC: 125 MG/DL (ref 65–140)
GLUCOSE UR STRIP-MCNC: NEGATIVE MG/DL
HCT VFR BLD AUTO: 34.8 % (ref 36.5–49.3)
HGB BLD-MCNC: 11.9 G/DL (ref 12–17)
HGB UR QL STRIP.AUTO: ABNORMAL
KETONES UR STRIP-MCNC: NEGATIVE MG/DL
LEUKOCYTE ESTERASE UR QL STRIP: NEGATIVE
MCH RBC QN AUTO: 37.7 PG (ref 26.8–34.3)
MCHC RBC AUTO-ENTMCNC: 34.2 G/DL (ref 31.4–37.4)
MCV RBC AUTO: 110 FL (ref 82–98)
MUCOUS THREADS UR QL AUTO: ABNORMAL
NITRITE UR QL STRIP: NEGATIVE
NON-SQ EPI CELLS URNS QL MICRO: ABNORMAL /HPF
PH UR STRIP.AUTO: 5.5 [PH]
PLATELET # BLD AUTO: 135 THOUSANDS/UL (ref 149–390)
PMV BLD AUTO: 13.8 FL (ref 8.9–12.7)
POTASSIUM SERPL-SCNC: 4.3 MMOL/L (ref 3.5–5.3)
PROT SERPL-MCNC: 4.6 G/DL (ref 6.4–8.4)
PROT UR STRIP-MCNC: NEGATIVE MG/DL
RBC # BLD AUTO: 3.16 MILLION/UL (ref 3.88–5.62)
RBC #/AREA URNS AUTO: ABNORMAL /HPF
SODIUM SERPL-SCNC: 136 MMOL/L (ref 135–147)
SP GR UR STRIP.AUTO: 1.02 (ref 1–1.03)
TSH SERPL DL<=0.05 MIU/L-ACNC: 2.5 UIU/ML (ref 0.45–4.5)
UROBILINOGEN UR STRIP-ACNC: <2 MG/DL
VIT B12 SERPL-MCNC: 3205 PG/ML (ref 180–914)
WBC # BLD AUTO: 13.84 THOUSAND/UL (ref 4.31–10.16)
WBC #/AREA URNS AUTO: ABNORMAL /HPF

## 2025-04-15 PROCEDURE — 99232 SBSQ HOSP IP/OBS MODERATE 35: CPT | Performed by: INTERNAL MEDICINE

## 2025-04-15 PROCEDURE — 76705 ECHO EXAM OF ABDOMEN: CPT

## 2025-04-15 PROCEDURE — 84443 ASSAY THYROID STIM HORMONE: CPT | Performed by: INTERNAL MEDICINE

## 2025-04-15 PROCEDURE — 80053 COMPREHEN METABOLIC PANEL: CPT | Performed by: INTERNAL MEDICINE

## 2025-04-15 PROCEDURE — 85027 COMPLETE CBC AUTOMATED: CPT | Performed by: INTERNAL MEDICINE

## 2025-04-15 PROCEDURE — 82607 VITAMIN B-12: CPT | Performed by: INTERNAL MEDICINE

## 2025-04-15 PROCEDURE — 81001 URINALYSIS AUTO W/SCOPE: CPT | Performed by: INTERNAL MEDICINE

## 2025-04-15 RX ORDER — PREDNISONE 20 MG/1
20 TABLET ORAL DAILY
Status: DISCONTINUED | OUTPATIENT
Start: 2025-04-16 | End: 2025-04-16

## 2025-04-15 RX ORDER — LOPERAMIDE HYDROCHLORIDE 2 MG/1
2 CAPSULE ORAL 3 TIMES DAILY PRN
Status: DISCONTINUED | OUTPATIENT
Start: 2025-04-15 | End: 2025-04-17 | Stop reason: HOSPADM

## 2025-04-15 RX ADMIN — GABAPENTIN 100 MG: 100 CAPSULE ORAL at 21:17

## 2025-04-15 RX ADMIN — CARVEDILOL 3.12 MG: 3.12 TABLET, FILM COATED ORAL at 16:32

## 2025-04-15 RX ADMIN — FUROSEMIDE 20 MG: 20 TABLET ORAL at 08:23

## 2025-04-15 RX ADMIN — FOLIC ACID 1 MG: 1 TABLET ORAL at 08:24

## 2025-04-15 RX ADMIN — MIDODRINE HYDROCHLORIDE 5 MG: 5 TABLET ORAL at 16:32

## 2025-04-15 RX ADMIN — MIDODRINE HYDROCHLORIDE 5 MG: 5 TABLET ORAL at 11:37

## 2025-04-15 RX ADMIN — GABAPENTIN 100 MG: 100 CAPSULE ORAL at 16:32

## 2025-04-15 RX ADMIN — CYANOCOBALAMIN TAB 500 MCG 1000 MCG: 500 TAB at 08:24

## 2025-04-15 RX ADMIN — SPIRONOLACTONE 50 MG: 25 TABLET ORAL at 08:23

## 2025-04-15 RX ADMIN — GABAPENTIN 100 MG: 100 CAPSULE ORAL at 08:23

## 2025-04-15 RX ADMIN — Medication 1 TABLET: at 08:23

## 2025-04-15 RX ADMIN — PREDNISONE 40 MG: 20 TABLET ORAL at 08:23

## 2025-04-15 RX ADMIN — MIDODRINE HYDROCHLORIDE 5 MG: 5 TABLET ORAL at 08:24

## 2025-04-15 RX ADMIN — CARVEDILOL 3.12 MG: 3.12 TABLET, FILM COATED ORAL at 08:24

## 2025-04-15 RX ADMIN — THIAMINE HCL TAB 100 MG 100 MG: 100 TAB at 08:29

## 2025-04-15 RX ADMIN — PSYLLIUM HUSK 1 PACKET: 3.4 POWDER ORAL at 16:33

## 2025-04-15 RX ADMIN — FLUTICASONE FUROATE AND VILANTEROL TRIFENATATE 1 PUFF: 100; 25 POWDER RESPIRATORY (INHALATION) at 08:31

## 2025-04-15 RX ADMIN — PANCRELIPASE 36000 UNITS: 120000; 24000; 76000 CAPSULE, DELAYED RELEASE PELLETS ORAL at 16:33

## 2025-04-15 RX ADMIN — FAMOTIDINE 20 MG: 20 TABLET, FILM COATED ORAL at 21:17

## 2025-04-15 RX ADMIN — NICOTINE 1 PATCH: 21 PATCH, EXTENDED RELEASE TRANSDERMAL at 08:30

## 2025-04-15 RX ADMIN — PANTOPRAZOLE SODIUM 40 MG: 40 TABLET, DELAYED RELEASE ORAL at 08:24

## 2025-04-15 RX ADMIN — PANCRELIPASE 36000 UNITS: 120000; 24000; 76000 CAPSULE, DELAYED RELEASE PELLETS ORAL at 11:37

## 2025-04-15 RX ADMIN — PSYLLIUM HUSK 1 PACKET: 3.4 POWDER ORAL at 11:39

## 2025-04-15 RX ADMIN — PANCRELIPASE 36000 UNITS: 120000; 24000; 76000 CAPSULE, DELAYED RELEASE PELLETS ORAL at 08:27

## 2025-04-15 NOTE — PLAN OF CARE
Problem: Potential for Falls  Goal: Patient will remain free of falls  Description: INTERVENTIONS:- Educate patient/family on patient safety including physical limitations- Instruct patient to call for assistance with activity - Consult OT/PT to assist with strengthening/mobility - Keep Call bell within reach- Keep bed low and locked with side rails adjusted as appropriate- Keep care items and personal belongings within reach- Initiate and maintain comfort rounds- Make Fall Risk Sign visible to staff- Offer Toileting every 2 Hours, in advance of need- Initiate/Maintain bed alarm- Obtain necessary fall risk management equipment: - Apply yellow socks and bracelet for high fall risk patients- Consider moving patient to room near nurses station  INTERVENTIONS:- Educate patient/family on patient safety including physical limitations- Instruct patient to call for assistance with activity - Consult OT/PT to assist with strengthening/mobility - Keep Call bell within reach- Keep bed low and locked with side rails adjusted as appropriate- Keep care items and personal belongings within reach- Initiate and maintain comfort rounds- Make Fall Risk Sign visible to staff- Offer Toileting every 2 Hours, in advance of need- Initiate/Maintain bed alarm- Obtain necessary fall risk management equipment: - Apply yellow socks and bracelet for high fall risk patients- Consider moving patient to room near nurses station  Outcome: Progressing     Problem: PAIN - ADULT  Goal: Verbalizes/displays adequate comfort level or baseline comfort level  Description: Interventions:- Encourage patient to monitor pain and request assistance- Assess pain using appropriate pain scale- Administer analgesics based on type and severity of pain and evaluate response- Implement non-pharmacological measures as appropriate and evaluate response- Consider cultural and social influences on pain and pain management- Notify physician/advanced practitioner if  interventions unsuccessful or patient reports new pain  Outcome: Progressing     Problem: INFECTION - ADULT  Goal: Absence or prevention of progression during hospitalization  Description: INTERVENTIONS:- Assess and monitor for signs and symptoms of infection- Monitor lab/diagnostic results- Monitor all insertion sites, i.e. indwelling lines, tubes, and drains- Monitor endotracheal if appropriate and nasal secretions for changes in amount and color- [place] appropriate cooling/warming therapies per order- Administer medications as ordered- Instruct and encourage patient and family to use good hand hygiene technique- Identify and instruct in appropriate isolation precautions for identified infection/condition  Outcome: Progressing     Problem: SAFETY ADULT  Goal: Patient will remain free of falls  Description: INTERVENTIONS:- Educate patient/family on patient safety including physical limitations- Instruct patient to call for assistance with activity - Consult OT/PT to assist with strengthening/mobility - Keep Call bell within reach- Keep bed low and locked with side rails adjusted as appropriate- Keep care items and personal belongings within reach- Initiate and maintain comfort rounds- Make Fall Risk Sign visible to staff- Offer Toileting every 2 Hours, in advance of need- Initiate/Maintain bed alarm- Obtain necessary fall risk management equipment: - Apply yellow socks and bracelet for high fall risk patients- Consider moving patient to room near nurses station  INTERVENTIONS:- Educate patient/family on patient safety including physical limitations- Instruct patient to call for assistance with activity - Consult OT/PT to assist with strengthening/mobility - Keep Call bell within reach- Keep bed low and locked with side rails adjusted as appropriate- Keep care items and personal belongings within reach- Initiate and maintain comfort rounds- Make Fall Risk Sign visible to staff- Offer Toileting every 2 Hours, in  advance of need- Initiate/Maintain bed alarm- Obtain necessary fall risk management equipment: - Apply yellow socks and bracelet for high fall risk patients- Consider moving patient to room near nurses station  Outcome: Progressing  Goal: Maintain or return to baseline ADL function  Description: INTERVENTIONS:-  Assess patient's ability to carry out ADLs; assess patient's baseline for ADL function and identify physical deficits which impact ability to perform ADLs (bathing, care of mouth/teeth, toileting, grooming, dressing, etc.)- Assess/evaluate cause of self-care deficits - Assess range of motion- Assess patient's mobility; develop plan if impaired- Assess patient's need for assistive devices and provide as appropriate- Encourage maximum independence but intervene and supervise when necessary- Involve family in performance of ADLs- Assess for home care needs following discharge - Consider OT consult to assist with ADL evaluation and planning for discharge- Provide patient education as appropriate  Outcome: Progressing  Goal: Maintains/Returns to pre admission functional level  Description: INTERVENTIONS:- Perform AM-PAC 6 Click Basic Mobility/ Daily Activity assessment daily.- Set and communicate daily mobility goal to care team and patient/family/caregiver. - Collaborate with rehabilitation services on mobility goals if consulted- Perform Range of Motion 3 times a day.- Reposition patient every 2 hours.- Dangle patient 3 times a day- Stand patient 3 times a day- Ambulate patient 3 times a day- Out of bed to chair 3 times a day - Out of bed for meals 3 times a day- Out of bed for toileting- Record patient progress and toleration of activity level   Outcome: Progressing     Problem: DISCHARGE PLANNING  Goal: Discharge to home or other facility with appropriate resources  Description: INTERVENTIONS:- Identify barriers to discharge w/patient and caregiver- Arrange for needed discharge resources and transportation as  appropriate- Identify discharge learning needs (meds, wound care, etc.)- Arrange for interpretive services to assist at discharge as needed- Refer to Case Management Department for coordinating discharge planning if the patient needs post-hospital services based on physician/advanced practitioner order or complex needs related to functional status, cognitive ability, or social support system  Outcome: Progressing     Problem: Knowledge Deficit  Goal: Patient/family/caregiver demonstrates understanding of disease process, treatment plan, medications, and discharge instructions  Description: Complete learning assessment and assess knowledge base.Interventions:- Provide teaching at level of understanding- Provide teaching via preferred learning methods  Outcome: Progressing     Problem: Prexisting or High Potential for Compromised Skin Integrity  Goal: Skin integrity is maintained or improved  Description: INTERVENTIONS:- Identify patients at risk for skin breakdown- Assess and monitor skin integrity- Assess and monitor nutrition and hydration status- Monitor labs - Assess for incontinence - Turn and reposition patient- Assist with mobility/ambulation- Relieve pressure over bony prominences- Avoid friction and shearing- Provide appropriate hygiene as needed including keeping skin clean and dry- Evaluate need for skin moisturizer/barrier cream- Collaborate with interdisciplinary team - Patient/family teaching- Consider wound care consult   Outcome: Progressing     Problem: RESPIRATORY - ADULT  Goal: Achieves optimal ventilation and oxygenation  Description: INTERVENTIONS:- Assess for changes in respiratory status- Assess for changes in mentation and behavior- Position to facilitate oxygenation and minimize respiratory effort- Oxygen administered by appropriate delivery if ordered- Initiate smoking cessation education as indicated- Encourage broncho-pulmonary hygiene including cough, deep breathe, Incentive Spirometry-  Assess the need for suctioning and aspirate as needed- Assess and instruct to report SOB or any respiratory difficulty- Respiratory Therapy support as indicated  Outcome: Progressing     Problem: NEUROSENSORY - ADULT  Goal: Achieves maximal functionality and self care  Description: INTERVENTIONS- Monitor swallowing and airway patency with patient fatigue and changes in neurological status- Encourage and assist patient to increase activity and self care. - Encourage visually impaired, hearing impaired and aphasic patients to use assistive/communication devices  Outcome: Progressing     Problem: CARDIOVASCULAR - ADULT  Goal: Maintains optimal cardiac output and hemodynamic stability  Description: INTERVENTIONS:- Monitor I/O, vital signs and rhythm- Monitor for S/S and trends of decreased cardiac output- Administer and titrate ordered vasoactive medications to optimize hemodynamic stability- Assess quality of pulses, skin color and temperature- Assess for signs of decreased coronary artery perfusion- Instruct patient to report change in severity of symptoms  Outcome: Progressing  Goal: Absence of cardiac dysrhythmias or at baseline rhythm  Description: INTERVENTIONS:- Continuous cardiac monitoring, vital signs, obtain 12 lead EKG if ordered- Administer antiarrhythmic and heart rate control medications as ordered- Monitor electrolytes and administer replacement therapy as ordered  Outcome: Progressing     Problem: GASTROINTESTINAL - ADULT  Goal: Maintains or returns to baseline bowel function  Description: INTERVENTIONS:- Assess bowel function- Encourage oral fluids to ensure adequate hydration- Administer IV fluids if ordered to ensure adequate hydration- Administer ordered medications as needed- Encourage mobilization and activity- Consider nutritional services referral to assist patient with adequate nutrition and appropriate food choices  Outcome: Progressing     Problem: MUSCULOSKELETAL - ADULT  Goal: Maintain or  return mobility to safest level of function  Description: INTERVENTIONS:- Assess patient's ability to carry out ADLs; assess patient's baseline for ADL function and identify physical deficits which impact ability to perform ADLs (bathing, care of mouth/teeth, toileting, grooming, dressing, etc.)- Assess/evaluate cause of self-care deficits - Assess range of motion- Assess patient's mobility- Assess patient's need for assistive devices and provide as appropriate- Encourage maximum independence but intervene and supervise when necessary- Involve family in performance of ADLs- Assess for home care needs following discharge - Consider OT consult to assist with ADL evaluation and planning for discharge- Provide patient education as appropriate  Outcome: Progressing

## 2025-04-15 NOTE — CASE MANAGEMENT
Case Management Discharge Planning Note    Patient name Del Ocampo  Location [address] [address]/[address] [address]-* MRN [id number]  : 1955 Date 4/15/2025       Current Admission Date: 2025  Current Admission Diagnosis:Acute encephalopathy   Patient Active Problem List    Diagnosis Date Noted Date Diagnosed    Chronic diarrhea 2025     Thrombocytopenia (HCC) 2025     Ectasia of artery (HCC) 2025     Elevated troponin 2025     Goals of care, counseling/discussion 2025     Acute encephalopathy 2025     Generalized weakness 2025     Abnormal CT scan 2025     Jaw pain 2025     Rash 2024     Chronic combined systolic and diastolic congestive heart failure (HCC) 2024     Serum total bilirubin elevated 2024     Elevated LFTs 2024     Temporal arteritis (HCC)      Neuropathy 2024     Chronic obstructive pulmonary disease with acute exacerbation (HCC) 2024     Gastroesophageal reflux disease 2024     Pancreatic insufficiency 04/15/2024     Hepatic steatosis 2024     Ascites 2024     Alcohol abuse 2024     Hypokalemia 2024     Nicotine abuse 2024     History of bleeding ulcers 2024     Aneurysm of descending thoracic aorta without rupture (HCC) 2024     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     Left bundle-branch block, unspecified 01/10/2020     PMR (polymyalgia rheumatica) (HCC) 2015       LOS (days): 3  Geometric Mean LOS (GMLOS) (days): 3.3  Days to GMLOS:-0.1     OBJECTIVE:  Risk of Unplanned Readmission Score: 19.23     Current admission status: Inpatient   Preferred Pharmacy:   [facility]/pharmacy #[id number] - [place], [place] - [address]  [address] [address]  Phone: [phone] Fax: [phone]    [facility] - [place] - [address]  [address]  [place] [address]  Phone:  [phone] Fax: [phone]    Primary Care Provider: Frank Lombardi, DO    Primary Insurance: MEDICARE  Secondary Insurance: [employer]    DISCHARGE DETAILS:    Discharge planning discussed with:: Patient and wife Diann  Freedom of Choice: Yes  Comments - Freedom of Choice: CM return to meet with patient and wife at bedside to confirm STR facility choice. Toni Tidwell provided first choice [facility] in [place] (per family recommendation) and second choice [facility].  CM contacted family/caregiver?: Yes  Were Treatment Team discharge recommendations reviewed with patient/caregiver?: Yes  Did patient/caregiver verbalize understanding of patient care needs?: Yes    Contacts  Patient Contacts: Toni Tidwell  Contact Method: In Person    Requested Home Health Care         Is the patient interested in C at discharge?: No    Other Referral/Resources/Interventions Provided:  Interventions: Short Term Rehab  Referral Comments: CM reserved [facility] in [place].    Treatment Team Recommendation: Short Term Rehab  Discharge Destination Plan:: Short Term Rehab    IMM Given (Date):: 04/15/25 (IMM was reviewed with both patient and wife Diann at bedside. Diann verbalized her understanding and signed form. Copy placed in scan bin for patient's chart.)  IMM Given to:: Patient  Family notified:: Toni Tidwell    IMM reviewed with patient and caregiver, patient and caregiver agrees with discharge determination.

## 2025-04-15 NOTE — PLAN OF CARE
Problem: Potential for Falls  Goal: Patient will remain free of falls  Description: INTERVENTIONS:- Educate patient/family on patient safety including physical limitations- Instruct patient to call for assistance with activity - Consult OT/PT to assist with strengthening/mobility - Keep Call bell within reach- Keep bed low and locked with side rails adjusted as appropriate- Keep care items and personal belongings within reach- Initiate and maintain comfort rounds- Make Fall Risk Sign visible to staff- Offer Toileting every 2 Hours, in advance of need- Initiate/Maintain bed alarm- Obtain necessary fall risk management equipment: call bell within reach - Apply yellow socks and bracelet for high fall risk patients- Consider moving patient to room near nurses station  INTERVENTIONS:- Educate patient/family on patient safety including physical limitations- Instruct patient to call for assistance with activity - Consult OT/PT to assist with strengthening/mobility - Keep Call bell within reach- Keep bed low and locked with side rails adjusted as appropriate- Keep care items and personal belongings within reach- Initiate and maintain comfort rounds- Make Fall Risk Sign visible to staff- Offer Toileting every 2 Hours, in advance of need- Initiate/Maintain bed alarm- Obtain necessary fall risk management equipment: call bell within reach - Apply yellow socks and bracelet for high fall risk patients- Consider moving patient to room near nurses station  Outcome: Progressing     Problem: PAIN - ADULT  Goal: Verbalizes/displays adequate comfort level or baseline comfort level  Description: Interventions:- Encourage patient to monitor pain and request assistance- Assess pain using appropriate pain scale- Administer analgesics based on type and severity of pain and evaluate response- Implement non-pharmacological measures as appropriate and evaluate response- Consider cultural and social influences on pain and pain management-  Notify physician/advanced practitioner if interventions unsuccessful or patient reports new pain  Outcome: Progressing     Problem: INFECTION - ADULT  Goal: Absence or prevention of progression during hospitalization  Description: INTERVENTIONS:- Assess and monitor for signs and symptoms of infection- Monitor lab/diagnostic results- Monitor all insertion sites, i.e. indwelling lines, tubes, and drains- Monitor endotracheal if appropriate and nasal secretions for changes in amount and color- [place] appropriate cooling/warming therapies per order- Administer medications as ordered- Instruct and encourage patient and family to use good hand hygiene technique- Identify and instruct in appropriate isolation precautions for identified infection/condition  Outcome: Progressing     Problem: SAFETY ADULT  Goal: Patient will remain free of falls  Description: INTERVENTIONS:- Educate patient/family on patient safety including physical limitations- Instruct patient to call for assistance with activity - Consult OT/PT to assist with strengthening/mobility - Keep Call bell within reach- Keep bed low and locked with side rails adjusted as appropriate- Keep care items and personal belongings within reach- Initiate and maintain comfort rounds- Make Fall Risk Sign visible to staff- Offer Toileting every 2 Hours, in advance of need- Initiate/Maintain bed alarm- Obtain necessary fall risk management equipment: call bell within reach - Apply yellow socks and bracelet for high fall risk patients- Consider moving patient to room near nurses station  INTERVENTIONS:- Educate patient/family on patient safety including physical limitations- Instruct patient to call for assistance with activity - Consult OT/PT to assist with strengthening/mobility - Keep Call bell within reach- Keep bed low and locked with side rails adjusted as appropriate- Keep care items and personal belongings within reach- Initiate and maintain comfort rounds- Make  Fall Risk Sign visible to staff- Offer Toileting every 2 Hours, in advance of need- Initiate/Maintain bed alarm- Obtain necessary fall risk management equipment: call bell within reach - Apply yellow socks and bracelet for high fall risk patients- Consider moving patient to room near nurses station  Outcome: Progressing  Goal: Maintain or return to baseline ADL function  Description: INTERVENTIONS:-  Assess patient's ability to carry out ADLs; assess patient's baseline for ADL function and identify physical deficits which impact ability to perform ADLs (bathing, care of mouth/teeth, toileting, grooming, dressing, etc.)- Assess/evaluate cause of self-care deficits - Assess range of motion- Assess patient's mobility; develop plan if impaired- Assess patient's need for assistive devices and provide as appropriate- Encourage maximum independence but intervene and supervise when necessary- Involve family in performance of ADLs- Assess for home care needs following discharge - Consider OT consult to assist with ADL evaluation and planning for discharge- Provide patient education as appropriate  Outcome: Progressing  Goal: Maintains/Returns to pre admission functional level  Description: INTERVENTIONS:- Perform AM-PAC 6 Click Basic Mobility/ Daily Activity assessment daily.- Set and communicate daily mobility goal to care team and patient/family/caregiver. - Collaborate with rehabilitation services on mobility goals if consulted- Perform Range of Motion 2 times a day.- Reposition patient every 2 hours.- Dangle patient 22 times a day- Stand patient 2 times a day- Ambulate patient 2 times a day- Out of bed to chair 2 times a day - Out of bed for meals 2 times a day- Out of bed for toileting- Record patient progress and toleration of activity level   Outcome: Progressing     Problem: DISCHARGE PLANNING  Goal: Discharge to home or other facility with appropriate resources  Description: INTERVENTIONS:- Identify barriers to  discharge w/patient and caregiver- Arrange for needed discharge resources and transportation as appropriate- Identify discharge learning needs (meds, wound care, etc.)- Arrange for interpretive services to assist at discharge as needed- Refer to Case Management Department for coordinating discharge planning if the patient needs post-hospital services based on physician/advanced practitioner order or complex needs related to functional status, cognitive ability, or social support system  Outcome: Progressing     Problem: Knowledge Deficit  Goal: Patient/family/caregiver demonstrates understanding of disease process, treatment plan, medications, and discharge instructions  Description: Complete learning assessment and assess knowledge base.Interventions:- Provide teaching at level of understanding- Provide teaching via preferred learning methods  Outcome: Progressing     Problem: Prexisting or High Potential for Compromised Skin Integrity  Goal: Skin integrity is maintained or improved  Description: INTERVENTIONS:- Identify patients at risk for skin breakdown- Assess and monitor skin integrity- Assess and monitor nutrition and hydration status- Monitor labs - Assess for incontinence - Turn and reposition patient- Assist with mobility/ambulation- Relieve pressure over bony prominences- Avoid friction and shearing- Provide appropriate hygiene as needed including keeping skin clean and dry- Evaluate need for skin moisturizer/barrier cream- Collaborate with interdisciplinary team - Patient/family teaching- Consider wound care consult   Outcome: Progressing     Problem: RESPIRATORY - ADULT  Goal: Achieves optimal ventilation and oxygenation  Description: INTERVENTIONS:- Assess for changes in respiratory status- Assess for changes in mentation and behavior- Position to facilitate oxygenation and minimize respiratory effort- Oxygen administered by appropriate delivery if ordered- Initiate smoking cessation education as  indicated- Encourage broncho-pulmonary hygiene including cough, deep breathe, Incentive Spirometry- Assess the need for suctioning and aspirate as needed- Assess and instruct to report SOB or any respiratory difficulty- Respiratory Therapy support as indicated  Outcome: Progressing     Problem: NEUROSENSORY - ADULT  Goal: Achieves maximal functionality and self care  Description: INTERVENTIONS- Monitor swallowing and airway patency with patient fatigue and changes in neurological status- Encourage and assist patient to increase activity and self care. - Encourage visually impaired, hearing impaired and aphasic patients to use assistive/communication devices  Outcome: Progressing     Problem: CARDIOVASCULAR - ADULT  Goal: Maintains optimal cardiac output and hemodynamic stability  Description: INTERVENTIONS:- Monitor I/O, vital signs and rhythm- Monitor for S/S and trends of decreased cardiac output- Administer and titrate ordered vasoactive medications to optimize hemodynamic stability- Assess quality of pulses, skin color and temperature- Assess for signs of decreased coronary artery perfusion- Instruct patient to report change in severity of symptoms  Outcome: Progressing  Goal: Absence of cardiac dysrhythmias or at baseline rhythm  Description: INTERVENTIONS:- Continuous cardiac monitoring, vital signs, obtain 12 lead EKG if ordered- Administer antiarrhythmic and heart rate control medications as ordered- Monitor electrolytes and administer replacement therapy as ordered  Outcome: Progressing     Problem: GASTROINTESTINAL - ADULT  Goal: Maintains or returns to baseline bowel function  Description: INTERVENTIONS:- Assess bowel function- Encourage oral fluids to ensure adequate hydration- Administer IV fluids if ordered to ensure adequate hydration- Administer ordered medications as needed- Encourage mobilization and activity- Consider nutritional services referral to assist patient with adequate nutrition and  appropriate food choices  Outcome: Progressing     Problem: MUSCULOSKELETAL - ADULT  Goal: Maintain or return mobility to safest level of function  Description: INTERVENTIONS:- Assess patient's ability to carry out ADLs; assess patient's baseline for ADL function and identify physical deficits which impact ability to perform ADLs (bathing, care of mouth/teeth, toileting, grooming, dressing, etc.)- Assess/evaluate cause of self-care deficits - Assess range of motion- Assess patient's mobility- Assess patient's need for assistive devices and provide as appropriate- Encourage maximum independence but intervene and supervise when necessary- Involve family in performance of ADLs- Assess for home care needs following discharge - Consider OT consult to assist with ADL evaluation and planning for discharge- Provide patient education as appropriate  Outcome: Progressing

## 2025-04-15 NOTE — CONSULTS
Nutrition consult received. NFPE conducted and PT does not meet criteria for malnutrition. Good intake reported. Cardiac diet education reviewed. PT and PT wife receptive. Will continue to follow.

## 2025-04-15 NOTE — ASSESSMENT & PLAN NOTE
Highly suspicious for cirrhosis in the setting of thrombocytopenia with hypoalbuminemia with pleural effusions and ascites  CT showed enlarged fatty liver which he is known to have and nonspecific wall thickening of the gallbladder.  Right upper quadrant ultrasound with diffusely increased hepatic echogenicity consistent with hepatic steatosis and cannot exclude cirrhosis with diffuse gallbladder wall thickening  Patient has no formal diagnosis of cirrhosis but most likely patient has decompensated cirrhosis  [facility].9-patient less likely has acute alcoholic hepatitis and most likely decompensated cirrhosis.  GI recommending to hold off on steroids at the current time  MELD 3.0: 17 at 4/14/2025  5:47 AM  MELD-Na: 15 at 4/14/2025  5:47 AM  Calculated from:  Serum Creatinine: 0.82 mg/dL (Using min of 1 mg/dL) at 4/14/2025  5:47 AM  Serum Sodium: 139 mmol/L (Using max of 137 mmol/L) at 4/14/2025  5:47 AM  Total Bilirubin: 2.71 mg/dL at 4/14/2025  5:47 AM  Serum Albumin: 2.2 g/dL at 4/14/2025  5:47 AM  INR(ratio): 1.64 at 4/12/2025  3:08 AM  Age at listing (hypothetical): 70 years  Sex: Male at 4/14/2025  5:47 AM     Continue to monitor MELD labs  Monitor intake output, fluid and salt restriction  Continue diuretics as tolerated, monitor response  Midodrine for blood pressure support  Recheck limited ultrasound for ascites due to abdominal distention  GI follow-up  Outpatient elastography  Alcohol cessation

## 2025-04-15 NOTE — PROGRESS NOTES
Progress Note - Hospitalist   Name: Del Ocampo 70 y.o. male I MRN: [id number]  Unit/Bed#: 4 [place] 409-01 I Date of Admission: 4/11/2025   Date of Service: 4/15/2025 I Hospital Day: 3    Assessment & Plan  Acute encephalopathy (Resolved: 4/16/2025)  Patient presents with altered mental status for 1 day and generalized weakness poor oral intake for about 3 weeks per wife.  Has been in bed for about 1 week.  Reports SOB for about 1 week. Patient drinks alcohol daily, 6-9 shots of vodka daily per wife.  Last drink was yesterday.  Patient has chronic diarrhea nausea vomiting abdominal pain per wife at bedside.  Ammonia level normal.  WBC normal, no bands, afebrile.  CT chest abdomen pelvis with IV contrast showed - Trace bilateral pleural effusions.  Enlarged fatty liver with mild ascites. Abnormal wall thickening of the sigmoid colon. Suspect portal hypertensive colopathy. Cannot exclude colitis in the appropriate clinical situation. Nonspecific wall thickening of the gallbladder likely related to liver disease and ascites. Clinical correlation is advised.   VBG unremarkable  CMP showed , alk phos 154, albumin 2.6, TB 4.76.  Platelet count 86.  Management as below.  The patient's mental status has improved and is close to baseline.  CAT scan of the brain without any acute intracranial abnormality with chronic microangiopathic changes    Elevated LFTs  Highly suspicious for cirrhosis in the setting of thrombocytopenia with hypoalbuminemia with pleural effusions and ascites  CT showed enlarged fatty liver which he is known to have and nonspecific wall thickening of the gallbladder.  Right upper quadrant ultrasound with diffusely increased hepatic echogenicity consistent with hepatic steatosis and cannot exclude cirrhosis with diffuse gallbladder wall thickening  Patient has no formal diagnosis of cirrhosis but most likely patient has decompensated cirrhosis  Jolanta discriminant factor is 34.9-patient less likely  has acute alcoholic hepatitis and most likely decompensated cirrhosis.  GI recommending to hold off on steroids at the current time  MELD 3.0: 17 at 4/14/2025  5:47 AM  MELD-Na: 15 at 4/14/2025  5:47 AM  Calculated from:  Serum Creatinine: 0.82 mg/dL (Using min of 1 mg/dL) at 4/14/2025  5:47 AM  Serum Sodium: 139 mmol/L (Using max of 137 mmol/L) at 4/14/2025  5:47 AM  Total Bilirubin: 2.71 mg/dL at 4/14/2025  5:47 AM  Serum Albumin: 2.2 g/dL at 4/14/2025  5:47 AM  INR(ratio): 1.64 at 4/12/2025  3:08 AM  Age at listing (hypothetical): 70 years  Sex: Male at 4/14/2025  5:47 AM     Continue to monitor MELD labs  Monitor intake output, fluid and salt restriction  Continue diuretics as tolerated, monitor response  Midodrine for blood pressure support  Recheck limited ultrasound for ascites due to abdominal distention  GI follow-up  Outpatient elastography  Alcohol cessation    Alcohol abuse  [facility] protocol  Alcohol cessation  Continue thiamine folic acid and multivitamin  Chronic obstructive pulmonary disease with acute exacerbation (HCC)  On Advair inhaler, albuterol inhaler as needed at home.  Patient was on IV Solu-Medrol initially which was changed to prednisone 40 mg p.o. daily  Currently denies shortness of breath, mild cough.  Mild leukocytosis secondary to steroids  Continue bronchodilators, Xopenex as needed   Substituted Advair with Breo  Symptomatic treatment, Robitussin as needed  Taper prednisone  O2 saturation is in mid 90s on 2 L oxygen.  Titrate down oxygen as tolerated  Chronic combined systolic and diastolic congestive heart failure (HCC)  Wt Readings from Last 3 Encounters:   04/15/25 72.6 kg (160 lb)   03/24/25 71.2 kg (157 lb)   01/22/25 68.9 kg (152 lb)   On torsemide 20 mg p.o. daily if weight above 155 pounds at home.  2D echo in February 2025 showed grossly normal LV and RV size and systolic function, mild AR.    EKG sinus rhythm with PVCs, wide QRS with left BBB, no acute ischemic  changes.  Mild pedal edema on exam.  Trace bilateral pleural effusions and mild ascites on CT.  No pulm edema on CT.  Albumin 2.6.  Could be contributing to pleural effusions and ascites.  Likely volume overload/diffuse edema secondary to hypoalbuminemia/cirrhosis  Started on Lasix 20 mg p.o. daily along with Aldactone 50 mg p.o. daily  Monitor response, titrate up as needed  Daily weight, intake output  Follow-up BMP, electrolytes    Abnormal CT scan  Reports chronic diarrhea about 10 times a day.  And chronic nausea vomiting a few times a day.  Also with chronic diffuse abdominal pain.  Stool bacterial panel negative, stool C. difficile negative.  Fecal elastase within normal limit 317  Abnormal CT scan with sigmoid colon thickening, suspected colopathy  Still reports multiple frequent loose/soft bowel movement  GI following, input appreciated  Patient was initially started on ceftriaxone and Flagyl which can be discontinued as patient has low clinical signs of infection  Monitor stool chart  Patient started on Creon and psyllium  Imodium as needed  Follow-up fecal calprotectin, celiac panel, ova parasite,  Elevated troponin  Troponin 56-51-37  Denies chest pain  EKG as above  Most likely non-MI troponin elevation  Thrombocytopenia (HCC)  Likely due to alcohol abuse/cirrhosis  CT abdomen--fatty liver, unremarkable spleen  Ultrasound with the abdomen increase hepatic echogenicity  Follow-up RUQ ultrasound  Monitor CBC  Hold pharmacologic DVT prophylaxis  History of bleeding ulcers  Continue PPI daily  Hepatic steatosis  Noted history  Triglycerides 229 with LDL level of 82  Possible secondary to alcohol abuse  Right inguinal pain  Patient reported right groin discomfort, no significant tenderness on exam, abdominal distention noted  Patient denies any dysuria but reports frequency with small output  Normal range of motion at right hip, denies any fall trauma or injury  Monitor symptoms  Check UA, bladder  scan  Repeat limited ultrasound of abdomen  Nicotine abuse  Nicotine patch, smoking cessation  PMR (polymyalgia rheumatica) (HCC)  Noted history  Ectasia of artery (HCC)  CT showed - Fusiform ectasia of the ascending thoracic aorta measuring up to 45 mm is stable. Recommendation is for follow-up low radiation dose chest CT in one year.   Generalized weakness  Suspect due to alcohol abuse, poor oral intake, chronic diarrhea nausea vomiting.   UA was unremarkable  PT /OT evaluation and treatment  Fall precautions  Goals of care, counseling/discussion  Wife reported that she felt like patient is going to die on the day of admission.  Patient is having frequent hospitalization and patient is not going to quit alcohol.  Wife would prefer patient to be comfortable and was questioning about hospice  Discussed with wife by previous provider that patient might not be a candidate for hospice but can provide palliative care referral due to multiple comorbid conditions and continued alcohol use.  Wife would also like some resources at home.  Will refer to palliative care on discharge  Hypokalemia  Improved after potassium supplementation  Monitor      VTE Pharmacologic Prophylaxis: VTE Score: 4 Moderate Risk (Score 3-4) - Pharmacological DVT Prophylaxis Contraindicated. Sequential Compression Devices Ordered.    Mobility:   Basic Mobility Inpatient Raw Score: 12  JH-HLM Goal: 4: Move to chair/commode  JH-HLM Achieved: 6: Walk 10 steps or more  JH-HLM Goal NOT achieved. Continue with multidisciplinary rounding and encourage appropriate mobility to improve upon JH-HLM goals.    Patient Centered Rounds: I performed bedside rounds with nursing staff today.   Discussions with Specialists or Other Care Team Provider: GI, palliative care    Education and Discussions with Family / Patient: Updated  (wife) at bedside.    Current Length of Stay: 3 day(s)  Current Patient Status: Inpatient   Certification Statement: The  patient will continue to require additional inpatient hospital stay due to abdominal distention, frequent diarrhea  Discharge Plan: Anticipate discharge in 24-48 hrs to discharge location to be determined pending rehab evaluations.    Code Status: Level 3 - DNAR and DNI    Subjective   Reports right groin pain  Denies any fever, chills, eating well  Still reports multiple episodes of loose bowel movement, reportedly 15 episodes yesterday  Denies any chest pain, shortness of breath  Denies any urinary difficulties but reports only small amount of urine output    Denies any falls or injury      Objective :  Temp:  [97.3 °F (36.3 °C)-97.5 °F (36.4 °C)] 97.3 °F (36.3 °C)  HR:  [65-81] 81  BP: (100-114)/(52-67) 111/67  Resp:  [18-19] 19  SpO2:  [90 %-92 %] 91 %  O2 Device: None (Room air)    Body mass index is 24.33 kg/m².     Input and Output Summary (last 24 hours):     Intake/Output Summary (Last 24 hours) at 4/15/2025 0835  Last data filed at 4/14/2025 1300  Gross per 24 hour   Intake 440 ml   Output --   Net 440 ml       Physical Exam  Constitutional:       General: He is not in acute distress.     Comments: Chronically ill, comfortable   HENT:      Head: Normocephalic and atraumatic.   Cardiovascular:      Rate and Rhythm: Normal rate.   Pulmonary:      Effort: Pulmonary effort is normal. No respiratory distress.      Breath sounds: No rhonchi.   Abdominal:      General: Bowel sounds are normal. There is distension.      Palpations: Abdomen is soft.      Tenderness: There is no abdominal tenderness. There is no guarding or rebound.   Musculoskeletal:      Right lower leg: Edema present.      Left lower leg: Edema present.      Comments: Normal range of motion bilateral lower extremity especially at right hip   Skin:     General: Skin is warm and dry.      Findings: No rash.   Neurological:      Mental Status: He is alert. Mental status is at baseline.      Cranial Nerves: No cranial nerve deficit.          Lines/Drains:  Lines/Drains/Airways       Active Status       Name Placement date Placement time Site Days    External Urinary Catheter Medium 04/11/25 2022  -- 3                            Lab Results: I have reviewed the following results:   Results from last 7 days   Lab Units 04/15/25  0452 04/12/25  0308 04/11/25  1707   WBC Thousand/uL 13.84*   < > 6.82   HEMOGLOBIN g/dL 11.9*   < > 13.6   HEMATOCRIT % 34.8*   < > 40.5   PLATELETS Thousands/uL 135*   < > 86*   SEGS PCT %  --   --  81*   LYMPHO PCT %  --   --  8*   MONO PCT %  --   --  10   EOS PCT %  --   --  0    < > = values in this interval not displayed.     Results from last 7 days   Lab Units 04/15/25  0452   SODIUM mmol/L 136   POTASSIUM mmol/L 4.3   CHLORIDE mmol/L 101   CO2 mmol/L 26   BUN mg/dL 14   CREATININE mg/dL 0.79   ANION GAP mmol/L 9   CALCIUM mg/dL 8.3*   ALBUMIN g/dL 2.2*   TOTAL BILIRUBIN mg/dL 2.77*   ALK PHOS U/L 136*   ALT U/L 39   AST U/L 66*   GLUCOSE RANDOM mg/dL 125     Results from last 7 days   Lab Units 04/12/25  0308   INR  1.64*     Results from last 7 days   Lab Units 04/11/25  1700   POC GLUCOSE mg/dl 110               Recent Cultures (last 7 days):   Results from last 7 days   Lab Units 04/12/25  0620   C DIFF TOXIN B BY PCR  Negative       Imaging Results Review: I reviewed radiology reports from this admission including: CT chest, CT abdomen/pelvis, CT head, and Ultrasound(s).  Other Study Results Review: No additional pertinent studies reviewed.    Last 24 Hours Medication List:     Current Facility-Administered Medications:     carvedilol (COREG) tablet 3.125 mg, BID With Meals    cyanocobalamin (VITAMIN B-12) tablet 1,000 mcg, Daily    famotidine (PEPCID) tablet 20 mg, HS    Fluticasone Furoate-Vilanterol 100-25 mcg/actuation 1 puff, Daily    folic acid (FOLVITE) tablet 1 mg, Daily    furosemide (LASIX) tablet 20 mg, Daily    gabapentin (NEURONTIN) capsule 100 mg, TID    guaiFENesin (ROBITUSSIN) oral liquid 200  mg, Q4H PRN    levalbuterol (XOPENEX) inhalation solution 0.63 mg, Q6H PRN    midodrine (PROAMATINE) tablet 5 mg, TID AC    multivitamin-minerals (CENTRUM) tablet 1 tablet, Daily    nicotine (NICODERM CQ) 21 mg/24 hr TD 24 hr patch 1 patch, Daily    ondansetron (ZOFRAN) injection 4 mg, Q6H PRN    pancrelipase (Lip-Prot-Amyl) (CREON) delayed release capsule 36,000 Units, TID With Meals    pantoprazole (PROTONIX) EC tablet 40 mg, Daily Before Breakfast    predniSONE tablet 40 mg, Daily    psyllium (METAMUCIL) 1 packet, BID before lunch/dinner    spironolactone (ALDACTONE) tablet 50 mg, Daily    thiamine tablet 100 mg, Daily    Administrative Statements   Today, Patient Was Seen By: Romy Rivera MD  I have spent a total time of 45 minutes in caring for this patient on the day of the visit/encounter including Diagnostic results, Patient and family education, Importance of tx compliance, Risk factor reductions, Impressions, Counseling / Coordination of care, Documenting in the medical record, Reviewing/placing orders in the medical record (including tests, medications, and/or procedures), Obtaining or reviewing history  , and Communicating with other healthcare professionals .    **Please Note: This note may have been constructed using a voice recognition system.**

## 2025-04-15 NOTE — ASSESSMENT & PLAN NOTE
Likely due to alcohol abuse/cirrhosis  CT abdomen--fatty liver, unremarkable spleen  Ultrasound with the abdomen increase hepatic echogenicity  Follow-up RUQ ultrasound  Monitor CBC  Hold pharmacologic DVT prophylaxis

## 2025-04-15 NOTE — ASSESSMENT & PLAN NOTE
[facility] protocol  Alcohol cessation  Continue thiamine folic acid and multivitamin

## 2025-04-15 NOTE — CASE MANAGEMENT
Case Management Discharge Planning Note    Patient name Del Ocampo  Location [address] [place] [address]/[address] [place] [address]-* MRN [id number]  : 1955 Date 4/15/2025       Current Admission Date: 2025  Current Admission Diagnosis:Acute encephalopathy   Patient Active Problem List    Diagnosis Date Noted Date Diagnosed    Chronic diarrhea 2025     Thrombocytopenia (HCC) 2025     Ectasia of artery (HCC) 2025     Elevated troponin 2025     Goals of care, counseling/discussion 2025     Acute encephalopathy 2025     Generalized weakness 2025     Abnormal CT scan 2025     Jaw pain 2025     Rash 2024     Chronic combined systolic and diastolic congestive heart failure (HCC) 2024     Serum total bilirubin elevated 2024     Elevated LFTs 2024     Temporal arteritis (HCC)      Neuropathy 2024     Chronic obstructive pulmonary disease with acute exacerbation (HCC) 2024     Gastroesophageal reflux disease 2024     Pancreatic insufficiency 04/15/2024     Hepatic steatosis 2024     Ascites 2024     Alcohol abuse 2024     Hypokalemia 2024     Nicotine abuse 2024     History of bleeding ulcers 2024     Aneurysm of descending thoracic aorta without rupture (HCC) 2024     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  [address]  [place] [address]  Phone:  [phone] Fax: [phone]    Primary Care Provider: Frank Lombardi, DO    Primary Insurance: MEDICARE  Secondary Insurance: [employer]    DISCHARGE DETAILS:    Discharge planning discussed with:: Patient and wife Diann  Freedom of Choice: Yes  Comments - Freedom of Choice: CM met with patient and wife at bedside to provide list of accepting STR facilities for their review. Patient/wife asked to discuss options and confirm CM in the morning. CM to return to confirm facility choice.

## 2025-04-15 NOTE — ASSESSMENT & PLAN NOTE
Reports chronic diarrhea about 10 times a day.  And chronic nausea vomiting a few times a day.  Also with chronic diffuse abdominal pain.  Stool bacterial panel negative, stool C. difficile negative.  Fecal elastase within normal limit 317  Abnormal CT scan with sigmoid colon thickening, suspected colopathy  Still reports multiple frequent loose/soft bowel movement  GI following, input appreciated  Patient was initially started on ceftriaxone and Flagyl which can be discontinued as patient has low clinical signs of infection  Monitor stool chart  Patient started on Creon and psyllium  Imodium as needed  Follow-up fecal calprotectin, celiac panel, ova parasite,

## 2025-04-15 NOTE — ASSESSMENT & PLAN NOTE
Wife reported that she felt like patient is going to die on the day of admission.  Patient is having frequent hospitalization and patient is not going to quit alcohol.  Wife would prefer patient to be comfortable and was questioning about hospice  Discussed with wife by previous provider that patient might not be a candidate for hospice but can provide palliative care referral due to multiple comorbid conditions and continued alcohol use.  Wife would also like some resources at home.  Will refer to palliative care on discharge

## 2025-04-15 NOTE — ASSESSMENT & PLAN NOTE
On Advair inhaler, albuterol inhaler as needed at home.  Patient was on IV Solu-Medrol initially which was changed to prednisone 40 mg p.o. daily  Currently denies shortness of breath, mild cough.  Mild leukocytosis secondary to steroids  Continue bronchodilators, Xopenex as needed   Substituted Advair with Breo  Symptomatic treatment, Robitussin as needed  Taper prednisone  O2 saturation is in mid 90s on 2 L oxygen.  Titrate down oxygen as tolerated

## 2025-04-15 NOTE — PROGRESS NOTES
Progress Note - Gastroenterology   Name: Del Ocampo 70 y.o. male I MRN: [id number]  Unit/Bed#: 4 [place] 409-01 I Date of Admission: 4/11/2025   Date of Service: 4/15/2025 I Hospital Day: 3    Assessment & Plan  Elevated LFTs  MELD 3.0: 17 at 4/14/2025  5:47 AM  MELD-Na: 15 at 4/14/2025  5:47 AM  Calculated from:  Serum Creatinine: 0.82 mg/dL (Using min of 1 mg/dL) at 4/14/2025  5:47 AM  Serum Sodium: 139 mmol/L (Using max of 137 mmol/L) at 4/14/2025  5:47 AM  Total Bilirubin: 2.71 mg/dL at 4/14/2025  5:47 AM  Serum Albumin: 2.2 g/dL at 4/14/2025  5:47 AM  INR(ratio): 1.64 at 4/12/2025  3:08 AM  Age at listing (hypothetical): 70 years  Sex: Male at 4/14/2025  5:47 AM    -highly suspicious for cirrhosis in the setting of thrombocytopenia, pleural effusions, ascites, low albumin, and elevated INR.  Both ultrasound and CT scan is showing an enlarged fatty liver.  -Patient would benefit from outpatient follow-up with hepatology and a possible elastography to confirm cirrhosis  -No signs of any liver lesions on imaging at this time  -Pt started on low-dose Lasix 20 mg and Aldactone 50 mg, per primary team also started on Midodrine 5mg TID due to soft BPs. Continue to monitor.   -Will recheck US limited to see if there's enough fluid for paracentesis   -Patient has had longstanding alcohol abuse, technically Madrey discriminant factor is 34.9 but this is less likely acute alcoholic hepatitis and most likely more related to decompensated cirrhosis.  Can hold off on steroids for alcoholic hepatitis is at this time unless he would significantly worsen  - No signs of asterixis on exam.  Patient is alert  - Continue to monitor MELD labs closely  - No history of esophageal varices on EGD in 2024.    Alcohol abuse  -recommend alcohol cessation, patient seems more willing to consider this today per my discussion with him   Hepatic steatosis  -as above   Abnormal CT scan  -Patient noted to have some sigmoid colon thickening.   Suspect this is secondary to colopathy from fluid overload.  Had colonoscopy last year without any signs of colitis.  Would not recommend repeat colonoscopy at this time  Chronic diarrhea  Has had workup for this in the past. Random colon bx negative for microscopic colitis 3/28/24. C.diff, enteric panel negative this admission. Fecal elastase . Per patient's wife stool has more consistency but he's still having upwards of 10 BM per day per his report. States on a good day at home he was having 5.   - Continue Metamucil BID  - Continue Creon 36,000 units before meals based on weight  -Add imodium PRN  -f/u fecal calprotectin, celiac panel, O+P  -Monitor stool output    Hypokalemia          Subjective   Pt with wife at bedside. Reports eating well no nausea or vomiting. Reports some r sided groin pain when he moves. Reports he had 15 Bm yesterday, 4 so far today. Soft instead of liquid, non bloody, and has no abdominal pain associated with BM or eating.     Objective :  Temp:  [97.1 °F (36.2 °C)-97.3 °F (36.3 °C)] 97.1 °F (36.2 °C)  HR:  [65-81] 66  BP: (110-114)/(61-67) 110/61  Resp:  [18-19] 18  SpO2:  [89 %-92 %] 89 %  O2 Device: None (Room air)    Physical Exam  Vitals and nursing note reviewed.   Constitutional:       General: He is not in acute distress.     Appearance: He is well-developed.   HENT:      Head: Normocephalic and atraumatic.   Eyes:      Conjunctiva/sclera: Conjunctivae normal.   Cardiovascular:      Rate and Rhythm: Normal rate and regular rhythm.      Heart sounds: No murmur heard.  Pulmonary:      Effort: Pulmonary effort is normal. No respiratory distress.      Breath sounds: Normal breath sounds.   Abdominal:      Palpations: Abdomen is soft.      Tenderness: There is no abdominal tenderness.   Musculoskeletal:         General: No swelling.      Cervical back: Neck supple.   Skin:     General: Skin is warm and dry.      Capillary Refill: Capillary refill takes less than 2 seconds.    Neurological:      Mental Status: He is alert and oriented to person, place, and time.   Psychiatric:         Mood and Affect: Mood normal.           Lab Results: I have reviewed the following results:

## 2025-04-15 NOTE — ASSESSMENT & PLAN NOTE
Has had workup for this in the past. Random colon bx negative for microscopic colitis 3/28/24. C.diff, enteric panel negative this admission. Fecal elastase . Per patient's wife stool has more consistency but he's still having upwards of 10 BM per day per his report. States on a good day at home he was having 5.   - Continue Metamucil BID  - Continue Creon 36,000 units before meals based on weight  -Add imodium PRN  -f/u fecal calprotectin, celiac panel, O+P  -Monitor stool output

## 2025-04-16 PROBLEM — R10.31 RIGHT INGUINAL PAIN: Status: ACTIVE | Noted: 2025-04-16

## 2025-04-16 PROBLEM — G93.40 ACUTE ENCEPHALOPATHY: Status: RESOLVED | Noted: 2025-04-11 | Resolved: 2025-04-16

## 2025-04-16 LAB
ALBUMIN SERPL BCG-MCNC: 2.7 G/DL (ref 3.5–5)
ALP SERPL-CCNC: 174 U/L (ref 34–104)
ALT SERPL W P-5'-P-CCNC: 48 U/L (ref 7–52)
ANION GAP SERPL CALCULATED.3IONS-SCNC: 10 MMOL/L (ref 4–13)
AST SERPL W P-5'-P-CCNC: 65 U/L (ref 13–39)
BILIRUB SERPL-MCNC: 2.82 MG/DL (ref 0.2–1)
BUN SERPL-MCNC: 17 MG/DL (ref 5–25)
CALCIUM ALBUM COR SERPL-MCNC: 9.8 MG/DL (ref 8.3–10.1)
CALCIUM SERPL-MCNC: 8.8 MG/DL (ref 8.4–10.2)
CHLORIDE SERPL-SCNC: 97 MMOL/L (ref 96–108)
CO2 SERPL-SCNC: 26 MMOL/L (ref 21–32)
CREAT SERPL-MCNC: 0.83 MG/DL (ref 0.6–1.3)
ERYTHROCYTE [DISTWIDTH] IN BLOOD BY AUTOMATED COUNT: 16.1 % (ref 11.6–15.1)
GFR SERPL CREATININE-BSD FRML MDRD: 87 ML/MIN/1.73SQ M
GLUCOSE SERPL-MCNC: 148 MG/DL (ref 65–140)
HCT VFR BLD AUTO: 37.3 % (ref 36.5–49.3)
HGB BLD-MCNC: 12.9 G/DL (ref 12–17)
IGA SERPL-MCNC: 447 MG/DL (ref 66–433)
INR PPP: 1.13 (ref 0.85–1.19)
MCH RBC QN AUTO: 37.9 PG (ref 26.8–34.3)
MCHC RBC AUTO-ENTMCNC: 34.6 G/DL (ref 31.4–37.4)
MCV RBC AUTO: 110 FL (ref 82–98)
PLATELET # BLD AUTO: 183 THOUSANDS/UL (ref 149–390)
PMV BLD AUTO: 13.1 FL (ref 8.9–12.7)
POTASSIUM SERPL-SCNC: 3.9 MMOL/L (ref 3.5–5.3)
PROT SERPL-MCNC: 5.2 G/DL (ref 6.4–8.4)
PROTHROMBIN TIME: 15 SECONDS (ref 12.3–15)
RBC # BLD AUTO: 3.4 MILLION/UL (ref 3.88–5.62)
SODIUM SERPL-SCNC: 133 MMOL/L (ref 135–147)
WBC # BLD AUTO: 19.52 THOUSAND/UL (ref 4.31–10.16)

## 2025-04-16 PROCEDURE — 97530 THERAPEUTIC ACTIVITIES: CPT

## 2025-04-16 PROCEDURE — 86364 TISS TRNSGLTMNASE EA IG CLAS: CPT | Performed by: NURSE PRACTITIONER

## 2025-04-16 PROCEDURE — 85027 COMPLETE CBC AUTOMATED: CPT | Performed by: INTERNAL MEDICINE

## 2025-04-16 PROCEDURE — 80053 COMPREHEN METABOLIC PANEL: CPT | Performed by: INTERNAL MEDICINE

## 2025-04-16 PROCEDURE — 87209 SMEAR COMPLEX STAIN: CPT | Performed by: NURSE PRACTITIONER

## 2025-04-16 PROCEDURE — 82784 ASSAY IGA/IGD/IGG/IGM EACH: CPT | Performed by: NURSE PRACTITIONER

## 2025-04-16 PROCEDURE — 99232 SBSQ HOSP IP/OBS MODERATE 35: CPT | Performed by: INTERNAL MEDICINE

## 2025-04-16 PROCEDURE — 97110 THERAPEUTIC EXERCISES: CPT

## 2025-04-16 PROCEDURE — 83993 ASSAY FOR CALPROTECTIN FECAL: CPT | Performed by: NURSE PRACTITIONER

## 2025-04-16 PROCEDURE — 87177 OVA AND PARASITES SMEARS: CPT | Performed by: NURSE PRACTITIONER

## 2025-04-16 PROCEDURE — 85610 PROTHROMBIN TIME: CPT | Performed by: INTERNAL MEDICINE

## 2025-04-16 RX ORDER — PREDNISONE 10 MG/1
10 TABLET ORAL DAILY
Status: DISCONTINUED | OUTPATIENT
Start: 2025-04-19 | End: 2025-04-17 | Stop reason: HOSPADM

## 2025-04-16 RX ORDER — FUROSEMIDE 10 MG/ML
40 INJECTION INTRAMUSCULAR; INTRAVENOUS ONCE
Status: DISCONTINUED | OUTPATIENT
Start: 2025-04-16 | End: 2025-04-16

## 2025-04-16 RX ORDER — PREDNISONE 20 MG/1
20 TABLET ORAL DAILY
Status: DISCONTINUED | OUTPATIENT
Start: 2025-04-17 | End: 2025-04-17 | Stop reason: HOSPADM

## 2025-04-16 RX ORDER — HEPARIN SODIUM 5000 [USP'U]/ML
5000 INJECTION, SOLUTION INTRAVENOUS; SUBCUTANEOUS EVERY 12 HOURS SCHEDULED
Status: DISCONTINUED | OUTPATIENT
Start: 2025-04-16 | End: 2025-04-17 | Stop reason: HOSPADM

## 2025-04-16 RX ORDER — FUROSEMIDE 10 MG/ML
40 INJECTION INTRAMUSCULAR; INTRAVENOUS ONCE
Status: COMPLETED | OUTPATIENT
Start: 2025-04-16 | End: 2025-04-16

## 2025-04-16 RX ORDER — ALBUMIN (HUMAN) 12.5 G/50ML
12.5 SOLUTION INTRAVENOUS ONCE
Status: COMPLETED | OUTPATIENT
Start: 2025-04-16 | End: 2025-04-16

## 2025-04-16 RX ADMIN — Medication 1 TABLET: at 08:58

## 2025-04-16 RX ADMIN — GABAPENTIN 100 MG: 100 CAPSULE ORAL at 21:31

## 2025-04-16 RX ADMIN — FUROSEMIDE 40 MG: 10 INJECTION, SOLUTION INTRAMUSCULAR; INTRAVENOUS at 16:46

## 2025-04-16 RX ADMIN — SPIRONOLACTONE 50 MG: 25 TABLET ORAL at 08:58

## 2025-04-16 RX ADMIN — FAMOTIDINE 20 MG: 20 TABLET, FILM COATED ORAL at 21:31

## 2025-04-16 RX ADMIN — ALBUMIN (HUMAN) 12.5 G: 0.25 INJECTION, SOLUTION INTRAVENOUS at 16:35

## 2025-04-16 RX ADMIN — GABAPENTIN 100 MG: 100 CAPSULE ORAL at 08:58

## 2025-04-16 RX ADMIN — FOLIC ACID 1 MG: 1 TABLET ORAL at 08:58

## 2025-04-16 RX ADMIN — PANCRELIPASE 36000 UNITS: 120000; 24000; 76000 CAPSULE, DELAYED RELEASE PELLETS ORAL at 16:38

## 2025-04-16 RX ADMIN — PANTOPRAZOLE SODIUM 40 MG: 40 TABLET, DELAYED RELEASE ORAL at 06:17

## 2025-04-16 RX ADMIN — PREDNISONE 20 MG: 20 TABLET ORAL at 08:58

## 2025-04-16 RX ADMIN — PSYLLIUM HUSK 1 PACKET: 3.4 POWDER ORAL at 11:59

## 2025-04-16 RX ADMIN — MIDODRINE HYDROCHLORIDE 5 MG: 5 TABLET ORAL at 16:34

## 2025-04-16 RX ADMIN — HEPARIN SODIUM 5000 UNITS: 5000 INJECTION INTRAVENOUS; SUBCUTANEOUS at 21:31

## 2025-04-16 RX ADMIN — THIAMINE HCL TAB 100 MG 100 MG: 100 TAB at 08:58

## 2025-04-16 RX ADMIN — FUROSEMIDE 20 MG: 20 TABLET ORAL at 08:58

## 2025-04-16 RX ADMIN — CARVEDILOL 3.12 MG: 3.12 TABLET, FILM COATED ORAL at 08:58

## 2025-04-16 RX ADMIN — PANCRELIPASE 36000 UNITS: 120000; 24000; 76000 CAPSULE, DELAYED RELEASE PELLETS ORAL at 11:58

## 2025-04-16 RX ADMIN — MIDODRINE HYDROCHLORIDE 5 MG: 5 TABLET ORAL at 06:17

## 2025-04-16 RX ADMIN — MIDODRINE HYDROCHLORIDE 5 MG: 5 TABLET ORAL at 11:58

## 2025-04-16 RX ADMIN — CYANOCOBALAMIN TAB 500 MCG 1000 MCG: 500 TAB at 08:58

## 2025-04-16 RX ADMIN — GABAPENTIN 100 MG: 100 CAPSULE ORAL at 16:34

## 2025-04-16 RX ADMIN — PANCRELIPASE 36000 UNITS: 120000; 24000; 76000 CAPSULE, DELAYED RELEASE PELLETS ORAL at 08:59

## 2025-04-16 RX ADMIN — NICOTINE 1 PATCH: 21 PATCH, EXTENDED RELEASE TRANSDERMAL at 09:07

## 2025-04-16 RX ADMIN — FLUTICASONE FUROATE AND VILANTEROL TRIFENATATE 1 PUFF: 100; 25 POWDER RESPIRATORY (INHALATION) at 08:59

## 2025-04-16 RX ADMIN — LOPERAMIDE HYDROCHLORIDE 2 MG: 2 CAPSULE ORAL at 11:58

## 2025-04-16 RX ADMIN — PSYLLIUM HUSK 1 PACKET: 3.4 POWDER ORAL at 16:40

## 2025-04-16 NOTE — ASSESSMENT & PLAN NOTE
Wt Readings from Last 3 Encounters:   04/16/25 72.9 kg (160 lb 12.8 oz)   03/24/25 71.2 kg (157 lb)   01/22/25 68.9 kg (152 lb)   On torsemide 20 mg p.o. daily if weight above 155 pounds at home.  2D echo in February 2025 showed grossly normal LV and RV size and systolic function, mild AR.    EKG sinus rhythm with PVCs, wide QRS with left BBB, no acute ischemic changes.  Mild pedal edema on exam.  Trace bilateral pleural effusions and mild ascites on CT.  No pulm edema on CT.  Albumin 2.6.  Could be contributing to pleural effusions and ascites.  Likely volume overload/diffuse edema secondary to hypoalbuminemia/cirrhosis  Started on Lasix 20 mg p.o. daily along with Aldactone 50 mg p.o. daily  Monitor response, titrate up as needed  Daily weight, intake output  Follow-up BMP, electrolytes, will require close monitoring

## 2025-04-16 NOTE — PROGRESS NOTES
"Date of Fall: 04/16/25  Observer/Reported time of Fall: 1702  Name of Provider Notified: Miguel  Time Provider Notified: 1703  Assessment of Patient Injury: Interruption in skin integrity  Post Fall Interventions: Circumstances of fall reviewed and documented; Fall risk precautions implemented/maitained; Comforts rounds continued; Need for additional safety measures intiated if necessary  Family/Next of kin notified?: Yes (Wife in the room)  Pt's wife assisted pt to the bathroom and closed the door. Pt states, \" I shifted to move the dress out of the way and stretched and went down.\" Wife came to hallway and notified staff.   "

## 2025-04-16 NOTE — PLAN OF CARE
Problem: Potential for Falls  Goal: Patient will remain free of falls  Description: INTERVENTIONS:- Educate patient/family on patient safety including physical limitations- Instruct patient to call for assistance with activity - Consult OT/PT to assist with strengthening/mobility - Keep Call bell within reach- Keep bed low and locked with side rails adjusted as appropriate- Keep care items and personal belongings within reach- Initiate and maintain comfort rounds- Make Fall Risk Sign visible to staff- Offer Toileting every 2 Hours, in advance of need- Initiate/Maintain alarm- Obtain necessary fall risk management equipment: - Apply yellow socks and bracelet for high fall risk patients- Consider moving patient to room near nurses station  INTERVENTIONS:- Educate patient/family on patient safety including physical limitations- Instruct patient to call for assistance with activity - Consult OT/PT to assist with strengthening/mobility - Keep Call bell within reach- Keep bed low and locked with side rails adjusted as appropriate- Keep care items and personal belongings within reach- Initiate and maintain comfort rounds- Make Fall Risk Sign visible to staff- Offer Toileting every 2 Hours, in advance of need- Initiate/Maintain bed/chair alarm- Obtain necessary fall risk management equipment: - Apply yellow socks and bracelet for high fall risk patients- Consider moving patient to room near nurses station  Outcome: Progressing     Problem: PAIN - ADULT  Goal: Verbalizes/displays adequate comfort level or baseline comfort level  Description: Interventions:- Encourage patient to monitor pain and request assistance- Assess pain using appropriate pain scale- Administer analgesics based on type and severity of pain and evaluate response- Implement non-pharmacological measures as appropriate and evaluate response- Consider cultural and social influences on pain and pain management- Notify physician/advanced practitioner if  interventions unsuccessful or patient reports new pain  Outcome: Progressing     Problem: INFECTION - ADULT  Goal: Absence or prevention of progression during hospitalization  Description: INTERVENTIONS:- Assess and monitor for signs and symptoms of infection- Monitor lab/diagnostic results- Monitor all insertion sites, i.e. indwelling lines, tubes, and drains- Monitor endotracheal if appropriate and nasal secretions for changes in amount and color- [place] appropriate cooling/warming therapies per order- Administer medications as ordered- Instruct and encourage patient and family to use good hand hygiene technique- Identify and instruct in appropriate isolation precautions for identified infection/condition  Outcome: Progressing     Problem: SAFETY ADULT  Goal: Patient will remain free of falls  Description: INTERVENTIONS:- Educate patient/family on patient safety including physical limitations- Instruct patient to call for assistance with activity - Consult OT/PT to assist with strengthening/mobility - Keep Call bell within reach- Keep bed low and locked with side rails adjusted as appropriate- Keep care items and personal belongings within reach- Initiate and maintain comfort rounds- Make Fall Risk Sign visible to staff- Offer Toileting every 2 Hours, in advance of need- Initiate/Maintain bed/chair alarm- Obtain necessary fall risk management equipment: - Apply yellow socks and bracelet for high fall risk patients- Consider moving patient to room near nurses station  INTERVENTIONS:- Educate patient/family on patient safety including physical limitations- Instruct patient to call for assistance with activity - Consult OT/PT to assist with strengthening/mobility - Keep Call bell within reach- Keep bed low and locked with side rails adjusted as appropriate- Keep care items and personal belongings within reach- Initiate and maintain comfort rounds- Make Fall Risk Sign visible to staff- Offer Toileting every 2  Hours, in advance of need- Initiate/Maintain bed/chair alarm- Obtain necessary fall risk management equipment: - Apply yellow socks and bracelet for high fall risk patients- Consider moving patient to room near nurses station  Outcome: Progressing  Goal: Maintain or return to baseline ADL function  Description: INTERVENTIONS:-  Assess patient's ability to carry out ADLs; assess patient's baseline for ADL function and identify physical deficits which impact ability to perform ADLs (bathing, care of mouth/teeth, toileting, grooming, dressing, etc.)- Assess/evaluate cause of self-care deficits - Assess range of motion- Assess patient's mobility; develop plan if impaired- Assess patient's need for assistive devices and provide as appropriate- Encourage maximum independence but intervene and supervise when necessary- Involve family in performance of ADLs- Assess for home care needs following discharge - Consider OT consult to assist with ADL evaluation and planning for discharge- Provide patient education as appropriate  Outcome: Progressing  Goal: Maintains/Returns to pre admission functional level  Description: INTERVENTIONS:- Perform AM-PAC 6 Click Basic Mobility/ Daily Activity assessment daily.- Set and communicate daily mobility goal to care team and patient/family/caregiver. - Collaborate with rehabilitation services on mobility goals if consulted- Perform Range of Motion 12 times a day.- Reposition patient every 2 hours.- Dangle patient 3 times a day- Stand patient 3 times a day- Ambulate patient 3 times a day- Out of bed to chair 3 times a day - Out of bed for meals 3 times a day- Out of bed for toileting- Record patient progress and toleration of activity level   Outcome: Progressing     Problem: DISCHARGE PLANNING  Goal: Discharge to home or other facility with appropriate resources  Description: INTERVENTIONS:- Identify barriers to discharge w/patient and caregiver- Arrange for needed discharge resources and  transportation as appropriate- Identify discharge learning needs (meds, wound care, etc.)- Arrange for interpretive services to assist at discharge as needed- Refer to Case Management Department for coordinating discharge planning if the patient needs post-hospital services based on physician/advanced practitioner order or complex needs related to functional status, cognitive ability, or social support system  Outcome: Progressing     Problem: Knowledge Deficit  Goal: Patient/family/caregiver demonstrates understanding of disease process, treatment plan, medications, and discharge instructions  Description: Complete learning assessment and assess knowledge base.Interventions:- Provide teaching at level of understanding- Provide teaching via preferred learning methods  Outcome: Progressing     Problem: Prexisting or High Potential for Compromised Skin Integrity  Goal: Skin integrity is maintained or improved  Description: INTERVENTIONS:- Identify patients at risk for skin breakdown- Assess and monitor skin integrity- Assess and monitor nutrition and hydration status- Monitor labs - Assess for incontinence - Turn and reposition patient- Assist with mobility/ambulation- Relieve pressure over bony prominences- Avoid friction and shearing- Provide appropriate hygiene as needed including keeping skin clean and dry- Evaluate need for skin moisturizer/barrier cream- Collaborate with interdisciplinary team - Patient/family teaching- Consider wound care consult   Outcome: Progressing     Problem: NEUROSENSORY - ADULT  Goal: Achieves maximal functionality and self care  Description: INTERVENTIONS- Monitor swallowing and airway patency with patient fatigue and changes in neurological status- Encourage and assist patient to increase activity and self care. - Encourage visually impaired, hearing impaired and aphasic patients to use assistive/communication devices  Outcome: Progressing     Problem: CARDIOVASCULAR - ADULT  Goal:  Maintains optimal cardiac output and hemodynamic stability  Description: INTERVENTIONS:- Monitor I/O, vital signs and rhythm- Monitor for S/S and trends of decreased cardiac output- Administer and titrate ordered vasoactive medications to optimize hemodynamic stability- Assess quality of pulses, skin color and temperature- Assess for signs of decreased coronary artery perfusion- Instruct patient to report change in severity of symptoms  Outcome: Progressing  Goal: Absence of cardiac dysrhythmias or at baseline rhythm  Description: INTERVENTIONS:- Continuous cardiac monitoring, vital signs, obtain 12 lead EKG if ordered- Administer antiarrhythmic and heart rate control medications as ordered- Monitor electrolytes and administer replacement therapy as ordered  Outcome: Progressing     Problem: GASTROINTESTINAL - ADULT  Goal: Maintains or returns to baseline bowel function  Description: INTERVENTIONS:- Assess bowel function- Encourage oral fluids to ensure adequate hydration- Administer IV fluids if ordered to ensure adequate hydration- Administer ordered medications as needed- Encourage mobilization and activity- Consider nutritional services referral to assist patient with adequate nutrition and appropriate food choices  Outcome: Progressing     Problem: MUSCULOSKELETAL - ADULT  Goal: Maintain or return mobility to safest level of function  Description: INTERVENTIONS:- Assess patient's ability to carry out ADLs; assess patient's baseline for ADL function and identify physical deficits which impact ability to perform ADLs (bathing, care of mouth/teeth, toileting, grooming, dressing, etc.)- Assess/evaluate cause of self-care deficits - Assess range of motion- Assess patient's mobility- Assess patient's need for assistive devices and provide as appropriate- Encourage maximum independence but intervene and supervise when necessary- Involve family in performance of ADLs- Assess for home care needs following discharge -  Consider OT consult to assist with ADL evaluation and planning for discharge- Provide patient education as appropriate  Outcome: Progressing

## 2025-04-16 NOTE — ASSESSMENT & PLAN NOTE
Has had workup for this in the past & pt denies any worsening prior to admission. C.diff, enteric panel negative this admission.  Fecal elastase . Random colon bx negative for microscopic colitis 3/28/24. Per patient's wife stool has more consistency and is now sometimes formed, has had 4 BM so far today    - Continue Metamucil BID  - Continue Creon 36,000 units before meals based on weight  -f/u fecal calprotectin, celiac panel, O+P  -Continue imodium PRN which he was taking at home  -Monitor stool output

## 2025-04-16 NOTE — CASE MANAGEMENT
Case Management Discharge Planning Note    Patient name Del Ocampo  Location [address] [place] [address]/[address] [place] [address]-* MRN [id number]  : 1955 Date 2025       Current Admission Date: 2025  Current Admission Diagnosis:Alcohol abuse   Patient Active Problem List    Diagnosis Date Noted Date Diagnosed    Right inguinal pain 2025     Chronic diarrhea 2025     Thrombocytopenia (HCC) 2025     Ectasia of artery (HCC) 2025     Elevated troponin 2025     Goals of care, counseling/discussion 2025     Generalized weakness 2025     Abnormal CT scan 2025     Jaw pain 2025     Rash 2024     Chronic combined systolic and diastolic congestive heart failure (HCC) 2024     Serum total bilirubin elevated 2024     Elevated LFTs 2024     Temporal arteritis (HCC)      Neuropathy 2024     Chronic obstructive pulmonary disease with acute exacerbation (HCC) 2024     Gastroesophageal reflux disease 2024     Pancreatic insufficiency 04/15/2024     Hepatic steatosis 2024     Ascites 2024     Alcohol abuse 2024     Hypokalemia 2024     Nicotine abuse 2024     History of bleeding ulcers 2024     Aneurysm of descending thoracic aorta without rupture (HCC) 2024     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     Left bundle-branch block, unspecified 01/10/2020     PMR (polymyalgia rheumatica) (HCC) 2015       LOS (days): 4  Geometric Mean LOS (GMLOS) (days): 3.3  Days to GMLOS:-1.2     OBJECTIVE:  Risk of Unplanned Readmission Score: 19.51     Current admission status: Inpatient   Preferred Pharmacy:   [facility]/pharmacy #[id number] - [place], [place] - [address]  [address] [facility] [address]  Phone: [phone] Fax: [phone]    [facility] - [place] - [address] [facility]  [address] [facility] [address] [facility]  [place] [address]  Phone:  [phone] Fax: [phone]    Primary Care Provider: Frank Lombardi, DO    Primary Insurance: MEDICARE  Secondary Insurance: [employer]    DISCHARGE DETAILS:    Discharge planning discussed with:: Patient's wife Diann  Freedom of Choice: Yes     CM contacted family/caregiver?: Yes  Were Treatment Team discharge recommendations reviewed with patient/caregiver?: Yes  Did patient/caregiver verbalize understanding of patient care needs?: Yes    Contacts  Patient Contacts: Wife Diann  Relationship to Patient:: Family  Contact Method: In Person  Reason/Outcome: Discharge Planning    Requested Home Health Care         Is the patient interested in HHC at discharge?: Yes  Home Health Discipline requested:: Occupational Therapy, Physical Therapy  Home Health Agency Name:: Other (TBD-referrals sent-pending)  [facility] External Referral Reason (only applicable if external HHA name selected): Services not provided in network or near patient location  Home Health Follow-Up Provider:: PCP  Home Health Services Needed:: Evaluate Functional Status and Safety, Gait/ADL Training, Strengthening/Theraputic Exercises to Improve Function  Supporting Clincal Findings:: Fatigues Easliy in Short Distances, Limited Endurance    DME Referral Provided  Referral made for DME?: No    Other Referral/Resources/Interventions Provided:  Referral Comments: CM cancelled STR referral and sent blanket HHC referrals in [place].    Treatment Team Recommendation: Home with Home Health Care  Discharge Destination Plan:: Home with Home Health Care    CM met with patient's wife Diann to discuss updated rehab recommendations for home therapy. Diann confirmed patient would be interested in home therapy and consented to have referrals sent.     CM made Diann aware CM will confirm agency for PT/OT and contact information will be on AVS at discharge.

## 2025-04-16 NOTE — ASSESSMENT & PLAN NOTE
On Advair inhaler, albuterol inhaler as needed at home.  Patient was on IV Solu-Medrol initially which was changed to prednisone 40 mg p.o. daily  Denies shortness of breath.  Mild leukocytosis secondary to steroids  Continue bronchodilators, Xopenex as needed   Substituted Advair with Breo  Symptomatic treatment, Robitussin as needed  Taper prednisone, 20 mg for 3 days followed by 10 mg for 3 days.    O2 saturation is in mid 90s on 2 L oxygen.  Titrate down oxygen as tolerated

## 2025-04-16 NOTE — PROGRESS NOTES
Progress Note - Hospitalist   Name: Del Ocampo 70 y.o. male I MRN: [id number]  Unit/Bed#: 4 [place] [address] I Date of Admission: 4/11/2025   Date of Service: 4/16/2025 I Hospital Day: 4    Assessment & Plan  Elevated LFTs  Highly suspicious for cirrhosis in the setting of thrombocytopenia with hypoalbuminemia with pleural effusions and ascites  CT showed enlarged fatty liver which he is known to have and nonspecific wall thickening of the gallbladder.  Right upper quadrant ultrasound with diffusely increased hepatic echogenicity consistent with hepatic steatosis and cannot exclude cirrhosis with diffuse gallbladder wall thickening  Patient has no formal diagnosis of cirrhosis but most likely patient has decompensated cirrhosis  Maddey discriminant factor is 34.9-patient less likely has acute alcoholic hepatitis and most likely decompensated cirrhosis.  GI recommending to hold off on steroids at the current time  Ultrasound limited on 4/15-small amount of ascites  MELD 3.0: 17 at 4/14/2025  5:47 AM  MELD-Na: 15 at 4/14/2025  5:47 AM  Calculated from:  Serum Creatinine: 0.82 mg/dL (Using min of 1 mg/dL) at 4/14/2025  5:47 AM  Serum Sodium: 139 mmol/L (Using max of 137 mmol/L) at 4/14/2025  5:47 AM  Total Bilirubin: 2.71 mg/dL at 4/14/2025  5:47 AM  Serum Albumin: 2.2 g/dL at 4/14/2025  5:47 AM  INR(ratio): 1.64 at 4/12/2025  3:08 AM  Age at listing (hypothetical): 70 years  Sex: Male at 4/14/2025  5:47 AM     Continue to monitor MELD labs  Monitor intake output, fluid and salt restriction  Continue diuretics as tolerated, monitor response  Midodrine for blood pressure support  Will require close monitoring of renal function and electrolytes in setting of diarrhea diuretic use and marginal blood pressure  GI follow-up  Outpatient elastography  Alcohol cessation    Chronic obstructive pulmonary disease with acute exacerbation (HCC)  On Advair inhaler, albuterol inhaler as needed at home.  Patient was on IV Solu-Medrol  initially which was changed to prednisone 40 mg p.o. daily  Denies shortness of breath.  Mild leukocytosis secondary to steroids  Continue bronchodilators, Xopenex as needed   Substituted Advair with Breo  Symptomatic treatment, Robitussin as needed  Taper prednisone, 20 mg for 3 days followed by 10 mg for 3 days.    O2 saturation is in mid 90s on 2 L oxygen.  Titrate down oxygen as tolerated  Chronic combined systolic and diastolic congestive heart failure (HCC)  Wt Readings from Last 3 Encounters:   04/16/25 72.9 kg (160 lb 12.8 oz)   03/24/25 71.2 kg (157 lb)   01/22/25 68.9 kg (152 lb)   On torsemide 20 mg p.o. daily if weight above 155 pounds at home.  2D echo in February 2025 showed grossly normal LV and RV size and systolic function, mild AR.    EKG sinus rhythm with PVCs, wide QRS with left BBB, no acute ischemic changes.  Mild pedal edema on exam.  Trace bilateral pleural effusions and mild ascites on CT.  No pulm edema on CT.  Albumin 2.6.  Could be contributing to pleural effusions and ascites.  Likely volume overload/diffuse edema secondary to hypoalbuminemia/cirrhosis  Started on Lasix 20 mg p.o. daily along with Aldactone 50 mg p.o. daily  Monitor response, titrate up as needed  Daily weight, intake output  Follow-up BMP, electrolytes, will require close monitoring    Abnormal CT scan  Reports chronic diarrhea about 10 times a day.  And chronic nausea vomiting a few times a day.  Also with chronic diffuse abdominal pain.  Stool bacterial panel negative, stool C. difficile negative.  Fecal elastase within normal limit 317  Abnormal CT scan with sigmoid colon thickening, suspected colopathy  Continues to have frequent bowel movement though consistency appears to be improving  GI following, input appreciated  Patient was initially started on ceftriaxone and Flagyl which can be discontinued as patient has low clinical signs of infection  Monitor stool chart  Patient started on Creon and  psyllium  Imodium as needed though patient did not use Imodium yesterday, trial of Imodium x 1 today  Follow-up fecal calprotectin, celiac panel, ova parasite,  Elevated troponin  Troponin 56-51-37  Denies chest pain  EKG as above  Most likely non-MI troponin elevation  Thrombocytopenia (HCC)  Likely due to alcohol abuse/cirrhosis  CT abdomen--fatty liver, unremarkable spleen  Ultrasound with the abdomen increase hepatic echogenicity  Monitor CBC  Hold pharmacologic DVT prophylaxis  Alcohol abuse  CIWA protocol  Alcohol cessation  Continue thiamine folic acid and multivitamin  History of bleeding ulcers  Continue PPI daily  Hepatic steatosis  Noted history  Triglycerides 229 with LDL level of 82  Possible secondary to alcohol abuse  Right inguinal pain  Patient reported right groin discomfort initially on 4/15, no significant tenderness on exam, abdominal distention noted  Patient denies any dysuria but reports frequency with small output  Normal range of motion at right hip, denies any fall trauma or injury  Reports that pain is improving now intermittent only.  Repeat UA small blood, calcium oxalate crystals occasional.  Prior CT scan without any nephrolithiasis.  Bladder scan unremarkable.  Ultrasound of the abdomen with small amount of ascites  Possibly musculoskeletal  Monitor symptoms  Monitor intake output  Monitor abdominal exam  PT/OT  Nicotine abuse  Nicotine patch, smoking cessation  PMR (polymyalgia rheumatica) (HCC)  Noted history  Ectasia of artery (HCC)  CT showed - Fusiform ectasia of the ascending thoracic aorta measuring up to 45 mm is stable. Recommendation is for follow-up low radiation dose chest CT in one year.   Generalized weakness  Suspect due to alcohol abuse, poor oral intake, chronic diarrhea nausea vomiting.   UA was unremarkable  PT /OT evaluation and treatment  Fall precautions  Goals of care, counseling/discussion  Wife reported that she felt like patient is going to die on the day of  admission.  Patient is having frequent hospitalization and patient is not going to quit alcohol.  Wife would prefer patient to be comfortable and was questioning about hospice  Discussed with wife by previous provider that patient might not be a candidate for hospice but can provide palliative care referral due to multiple comorbid conditions and continued alcohol use.  Wife would also like some resources at home.  Will refer to palliative care on discharge  Hypokalemia  Improved after potassium supplementation  Monitor, follow-up BMP today      Addendum 5 PM  Patient had mechanical fall while going to use commode.  Patient reported while trying to get his hospital gown out of the way when sitting to commode he lost his balance and fell on his left side, patient denied any significant injury as his fall was broken by walker on the side.  Patient denies any head injury, loss of consciousness, dizziness, lightheadedness.  Patient denied any pain but noted to have mild abrasion over left hand and small contusion on the knee.  Vital signs were stable patient was at baseline was able to use commode and ambulate without any difficulty subsequently.  Discussed fall precautions and will continue to monitor.      VTE Pharmacologic Prophylaxis: VTE Score: 4 Moderate Risk (Score 3-4) - Pharmacological DVT Prophylaxis Contraindicated. Sequential Compression Devices Ordered.    Mobility:   Basic Mobility Inpatient Raw Score: 18  JH-HLM Goal: 6: Walk 10 steps or more  JH-HLM Achieved: 6: Walk 10 steps or more  JH-HLM Goal NOT achieved. Continue with multidisciplinary rounding and encourage appropriate mobility to improve upon JH-HLM goals.    Patient Centered Rounds: I performed bedside rounds with nursing staff today.   Discussions with Specialists or Other Care Team Provider: GI    Education and Discussions with Family / Patient: Updated  (wife) at bedside.    Current Length of Stay: 4 day(s)  Current Patient  Status: Inpatient   Certification Statement: The patient will continue to require additional inpatient hospital stay due to abdominal distention, frequent diarrhea  Discharge Plan: Anticipate discharge in 24-48 hrs to discharge location to be determined pending rehab evaluations.    Code Status: Level 3 - DNAR and DNI    Subjective     Still with diarrhea about 9 episodes yesterday but feels that is more soft now rather than just liquid  Right groin pain is better, now only intermittent feels like a stretch  Tolerating diet well  Urinating without any difficulty in small quantities      Objective :  Temp:  [97.1 °F (36.2 °C)-97.8 °F (36.6 °C)] 97.2 °F (36.2 °C)  HR:  [64-66] 65  BP: ()/(55-63) 111/58  Resp:  [16-18] 18  SpO2:  [89 %-92 %] 90 %  O2 Device: None (Room air)    Body mass index is 24.45 kg/m².     Input and Output Summary (last 24 hours):     Intake/Output Summary (Last 24 hours) at 4/16/2025 0826  Last data filed at 4/16/2025 0249  Gross per 24 hour   Intake 1020 ml   Output 150 ml   Net 870 ml       Physical Exam  Constitutional:       General: He is not in acute distress.     Comments: Chronically ill, comfortable   HENT:      Head: Normocephalic and atraumatic.   Cardiovascular:      Rate and Rhythm: Normal rate.   Pulmonary:      Effort: Pulmonary effort is normal. No respiratory distress.      Breath sounds: No rhonchi.   Abdominal:      General: Bowel sounds are normal. There is distension.      Palpations: Abdomen is soft.      Tenderness: There is no abdominal tenderness. There is no guarding or rebound.   Musculoskeletal:      Right lower leg: Edema present.      Left lower leg: Edema present.   Skin:     General: Skin is warm and dry.      Findings: No rash.   Neurological:      Mental Status: He is alert. Mental status is at baseline.      Cranial Nerves: No cranial nerve deficit.         Lines/Drains:  Lines/Drains/Airways       Active Status       Name Placement date Placement time  Site Days    External Urinary Catheter Medium 04/11/25 2022  -- 4                            Lab Results: I have reviewed the following results:   Results from last 7 days   Lab Units 04/15/25  0452 04/12/25  0308 04/11/25  1707   WBC Thousand/uL 13.84*   < > 6.82   HEMOGLOBIN g/dL 11.9*   < > 13.6   HEMATOCRIT % 34.8*   < > 40.5   PLATELETS Thousands/uL 135*   < > 86*   SEGS PCT %  --   --  81*   LYMPHO PCT %  --   --  8*   MONO PCT %  --   --  10   EOS PCT %  --   --  0    < > = values in this interval not displayed.     Results from last 7 days   Lab Units 04/15/25  0452   SODIUM mmol/L 136   POTASSIUM mmol/L 4.3   CHLORIDE mmol/L 101   CO2 mmol/L 26   BUN mg/dL 14   CREATININE mg/dL 0.79   ANION GAP mmol/L 9   CALCIUM mg/dL 8.3*   ALBUMIN g/dL 2.2*   TOTAL BILIRUBIN mg/dL 2.77*   ALK PHOS U/L 136*   ALT U/L 39   AST U/L 66*   GLUCOSE RANDOM mg/dL 125     Results from last 7 days   Lab Units 04/12/25  0308   INR  1.64*     Results from last 7 days   Lab Units 04/11/25  1700   POC GLUCOSE mg/dl 110               Recent Cultures (last 7 days):   Results from last 7 days   Lab Units 04/12/25  0620   C DIFF TOXIN B BY PCR  Negative       Imaging Results Review: I reviewed radiology reports from this admission including: CT chest, CT abdomen/pelvis, CT head, and Ultrasound(s).  Other Study Results Review: No additional pertinent studies reviewed.    Last 24 Hours Medication List:     Current Facility-Administered Medications:     carvedilol (COREG) tablet 3.125 mg, BID With Meals    cyanocobalamin (VITAMIN B-12) tablet 1,000 mcg, Daily    famotidine (PEPCID) tablet 20 mg, HS    Fluticasone Furoate-Vilanterol 100-25 mcg/actuation 1 puff, Daily    folic acid (FOLVITE) tablet 1 mg, Daily    furosemide (LASIX) tablet 20 mg, Daily    gabapentin (NEURONTIN) capsule 100 mg, TID    guaiFENesin (ROBITUSSIN) oral liquid 200 mg, Q4H PRN    levalbuterol (XOPENEX) inhalation solution 0.63 mg, Q6H PRN    loperamide (IMODIUM)  capsule 2 mg, TID PRN    midodrine (PROAMATINE) tablet 5 mg, TID AC    multivitamin-minerals (CENTRUM) tablet 1 tablet, Daily    nicotine (NICODERM CQ) 21 mg/24 hr TD 24 hr patch 1 patch, Daily    ondansetron (ZOFRAN) injection 4 mg, Q6H PRN    pancrelipase (Lip-Prot-Amyl) (CREON) delayed release capsule 36,000 Units, TID With Meals    pantoprazole (PROTONIX) EC tablet 40 mg, Daily Before Breakfast    predniSONE tablet 20 mg, Daily    psyllium (METAMUCIL) 1 packet, BID before lunch/dinner    spironolactone (ALDACTONE) tablet 50 mg, Daily    thiamine tablet 100 mg, Daily    Administrative Statements   Today, Patient Was Seen By: Romy Rivera MD  I have spent a total time of 45 minutes in caring for this patient on the day of the visit/encounter including Diagnostic results, Patient and family education, Importance of tx compliance, Risk factor reductions, Impressions, Counseling / Coordination of care, Documenting in the medical record, Reviewing/placing orders in the medical record (including tests, medications, and/or procedures), Obtaining or reviewing history  , and Communicating with other healthcare professionals .    **Please Note: This note may have been constructed using a voice recognition system.**

## 2025-04-16 NOTE — OCCUPATIONAL THERAPY NOTE
Occupational Therapy Cancellation Note       04/16/25 1446   Note Type   Note Type Cancelled Session   Cancel Reasons Other  (Attempted to see pt however currently working with PT. Will re-attempt later as time/schedule permits.)     Trice Esquivel OTR/L   [place] License # [id number]  [place] License # [id number]

## 2025-04-16 NOTE — ASSESSMENT & PLAN NOTE
[facility] protocol  Alcohol cessation  Continue thiamine folic acid and multivitamin

## 2025-04-16 NOTE — ASSESSMENT & PLAN NOTE
MELD 3.0: 17 at 4/14/2025  5:47 AM  MELD-Na: 15 at 4/14/2025  5:47 AM  Calculated from:  Serum Creatinine: 0.82 mg/dL (Using min of 1 mg/dL) at 4/14/2025  5:47 AM  Serum Sodium: 139 mmol/L (Using max of 137 mmol/L) at 4/14/2025  5:47 AM  Total Bilirubin: 2.71 mg/dL at 4/14/2025  5:47 AM  Serum Albumin: 2.2 g/dL at 4/14/2025  5:47 AM  INR(ratio): 1.64 at 4/12/2025  3:08 AM  Age at listing (hypothetical): 70 years  Sex: Male at 4/14/2025  5:47 AM    -highly suspicious for cirrhosis in the setting of thrombocytopenia, pleural effusions, ascites, low albumin, and elevated INR.  Both ultrasound and CT scan is showing an enlarged fatty liver.  -Patient would benefit from outpatient follow-up with hepatology and a possible elastography to confirm cirrhosis  -No signs of any liver lesions on imaging at this time  -Pt started on low-dose Lasix 20 mg and Aldactone 50 mg, per primary team also started on Midodrine 5mg TID due to soft BPs. Continue to monitor.   -US limited repeated with only small ascites noted  -Recommend 2g low sodium diet  -Patient has had longstanding alcohol abuse, technically Madrey discriminant factor is 34.9 but this is less likely acute alcoholic hepatitis and most likely more related to decompensated cirrhosis.  Can hold off on steroids for alcoholic hepatitis is at this time unless he would significantly worsen  - No signs of asterixis on exam.  Patient is alert  - Continue to monitor MELD labs closely  - No history of esophageal varices on EGD in 2024.  -follow up outpatient at hepatology clinic

## 2025-04-16 NOTE — ASSESSMENT & PLAN NOTE
Highly suspicious for cirrhosis in the setting of thrombocytopenia with hypoalbuminemia with pleural effusions and ascites  CT showed enlarged fatty liver which he is known to have and nonspecific wall thickening of the gallbladder.  Right upper quadrant ultrasound with diffusely increased hepatic echogenicity consistent with hepatic steatosis and cannot exclude cirrhosis with diffuse gallbladder wall thickening  Patient has no formal diagnosis of cirrhosis but most likely patient has decompensated cirrhosis  [facility] discriminant factor is 34.9-patient less likely has acute alcoholic hepatitis and most likely decompensated cirrhosis.  GI recommending to hold off on steroids at the current time  Ultrasound limited on 4/15-small amount of ascites  MELD 3.0: 17 at 4/14/2025  5:47 AM  MELD-Na: 15 at 4/14/2025  5:47 AM  Calculated from:  Serum Creatinine: 0.82 mg/dL (Using min of 1 mg/dL) at 4/14/2025  5:47 AM  Serum Sodium: 139 mmol/L (Using max of 137 mmol/L) at 4/14/2025  5:47 AM  Total Bilirubin: 2.71 mg/dL at 4/14/2025  5:47 AM  Serum Albumin: 2.2 g/dL at 4/14/2025  5:47 AM  INR(ratio): 1.64 at 4/12/2025  3:08 AM  Age at listing (hypothetical): 70 years  Sex: Male at 4/14/2025  5:47 AM     Continue to monitor MELD labs  Monitor intake output, fluid and salt restriction  Continue diuretics as tolerated, monitor response  Midodrine for blood pressure support  Will require close monitoring of renal function and electrolytes in setting of diarrhea diuretic use and marginal blood pressure  GI follow-up  Outpatient elastography  Alcohol cessation

## 2025-04-16 NOTE — PLAN OF CARE
Problem: Potential for Falls  Goal: Patient will remain free of falls  Description: INTERVENTIONS:- Educate patient/family on patient safety including physical limitations- Instruct patient to call for assistance with activity - Consult OT/PT to assist with strengthening/mobility - Keep Call bell within reach- Keep bed low and locked with side rails adjusted as appropriate- Keep care items and personal belongings within reach- Initiate and maintain comfort rounds- Make Fall Risk Sign visible to staff- Offer Toileting every 2 Hours, in advance of need- Initiate/Maintain bed alarm- Obtain necessary fall risk management equipment: call bell within reach - Apply yellow socks and bracelet for high fall risk patients- Consider moving patient to room near nurses station  INTERVENTIONS:- Educate patient/family on patient safety including physical limitations- Instruct patient to call for assistance with activity - Consult OT/PT to assist with strengthening/mobility - Keep Call bell within reach- Keep bed low and locked with side rails adjusted as appropriate- Keep care items and personal belongings within reach- Initiate and maintain comfort rounds- Make Fall Risk Sign visible to staff- Offer Toileting every 2 Hours, in advance of need- Initiate/Maintain bed alarm- Obtain necessary fall risk management equipment: call bell within reach - Apply yellow socks and bracelet for high fall risk patients- Consider moving patient to room near nurses station  Outcome: Progressing     Problem: PAIN - ADULT  Goal: Verbalizes/displays adequate comfort level or baseline comfort level  Description: Interventions:- Encourage patient to monitor pain and request assistance- Assess pain using appropriate pain scale- Administer analgesics based on type and severity of pain and evaluate response- Implement non-pharmacological measures as appropriate and evaluate response- Consider cultural and social influences on pain and pain management-  Notify physician/advanced practitioner if interventions unsuccessful or patient reports new pain  Outcome: Progressing     Problem: INFECTION - ADULT  Goal: Absence or prevention of progression during hospitalization  Description: INTERVENTIONS:- Assess and monitor for signs and symptoms of infection- Monitor lab/diagnostic results- Monitor all insertion sites, i.e. indwelling lines, tubes, and drains- Monitor endotracheal if appropriate and nasal secretions for changes in amount and color- [place] appropriate cooling/warming therapies per order- Administer medications as ordered- Instruct and encourage patient and family to use good hand hygiene technique- Identify and instruct in appropriate isolation precautions for identified infection/condition  Outcome: Progressing     Problem: SAFETY ADULT  Goal: Patient will remain free of falls  Description: INTERVENTIONS:- Educate patient/family on patient safety including physical limitations- Instruct patient to call for assistance with activity - Consult OT/PT to assist with strengthening/mobility - Keep Call bell within reach- Keep bed low and locked with side rails adjusted as appropriate- Keep care items and personal belongings within reach- Initiate and maintain comfort rounds- Make Fall Risk Sign visible to staff- Offer Toileting every 2 Hours, in advance of need- Initiate/Maintain bed alarm- Obtain necessary fall risk management equipment: call bell within reach - Apply yellow socks and bracelet for high fall risk patients- Consider moving patient to room near nurses station  INTERVENTIONS:- Educate patient/family on patient safety including physical limitations- Instruct patient to call for assistance with activity - Consult OT/PT to assist with strengthening/mobility - Keep Call bell within reach- Keep bed low and locked with side rails adjusted as appropriate- Keep care items and personal belongings within reach- Initiate and maintain comfort rounds- Make  Fall Risk Sign visible to staff- Offer Toileting every 2 Hours, in advance of need- Initiate/Maintain bed alarm- Obtain necessary fall risk management equipment: call bell within reach - Apply yellow socks and bracelet for high fall risk patients- Consider moving patient to room near nurses station  Outcome: Progressing  Goal: Maintain or return to baseline ADL function  Description: INTERVENTIONS:-  Assess patient's ability to carry out ADLs; assess patient's baseline for ADL function and identify physical deficits which impact ability to perform ADLs (bathing, care of mouth/teeth, toileting, grooming, dressing, etc.)- Assess/evaluate cause of self-care deficits - Assess range of motion- Assess patient's mobility; develop plan if impaired- Assess patient's need for assistive devices and provide as appropriate- Encourage maximum independence but intervene and supervise when necessary- Involve family in performance of ADLs- Assess for home care needs following discharge - Consider OT consult to assist with ADL evaluation and planning for discharge- Provide patient education as appropriate  Outcome: Progressing  Goal: Maintains/Returns to pre admission functional level  Description: INTERVENTIONS:- Perform AM-PAC 6 Click Basic Mobility/ Daily Activity assessment daily.- Set and communicate daily mobility goal to care team and patient/family/caregiver. - Collaborate with rehabilitation services on mobility goals if consulted- Perform Range of Motion 2 times a day.- Reposition patient every 2 hours.- Dangle patient 2 times a day- Stand patient 2 times a day- Ambulate patient 2 times a day- Out of bed to chair 2 times a day - Out of bed for meals 2 times a day- Out of bed for toileting- Record patient progress and toleration of activity level   Outcome: Progressing     Problem: DISCHARGE PLANNING  Goal: Discharge to home or other facility with appropriate resources  Description: INTERVENTIONS:- Identify barriers to  discharge w/patient and caregiver- Arrange for needed discharge resources and transportation as appropriate- Identify discharge learning needs (meds, wound care, etc.)- Arrange for interpretive services to assist at discharge as needed- Refer to Case Management Department for coordinating discharge planning if the patient needs post-hospital services based on physician/advanced practitioner order or complex needs related to functional status, cognitive ability, or social support system  Outcome: Progressing     Problem: Knowledge Deficit  Goal: Patient/family/caregiver demonstrates understanding of disease process, treatment plan, medications, and discharge instructions  Description: Complete learning assessment and assess knowledge base.Interventions:- Provide teaching at level of understanding- Provide teaching via preferred learning methods  Outcome: Progressing     Problem: Prexisting or High Potential for Compromised Skin Integrity  Goal: Skin integrity is maintained or improved  Description: INTERVENTIONS:- Identify patients at risk for skin breakdown- Assess and monitor skin integrity- Assess and monitor nutrition and hydration status- Monitor labs - Assess for incontinence - Turn and reposition patient- Assist with mobility/ambulation- Relieve pressure over bony prominences- Avoid friction and shearing- Provide appropriate hygiene as needed including keeping skin clean and dry- Evaluate need for skin moisturizer/barrier cream- Collaborate with interdisciplinary team - Patient/family teaching- Consider wound care consult   Outcome: Progressing     Problem: RESPIRATORY - ADULT  Goal: Achieves optimal ventilation and oxygenation  Description: INTERVENTIONS:- Assess for changes in respiratory status- Assess for changes in mentation and behavior- Position to facilitate oxygenation and minimize respiratory effort- Oxygen administered by appropriate delivery if ordered- Initiate smoking cessation education as  indicated- Encourage broncho-pulmonary hygiene including cough, deep breathe, Incentive Spirometry- Assess the need for suctioning and aspirate as needed- Assess and instruct to report SOB or any respiratory difficulty- Respiratory Therapy support as indicated  Outcome: Progressing     Problem: NEUROSENSORY - ADULT  Goal: Achieves maximal functionality and self care  Description: INTERVENTIONS- Monitor swallowing and airway patency with patient fatigue and changes in neurological status- Encourage and assist patient to increase activity and self care. - Encourage visually impaired, hearing impaired and aphasic patients to use assistive/communication devices  Outcome: Progressing     Problem: CARDIOVASCULAR - ADULT  Goal: Maintains optimal cardiac output and hemodynamic stability  Description: INTERVENTIONS:- Monitor I/O, vital signs and rhythm- Monitor for S/S and trends of decreased cardiac output- Administer and titrate ordered vasoactive medications to optimize hemodynamic stability- Assess quality of pulses, skin color and temperature- Assess for signs of decreased coronary artery perfusion- Instruct patient to report change in severity of symptoms  Outcome: Progressing  Goal: Absence of cardiac dysrhythmias or at baseline rhythm  Description: INTERVENTIONS:- Continuous cardiac monitoring, vital signs, obtain 12 lead EKG if ordered- Administer antiarrhythmic and heart rate control medications as ordered- Monitor electrolytes and administer replacement therapy as ordered  Outcome: Progressing     Problem: GASTROINTESTINAL - ADULT  Goal: Maintains or returns to baseline bowel function  Description: INTERVENTIONS:- Assess bowel function- Encourage oral fluids to ensure adequate hydration- Administer IV fluids if ordered to ensure adequate hydration- Administer ordered medications as needed- Encourage mobilization and activity- Consider nutritional services referral to assist patient with adequate nutrition and  appropriate food choices  Outcome: Progressing     Problem: MUSCULOSKELETAL - ADULT  Goal: Maintain or return mobility to safest level of function  Description: INTERVENTIONS:- Assess patient's ability to carry out ADLs; assess patient's baseline for ADL function and identify physical deficits which impact ability to perform ADLs (bathing, care of mouth/teeth, toileting, grooming, dressing, etc.)- Assess/evaluate cause of self-care deficits - Assess range of motion- Assess patient's mobility- Assess patient's need for assistive devices and provide as appropriate- Encourage maximum independence but intervene and supervise when necessary- Involve family in performance of ADLs- Assess for home care needs following discharge - Consider OT consult to assist with ADL evaluation and planning for discharge- Provide patient education as appropriate  Outcome: Progressing

## 2025-04-16 NOTE — PROGRESS NOTES
Progress Note - Gastroenterology   Name: Del Ocampo 70 y.o. male I MRN: [id number]  Unit/Bed#: 4 [place] [address]-01 I Date of Admission: 4/11/2025   Date of Service: 4/16/2025 I Hospital Day: 4    Assessment & Plan  Elevated LFTs  MELD 3.0: 17 at 4/14/2025  5:47 AM  MELD-Na: 15 at 4/14/2025  5:47 AM  Calculated from:  Serum Creatinine: 0.82 mg/dL (Using min of 1 mg/dL) at 4/14/2025  5:47 AM  Serum Sodium: 139 mmol/L (Using max of 137 mmol/L) at 4/14/2025  5:47 AM  Total Bilirubin: 2.71 mg/dL at 4/14/2025  5:47 AM  Serum Albumin: 2.2 g/dL at 4/14/2025  5:47 AM  INR(ratio): 1.64 at 4/12/2025  3:08 AM  Age at listing (hypothetical): 70 years  Sex: Male at 4/14/2025  5:47 AM    -highly suspicious for cirrhosis in the setting of thrombocytopenia, pleural effusions, ascites, low albumin, and elevated INR.  Both ultrasound and CT scan is showing an enlarged fatty liver.  -Patient would benefit from outpatient follow-up with hepatology and a possible elastography to confirm cirrhosis  -No signs of any liver lesions on imaging at this time  -Pt started on low-dose Lasix 20 mg and Aldactone 50 mg, per primary team also started on Midodrine 5mg TID due to soft BPs. Continue to monitor.   -US limited repeated with only small ascites noted  -Recommend 2g low sodium diet  -Patient has had longstanding alcohol abuse, technically Madrey discriminant factor is 34.9 but this is less likely acute alcoholic hepatitis and most likely more related to decompensated cirrhosis.  Can hold off on steroids for alcoholic hepatitis is at this time unless he would significantly worsen  - No signs of asterixis on exam.  Patient is alert  - Continue to monitor MELD labs closely  - No history of esophageal varices on EGD in 2024.  -follow up outpatient at hepatology clinic    Alcohol abuse  -recommend alcohol cessation  Hepatic steatosis  -as above   Abnormal CT scan  -Patient noted to have some sigmoid colon thickening.  Suspect this is secondary to  colopathy from fluid overload.  Had colonoscopy last year without any signs of colitis.  Would not recommend repeat colonoscopy at this time  Chronic diarrhea  Has had workup for this in the past & pt denies any worsening prior to admission. C.diff, enteric panel negative this admission.  Fecal elastase . Random colon bx negative for microscopic colitis 3/28/24. Per patient's wife stool has more consistency and is now sometimes formed, has had 4 BM so far today    - Continue Metamucil BID  - Continue Creon 36,000 units before meals based on weight  -f/u fecal calprotectin, celiac panel, O+P  -Continue imodium PRN which he was taking at home  -Monitor stool output    Hypokalemia    Right inguinal pain          Subjective   Pt sitting up on the side of the bed with wife at bedside. Has had 4 BM so far today and did have some formed stool now. No black or bloody discoloration. Tolerating diet well. Denies SOB. Just was giv    Objective :  Temp:  [97.1 °F (36.2 °C)-97.8 °F (36.6 °C)] 97.2 °F (36.2 °C)  HR:  [64-66] 65  BP: ()/(55-63) 111/58  Resp:  [16-18] 18  SpO2:  [89 %-92 %] 90 %  O2 Device: None (Room air)    Physical Exam  Vitals and nursing note reviewed.   Constitutional:       General: He is not in acute distress.     Appearance: He is well-developed.   HENT:      Head: Normocephalic and atraumatic.   Eyes:      Conjunctiva/sclera: Conjunctivae normal.   Cardiovascular:      Rate and Rhythm: Normal rate and regular rhythm.      Heart sounds: No murmur heard.  Pulmonary:      Effort: Pulmonary effort is normal. No respiratory distress.      Breath sounds: Normal breath sounds.   Abdominal:      General: Abdomen is protuberant.      Palpations: Abdomen is soft.      Tenderness: There is no abdominal tenderness.   Musculoskeletal:         General: No swelling.      Cervical back: Neck supple.   Skin:     General: Skin is warm and dry.      Capillary Refill: Capillary refill takes less than 2 seconds.    Neurological:      Mental Status: He is alert and oriented to person, place, and time.   Psychiatric:         Mood and Affect: Mood normal.           Lab Results: I have reviewed the following results:

## 2025-04-16 NOTE — PHYSICAL THERAPY NOTE
PT TREATMENT       04/16/25 1509   PT Last Visit   PT Visit Date 04/16/25   Note Type   Note Type Treatment   Pain Assessment   Pain Assessment Tool 0-10   Pain Score No Pain   Patient's Stated Pain Goal No pain   Multiple Pain Sites No   Restrictions/Precautions   Weight Bearing Precautions Per Order No   Other Precautions Bed Alarm;Chair Alarm;Fall Risk;Pain   General   Chart Reviewed Yes   Family/Caregiver Present Yes  (wife at bedside throughout session)   Cognition   Overall Cognitive Status WFL   Arousal/Participation Cooperative   Orientation Level Oriented X4   Following Commands Follows all commands and directions without difficulty   Subjective   Subjective Pt agreeable to PT session this afternoon   Bed Mobility   Supine to Sit 5  Supervision   Additional items Assist x 1;Verbal cues;Increased time required;HOB elevated;Bedrails   Sit to Supine 5  Supervision   Additional items Assist x 1;Verbal cues;Increased time required;HOB elevated;Bedrails   Transfers   Sit to Stand 4  Minimal assistance  (progressing to supervision)   Additional items Assist x 1;Verbal cues   Stand to Sit 5  Supervision   Additional items Assist x 1;Verbal cues;Increased time required   Ambulation/Elevation   Gait pattern Foward flexed;Short stride;Step through pattern  (decreased gait speed)   Gait Assistance 5  Supervision   Additional items Verbal cues   Assistive Device Rolling walker   Distance 200 feet with multiple changes of direction   Stair Management Assistance 5  Supervision   Additional items Assist x 1;Verbal cues   Stair Management Technique Two rails;Reciprocal   Number of Stairs 8   Balance   Static Sitting Fair +   Dynamic Sitting Fair   Static Standing Fair   Dynamic Standing Fair -   Ambulatory Fair -  (RW)   Activity Tolerance   Activity Tolerance Patient tolerated treatment well   Nurse Made Aware yes FELA Beck   Exercises   Neuro re-ed Able to perform seated exercise including heel/toe raises, LAQ, seated  hip marches, hip add squeezes x 10-15 reps bilat   Assessment   Prognosis Good   Problem List Decreased strength;Impaired balance;Decreased endurance;Decreased mobility;Decreased coordination   Assessment Pt seen for PT session this afternoon. Pt making good progress towards IP PT goals. Able to progress to ambulate x increased distance with decreased level of assist using RW. Able to progress to negotiate x 8 steps with supervision + increased time using B rails. Pt requires intermittent verbal cues for proper hand placement on walker/armrests to safely perform transfers. Able to stand at standard toilet with supervision to urinate without LOB. Discussed improvement with pt + wife and recommendation updated to level 3 minimum resource intensity.  The patient's AM-[facility] Basic Mobility Inpatient Short Form Raw Score is 18. A Raw score of greater than 16 suggests the patient may benefit from discharge to home. Please also refer to the recommendation of the Physical Therapist for safe discharge planning.     Goals   Patient Goals to get better   Plan   Treatment/Interventions LE strengthening/ROM;Functional transfer training;ADL retraining;Endurance training;Therapeutic exercise;Gait training;Spoke to nursing   Progress Progressing toward goals   PT Frequency 3-5x/wk   Discharge Recommendation   Rehab Resource Intensity Level, PT III (Minimum Resource Intensity)   Additional Comments  Coty velásquez   AM-PAC Basic Mobility Inpatient   Turning in Flat Bed Without Bedrails 3   Lying on Back to Sitting on Edge of Flat Bed Without Bedrails 3   Moving Bed to Chair 3   Standing Up From Chair Using Arms 3   Walk in Room 3   Climb 3-5 Stairs With Railing 3   Basic Mobility Inpatient Raw Score 18   Basic Mobility Standardized Score 41.05   [facility] Highest Level Of Mobility   -HLM Goal 6: Walk 10 steps or more   -HLM Achieved 7: Walk 25 feet or more   Education   Education Provided Mobility training;Home exercise  program;Assistive device   Patient Demonstrates verbal understanding   End of Consult   Patient Position at End of Consult Supine;All needs within reach;Bed/Chair alarm activated  (declined chair at EOS)   Licensure   [place] License Number  Jayne Jones [id number]

## 2025-04-16 NOTE — ASSESSMENT & PLAN NOTE
Reports chronic diarrhea about 10 times a day.  And chronic nausea vomiting a few times a day.  Also with chronic diffuse abdominal pain.  Stool bacterial panel negative, stool C. difficile negative.  Fecal elastase within normal limit 317  Abnormal CT scan with sigmoid colon thickening, suspected colopathy  Continues to have frequent bowel movement though consistency appears to be improving  GI following, input appreciated  Patient was initially started on ceftriaxone and Flagyl which can be discontinued as patient has low clinical signs of infection  Monitor stool chart  Patient started on Creon and psyllium  Imodium as needed though patient did not use Imodium yesterday, trial of Imodium x 1 today  Follow-up fecal calprotectin, celiac panel, ova parasite,

## 2025-04-16 NOTE — PLAN OF CARE
Problem: PHYSICAL THERAPY ADULT  Goal: Performs mobility at highest level of function for planned discharge setting.  See evaluation for individualized goals.  Description: Treatment/Interventions: ADL retraining, Functional transfer training, LE strengthening/ROM, Elevations, Therapeutic exercise, Endurance training, Patient/family training, Equipment eval/education, Bed mobility, Gait training, Compensatory technique education          See flowsheet documentation for full assessment, interventions and recommendations.  Outcome: Progressing  Note: Prognosis: Good  Problem List: Decreased strength, Impaired balance, Decreased endurance, Decreased mobility, Decreased coordination  Assessment: Pt seen for PT session this afternoon. Pt making good progress towards IP PT goals. Able to progress to ambulate x increased distance with decreased level of assist using RW. Able to progress to negotiate x 8 steps with supervision + increased time using B rails. Pt requires intermittent verbal cues for proper hand placement on walker/armrests to safely perform transfers. Able to stand at standard toilet with supervision to urinate without LOB. Discussed improvement with pt + wife and recommendation updated to level 3 minimum resource intensity.        Rehab Resource Intensity Level, PT: III (Minimum Resource Intensity)    See flowsheet documentation for full assessment.

## 2025-04-16 NOTE — ASSESSMENT & PLAN NOTE
Likely due to alcohol abuse/cirrhosis  CT abdomen--fatty liver, unremarkable spleen  Ultrasound with the abdomen increase hepatic echogenicity  Monitor CBC  Hold pharmacologic DVT prophylaxis

## 2025-04-16 NOTE — ASSESSMENT & PLAN NOTE
Patient reported right groin discomfort initially on 4/15, no significant tenderness on exam, abdominal distention noted  Patient denies any dysuria but reports frequency with small output  Normal range of motion at right hip, denies any fall trauma or injury  Reports that pain is improving now intermittent only.  Repeat UA small blood, calcium oxalate crystals occasional.  Prior CT scan without any nephrolithiasis.  Bladder scan unremarkable.  Ultrasound of the abdomen with small amount of ascites  Possibly musculoskeletal  Monitor symptoms  Monitor intake output  Monitor abdominal exam  PT/OT

## 2025-04-16 NOTE — ASSESSMENT & PLAN NOTE
Patient reported right groin discomfort, no significant tenderness on exam, abdominal distention noted  Patient denies any dysuria but reports frequency with small output  Normal range of motion at right hip, denies any fall trauma or injury  Monitor symptoms  Check UA, bladder scan  Repeat limited ultrasound of abdomen

## 2025-04-17 ENCOUNTER — TRANSITIONAL CARE MANAGEMENT (OUTPATIENT)
Dept: FAMILY MEDICINE CLINIC | Facility: CLINIC | Age: 70
End: 2025-04-17

## 2025-04-17 ENCOUNTER — TELEPHONE (OUTPATIENT)
Dept: FAMILY MEDICINE CLINIC | Facility: CLINIC | Age: 70
End: 2025-04-17

## 2025-04-17 VITALS
BODY MASS INDEX: 23.99 KG/M2 | OXYGEN SATURATION: 96 % | DIASTOLIC BLOOD PRESSURE: 55 MMHG | WEIGHT: 158.29 LBS | TEMPERATURE: 97.7 F | SYSTOLIC BLOOD PRESSURE: 110 MMHG | HEIGHT: 68 IN | RESPIRATION RATE: 17 BRPM | HEART RATE: 73 BPM

## 2025-04-17 PROBLEM — D69.6 THROMBOCYTOPENIA (HCC): Status: RESOLVED | Noted: 2025-04-12 | Resolved: 2025-04-17

## 2025-04-17 PROBLEM — E87.6 HYPOKALEMIA: Status: RESOLVED | Noted: 2024-03-21 | Resolved: 2025-04-17

## 2025-04-17 PROBLEM — R10.31 RIGHT INGUINAL PAIN: Status: RESOLVED | Noted: 2025-04-16 | Resolved: 2025-04-17

## 2025-04-17 LAB
ALBUMIN SERPL BCG-MCNC: 2.4 G/DL (ref 3.5–5)
ALP SERPL-CCNC: 125 U/L (ref 34–104)
ALT SERPL W P-5'-P-CCNC: 45 U/L (ref 7–52)
ANION GAP SERPL CALCULATED.3IONS-SCNC: 4 MMOL/L (ref 4–13)
AST SERPL W P-5'-P-CCNC: 75 U/L (ref 13–39)
BILIRUB SERPL-MCNC: 2.34 MG/DL (ref 0.2–1)
BUN SERPL-MCNC: 18 MG/DL (ref 5–25)
CALCIUM ALBUM COR SERPL-MCNC: 9.8 MG/DL (ref 8.3–10.1)
CALCIUM SERPL-MCNC: 8.5 MG/DL (ref 8.4–10.2)
CHLORIDE SERPL-SCNC: 102 MMOL/L (ref 96–108)
CO2 SERPL-SCNC: 27 MMOL/L (ref 21–32)
CREAT SERPL-MCNC: 0.89 MG/DL (ref 0.6–1.3)
ERYTHROCYTE [DISTWIDTH] IN BLOOD BY AUTOMATED COUNT: 16.3 % (ref 11.6–15.1)
GFR SERPL CREATININE-BSD FRML MDRD: 85 ML/MIN/1.73SQ M
GLUCOSE SERPL-MCNC: 86 MG/DL (ref 65–140)
HCT VFR BLD AUTO: 32 % (ref 36.5–49.3)
HGB BLD-MCNC: 11.2 G/DL (ref 12–17)
MCH RBC QN AUTO: 38 PG (ref 26.8–34.3)
MCHC RBC AUTO-ENTMCNC: 35 G/DL (ref 31.4–37.4)
MCV RBC AUTO: 109 FL (ref 82–98)
PLATELET # BLD AUTO: 184 THOUSANDS/UL (ref 149–390)
PMV BLD AUTO: 12.8 FL (ref 8.9–12.7)
POTASSIUM SERPL-SCNC: 3.6 MMOL/L (ref 3.5–5.3)
PROT SERPL-MCNC: 4.4 G/DL (ref 6.4–8.4)
RBC # BLD AUTO: 2.95 MILLION/UL (ref 3.88–5.62)
SODIUM SERPL-SCNC: 133 MMOL/L (ref 135–147)
TTG IGA SER IA-ACNC: 1.6 U/ML (ref ?–10)
WBC # BLD AUTO: 17.57 THOUSAND/UL (ref 4.31–10.16)

## 2025-04-17 PROCEDURE — 80053 COMPREHEN METABOLIC PANEL: CPT | Performed by: INTERNAL MEDICINE

## 2025-04-17 PROCEDURE — 97535 SELF CARE MNGMENT TRAINING: CPT

## 2025-04-17 PROCEDURE — 99232 SBSQ HOSP IP/OBS MODERATE 35: CPT | Performed by: INTERNAL MEDICINE

## 2025-04-17 PROCEDURE — 97530 THERAPEUTIC ACTIVITIES: CPT

## 2025-04-17 PROCEDURE — 99239 HOSP IP/OBS DSCHRG MGMT >30: CPT | Performed by: INTERNAL MEDICINE

## 2025-04-17 PROCEDURE — 85027 COMPLETE CBC AUTOMATED: CPT | Performed by: INTERNAL MEDICINE

## 2025-04-17 RX ORDER — SPIRONOLACTONE 25 MG/1
50 TABLET ORAL 2 TIMES DAILY
Status: DISCONTINUED | OUTPATIENT
Start: 2025-04-17 | End: 2025-04-17 | Stop reason: HOSPADM

## 2025-04-17 RX ORDER — FOLIC ACID 1 MG/1
1 TABLET ORAL DAILY
Qty: 30 TABLET | Refills: 0 | Status: SHIPPED | OUTPATIENT
Start: 2025-04-18 | End: 2025-04-28 | Stop reason: SDUPTHER

## 2025-04-17 RX ORDER — TORSEMIDE 20 MG/1
20 TABLET ORAL DAILY
Status: DISCONTINUED | OUTPATIENT
Start: 2025-04-17 | End: 2025-04-17 | Stop reason: HOSPADM

## 2025-04-17 RX ORDER — CARVEDILOL 3.12 MG/1
3.12 TABLET ORAL 2 TIMES DAILY WITH MEALS
Qty: 60 TABLET | Refills: 0 | Status: SHIPPED | OUTPATIENT
Start: 2025-04-17 | End: 2025-04-28 | Stop reason: SDUPTHER

## 2025-04-17 RX ORDER — PREDNISONE 10 MG/1
TABLET ORAL
Qty: 5 TABLET | Refills: 0 | Status: SHIPPED | OUTPATIENT
Start: 2025-04-18 | End: 2025-04-21

## 2025-04-17 RX ORDER — TORSEMIDE 20 MG/1
20 TABLET ORAL DAILY
Qty: 30 TABLET | Refills: 0 | Status: SHIPPED | OUTPATIENT
Start: 2025-04-18 | End: 2025-04-28 | Stop reason: SDUPTHER

## 2025-04-17 RX ORDER — FAMOTIDINE 20 MG/1
20 TABLET, FILM COATED ORAL
Qty: 30 TABLET | Refills: 0 | Status: SHIPPED | OUTPATIENT
Start: 2025-04-17 | End: 2025-04-28 | Stop reason: SDUPTHER

## 2025-04-17 RX ORDER — SPIRONOLACTONE 50 MG/1
50 TABLET, FILM COATED ORAL 2 TIMES DAILY
Qty: 60 TABLET | Refills: 0 | Status: SHIPPED | OUTPATIENT
Start: 2025-04-17 | End: 2025-04-28 | Stop reason: SDUPTHER

## 2025-04-17 RX ORDER — MIDODRINE HYDROCHLORIDE 5 MG/1
5 TABLET ORAL
Qty: 90 TABLET | Refills: 0 | Status: SHIPPED | OUTPATIENT
Start: 2025-04-17 | End: 2025-04-28 | Stop reason: SDUPTHER

## 2025-04-17 RX ORDER — NICOTINE 21 MG/24HR
1 PATCH, TRANSDERMAL 24 HOURS TRANSDERMAL DAILY
Qty: 28 PATCH | Refills: 0 | Status: SHIPPED | OUTPATIENT
Start: 2025-04-18 | End: 2025-04-28

## 2025-04-17 RX ADMIN — FLUTICASONE FUROATE AND VILANTEROL TRIFENATATE 1 PUFF: 100; 25 POWDER RESPIRATORY (INHALATION) at 08:33

## 2025-04-17 RX ADMIN — HEPARIN SODIUM 5000 UNITS: 5000 INJECTION INTRAVENOUS; SUBCUTANEOUS at 08:34

## 2025-04-17 RX ADMIN — PANTOPRAZOLE SODIUM 40 MG: 40 TABLET, DELAYED RELEASE ORAL at 06:06

## 2025-04-17 RX ADMIN — PANCRELIPASE 36000 UNITS: 120000; 24000; 76000 CAPSULE, DELAYED RELEASE PELLETS ORAL at 11:29

## 2025-04-17 RX ADMIN — LOPERAMIDE HYDROCHLORIDE 2 MG: 2 CAPSULE ORAL at 08:31

## 2025-04-17 RX ADMIN — PANCRELIPASE 36000 UNITS: 120000; 24000; 76000 CAPSULE, DELAYED RELEASE PELLETS ORAL at 08:33

## 2025-04-17 RX ADMIN — MIDODRINE HYDROCHLORIDE 5 MG: 5 TABLET ORAL at 11:29

## 2025-04-17 RX ADMIN — SPIRONOLACTONE 50 MG: 25 TABLET ORAL at 08:31

## 2025-04-17 RX ADMIN — TORSEMIDE 20 MG: 20 TABLET ORAL at 08:45

## 2025-04-17 RX ADMIN — CARVEDILOL 3.12 MG: 3.12 TABLET, FILM COATED ORAL at 08:31

## 2025-04-17 RX ADMIN — NICOTINE 1 PATCH: 21 PATCH, EXTENDED RELEASE TRANSDERMAL at 08:34

## 2025-04-17 RX ADMIN — MIDODRINE HYDROCHLORIDE 5 MG: 5 TABLET ORAL at 06:06

## 2025-04-17 RX ADMIN — THIAMINE HCL TAB 100 MG 100 MG: 100 TAB at 08:31

## 2025-04-17 RX ADMIN — PREDNISONE 20 MG: 20 TABLET ORAL at 08:31

## 2025-04-17 RX ADMIN — FOLIC ACID 1 MG: 1 TABLET ORAL at 08:31

## 2025-04-17 RX ADMIN — GABAPENTIN 100 MG: 100 CAPSULE ORAL at 08:31

## 2025-04-17 RX ADMIN — CYANOCOBALAMIN TAB 500 MCG 1000 MCG: 500 TAB at 08:31

## 2025-04-17 RX ADMIN — Medication 1 TABLET: at 08:31

## 2025-04-17 RX ADMIN — PSYLLIUM HUSK 1 PACKET: 3.4 POWDER ORAL at 11:31

## 2025-04-17 NOTE — PLAN OF CARE
Problem: Potential for Falls  Goal: Patient will remain free of falls  Description: INTERVENTIONS:- Educate patient/family on patient safety including physical limitations- Instruct patient to call for assistance with activity - Consult OT/PT to assist with strengthening/mobility - Keep Call bell within reach- Keep bed low and locked with side rails adjusted as appropriate- Keep care items and personal belongings within reach- Initiate and maintain comfort rounds- Make Fall Risk Sign visible to staff- Offer Toileting every 2 Hours, in advance of need- Initiate/Maintain bed and chair alarm- Obtain necessary fall risk management equipment: bed and chair alarm/yellow socks and bracelet - Apply yellow socks and bracelet for high fall risk patients- Consider moving patient to room near nurses station  INTERVENTIONS:- Educate patient/family on patient safety including physical limitations- Instruct patient to call for assistance with activity - Consult OT/PT to assist with strengthening/mobility - Keep Call bell within reach- Keep bed low and locked with side rails adjusted as appropriate- Keep care items and personal belongings within reach- Initiate and maintain comfort rounds- Make Fall Risk Sign visible to staff- Offer Toileting every 2 Hours, in advance of need- Initiate/Maintain bed and chair alarm- Obtain necessary fall risk management equipment: bed and chair alarm/yellow socks and bracelet- Apply yellow socks and bracelet for high fall risk patients- Consider moving patient to room near nurses station  Outcome: Progressing     Problem: PAIN - ADULT  Goal: Verbalizes/displays adequate comfort level or baseline comfort level  Description: Interventions:- Encourage patient to monitor pain and request assistance- Assess pain using appropriate pain scale- Administer analgesics based on type and severity of pain and evaluate response- Implement non-pharmacological measures as appropriate and evaluate response-  Consider cultural and social influences on pain and pain management- Notify physician/advanced practitioner if interventions unsuccessful or patient reports new pain  Outcome: Progressing     Problem: INFECTION - ADULT  Goal: Absence or prevention of progression during hospitalization  Description: INTERVENTIONS:- Assess and monitor for signs and symptoms of infection- Monitor lab/diagnostic results- Monitor all insertion sites, i.e. indwelling lines, tubes, and drains- Monitor endotracheal if appropriate and nasal secretions for changes in amount and color- [place] appropriate cooling/warming therapies per order- Administer medications as ordered- Instruct and encourage patient and family to use good hand hygiene technique- Identify and instruct in appropriate isolation precautions for identified infection/condition  Outcome: Progressing     Problem: SAFETY ADULT  Goal: Patient will remain free of falls  Description: INTERVENTIONS:- Educate patient/family on patient safety including physical limitations- Instruct patient to call for assistance with activity - Consult OT/PT to assist with strengthening/mobility - Keep Call bell within reach- Keep bed low and locked with side rails adjusted as appropriate- Keep care items and personal belongings within reach- Initiate and maintain comfort rounds- Make Fall Risk Sign visible to staff- Offer Toileting every 2 Hours, in advance of need- Initiate/Maintain bed and chair alarm- Obtain necessary fall risk management equipment: bed and chair alarm/yellow socks and bracelet - Apply yellow socks and bracelet for high fall risk patients- Consider moving patient to room near nurses station  INTERVENTIONS:- Educate patient/family on patient safety including physical limitations- Instruct patient to call for assistance with activity - Consult OT/PT to assist with strengthening/mobility - Keep Call bell within reach- Keep bed low and locked with side rails adjusted as  appropriate- Keep care items and personal belongings within reach- Initiate and maintain comfort rounds- Make Fall Risk Sign visible to staff- Offer Toileting every 2 Hours, in advance of need- Initiate/Maintain bed and chair alarm- Obtain necessary fall risk management equipment: bed and chair alarm/yellow socks and bracelet- Apply yellow socks and bracelet for high fall risk patients- Consider moving patient to room near nurses station  Outcome: Progressing  Goal: Maintain or return to baseline ADL function  Description: INTERVENTIONS:-  Assess patient's ability to carry out ADLs; assess patient's baseline for ADL function and identify physical deficits which impact ability to perform ADLs (bathing, care of mouth/teeth, toileting, grooming, dressing, etc.)- Assess/evaluate cause of self-care deficits - Assess range of motion- Assess patient's mobility; develop plan if impaired- Assess patient's need for assistive devices and provide as appropriate- Encourage maximum independence but intervene and supervise when necessary- Involve family in performance of ADLs- Assess for home care needs following discharge - Consider OT consult to assist with ADL evaluation and planning for discharge- Provide patient education as appropriate  Outcome: Progressing  Goal: Maintains/Returns to pre admission functional level  Description: INTERVENTIONS:- Perform AM-PAC 6 Click Basic Mobility/ Daily Activity assessment daily.- Set and communicate daily mobility goal to care team and patient/family/caregiver. - Collaborate with rehabilitation services on mobility goals if consulted- Perform Range of Motion 3 times a day.- Reposition patient every 2 hours.- Dangle patient 3 times a day- Stand patient 3 times a day- Ambulate patient 3 times a day- Out of bed to chair 3 times a day - Out of bed for meals 3 times a day- Out of bed for toileting- Record patient progress and toleration of activity level   Outcome: Progressing     Problem:  DISCHARGE PLANNING  Goal: Discharge to home or other facility with appropriate resources  Description: INTERVENTIONS:- Identify barriers to discharge w/patient and caregiver- Arrange for needed discharge resources and transportation as appropriate- Identify discharge learning needs (meds, wound care, etc.)- Arrange for interpretive services to assist at discharge as needed- Refer to Case Management Department for coordinating discharge planning if the patient needs post-hospital services based on physician/advanced practitioner order or complex needs related to functional status, cognitive ability, or social support system  Outcome: Progressing     Problem: Knowledge Deficit  Goal: Patient/family/caregiver demonstrates understanding of disease process, treatment plan, medications, and discharge instructions  Description: Complete learning assessment and assess knowledge base.Interventions:- Provide teaching at level of understanding- Provide teaching via preferred learning methods  Outcome: Progressing     Problem: Prexisting or High Potential for Compromised Skin Integrity  Goal: Skin integrity is maintained or improved  Description: INTERVENTIONS:- Identify patients at risk for skin breakdown- Assess and monitor skin integrity- Assess and monitor nutrition and hydration status- Monitor labs - Assess for incontinence - Turn and reposition patient- Assist with mobility/ambulation- Relieve pressure over bony prominences- Avoid friction and shearing- Provide appropriate hygiene as needed including keeping skin clean and dry- Evaluate need for skin moisturizer/barrier cream- Collaborate with interdisciplinary team - Patient/family teaching- Consider wound care consult   Outcome: Progressing     Problem: RESPIRATORY - ADULT  Goal: Achieves optimal ventilation and oxygenation  Description: INTERVENTIONS:- Assess for changes in respiratory status- Assess for changes in mentation and behavior- Position to facilitate  oxygenation and minimize respiratory effort- Oxygen administered by appropriate delivery if ordered- Initiate smoking cessation education as indicated- Encourage broncho-pulmonary hygiene including cough, deep breathe, Incentive Spirometry- Assess the need for suctioning and aspirate as needed- Assess and instruct to report SOB or any respiratory difficulty- Respiratory Therapy support as indicated  Outcome: Progressing     Problem: NEUROSENSORY - ADULT  Goal: Achieves maximal functionality and self care  Description: INTERVENTIONS- Monitor swallowing and airway patency with patient fatigue and changes in neurological status- Encourage and assist patient to increase activity and self care. - Encourage visually impaired, hearing impaired and aphasic patients to use assistive/communication devices  Outcome: Progressing     Problem: CARDIOVASCULAR - ADULT  Goal: Maintains optimal cardiac output and hemodynamic stability  Description: INTERVENTIONS:- Monitor I/O, vital signs and rhythm- Monitor for S/S and trends of decreased cardiac output- Administer and titrate ordered vasoactive medications to optimize hemodynamic stability- Assess quality of pulses, skin color and temperature- Assess for signs of decreased coronary artery perfusion- Instruct patient to report change in severity of symptoms  Outcome: Progressing  Goal: Absence of cardiac dysrhythmias or at baseline rhythm  Description: INTERVENTIONS:- Continuous cardiac monitoring, vital signs, obtain 12 lead EKG if ordered- Administer antiarrhythmic and heart rate control medications as ordered- Monitor electrolytes and administer replacement therapy as ordered  Outcome: Progressing     Problem: GASTROINTESTINAL - ADULT  Goal: Maintains or returns to baseline bowel function  Description: INTERVENTIONS:- Assess bowel function- Encourage oral fluids to ensure adequate hydration- Administer IV fluids if ordered to ensure adequate hydration- Administer ordered  medications as needed- Encourage mobilization and activity- Consider nutritional services referral to assist patient with adequate nutrition and appropriate food choices  Outcome: Progressing     Problem: MUSCULOSKELETAL - ADULT  Goal: Maintain or return mobility to safest level of function  Description: INTERVENTIONS:- Assess patient's ability to carry out ADLs; assess patient's baseline for ADL function and identify physical deficits which impact ability to perform ADLs (bathing, care of mouth/teeth, toileting, grooming, dressing, etc.)- Assess/evaluate cause of self-care deficits - Assess range of motion- Assess patient's mobility- Assess patient's need for assistive devices and provide as appropriate- Encourage maximum independence but intervene and supervise when necessary- Involve family in performance of ADLs- Assess for home care needs following discharge - Consider OT consult to assist with ADL evaluation and planning for discharge- Provide patient education as appropriate  Outcome: Progressing

## 2025-04-17 NOTE — ASSESSMENT & PLAN NOTE
Patient reported right groin discomfort initially on 4/15, no significant tenderness on exam, abdominal distention noted  Patient denies any dysuria but reports frequency with small output  Normal range of motion at right hip, denies any fall trauma or injury  Reports that pain is improving now intermittent only.  Repeat UA small blood, calcium oxalate crystals occasional.  Prior CT scan without any nephrolithiasis.  Bladder scan unremarkable.  Ultrasound of the abdomen with small amount of ascites  Possibly musculoskeletal, now resolved  Monitor symptoms  Monitor intake output  Monitor abdominal exam  PT/OT

## 2025-04-17 NOTE — ASSESSMENT & PLAN NOTE
Likely due to alcohol abuse/cirrhosis  CT abdomen--fatty liver, unremarkable spleen  Ultrasound with the abdomen increase hepatic echogenicity  Resolved

## 2025-04-17 NOTE — PLAN OF CARE
Problem: Potential for Falls  Goal: Patient will remain free of falls  Description: INTERVENTIONS:- Educate patient/family on patient safety including physical limitations- Instruct patient to call for assistance with activity - Consult OT/PT to assist with strengthening/mobility - Keep Call bell within reach- Keep bed low and locked with side rails adjusted as appropriate- Keep care items and personal belongings within reach- Initiate and maintain comfort rounds- Make Fall Risk Sign visible to staff- Offer Toileting every 2 Hours, in advance of need- Initiate/Maintain bed alarm- Obtain necessary fall risk management equipment: call bell within reach - Apply yellow socks and bracelet for high fall risk patients- Consider moving patient to room near nurses station  INTERVENTIONS:- Educate patient/family on patient safety including physical limitations- Instruct patient to call for assistance with activity - Consult OT/PT to assist with strengthening/mobility - Keep Call bell within reach- Keep bed low and locked with side rails adjusted as appropriate- Keep care items and personal belongings within reach- Initiate and maintain comfort rounds- Make Fall Risk Sign visible to staff- Offer Toileting every 2 Hours, in advance of need- Initiate/Maintain bed alarm- Obtain necessary fall risk management equipment: call bell within reach - Apply yellow socks and bracelet for high fall risk patients- Consider moving patient to room near nurses station  Outcome: Progressing     Problem: PAIN - ADULT  Goal: Verbalizes/displays adequate comfort level or baseline comfort level  Description: Interventions:- Encourage patient to monitor pain and request assistance- Assess pain using appropriate pain scale- Administer analgesics based on type and severity of pain and evaluate response- Implement non-pharmacological measures as appropriate and evaluate response- Consider cultural and social influences on pain and pain management-  Notify physician/advanced practitioner if interventions unsuccessful or patient reports new pain  Outcome: Progressing     Problem: INFECTION - ADULT  Goal: Absence or prevention of progression during hospitalization  Description: INTERVENTIONS:- Assess and monitor for signs and symptoms of infection- Monitor lab/diagnostic results- Monitor all insertion sites, i.e. indwelling lines, tubes, and drains- Monitor endotracheal if appropriate and nasal secretions for changes in amount and color- [place] appropriate cooling/warming therapies per order- Administer medications as ordered- Instruct and encourage patient and family to use good hand hygiene technique- Identify and instruct in appropriate isolation precautions for identified infection/condition  Outcome: Progressing     Problem: SAFETY ADULT  Goal: Patient will remain free of falls  Description: INTERVENTIONS:- Educate patient/family on patient safety including physical limitations- Instruct patient to call for assistance with activity - Consult OT/PT to assist with strengthening/mobility - Keep Call bell within reach- Keep bed low and locked with side rails adjusted as appropriate- Keep care items and personal belongings within reach- Initiate and maintain comfort rounds- Make Fall Risk Sign visible to staff- Offer Toileting every 2 Hours, in advance of need- Initiate/Maintain bed alarm- Obtain necessary fall risk management equipment: call bell within reach - Apply yellow socks and bracelet for high fall risk patients- Consider moving patient to room near nurses station  INTERVENTIONS:- Educate patient/family on patient safety including physical limitations- Instruct patient to call for assistance with activity - Consult OT/PT to assist with strengthening/mobility - Keep Call bell within reach- Keep bed low and locked with side rails adjusted as appropriate- Keep care items and personal belongings within reach- Initiate and maintain comfort rounds- Make  Fall Risk Sign visible to staff- Offer Toileting every 2 Hours, in advance of need- Initiate/Maintain bed alarm- Obtain necessary fall risk management equipment: call bell within reach - Apply yellow socks and bracelet for high fall risk patients- Consider moving patient to room near nurses station  Outcome: Progressing  Goal: Maintain or return to baseline ADL function  Description: INTERVENTIONS:-  Assess patient's ability to carry out ADLs; assess patient's baseline for ADL function and identify physical deficits which impact ability to perform ADLs (bathing, care of mouth/teeth, toileting, grooming, dressing, etc.)- Assess/evaluate cause of self-care deficits - Assess range of motion- Assess patient's mobility; develop plan if impaired- Assess patient's need for assistive devices and provide as appropriate- Encourage maximum independence but intervene and supervise when necessary- Involve family in performance of ADLs- Assess for home care needs following discharge - Consider OT consult to assist with ADL evaluation and planning for discharge- Provide patient education as appropriate  Outcome: Progressing  Goal: Maintains/Returns to pre admission functional level  Description: INTERVENTIONS:- Perform AM-PAC 6 Click Basic Mobility/ Daily Activity assessment daily.- Set and communicate daily mobility goal to care team and patient/family/caregiver. - Collaborate with rehabilitation services on mobility goals if consulted- Perform Range of Motion 2 times a day.- Reposition patient every 2 hours.- Dangle patient 2 times a day- Stand patient 2 times a day- Ambulate patient 2 times a day- Out of bed to chair 2 times a day - Out of bed for meals 2 times a day- Out of bed for toileting- Record patient progress and toleration of activity level   Outcome: Progressing     Problem: DISCHARGE PLANNING  Goal: Discharge to home or other facility with appropriate resources  Description: INTERVENTIONS:- Identify barriers to  discharge w/patient and caregiver- Arrange for needed discharge resources and transportation as appropriate- Identify discharge learning needs (meds, wound care, etc.)- Arrange for interpretive services to assist at discharge as needed- Refer to Case Management Department for coordinating discharge planning if the patient needs post-hospital services based on physician/advanced practitioner order or complex needs related to functional status, cognitive ability, or social support system  Outcome: Progressing     Problem: Knowledge Deficit  Goal: Patient/family/caregiver demonstrates understanding of disease process, treatment plan, medications, and discharge instructions  Description: Complete learning assessment and assess knowledge base.Interventions:- Provide teaching at level of understanding- Provide teaching via preferred learning methods  Outcome: Progressing     Problem: Prexisting or High Potential for Compromised Skin Integrity  Goal: Skin integrity is maintained or improved  Description: INTERVENTIONS:- Identify patients at risk for skin breakdown- Assess and monitor skin integrity- Assess and monitor nutrition and hydration status- Monitor labs - Assess for incontinence - Turn and reposition patient- Assist with mobility/ambulation- Relieve pressure over bony prominences- Avoid friction and shearing- Provide appropriate hygiene as needed including keeping skin clean and dry- Evaluate need for skin moisturizer/barrier cream- Collaborate with interdisciplinary team - Patient/family teaching- Consider wound care consult   Outcome: Progressing     Problem: RESPIRATORY - ADULT  Goal: Achieves optimal ventilation and oxygenation  Description: INTERVENTIONS:- Assess for changes in respiratory status- Assess for changes in mentation and behavior- Position to facilitate oxygenation and minimize respiratory effort- Oxygen administered by appropriate delivery if ordered- Initiate smoking cessation education as  indicated- Encourage broncho-pulmonary hygiene including cough, deep breathe, Incentive Spirometry- Assess the need for suctioning and aspirate as needed- Assess and instruct to report SOB or any respiratory difficulty- Respiratory Therapy support as indicated  Outcome: Progressing     Problem: NEUROSENSORY - ADULT  Goal: Achieves maximal functionality and self care  Description: INTERVENTIONS- Monitor swallowing and airway patency with patient fatigue and changes in neurological status- Encourage and assist patient to increase activity and self care. - Encourage visually impaired, hearing impaired and aphasic patients to use assistive/communication devices  Outcome: Progressing     Problem: CARDIOVASCULAR - ADULT  Goal: Maintains optimal cardiac output and hemodynamic stability  Description: INTERVENTIONS:- Monitor I/O, vital signs and rhythm- Monitor for S/S and trends of decreased cardiac output- Administer and titrate ordered vasoactive medications to optimize hemodynamic stability- Assess quality of pulses, skin color and temperature- Assess for signs of decreased coronary artery perfusion- Instruct patient to report change in severity of symptoms  Outcome: Progressing  Goal: Absence of cardiac dysrhythmias or at baseline rhythm  Description: INTERVENTIONS:- Continuous cardiac monitoring, vital signs, obtain 12 lead EKG if ordered- Administer antiarrhythmic and heart rate control medications as ordered- Monitor electrolytes and administer replacement therapy as ordered  Outcome: Progressing     Problem: GASTROINTESTINAL - ADULT  Goal: Maintains or returns to baseline bowel function  Description: INTERVENTIONS:- Assess bowel function- Encourage oral fluids to ensure adequate hydration- Administer IV fluids if ordered to ensure adequate hydration- Administer ordered medications as needed- Encourage mobilization and activity- Consider nutritional services referral to assist patient with adequate nutrition and  appropriate food choices  Outcome: Progressing     Problem: MUSCULOSKELETAL - ADULT  Goal: Maintain or return mobility to safest level of function  Description: INTERVENTIONS:- Assess patient's ability to carry out ADLs; assess patient's baseline for ADL function and identify physical deficits which impact ability to perform ADLs (bathing, care of mouth/teeth, toileting, grooming, dressing, etc.)- Assess/evaluate cause of self-care deficits - Assess range of motion- Assess patient's mobility- Assess patient's need for assistive devices and provide as appropriate- Encourage maximum independence but intervene and supervise when necessary- Involve family in performance of ADLs- Assess for home care needs following discharge - Consider OT consult to assist with ADL evaluation and planning for discharge- Provide patient education as appropriate  Outcome: Progressing

## 2025-04-17 NOTE — ASSESSMENT & PLAN NOTE
Reports chronic diarrhea about 10 times a day.  And chronic nausea vomiting a few times a day.  Also with chronic diffuse abdominal pain.  Stool bacterial panel negative, stool C. difficile negative.  Fecal elastase within normal limit 317  Abnormal CT scan with sigmoid colon thickening, suspected colopathy  Continues to have frequent bowel movement though consistency appears to be improving  GI following, input appreciated  Diarrhea is improving with current treatment  Patient was initially started on ceftriaxone and Flagyl which can be discontinued as patient has low clinical signs of infection  Patient started on Creon and psyllium  Imodium as needed  Follow-up fecal calprotectin, celiac panel, ova parasite,  Follow-up with GI after discharge

## 2025-04-17 NOTE — ASSESSMENT & PLAN NOTE
Wt Readings from Last 3 Encounters:   04/17/25 71.8 kg (158 lb 4.6 oz)   03/24/25 71.2 kg (157 lb)   01/22/25 68.9 kg (152 lb)   On torsemide 20 mg p.o. daily if weight above 155 pounds at home.  2D echo in February 2025 showed grossly normal LV and RV size and systolic function, mild AR.    EKG sinus rhythm with PVCs, wide QRS with left BBB, no acute ischemic changes.  Mild pedal edema on exam.  Trace bilateral pleural effusions and mild ascites on CT.  No pulm edema on CT.  Albumin 2.6.  Could be contributing to pleural effusions and ascites.  Likely volume overload/diffuse edema secondary to hypoalbuminemia/cirrhosis  Started on Lasix 20 mg p.o. daily along with Aldactone 50 mg p.o. daily  Monitor response, titrate up as needed  Daily weight, intake output  Follow-up BMP, electrolytes, will require close monitoring

## 2025-04-17 NOTE — ASSESSMENT & PLAN NOTE
On Advair inhaler, albuterol inhaler as needed at home.  Patient was on IV Solu-Medrol initially which was changed to prednisone 40 mg p.o. daily  Denies shortness of breath/cough.  Mild leukocytosis secondary to steroids  Lungs clear to auscultation.  Saturating adequately on room air  Continue home bronchodilator/inhaler on discharge  Taper prednisone, 20 mg for 3 days followed by 10 mg for 3 days.   Patient is scheduled pulmonary evaluation as outpatient, encouraged to follow-up

## 2025-04-17 NOTE — ASSESSMENT & PLAN NOTE
CT showed - Fusiform ectasia of the ascending thoracic aorta measuring up to 45 mm is stable. Recommendation is for follow-up low radiation dose chest CT in one year.  Patient/wife aware about the finding and follows up with primary cardiology for surveillance

## 2025-04-17 NOTE — ASSESSMENT & PLAN NOTE
Wt Readings from Last 3 Encounters:   04/17/25 71.8 kg (158 lb 4.6 oz)   03/24/25 71.2 kg (157 lb)   01/22/25 68.9 kg (152 lb)   On torsemide 20 mg p.o. daily if weight above 155 pounds at home.  2D echo in February 2025 showed grossly normal LV and RV size and systolic function, mild AR.    EKG sinus rhythm with PVCs, wide QRS with left BBB, no acute ischemic changes.  Mild pedal edema on exam on presentation  Trace bilateral pleural effusions and mild ascites on CT.  No pulm edema on CT.  Albumin 2.6.  Could be contributing to pleural effusions and ascites.  Likely volume overload/diffuse edema secondary to hypoalbuminemia/cirrhosis  Volume status improved with diuresis during hospitalization  Started on Lasix 20 mg p.o. daily along with Aldactone 50 mg p.o. daily initially due to low blood pressure  Midodrine added, orthostasis negative  Currently tolerating torsemide 20 mg daily with Aldactone 50 mg twice daily  Advised to monitor daily weight  Follow-up BMP, electrolytes, will require close monitoring.  Will get repeat labs in 1 week.  Result will be forwarded to PCP and GI

## 2025-04-17 NOTE — ASSESSMENT & PLAN NOTE
Noted history  Triglycerides 229 with LDL level of 82  Possible secondary to alcohol abuse  Follow-up with GI as above

## 2025-04-17 NOTE — DISCHARGE SUMMARY
Discharge Summary - Hospitalist   Name: Del Ocampo 70 y.o. male I MRN: [id number]  Unit/Bed#: [address] [place] [address] I Date of Admission: 4/11/2025   Date of Service: 4/17/2025 I Hospital Day: 5     Assessment & Plan  Elevated LFTs  Highly suspicious for cirrhosis in the setting of thrombocytopenia with hypoalbuminemia with pleural effusions and ascites  CT showed enlarged fatty liver which he is known to have and nonspecific wall thickening of the gallbladder.  Right upper quadrant ultrasound with diffusely increased hepatic echogenicity consistent with hepatic steatosis and cannot exclude cirrhosis with diffuse gallbladder wall thickening  Patient has no formal diagnosis of cirrhosis but most likely patient has decompensated cirrhosis  Maddey discriminant factor is 34.9-patient less likely has acute alcoholic hepatitis and most likely decompensated cirrhosis.  GI recommending to hold off on steroids at the current time  Ultrasound limited on 4/15-small amount of ascites  MELD 3.0: 15 at 4/17/2025  5:25 AM  MELD-Na: 11 at 4/17/2025  5:25 AM  Calculated from:  Serum Creatinine: 0.89 mg/dL (Using min of 1 mg/dL) at 4/17/2025  5:25 AM  Serum Sodium: 133 mmol/L at 4/17/2025  5:25 AM  Total Bilirubin: 2.34 mg/dL at 4/17/2025  5:25 AM  Serum Albumin: 2.4 g/dL at 4/17/2025  5:25 AM  INR(ratio): 1.13 at 4/16/2025  1:03 PM  Age at listing (hypothetical): 70 years  Sex: Male at 4/17/2025  5:25 AM  Currently stable, appears compensated  Seen by GI, input appreciated  Alcohol cessation  Low-salt diet  Follow-up MELD labs as outpatient, will get repeat labs in 1 week  Uptitrated diuretics, tolerating torsemide 20 mg daily and Aldactone 50 mg twice daily  Monitor renal function closely  GI/hepatology follow-up after discharge  Outpatient elastography and possible MRI    Chronic obstructive pulmonary disease with acute exacerbation (HCC)  On Advair inhaler, albuterol inhaler as needed at home.  Patient was on IV Solu-Medrol initially  which was changed to prednisone 40 mg p.o. daily  Denies shortness of breath/cough.  Mild leukocytosis secondary to steroids  Lungs clear to auscultation.  Saturating adequately on room air  Continue home bronchodilator/inhaler on discharge  Taper prednisone, 20 mg for 3 days followed by 10 mg for 3 days.   Patient is scheduled pulmonary evaluation as outpatient, encouraged to follow-up  Chronic combined systolic and diastolic congestive heart failure (HCC)  Wt Readings from Last 3 Encounters:   04/17/25 71.8 kg (158 lb 4.6 oz)   03/24/25 71.2 kg (157 lb)   01/22/25 68.9 kg (152 lb)   On torsemide 20 mg p.o. daily if weight above 155 pounds at home.  2D echo in February 2025 showed grossly normal LV and RV size and systolic function, mild AR.    EKG sinus rhythm with PVCs, wide QRS with left BBB, no acute ischemic changes.  Mild pedal edema on exam on presentation  Trace bilateral pleural effusions and mild ascites on CT.  No pulm edema on CT.  Albumin 2.6.  Could be contributing to pleural effusions and ascites.  Likely volume overload/diffuse edema secondary to hypoalbuminemia/cirrhosis  Volume status improved with diuresis during hospitalization  Started on Lasix 20 mg p.o. daily along with Aldactone 50 mg p.o. daily initially due to low blood pressure  Midodrine added, orthostasis negative  Currently tolerating torsemide 20 mg daily with Aldactone 50 mg twice daily  Advised to monitor daily weight  Follow-up BMP, electrolytes, will require close monitoring.  Will get repeat labs in 1 week.  Result will be forwarded to PCP and GI    Abnormal CT scan  Reports chronic diarrhea about 10 times a day.  And chronic nausea vomiting a few times a day.  Also with chronic diffuse abdominal pain.  Stool bacterial panel negative, stool C. difficile negative.  Fecal elastase within normal limit 317  Abnormal CT scan with sigmoid colon thickening, suspected colopathy  Continues to have frequent bowel movement though  consistency appears to be improving  GI following, input appreciated  Diarrhea is improving with current treatment  Patient was initially started on ceftriaxone and Flagyl which can be discontinued as patient has low clinical signs of infection  Patient started on Creon and psyllium  Imodium as needed  Follow-up fecal calprotectin, celiac panel, ova parasite,  Follow-up with GI after discharge  Elevated troponin  Troponin 56-51-37  Denies chest pain  EKG as above  Most likely non-MI troponin elevation  Thrombocytopenia (HCC) (Resolved: 4/17/2025)  Likely due to alcohol abuse/cirrhosis  CT abdomen--fatty liver, unremarkable spleen  Ultrasound with the abdomen increase hepatic echogenicity  Resolved  Alcohol abuse  [facility] protocol there is no evidence of withdrawal  Alcohol cessation discussed, patient is motivated to quit  Continue thiamine folic acid and multivitamin  History of bleeding ulcers  Continue PPI daily  Hepatic steatosis  Noted history  Triglycerides 229 with LDL level of 82  Possible secondary to alcohol abuse  Follow-up with GI as above  Right inguinal pain (Resolved: 4/17/2025)  Patient reported right groin discomfort initially on 4/15, no significant tenderness on exam, abdominal distention noted  Patient denies any dysuria but reports frequency with small output  Normal range of motion at right hip, denies any fall trauma or injury  Reports that pain is improving now intermittent only.  Repeat UA small blood, calcium oxalate crystals occasional.  Prior CT scan without any nephrolithiasis.  Bladder scan unremarkable.  Ultrasound of the abdomen with small amount of ascites  Possibly musculoskeletal, now resolved  Monitor symptoms  Monitor intake output  Monitor abdominal exam  PT/OT  Nicotine abuse  Nicotine patch, smoking cessation  PMR (polymyalgia rheumatica) (HCC)  Noted history  Ectasia of artery (HCC)  CT showed - Fusiform ectasia of the ascending thoracic aorta measuring up to 45 mm is stable.  Recommendation is for follow-up low radiation dose chest CT in one year.  Patient/wife aware about the finding and follows up with primary cardiology for surveillance  Generalized weakness  Suspect due to alcohol abuse, poor oral intake, chronic diarrhea nausea vomiting.   UA was unremarkable  PT /OT evaluation and treatment-improving, initially considered rehab but patient will be discharged home with PT/OT with ongoing improvement  Fall precautions  Goals of care, counseling/discussion  Wife reported that she felt like patient is going to die on the day of admission.  Patient is having frequent hospitalization and patient was not going to quit alcohol.  Wife would prefer patient to be comfortable and was questioning about hospice initially during hospitalization  Discussed with wife by previous provider that patient might not be a candidate for hospice but can provide palliative care referral due to multiple comorbid conditions and continued alcohol use.  Wife would also like some resources at home.  Will refer to palliative care on discharge  Hypokalemia (Resolved: 4/17/2025)  Improved after potassium supplementation  Monitor on diuretics       Medical Problems       Resolved Problems  Date Reviewed: 4/14/2025          Resolved    * (Principal) Acute encephalopathy 4/16/2025     Resolved by  Romy Rivera MD        Discharging Physician / Practitioner: Romy Rivera MD  PCP: Frank Lombardi, DO  Admission Date:   Admission Orders (From admission, onward)       Ordered        04/12/25 0242  INPATIENT ADMISSION  Once            04/11/25 2022  Place in Observation  Once                          Discharge Date: 04/17/25    Consultations During Hospital Stay:  Gastroenterology    Procedures Performed:   None    Significant Findings / Test Results:   As above    Incidental Findings:   CT scan and ultrasound of the liver revealed  Diffusely increased hepatic echogenicity, consistent with hepatic steatosis. Given  the associated ascites, hepatic fibrosis or cirrhosis cannot be excluded. In addition, this appearance decreases the sensitivity for detecting subtle focal lesions.    Further evaluation by ultrasound elastography should be considered. Given the markedly heterogeneous appearance on the prior CT, a baseline gadolinium enhanced MRI could be considered for further evaluation.   (Evidence of fatty liver and likely cirrhosis of liver)  Abnormal wall thickening of sigmoid colon  Nonspecific thickening of gallbladder likely secondary to edema  4.5 cm fusiform ectasia of ascending thoracic aorta-will require repeat CT scan in 1 year to monitor              Follow up required: Yes,  Further liver imaging with MRI and ultrasound elastography as outpatient .     Please notify the following clinician to assist with the follow up:              Dr. Frank Lombardi, DO and gastroenterology     Incidental finding results were discussed with the Patient and Patient's POA by Romy Rivera MD on 04/17/25.   They expressed understanding and all questions answered.   I reviewed the above mentioned incidental findings with the patient and/or family and they expressed understanding.    Test Results Pending at Discharge (will require follow up):   Follow-up pending stool studies, celiac panel, fecal calprotectin, ova and parasite      Outpatient Tests Requested:  CBC, CMP, PT/INR    Complications: None    Reason for Admission: Generalized weakness, lethargy    Hospital Course:     As per HPI  Del Ocampo is a 70 y.o. male patient with past medical history of alcohol abuse, CHF, COPD, peptic ulcer disease, fatty liver, PMR, nicotine dependence who originally presented to the hospital on 4/11/2025 due to altered mental status for 1 day associated with generalized weakness, poor p.o. intake ongoing for last few weeks.  Patient 's wife also reported that Patient has been in bed for about 1 week per wife.  Patient reported SOB for about 1  week.  Patient was not responding today per wife.  She thought he was dying.  Patient drinks alcohol daily, 6-9 shots of vodka daily per wife.  Last drink was yesterday.  Patient has chronic diarrhea nausea vomiting abdominal pain per wife at bedside.  Wife reported diarrhea about 10 times a day, small amount though, pasty in nature. Vomiting a few times a day. Patient only eats banana yogurt cereal in past 3 weeks.  Patient denied chest pain headache dizziness fever chills.  No other complaints.  Patient was evaluated in ED, CT head did not reveal any acute abnormality, CT chest abdomen pelvis revealed abnormal findings suggestive of cirrhotic disease as above finding of possible colitis though suspected to be portal hypertensive colopathy was noted.  Patient noted to be weak but alert oriented.  Patient was subsequently admitted for further evaluation, patient was also noted to have diffuse wheezing bilaterally likely related to COPD exacerbation.  Patient was started on IV antibiotics for colitis and IV steroids with bronchodilators and was subsequently admitted.  Ammonia level within normal limit, patient was noted to be afebrile with stable vital signs though noted to have soft blood pressure no bandemia or leukocytosis noted.  Stool studies were negative and antibiotics were discontinued.  Patient was also seen by gastroenterology, finding was likely suggestive of decompensated cirrhosis less likely alcoholic hepatitis.  Recommended monitoring of MELD labs.  Patient was started on Creon and Metamucil for chronic diarrhea.  With the treatment patient's mental status gradually improved to baseline, patient initially required supplemental oxygen which subsequently improved.  Blood pressure stabilized the patient did not require midodrine for blood pressure support and subsequently patient was started on diuretics for volume overload associated with cirrhosis.  Imaging did not reveal any significant ascites.   "Diarrhea gradually improved.  Diuretics were adjusted patient tolerated well with stable blood pressure and renal function/electrolytes.  Patient currently improved to baseline, volume status improved with diuresis and improving with physical therapy.  Patient was initially recommended rehab but now improved and will be discharged home with home PT/OT.  Diagnosis treatment plan and follow-up discussed at length with the patient and wife at bedside and coordinated with GI.    Please see above list of diagnoses and related plan for additional information.     Condition at Discharge: stable    Discharge Day Visit / Exam:   Subjective:    Feels well, denies any pain  Ambulating to the bathroom with walker  Denies any abdominal pain, tolerating diet well, diarrhea is improving  Denies any dizziness, lightheadedness  Denies any urinary difficulties, reported good urine output yesterday  Denies any left hand or lower extremity pain from fall yesterday    Looking forward to go home      Vitals: Blood Pressure: 110/55 (04/17/25 0844)  Pulse: 73 (04/17/25 0844)  Temperature: 97.7 °F (36.5 °C) (04/17/25 0715)  Temp Source: Temporal (04/16/25 1703)  Respirations: 17 (04/17/25 0834)  Height: 5' 8\" (172.7 cm) (04/11/25 2115)  Weight - Scale: 71.8 kg (158 lb 4.6 oz) (04/17/25 0557)  SpO2: 96 % (04/17/25 0844)  Physical Exam  Constitutional:       General: He is not in acute distress.  HENT:      Head: Normocephalic and atraumatic.      Mouth/Throat:      Mouth: Mucous membranes are moist.   Eyes:      Extraocular Movements: Extraocular movements intact.   Cardiovascular:      Rate and Rhythm: Normal rate.   Pulmonary:      Effort: Pulmonary effort is normal. No respiratory distress.      Breath sounds: No rhonchi.   Abdominal:      General: Bowel sounds are normal. There is distension.      Palpations: Abdomen is soft.      Tenderness: There is no abdominal tenderness. There is no guarding or rebound.   Musculoskeletal:      " Cervical back: Neck supple.      Right lower leg: No edema.      Left lower leg: No edema.      Comments: Improved lower extremity edema   Skin:     General: Skin is warm and dry.      Findings: No rash.   Neurological:      Mental Status: He is alert and oriented to person, place, and time. Mental status is at baseline.      Cranial Nerves: No cranial nerve deficit.   Psychiatric:         Mood and Affect: Mood normal.            Discussion with Family: Updated  (wife) at bedside.    Discharge instructions/Information to patient and family:   See after visit summary for information provided to patient and family.      Provisions for Follow-Up Care:  See after visit summary for information related to follow-up care and any pertinent home health orders.      Mobility at time of Discharge:   Basic Mobility Inpatient Raw Score: 18  JH-HLM Goal: 6: Walk 10 steps or more  JH-HLM Achieved: 8: Walk 250 feet ot more  HLM Goal achieved. Continue to encourage appropriate mobility.     Disposition:   Home with VNA Services (Reminder: Complete face to face encounter)    Planned Readmission: No    Discharge Medications:  See after visit summary for reconciled discharge medications provided to patient and/or family.      Administrative Statements   Discharge Statement:  I have spent a total time of 45 minutes in caring for this patient on the day of the visit/encounter. >30 minutes of time was spent on: Diagnostic results, Prognosis, Risks and benefits of tx options, Instructions for management, Patient and family education, Importance of tx compliance, Risk factor reductions, Impressions, Counseling / Coordination of care, Documenting in the medical record, Reviewing / ordering tests, medicine, procedures  , and Communicating with other healthcare professionals .    **Please Note: This note may have been constructed using a voice recognition system**

## 2025-04-17 NOTE — ASSESSMENT & PLAN NOTE
Has had workup for this in the past & pt denies any worsening prior to admission. C.diff, enteric panel negative this admission.  Fecal elastase . Random colon bx negative for microscopic colitis 3/28/24. Per patient's wife stool has more consistency and is now sometimes formed, had 7 BM yesterday which was improved significantly from before.    - Continue Metamucil BID  - Continue Creon 36,000 units before meals based on weight  -Continue imodium PRN which he was taking at home  -f/u fecal calprotectin, celiac panel, O+P outpatient  -Monitor stool output  -Continue outpatient follow up

## 2025-04-17 NOTE — ASSESSMENT & PLAN NOTE
Highly suspicious for cirrhosis in the setting of thrombocytopenia with hypoalbuminemia with pleural effusions and ascites  CT showed enlarged fatty liver which he is known to have and nonspecific wall thickening of the gallbladder.  Right upper quadrant ultrasound with diffusely increased hepatic echogenicity consistent with hepatic steatosis and cannot exclude cirrhosis with diffuse gallbladder wall thickening  Patient has no formal diagnosis of cirrhosis but most likely patient has decompensated cirrhosis  Maddey discriminant factor is 34.9-patient less likely has acute alcoholic hepatitis and most likely decompensated cirrhosis.  GI recommending to hold off on steroids at the current time  Ultrasound limited on 4/15-small amount of ascites  MELD 3.0: 15 at 4/17/2025  5:25 AM  MELD-Na: 11 at 4/17/2025  5:25 AM  Calculated from:  Serum Creatinine: 0.89 mg/dL (Using min of 1 mg/dL) at 4/17/2025  5:25 AM  Serum Sodium: 133 mmol/L at 4/17/2025  5:25 AM  Total Bilirubin: 2.34 mg/dL at 4/17/2025  5:25 AM  Serum Albumin: 2.4 g/dL at 4/17/2025  5:25 AM  INR(ratio): 1.13 at 4/16/2025  1:03 PM  Age at listing (hypothetical): 70 years  Sex: Male at 4/17/2025  5:25 AM  Currently stable, appears compensated  Seen by GI, input appreciated  Alcohol cessation  Low-salt diet  Follow-up MELD labs as outpatient, will get repeat labs in 1 week  Uptitrated diuretics, tolerating torsemide 20 mg daily and Aldactone 50 mg twice daily  Monitor renal function closely  GI/hepatology follow-up after discharge  Outpatient elastography and possible MRI

## 2025-04-17 NOTE — CASE MANAGEMENT
Case Management Discharge Planning Note    Patient name Del Ocampo  Location [address] [address]/[address] [address]-* MRN [id number]  : 1955 Date 2025       Current Admission Date: 2025  Current Admission Diagnosis:Alcohol abuse   Patient Active Problem List    Diagnosis Date Noted Date Diagnosed    Right inguinal pain 2025     Chronic diarrhea 2025     Thrombocytopenia (HCC) 2025     Ectasia of artery (HCC) 2025     Elevated troponin 2025     Goals of care, counseling/discussion 2025     Generalized weakness 2025     Abnormal CT scan 2025     Jaw pain 2025     Rash 2024     Chronic combined systolic and diastolic congestive heart failure (HCC) 2024     Serum total bilirubin elevated 2024     Elevated LFTs 2024     Temporal arteritis (HCC)      Neuropathy 2024     Chronic obstructive pulmonary disease with acute exacerbation (HCC) 2024     Gastroesophageal reflux disease 2024     Pancreatic insufficiency 04/15/2024     Hepatic steatosis 2024     Ascites 2024     Alcohol abuse 2024     Hypokalemia 2024     Nicotine abuse 2024     History of bleeding ulcers 2024     Aneurysm of descending thoracic aorta without rupture (HCC) 2024     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     Left bundle-branch block, unspecified 01/10/2020     PMR (polymyalgia rheumatica) (HCC) 2015       LOS (days): 5  Geometric Mean LOS (GMLOS) (days): 3.3  Days to GMLOS:-2.1     OBJECTIVE:  Risk of Unplanned Readmission Score: 19.21         Current admission status: Inpatient   Preferred Pharmacy:   [facility]/pharmacy #[id number] - [place], [place] - [address]  [address] [address]  Phone: [phone] Fax: [phone]    [facility] - [place] - [address]  [address]  [place] [address]  Phone:  [phone] Fax: [phone]    Primary Care Provider: Frank Lombardi, DO    Primary Insurance: MEDICARE  Secondary Insurance: [employer]    DISCHARGE DETAILS:    Discharge planning discussed with:: Patient and wife Diann  Freedom of Choice: Yes    Comments - Freedom of Choice: Patient's preference is to return home with [facility] services.  [facility] case and was reserved in [facility].      CM contacted family/caregiver?: Yes (Wife at bedside)  Were Treatment Team discharge recommendations reviewed with patient/caregiver?: Yes  Did patient/caregiver verbalize understanding of patient care needs?: Yes  Were patient/caregiver advised of the risks associated with not following Treatment Team discharge recommendations?: Yes    Contacts  Patient Contacts: Diann Ocampo (wife)  Relationship to Patient:: Family  Contact Method: In Person  Reason/Outcome: Discharge Planning    Requested Home Health Care         Is the patient interested in HHC at discharge?: Yes  Home Health Discipline requested:: Occupational Therapy, Physical Therapy, Nursing  [facility] External Referral Reason (only applicable if external HHA name selected): Services not provided in network or near patient location  Home Health Follow-Up Provider:: PCP  Home Health Services Needed:: Evaluate Functional Status and Safety, Gait/ADL Training, Strengthening/Theraputic Exercises to Improve Function, COPD Management, Heart Failure Management  Homebound Criteria Met:: Uses an Assist Device (i.e. cane, walker, etc), Requires the Assistance of Another Person for Safe Ambulation or to Leave the Home  Supporting Clincal Findings:: Fatigues Easliy in Short Distances, Limited Endurance    DME Referral Provided  Referral made for DME?: No    Other Referral/Resources/Interventions Provided:  Interventions: [facility]    Would you like to participate in our [facility] service program?  : No - Declined    Treatment Team  Recommendation: Home with Home Health Care  Discharge Destination Plan:: Home with Home Health Care  Transport at Discharge : Family

## 2025-04-17 NOTE — ASSESSMENT & PLAN NOTE
WA protocol there is no evidence of withdrawal  Alcohol cessation discussed, patient is motivated to quit  Continue thiamine folic acid and multivitamin

## 2025-04-17 NOTE — ASSESSMENT & PLAN NOTE
Suspect due to alcohol abuse, poor oral intake, chronic diarrhea nausea vomiting.   UA was unremarkable  PT /OT evaluation and treatment-improving, initially considered rehab but patient will be discharged home with PT/OT with ongoing improvement  Fall precautions

## 2025-04-17 NOTE — TELEPHONE ENCOUNTER
First attempt to reach Les. Message left to call back on 4/17/25 so I can schedule a TCM visit for him Christy

## 2025-04-17 NOTE — ASSESSMENT & PLAN NOTE
-Patient noted to have some sigmoid colon thickening.  Suspect this is secondary to colopathy from fluid overload.  Had colonoscopy last year without any signs of colitis.

## 2025-04-17 NOTE — ASSESSMENT & PLAN NOTE
Highly suspicious for cirrhosis in the setting of thrombocytopenia with hypoalbuminemia with pleural effusions and ascites  CT showed enlarged fatty liver which he is known to have and nonspecific wall thickening of the gallbladder.  Right upper quadrant ultrasound with diffusely increased hepatic echogenicity consistent with hepatic steatosis and cannot exclude cirrhosis with diffuse gallbladder wall thickening  Patient has no formal diagnosis of cirrhosis but most likely patient has decompensated cirrhosis  [facility]-patient less likely has acute alcoholic hepatitis and most likely decompensated cirrhosis.  GI recommending to hold off on steroids at the current time  Ultrasound limited on 4/15-small amount of ascites  MELD 3.0: 15 at 4/17/2025  5:25 AM  MELD-Na: 11 at 4/17/2025  5:25 AM  Calculated from:  Serum Creatinine: 0.89 mg/dL (Using min of 1 mg/dL) at 4/17/2025  5:25 AM  Serum Sodium: 133 mmol/L at 4/17/2025  5:25 AM  Total Bilirubin: 2.34 mg/dL at 4/17/2025  5:25 AM  Serum Albumin: 2.4 g/dL at 4/17/2025  5:25 AM  INR(ratio): 1.13 at 4/16/2025  1:03 PM  Age at listing (hypothetical): 70 years  Sex: Male at 4/17/2025  5:25 AM     Continue to monitor MELD labs  Monitor intake output, fluid and salt restriction  Continue diuretics as tolerated, monitor response  Midodrine for blood pressure support  Will require close monitoring of renal function and electrolytes in setting of diarrhea diuretic use and marginal blood pressure  GI follow-up  Outpatient elastography  Alcohol cessation

## 2025-04-17 NOTE — PROGRESS NOTES
Progress Note - Gastroenterology   Name: Del Ocampo 70 y.o. male I MRN: [id number]  Unit/Bed#: [address] [address]01 I Date of Admission: 4/11/2025   Date of Service: 4/17/2025 I Hospital Day: 5    Assessment & Plan  Elevated LFTs  MELD 3.0: 15 at 4/17/2025  5:25 AM  MELD-Na: 11 at 4/17/2025  5:25 AM  Calculated from:  Serum Creatinine: 0.89 mg/dL (Using min of 1 mg/dL) at 4/17/2025  5:25 AM  Serum Sodium: 133 mmol/L at 4/17/2025  5:25 AM  Total Bilirubin: 2.34 mg/dL at 4/17/2025  5:25 AM  Serum Albumin: 2.4 g/dL at 4/17/2025  5:25 AM  INR(ratio): 1.13 at 4/16/2025  1:03 PM  Age at listing (hypothetical): 70 years  Sex: Male at 4/17/2025  5:25 AM  [facility]'s [facility] discrimin[facility] is: 11.5 which indicates low short-term mortality and treatment with glucocorticoids is not indicated.    -highly suspicious for cirrhosis in the setting of thrombocytopenia, pleural effusions, ascites, low albumin, and elevated INR.  Both ultrasound and CT scan is showing an enlarged fatty liver.  -Patient would benefit from outpatient follow-up with hepatology and a possible elastography to confirm cirrhosis  -No signs of any liver lesions on imaging at this time  -Pt started on diuretics, switched to torsemide 20mg daily, Aldactone 50mg BID. Also started on Midodrine 5mg TID. Tolerating well so far.   -US limited repeated with only small ascites noted  -Recommend 2g low sodium diet  -Patient has had longstanding alcohol abuse, technically Madrey discriminant factor is 34.9 but this is less likely acute alcoholic hepatitis and most likely more related to decompensated cirrhosis.  Can hold off on steroids for alcoholic hepatitis is at this time unless he would significantly worsen  - No signs of asterixis on exam.  Patient is alert  - Continue to monitor MELD labs closely  - No history of esophageal varices on EGD in 2024.  -follow up outpatient at hepatology clinic    Alcohol abuse  -recommend alcohol cessation  -Again discussed fatal  complications of continued alcohol use  Hepatic steatosis  -as above   Abnormal CT scan  -Patient noted to have some sigmoid colon thickening.  Suspect this is secondary to colopathy from fluid overload.  Had colonoscopy last year without any signs of colitis.   Chronic diarrhea  Has had workup for this in the past & pt denies any worsening prior to admission. C.diff, enteric panel negative this admission.  Fecal elastase . Random colon bx negative for microscopic colitis 3/28/24. Per patient's wife stool has more consistency and is now sometimes formed, had 7 BM yesterday which was improved significantly from before.    - Continue Metamucil BID  - Continue Creon 36,000 units before meals based on weight  -Continue imodium PRN which he was taking at home  -f/u fecal calprotectin, celiac panel, O+P outpatient  -Monitor stool output  -Continue outpatient follow up    Hypokalemia    Right inguinal pain          Subjective   Pt with wife at bedside eating lunch. Continues with good appetite. Had a OT dose Albumin/IV lasix 40 yesterday and had a good amount of urine output. Unfortunately fell while going to the bathroom but didn't sustain any injuries. Orthostatics normal. Renal function/lytes remain normal.  Will be going home with home services. Discussed again the fatal consequences of continued alcohol use & encouraged purging the house of all alcohol.     Objective :  Temp:  [97.2 °F (36.2 °C)-97.9 °F (36.6 °C)] 97.7 °F (36.5 °C)  HR:  [60-73] 73  BP: (104-125)/(55-65) 110/55  Resp:  [17-21] 17  SpO2:  [88 %-96 %] 96 %    Physical Exam  Vitals and nursing note reviewed.   Constitutional:       General: He is not in acute distress.     Appearance: He is well-developed.   HENT:      Head: Normocephalic and atraumatic.   Eyes:      Conjunctiva/sclera: Conjunctivae normal.   Cardiovascular:      Rate and Rhythm: Normal rate and regular rhythm.      Heart sounds: No murmur heard.  Pulmonary:      Effort: Pulmonary  effort is normal. No respiratory distress.      Breath sounds: Normal breath sounds.   Abdominal:      Palpations: Abdomen is soft.      Tenderness: There is no abdominal tenderness.   Musculoskeletal:         General: No swelling.      Cervical back: Neck supple.   Skin:     General: Skin is warm and dry.      Capillary Refill: Capillary refill takes less than 2 seconds.   Neurological:      Mental Status: He is alert and oriented to person, place, and time.   Psychiatric:         Mood and Affect: Mood normal.

## 2025-04-17 NOTE — DISCHARGE INSTR - AVS FIRST PAGE
Monitor daily weight    Low-salt diet and fluid restriction 2000 cc    Continue current diuretic dose  Follow-up labs CBC, CMP, PT/INR in 1 week to monitor liver, kidney function, electrolytes and hemoglobin    Schedule follow-up with GI/hepatology after discharge    Complete alcohol cessation      Follow-up pending stool studies, celiac panel, fecal calprotectin, ova and parasite

## 2025-04-17 NOTE — ASSESSMENT & PLAN NOTE
MELD 3.0: 15 at 4/17/2025  5:25 AM  MELD-Na: 11 at 4/17/2025  5:25 AM  Calculated from:  Serum Creatinine: 0.89 mg/dL (Using min of 1 mg/dL) at 4/17/2025  5:25 AM  Serum Sodium: 133 mmol/L at 4/17/2025  5:25 AM  Total Bilirubin: 2.34 mg/dL at 4/17/2025  5:25 AM  Serum Albumin: 2.4 g/dL at 4/17/2025  5:25 AM  INR(ratio): 1.13 at 4/16/2025  1:03 PM  Age at listing (hypothetical): 70 years  Sex: Male at 4/17/2025  5:25 AM  [facility] discrimin[facility] is: 11.5 which indicates low short-term mortality and treatment with glucocorticoids is not indicated.    -highly suspicious for cirrhosis in the setting of thrombocytopenia, pleural effusions, ascites, low albumin, and elevated INR.  Both ultrasound and CT scan is showing an enlarged fatty liver.  -Patient would benefit from outpatient follow-up with hepatology and a possible elastography to confirm cirrhosis  -No signs of any liver lesions on imaging at this time  -Pt started on diuretics, switched to torsemide 20mg daily, Aldactone 50mg BID. Also started on Midodrine 5mg TID. Tolerating well so far.   -US limited repeated with only small ascites noted  -Recommend 2g low sodium diet  -Patient has had longstanding alcohol abuse, technically Madrey discriminant factor is 34.9 but this is less likely acute alcoholic hepatitis and most likely more related to decompensated cirrhosis.  Can hold off on steroids for alcoholic hepatitis is at this time unless he would significantly worsen  - No signs of asterixis on exam.  Patient is alert  - Continue to monitor MELD labs closely  - No history of esophageal varices on EGD in 2024.  -follow up outpatient at hepatology clinic

## 2025-04-17 NOTE — ASSESSMENT & PLAN NOTE
Wife reported that she felt like patient is going to die on the day of admission.  Patient is having frequent hospitalization and patient was not going to quit alcohol.  Wife would prefer patient to be comfortable and was questioning about hospice initially during hospitalization  Discussed with wife by previous provider that patient might not be a candidate for hospice but can provide palliative care referral due to multiple comorbid conditions and continued alcohol use.  Wife would also like some resources at home.  Will refer to palliative care on discharge

## 2025-04-17 NOTE — ASSESSMENT & PLAN NOTE
[facility] protocol  Alcohol cessation  Continue thiamine folic acid and multivitamin

## 2025-04-17 NOTE — INCIDENTAL FINDINGS
The following findings require follow up:  Radiographic finding   Finding:   CT scan and ultrasound of the liver revealed  Diffusely increased hepatic echogenicity, consistent with hepatic steatosis. Given the associated ascites, hepatic fibrosis or cirrhosis cannot be excluded. In addition, this appearance decreases the sensitivity for detecting subtle focal lesions.    Further evaluation by ultrasound elastography should be considered. Given the markedly heterogeneous appearance on the prior CT, a baseline gadolinium enhanced MRI could be considered for further evaluation.   (Evidence of fatty liver and likely cirrhosis of liver)  Abnormal wall thickening of sigmoid colon  Nonspecific thickening of gallbladder likely secondary to edema  4.5 cm fusiform ectasia of ascending thoracic aorta-will require repeat CT scan in 1 year to monitor   Follow up required: Yes,  Further liver imaging with MRI and ultrasound elastography as outpatient .    Please notify the following clinician to assist with the follow up:   Dr. Frank Lombardi, DO and gastroenterology    Incidental finding results were discussed with the Patient and Patient's POA by Romy Rivera MD on 04/17/25.   They expressed understanding and all questions answered.

## 2025-04-17 NOTE — NURSING NOTE
Pt discharged home. All pt education completed and verbal understanding given. All pt belongings given to pt.

## 2025-04-17 NOTE — OCCUPATIONAL THERAPY NOTE
"OT TREATMENT       04/17/25 0948   OT Last Visit   OT Visit Date 04/17/25   Note Type   Note Type Treatment   Pain Assessment   Pain Assessment Tool 0-10   Pain Score No Pain   Restrictions/Precautions   Other Precautions Chair Alarm;Bed Alarm;Pain;Fall Risk   ADL   LB Dressing Assistance 5  Supervision/Setup   LB Dressing Deficit Supervision/safety   LB Dressing Comments donning pants   Toileting Assistance    (denied toileting needs)   Bed Mobility   Supine to Sit 5  Supervision   Additional items Assist x 1;Verbal cues;Increased time required;HOB elevated;Bedrails   Sit to Supine 5  Supervision   Additional items Assist x 1;Verbal cues;Increased time required;HOB elevated   Transfers   Sit to Stand 5  Supervision   Additional items Assist x 1;Verbal cues   Stand to Sit 5  Supervision   Additional items Assist x 1;Verbal cues   Stand pivot 5  Supervision   Additional items Assist x 1;Verbal cues   Toilet transfer 5  Supervision   Additional items Assist x 1;Verbal cues   Functional Mobility   Functional Mobility 5  Supervision   Additional Comments engaged in fxl mobility household distances using RW   Additional items Rolling walker   Subjective   Subjective \"I think I'm going home today.\"   Cognition   Overall Cognitive Status WFL   Arousal/Participation Alert;Cooperative   Attention Within functional limits   Orientation Level Oriented X4   Following Commands Follows all commands and directions without difficulty   Activity Tolerance   Activity Tolerance Patient tolerated treatment well   Assessment   Assessment Pt seen for OT treatment session. Pt making excellent progress since initial eval. Pt able to perform bed mobility, transfers and fxl mob using RW with supervision. Pt able to complete LB ADLs with supervision. Pt with improved activity tolerance allowing for greater independence with fxl tasks. Pt would benefit from cont OT services to maximize safety and fxl performance of ADLs. Recommend level III " minimum resource intensity when medically cleared for d/c.   The patient's raw score on the AM-PAC Daily Activity Inpatient Short Form is 21. A raw score of greater than or equal to 19 suggests the patient may benefit from discharge to home. Please refer to the recommendation of the Occupational Therapist for safe discharge planning.   Plan   Treatment Interventions ADL retraining;Functional transfer training;UE strengthening/ROM;Endurance training;Equipment evaluation/education;Patient/family training;Activityengagement;Compensatory technique education   OT Frequency 3-5x/wk   Discharge Recommendation   Rehab Resource Intensity Level, OT III (Minimum Resource Intensity)   AM-PAC Daily Activity Inpatient   Lower Body Dressing 3   Bathing 3   Toileting 3   Upper Body Dressing 4   Grooming 4   Eating 4   Daily Activity Raw Score 21   Daily Activity Standardized Score (Calc for Raw Score >=11) 44.27   AM-PAC Applied Cognition Inpatient   Following a Speech/Presentation 3   Understanding Ordinary Conversation 4   Taking Medications 3   Remembering Where Things Are Placed or Put Away 3   Remembering List of 4-5 Errands 3   Taking Care of Complicated Tasks 3   Applied Cognition Raw Score 19   Applied Cognition Standardized Score 39.77     Trice Esquivel OTR/L   [place] License # [id number]  [place] License # [id number]

## 2025-04-18 LAB — CALPROTECTIN STL-MCNC: 62.2 ÂΜG/G

## 2025-04-25 ENCOUNTER — RESULTS FOLLOW-UP (OUTPATIENT)
Age: 70
End: 2025-04-25

## 2025-04-28 ENCOUNTER — TELEPHONE (OUTPATIENT)
Dept: FAMILY MEDICINE CLINIC | Facility: CLINIC | Age: 70
End: 2025-04-28

## 2025-04-28 DIAGNOSIS — D64.9 ANEMIA: ICD-10-CM

## 2025-04-28 DIAGNOSIS — F10.10 ALCOHOL ABUSE: ICD-10-CM

## 2025-04-28 DIAGNOSIS — K21.9 GASTROESOPHAGEAL REFLUX DISEASE: ICD-10-CM

## 2025-04-28 DIAGNOSIS — K52.9 CHRONIC DIARRHEA: ICD-10-CM

## 2025-04-28 DIAGNOSIS — R18.8 ASCITES: ICD-10-CM

## 2025-04-28 DIAGNOSIS — I50.42 CHRONIC COMBINED SYSTOLIC AND DIASTOLIC CONGESTIVE HEART FAILURE (HCC): ICD-10-CM

## 2025-04-28 DIAGNOSIS — G62.9 NEUROPATHY: ICD-10-CM

## 2025-04-28 DIAGNOSIS — K76.0 HEPATIC STEATOSIS: ICD-10-CM

## 2025-04-28 RX ORDER — GABAPENTIN 100 MG/1
100 CAPSULE ORAL 3 TIMES DAILY
Qty: 270 CAPSULE | Refills: 1 | Status: SHIPPED | OUTPATIENT
Start: 2025-04-28 | End: 2025-10-25

## 2025-04-28 RX ORDER — GAUZE BANDAGE 2" X 2"
100 BANDAGE TOPICAL DAILY
Qty: 90 TABLET | Refills: 1 | Status: SHIPPED | OUTPATIENT
Start: 2025-04-28

## 2025-04-28 RX ORDER — MIDODRINE HYDROCHLORIDE 5 MG/1
5 TABLET ORAL
Qty: 90 TABLET | Refills: 0 | Status: SHIPPED | OUTPATIENT
Start: 2025-04-28

## 2025-04-28 RX ORDER — SPIRONOLACTONE 50 MG/1
50 TABLET, FILM COATED ORAL 2 TIMES DAILY
Qty: 180 TABLET | Refills: 1 | Status: SHIPPED | OUTPATIENT
Start: 2025-04-28

## 2025-04-28 RX ORDER — FAMOTIDINE 20 MG/1
20 TABLET, FILM COATED ORAL
Qty: 90 TABLET | Refills: 1 | Status: SHIPPED | OUTPATIENT
Start: 2025-04-28

## 2025-04-28 RX ORDER — CARVEDILOL 3.12 MG/1
3.12 TABLET ORAL 2 TIMES DAILY WITH MEALS
Qty: 180 TABLET | Refills: 1 | Status: SHIPPED | OUTPATIENT
Start: 2025-04-28

## 2025-04-28 RX ORDER — TORSEMIDE 20 MG/1
20 TABLET ORAL DAILY
Qty: 90 TABLET | Refills: 1 | Status: SHIPPED | OUTPATIENT
Start: 2025-04-28

## 2025-04-28 RX ORDER — FOLIC ACID 1 MG/1
1 TABLET ORAL DAILY
Qty: 90 TABLET | Refills: 1 | Status: SHIPPED | OUTPATIENT
Start: 2025-04-28

## 2025-04-28 NOTE — TELEPHONE ENCOUNTER
Wife is on the phone, they can't make todays appointment. Wife would like to know if he can have refills on everything except metamucill, immodium, kenolog and albuterol. Wife doesn't know what to do she physically can't get him out of the house. Can she have a referral for in house physical therapy.   Vicki Velazco MA

## 2025-04-28 NOTE — TELEPHONE ENCOUNTER
Pt has an oprder in the chart on 4/17 for home care they can to do that for physical therapy - after the nurses evaluate him  Pt also has an order in the chart for palliattive care - they should also follow up with that appt.   If the pt is home bound - would he qualify for the NP that does the home visits on discharge from the hospital?    Meds sent - the best I can tell

## 2025-05-06 ENCOUNTER — TELEPHONE (OUTPATIENT)
Dept: FAMILY MEDICINE CLINIC | Facility: CLINIC | Age: 70
End: 2025-05-06

## 2025-05-06 NOTE — TELEPHONE ENCOUNTER
Dr Lombardi sent [employer] message to patient that form is ready to be picked up. Scanned into media and placed in patient . NFA

## 2025-06-11 ENCOUNTER — TELEPHONE (OUTPATIENT)
Age: 70
End: 2025-06-11

## 2025-06-11 DIAGNOSIS — R18.8 OTHER ASCITES: ICD-10-CM

## 2025-06-11 DIAGNOSIS — J44.1 CHRONIC OBSTRUCTIVE PULMONARY DISEASE WITH ACUTE EXACERBATION (HCC): ICD-10-CM

## 2025-06-11 DIAGNOSIS — I50.42 CHRONIC COMBINED SYSTOLIC AND DIASTOLIC CONGESTIVE HEART FAILURE (HCC): ICD-10-CM

## 2025-06-11 DIAGNOSIS — K76.0 HEPATIC STEATOSIS: ICD-10-CM

## 2025-06-11 DIAGNOSIS — K86.89 PANCREATIC INSUFFICIENCY: ICD-10-CM

## 2025-06-11 DIAGNOSIS — K74.69 OTHER CIRRHOSIS OF LIVER (HCC): Primary | ICD-10-CM

## 2025-06-11 NOTE — TELEPHONE ENCOUNTER
Was thgis discussed at the skilled nursing facility, was a hospice order pl;belinda.  If not do they have a particular hospice company in mind.

## 2025-06-11 NOTE — TELEPHONE ENCOUNTER
Bekah calling from [facility] stating that patient is being discharged from skilled nursing and wife is interested in perusing hospice for patient. Please call Bekah if you have further questions

## 2025-06-19 NOTE — TELEPHONE ENCOUNTER
I put the orders in the chart, if he needs a face to face for medicare he can do a virtual if needed.

## 2025-06-19 NOTE — TELEPHONE ENCOUNTER
Spoke with patients wife, she will let us know if a virtual is needed. As of right now no further action.  Vicki Velazco MA

## 2025-06-26 DIAGNOSIS — D64.9 ANEMIA: ICD-10-CM

## 2025-06-26 RX ORDER — PANTOPRAZOLE SODIUM 40 MG/1
TABLET, DELAYED RELEASE ORAL
Qty: 90 TABLET | Refills: 1 | Status: SHIPPED | OUTPATIENT
Start: 2025-06-26

## 2025-07-29 ENCOUNTER — TELEPHONE (OUTPATIENT)
Age: 70
End: 2025-07-29